# Patient Record
Sex: MALE | Race: WHITE | NOT HISPANIC OR LATINO | Employment: OTHER | URBAN - METROPOLITAN AREA
[De-identification: names, ages, dates, MRNs, and addresses within clinical notes are randomized per-mention and may not be internally consistent; named-entity substitution may affect disease eponyms.]

---

## 2017-01-10 DIAGNOSIS — G25.81 RESTLESS LEGS SYNDROME (RLS): Primary | ICD-10-CM

## 2017-01-11 RX ORDER — CARBIDOPA AND LEVODOPA 25; 100 MG/1; MG/1
TABLET ORAL
Qty: 90 TABLET | Refills: 1 | Status: SHIPPED | OUTPATIENT
Start: 2017-01-11 | End: 2017-08-26

## 2017-01-11 NOTE — TELEPHONE ENCOUNTER
CARBIDOPA/LEVODOPA 25-100MG TABS     Last Written Prescription Date: 11/10/16  Last Fill Quantity: 30, # refills: 1  Last Office Visit with [facility], [facility] or [facility] prescribing provider: 11/10/16   Next 5 appointments (look out 90 days)     Jan 17, 2017  1:45 PM   [facility] [address] [address] [phone]                   BP Readings from Last 3 Encounters:   11/10/16 112/64   07/09/15 116/72   01/15/15 126/80

## 2017-01-17 ENCOUNTER — OFFICE VISIT (OUTPATIENT)
Dept: FAMILY MEDICINE | Facility: CLINIC | Age: 52
End: 2017-01-17
Payer: COMMERCIAL

## 2017-01-17 VITALS
HEART RATE: 90 BPM | TEMPERATURE: 97.7 F | SYSTOLIC BLOOD PRESSURE: 128 MMHG | OXYGEN SATURATION: 96 % | DIASTOLIC BLOOD PRESSURE: 78 MMHG | HEIGHT: 63 IN | RESPIRATION RATE: 16 BRPM | WEIGHT: 138.4 LBS | BODY MASS INDEX: 24.52 KG/M2

## 2017-01-17 DIAGNOSIS — N40.1 BENIGN PROSTATIC HYPERPLASIA WITH LOWER URINARY TRACT SYMPTOMS, UNSPECIFIED MORPHOLOGY: Primary | ICD-10-CM

## 2017-01-17 PROCEDURE — 99214 OFFICE O/P EST MOD 30 MIN: CPT | Performed by: FAMILY MEDICINE

## 2017-01-17 RX ORDER — TAMSULOSIN HYDROCHLORIDE 0.4 MG/1
0.4 CAPSULE ORAL DAILY
Qty: 90 CAPSULE | Refills: 3 | Status: SHIPPED | OUTPATIENT
Start: 2017-01-17 | End: 2017-12-21

## 2017-01-17 RX ORDER — FINASTERIDE 5 MG/1
5 TABLET, FILM COATED ORAL DAILY
Qty: 90 TABLET | Refills: 1 | Status: SHIPPED | OUTPATIENT
Start: 2017-01-17 | End: 2017-12-21

## 2017-01-17 RX ORDER — TAMSULOSIN HYDROCHLORIDE 0.4 MG/1
0.4 CAPSULE ORAL DAILY
Qty: 30 CAPSULE | Refills: 11 | Status: SHIPPED | OUTPATIENT
Start: 2017-01-17 | End: 2017-01-17

## 2017-01-17 NOTE — PROGRESS NOTES
SUBJECTIVE:                                                    Giovanni Cedeno is a 51 year old male who presents to clinic today for the following health issues:  Health Maintenance Due   Topic Date Due     HEPATITIS C SCREENING  04/22/1983     Health Maintenance reviewed at today's visit patient asked to schedule/complete:   None, Health Maintenance up to date.    Genitourinary symptoms      Duration: years, getting worse    Description:  nocturia x 4    Intensity:  moderate    Accompanying signs and symptoms (fever/discharge/nausea/vomiting/back or abdominal pain):  None    History (frequent UTI's/kidney stones/prostate problems): None  Sexually active: no     Precipitating or alleviating factors: doesn't drink many fluids at night    Therapies tried and outcome: none   Outcome: na         .  Current Outpatient Prescriptions   Medication Sig Dispense Refill     tamsulosin (FLOMAX) 0.4 MG capsule Take 1 capsule (0.4 mg) by mouth daily 90 capsule 3     finasteride (PROSCAR) 5 MG tablet Take 1 tablet (5 mg) by mouth daily 90 tablet 1     carbidopa-levodopa (SINEMET)  MG per tablet TAKE 1 TABLET BY MOUTH AT BEDTIME 90 tablet 1     ketoconazole (NIZORAL) 2 % cream Apply topically 2 times daily 45 g 1     ibuprofen (ADVIL,MOTRIN) 600 MG tablet Take 1 tablet (600 mg) by mouth every 6 hours as needed for moderate pain 90 tablet 3     simvastatin (ZOCOR) 20 MG tablet TAKE 1 TABLET BY MOUTH EVERY DAY AT BEDTIME 30 tablet 11     gabapentin (NEURONTIN) 300 MG capsule TAKE 2 CAPSULES BY MOUTH EVERY NIGHT AT BEDTIME 60 capsule 11     mirtazapine (REMERON) 15 MG tablet TAKE 1 TABLET BY MOUTH EVERY NIGHT AT BEDTIME 30 tablet 4        No Known Allergies    Immunization History   Administered Date(s) Administered     Influenza (IIV3) 10/01/2013, 09/20/2014     Influenza Vaccine IM 3yrs+ 4 Valent IIV4 11/10/2016     Zoster vaccine, live 01/09/2015         reports that he does not drink alcohol.      reports that he does  "not use illicit drugs.    family history includes DIABETES in his father; HEART DISEASE in his father.    indicated that his mother is alive. He indicated that his father is alive.      has no past surgical history on file.     reports that he does not engage in sexual activity.  .  Pediatric History   Patient Guardian Status     Mother:  Rae Cedeno     Other Topics Concern     Parent/Sibling W/ Cabg, Mi Or Angioplasty Before 65f 55m? Yes     Social History Narrative         reports that he has never smoked. He has never used smokeless tobacco.    Medical, social, surgical, and family histories reviewed.    /78 mmHg  Pulse 90  Temp(Src) 97.7  F (36.5  C) (Tympanic)  Resp 16  Ht 5' 2.5\" (1.588 m)  Wt 138 lb 6.4 oz (62.778 kg)  BMI 24.89 kg/m2  SpO2 96%    Body mass index is 24.89 kg/(m^2).    Problem list, Medication list, Allergies, and Medical/Social/Surgical histories reviewed in [facility] and updated as appropriate.  Labs reviewed in [facility]  Patient Active Problem List   Diagnosis     Restless leg syndrome     Knee bursitis     Pure hypercholesterolemia     Major depression in partial remission (H)     Pre-diabetes     Epicondylitis, lateral humeral     Immunization reaction     Chronic pain syndrome     Chronic right shoulder pain     History reviewed. No pertinent past surgical history.    Social History   Substance Use Topics     Smoking status: Never Smoker      Smokeless tobacco: Never Used     Alcohol Use: No     Family History   Problem Relation Age of Onset     DIABETES Father      HEART DISEASE Father          No Known Allergies  Recent Labs   Lab Test  11/10/16   0945  07/09/15   1013  03/11/14   1047  05/08/13   0924   A1C   --   5.4  5.8  6.3*   LDL  167*   --   218*  150*   HDL  53   --   48  44   TRIG  173*   --   325*  184*   ALT  29   --   29  35   CR  0.93   --    --   1.00   GFRESTIMATED  85   --    --   79   GFRESTBLACK  >90   --    --   >90   POTASSIUM   --    --    --   5.0    " "    BP Readings from Last 6 Encounters:   01/17/17 128/78   11/10/16 112/64   07/09/15 116/72   01/15/15 126/80   01/09/15 122/74   11/07/14 118/74       Wt Readings from Last 3 Encounters:   01/17/17 138 lb 6.4 oz (62.778 kg)   11/10/16 132 lb (59.875 kg)   11/10/16 131 lb 3.2 oz (59.512 kg)         Positive symptoms or findings indicated by bold designation:     ROS: 10 point ROS neg other than the symptoms noted above in the HPI.except  has Restless leg syndrome; Knee bursitis; Pure hypercholesterolemia; Major depression in partial remission (H); Pre-diabetes; Epicondylitis, lateral humeral; Immunization reaction; Chronic pain syndrome; and Chronic right shoulder pain on his problem list.      CHRONIC PAIN SYNDROME     LDL OR \"BAD\" CHOLESTEROL  GOAL < 100     DEPRESSION IMPROVED    RIGHT LATERAL EPICONDYLIITIS    Constitutional: The patient denied fatigue, fever, insomnia, night sweats, recent illness and weight loss.  NORMAL     Eyes: The patient denied blindness, eye pain, eye tearing, photophobia, vision change and visual disturbance. NORMAL     Ears/Nose/Throat/Neck: The patient denied dizziness, facial pain, hearing loss, nasal discharge, oral pain, otalgia, postnasal drip, sinus congestion, sore throat, tinnitus and voice change.   NORMAL     Cardiovascular: The patient denied arrhythmia, chest pain/pressure, claudication, edema, exercise intolerance, fatigue, orthopnea, palpitations and syncope.  NORMAL     Respiratory: The patient denied asthma, chest congestion, cough, dyspnea on exertion, dyspnea/shortness of breath, hemoptysis, pedal edema, pleuritic pain, productive sputum, snoring and wheezing. NL    Gastrointestinal: The patient denied abdominal pain, anorexia, constipation, diarrhea, dysphagia, gastroesophageal reflux, hematochezia, hemorrhoids, melena, nausea and vomiting .WNL     Genitourinary/Nephrology: The patient denied breast complaint, dysuria, nocturia sexual dysfunction, t, urinary " "frequency, urinary incontinence, urinary urgency   DYSURIA   NOCUTURIA AND FREQUENCY     Musculoskeletal: The patient denied arthralgia(s), back pain, joint complaint, muscle weakness, myalgias, osteoporosis, sciatica, stiffness and swelling.  RIGHT SHOULDER PAIN     Dermatoligic:: The patient denied acne, dermatitis, ecchymosis, itching, mole change, rash, skin cancer, skin lesion and sores.      Neurologic: The patient denied dizziness, gait abnormality, headache, memory loss, mental status change, paresis, paresthesia, seizure, syncope, tremor and vision change.      Psychiatric: The patient denied anxiety, depression, disturbances of memory, drug abuse, insomnia, mood swings and relationship difficulties.  NORMAL     Endocrine: The patient denied , goiter, obesity, polyuria and thyroid disease.  NORMAL     Hematologic/Lymphatic: The patient denied abnormal bleeding and bruising, abnormal ecchymoses, anemia, lymph node enlargement/mass, petechiae and venous  Thrombosis.  NORMAL     Allergy/Immunology: The patient denied food allergy and  Allergic rhinitis or conjunctivitis.  NORMAL       PE:  /78 mmHg  Pulse 90  Temp(Src) 97.7  F (36.5  C) (Tympanic)  Resp 16  Ht 5' 2.5\" (1.588 m)  Wt 138 lb 6.4 oz (62.778 kg)  BMI 24.89 kg/m2  SpO2 96% Body mass index is 24.89 kg/(m^2).    Constitutional: general appearance, well nourished, well developed, in no acute distress, well developed, appears stated age, normal body habitus,  NORMAL     Eyes:; The patient has normal eyelids sclerae and conjunctivae : NORMAL      Ears/Nose/Throat: external ear, overall: normal appearance; external nose, overall: benign appearance, normal moujth gums and lips  The patient has: NORMAL     Neck: thyroid, overall: normal size, normal consistency, nontender,  NORMAL     Respiratory:  palpation of chest, overall: normal excursion, NORMAL   Clear to percussion and auscultation  NORMAL     Tachypnea  NORMAL  Color  NORMAL "     Cardiovascular:  Good color with no peripheral edema NORMAL   Regular sinus rhythm without murmur. Physiologic heart sounds Heart is unelarged  .   Chest/Breast: normal shape  NORMAL      Abdominal exam,  Liver and spleen are  unenlarged  NORMAL       Tenderness NORMAL   Scars  NORMAL     Urogenital; no renal, flank or bladder  tenderness;  NORMAL     Lymphatic: neck nodes,  NORMAL    Other notes NA     Musculoskeletal:  Brief ortho exam normal except:   IMPROVE EPICONDYLIITIS  AND SHOULDER RANGE OF MOTION WITHIN NORMAL LIMITS     Integument: inspection of skin, no rash, lesions; and, palpation, no induration, no tenderness.      Neurologic mental status, overall: alert and oriented; gait, no ataxia, no unsteadiness; coordination, no tremors; cranial nerves, overall: normal motor, overall: normal bulk, tone.      Psychiatric: orientation/consciousness, overall: oriented to person, place and time; behavior/psychomotor activity, no tics, normal psychomotor activity; mood and affect, overall: normal mood and affect; appearance, overall: well-groomed, good eye contact; speech, overall: normal quality, no aphasia and normal quality, quantity, intact.        ICD-10-CM    1. Benign prostatic hyperplasia with lower urinary tract symptoms, unspecified morphology N40.1 tamsulosin (FLOMAX) 0.4 MG capsule     finasteride (PROSCAR) 5 MG tablet     DISCONTINUED: tamsulosin (FLOMAX) 0.4 MG capsule        .    Side effects benefits and risks thoroughly discussed. .he may come in early if unimproved or getting worse          Importance of adhering to regimen discussed and if medications were dispensed, the importance of taking medications discussed and bringing in the medications after every visit for chronic problems         Please drink 2 glasses of water prior to meals and walk 15-30 minutes after meals    I spent  25 MINUTES SPENT  with patient discussing the following issues    The encounter diagnosis was Benign prostatic  hyperplasia with lower urinary tract symptoms, unspecified morphology. over half of which involved counseling and coordination of care.    Patient Instructions     (N40.1) Benign prostatic hyperplasia with lower urinary tract symptoms, unspecified morphology  (primary encounter diagnosis)    Comment:      Plan: tamsulosin (FLOMAX) 0.4 MG capsule, finasteride          (PROSCAR) 5 MG tablet, DISCONTINUED: tamsulosin          (FLOMAX) 0.4 MG capsule                Anika Reyes Jr., MD             Diet:  MEDITERRANEAN DIET     Exercise:  NORMAL   Exercises Range of motion, balance, isometric, and strengthening exercises 30 repetitions twice daily of involved joints      .ANIKA REYES MD 1/17/2017 7:27 PM  January 17, 2017

## 2017-01-17 NOTE — MR AVS SNAPSHOT
"              After Visit Summary   1/17/2017    Giovanni Cedeno    MRN: [id number]           Patient Information     Date Of Birth          1965        Visit Information        Provider Department      1/17/2017 1:45 PM Sony Velez MD [facility]        Today's Diagnoses     Benign prostatic hyperplasia with lower urinary tract symptoms, unspecified morphology    -  1       Care Instructions      (N40.1) Benign prostatic hyperplasia with lower urinary tract symptoms, unspecified morphology  (primary encounter diagnosis)    Comment:      Plan: tamsulosin (FLOMAX) 0.4 MG capsule, finasteride          (PROSCAR) 5 MG tablet, DISCONTINUED: tamsulosin          (FLOMAX) 0.4 MG capsule                Sony Velez Jr., MD             Follow-ups after your visit        Who to contact     If you have questions or need follow up information about today's clinic visit or your schedule please contact [facility] directly at [phone].  Normal or non-critical lab and imaging results will be communicated to you by MyChart, letter or phone within 4 business days after the clinic has received the results. If you do not hear from us within 7 days, please contact the clinic through [facility] [employer] [facility] or phone. If you have a critical or abnormal lab result, we will notify you by phone as soon as possible.  Submit refill requests through [employer] or call your pharmacy and they will forward the refill request to us. Please allow 3 business days for your refill to be completed.          Additional Information About Your Visit        [facility] [employer] Information     [employer] lets you send messages to your doctor, view your test results, renew your prescriptions, schedule appointments and more. To sign up, go to [email or web address] . Click on \"Log in\" on the left side of the screen, which will take you to the Welcome page. Then click on \"Sign up Now\" on the " "right side of the page.     You will be asked to enter the access code listed below, as well as some personal information. Please follow the directions to create your username and password.     Your access code is: [email or web address]  Expires: 2017  9:47 AM     Your access code will  in 90 days. If you need help or a new code, please call your [facility] clinic or [phone].        Care EveryWhere ID     This is your Care EveryWhere ID. This could be used by other organizations to access your [facility] medical records  [id number]        Your Vitals Were     Pulse Temperature Respirations Height BMI (Body Mass Index) Pulse Oximetry    90 97.7  F (36.5  C) (Tympanic) 16 5' 2.5\" (1.588 m) 24.89 kg/m2 96%       Blood Pressure from Last 3 Encounters:   17 128/78   11/10/16 112/64   07/09/15 116/72    Weight from Last 3 Encounters:   17 138 lb 6.4 oz (62.778 kg)   11/10/16 132 lb (59.875 kg)   11/10/16 131 lb 3.2 oz (59.512 kg)              Today, you had the following     No orders found for display         Today's Medication Changes          These changes are accurate as of: 17  2:57 PM.  If you have any questions, ask your nurse or doctor.               Start taking these medicines.        Dose/Directions    finasteride 5 MG tablet   Commonly known as:  PROSCAR   Used for:  Benign prostatic hyperplasia with lower urinary tract symptoms, unspecified morphology   Started by:  Sony Velez MD        Dose:  5 mg   Take 1 tablet (5 mg) by mouth daily   Quantity:  90 tablet   Refills:  1       tamsulosin 0.4 MG capsule   Commonly known as:  FLOMAX   Used for:  Benign prostatic hyperplasia with lower urinary tract symptoms, unspecified morphology   Started by:  Sony Velez MD        Dose:  0.4 mg   Take 1 capsule (0.4 mg) by mouth daily   Quantity:  90 capsule   Refills:  3            Where to get your medicines      These medications were sent to [id number] - " [place], [place] - [address] [address] [facility] [address] [facility] [address]  [address], [place] [address]    Hours:  24-hours Phone:  [phone]    - finasteride 5 MG tablet  - tamsulosin 0.4 MG capsule             Primary Care Provider Office Phone # Fax #    Sony Medina Velez [phone]       [facility] [address]  [facility] [address]        Thank you!     Thank you for choosing [facility]  for your care. Our goal is always to provide you with excellent care. Hearing back from our patients is one way we can continue to improve our services. Please take a few minutes to complete the written survey that you may receive in the mail after your visit with us. Thank you!             Your Updated Medication List - Protect others around you: Learn how to safely use, store and throw away your medicines at www.disposemymeds.org.          This list is accurate as of: 1/17/17  2:57 PM.  Always use your most recent med list.                   Brand Name Dispense Instructions for use    carbidopa-levodopa  MG per tablet    SINEMET    90 tablet    TAKE 1 TABLET BY MOUTH AT BEDTIME       finasteride 5 MG tablet    PROSCAR    90 tablet    Take 1 tablet (5 mg) by mouth daily       gabapentin 300 MG capsule    NEURONTIN    60 capsule    TAKE 2 CAPSULES BY MOUTH EVERY NIGHT AT BEDTIME       ibuprofen 600 MG tablet    ADVIL/MOTRIN    90 tablet    Take 1 tablet (600 mg) by mouth every 6 hours as needed for moderate pain       ketoconazole 2 % cream    NIZORAL    45 g    Apply topically 2 times daily       mirtazapine 15 MG tablet    REMERON    30 tablet    TAKE 1 TABLET BY MOUTH EVERY NIGHT AT BEDTIME       simvastatin 20 MG tablet    ZOCOR    30 tablet    TAKE 1 TABLET BY MOUTH EVERY DAY AT BEDTIME       tamsulosin 0.4 MG capsule    FLOMAX    90 capsule    Take 1 capsule (0.4 mg) by mouth daily

## 2017-01-17 NOTE — NURSING NOTE
"Chief Complaint   Patient presents with     Urinary Problem     nocturia       Initial /78 mmHg  Pulse 90  Temp(Src) 97.7  F (36.5  C) (Tympanic)  Resp 16  Ht 5' 2.5\" (1.588 m)  Wt 138 lb 6.4 oz (62.778 kg)  BMI 24.89 kg/m2  SpO2 96% Estimated body mass index is 24.89 kg/(m^2) as calculated from the following:    Height as of this encounter: 5' 2.5\" (1.588 m).    Weight as of this encounter: 138 lb 6.4 oz (62.778 kg).  BP completed using cuff size: regular  Marisol Phillips CMA      "

## 2017-02-06 ENCOUNTER — TELEPHONE (OUTPATIENT)
Dept: ORTHOPEDICS | Facility: CLINIC | Age: 52
End: 2017-02-06

## 2017-02-06 NOTE — TELEPHONE ENCOUNTER
Reason for Call:  Other call back    Detailed comments: Patient seen you in November of 2016 for right shoulder, wondering if its ok to go canoeing. Please advise.     Phone Number Patient can be reached at: Home number on file [phone] (home)    Best Time: any    Can we leave a detailed message on this number? YES    Call taken on 2/6/2017 at 11:12 AM by Martine Rodríguez

## 2017-02-06 NOTE — TELEPHONE ENCOUNTER
Called and left a vm for the patient letting him know he can do activities as tolerated, it is up to him. If it hurts he may want to refrain. I left our main office number.  Mildred Hernandez Certified Medical Assistant

## 2017-05-23 ENCOUNTER — RADIANT APPOINTMENT (OUTPATIENT)
Dept: GENERAL RADIOLOGY | Facility: CLINIC | Age: 52
End: 2017-05-23
Attending: FAMILY MEDICINE
Payer: COMMERCIAL

## 2017-05-23 ENCOUNTER — OFFICE VISIT (OUTPATIENT)
Dept: FAMILY MEDICINE | Facility: CLINIC | Age: 52
End: 2017-05-23
Payer: COMMERCIAL

## 2017-05-23 VITALS
DIASTOLIC BLOOD PRESSURE: 62 MMHG | TEMPERATURE: 97.2 F | OXYGEN SATURATION: 100 % | HEART RATE: 90 BPM | BODY MASS INDEX: 24.12 KG/M2 | SYSTOLIC BLOOD PRESSURE: 110 MMHG | RESPIRATION RATE: 16 BRPM | WEIGHT: 134 LBS

## 2017-05-23 DIAGNOSIS — R10.12 ABDOMINAL PAIN, LEFT UPPER QUADRANT: Primary | ICD-10-CM

## 2017-05-23 DIAGNOSIS — M54.9 UPPER BACK PAIN: ICD-10-CM

## 2017-05-23 DIAGNOSIS — M54.50 ACUTE MIDLINE LOW BACK PAIN WITHOUT SCIATICA: ICD-10-CM

## 2017-05-23 DIAGNOSIS — M79.674 PAIN OF TOE OF RIGHT FOOT: ICD-10-CM

## 2017-05-23 LAB
ALBUMIN SERPL-MCNC: 3.9 G/DL (ref 3.4–5)
ALBUMIN UR-MCNC: NEGATIVE MG/DL
ALP SERPL-CCNC: 72 U/L (ref 40–150)
ALT SERPL W P-5'-P-CCNC: 31 U/L (ref 0–70)
ANION GAP SERPL CALCULATED.3IONS-SCNC: 12 MMOL/L (ref 3–14)
APPEARANCE UR: CLEAR
AST SERPL W P-5'-P-CCNC: 17 U/L (ref 0–45)
BILIRUB SERPL-MCNC: 0.2 MG/DL (ref 0.2–1.3)
BILIRUB UR QL STRIP: NEGATIVE
BUN SERPL-MCNC: 14 MG/DL (ref 7–30)
CALCIUM SERPL-MCNC: 9.4 MG/DL (ref 8.5–10.1)
CHLORIDE SERPL-SCNC: 105 MMOL/L (ref 94–109)
CO2 SERPL-SCNC: 23 MMOL/L (ref 20–32)
COLOR UR AUTO: YELLOW
CREAT SERPL-MCNC: 0.78 MG/DL (ref 0.66–1.25)
ERYTHROCYTE [DISTWIDTH] IN BLOOD BY AUTOMATED COUNT: 12.4 % (ref 10–15)
ERYTHROCYTE [SEDIMENTATION RATE] IN BLOOD BY WESTERGREN METHOD: 6 MM/H (ref 0–20)
GFR SERPL CREATININE-BSD FRML MDRD: ABNORMAL ML/MIN/1.7M2
GLUCOSE SERPL-MCNC: 103 MG/DL (ref 70–99)
GLUCOSE UR STRIP-MCNC: NEGATIVE MG/DL
HCT VFR BLD AUTO: 50 % (ref 40–53)
HGB BLD-MCNC: 16.8 G/DL (ref 13.3–17.7)
HGB UR QL STRIP: NEGATIVE
KETONES UR STRIP-MCNC: NEGATIVE MG/DL
LEUKOCYTE ESTERASE UR QL STRIP: NEGATIVE
MCH RBC QN AUTO: 30.5 PG (ref 26.5–33)
MCHC RBC AUTO-ENTMCNC: 33.6 G/DL (ref 31.5–36.5)
MCV RBC AUTO: 91 FL (ref 78–100)
NITRATE UR QL: NEGATIVE
PH UR STRIP: 6 PH (ref 5–7)
PLATELET # BLD AUTO: 323 10E9/L (ref 150–450)
POTASSIUM SERPL-SCNC: 4.2 MMOL/L (ref 3.4–5.3)
PROT SERPL-MCNC: 7.6 G/DL (ref 6.8–8.8)
RBC # BLD AUTO: 5.51 10E12/L (ref 4.4–5.9)
SODIUM SERPL-SCNC: 140 MMOL/L (ref 133–144)
SP GR UR STRIP: 1.02 (ref 1–1.03)
URN SPEC COLLECT METH UR: NORMAL
UROBILINOGEN UR STRIP-ACNC: 0.2 EU/DL (ref 0.2–1)
WBC # BLD AUTO: 9.9 10E9/L (ref 4–11)

## 2017-05-23 PROCEDURE — 85027 COMPLETE CBC AUTOMATED: CPT | Performed by: FAMILY MEDICINE

## 2017-05-23 PROCEDURE — 72070 X-RAY EXAM THORAC SPINE 2VWS: CPT

## 2017-05-23 PROCEDURE — 85652 RBC SED RATE AUTOMATED: CPT | Performed by: FAMILY MEDICINE

## 2017-05-23 PROCEDURE — 81003 URINALYSIS AUTO W/O SCOPE: CPT | Performed by: FAMILY MEDICINE

## 2017-05-23 PROCEDURE — 99214 OFFICE O/P EST MOD 30 MIN: CPT | Performed by: FAMILY MEDICINE

## 2017-05-23 PROCEDURE — 36415 COLL VENOUS BLD VENIPUNCTURE: CPT | Performed by: FAMILY MEDICINE

## 2017-05-23 PROCEDURE — 80053 COMPREHEN METABOLIC PANEL: CPT | Performed by: FAMILY MEDICINE

## 2017-05-23 PROCEDURE — 72100 X-RAY EXAM L-S SPINE 2/3 VWS: CPT

## 2017-05-23 RX ORDER — HYDROCODONE BITARTRATE AND ACETAMINOPHEN 7.5; 325 MG/1; MG/1
1 TABLET ORAL EVERY 4 HOURS PRN
Qty: 60 TABLET | Refills: 0 | Status: SHIPPED | OUTPATIENT
Start: 2017-05-23 | End: 2017-10-16

## 2017-05-23 RX ORDER — CELECOXIB 200 MG/1
200 CAPSULE ORAL 2 TIMES DAILY PRN
Qty: 60 CAPSULE | Refills: 1 | Status: SHIPPED | OUTPATIENT
Start: 2017-05-23 | End: 2017-10-16

## 2017-05-23 RX ORDER — CYCLOBENZAPRINE HCL 5 MG
5 TABLET ORAL 3 TIMES DAILY PRN
Qty: 42 TABLET | Refills: 1 | Status: SHIPPED | OUTPATIENT
Start: 2017-05-23 | End: 2017-08-01

## 2017-05-23 NOTE — PROGRESS NOTES
NORMAL ERTHROCYTE SEDIMENTATION RATE IE INFLAMMATORY MARKER   NORMAL COMPLETE BLOOD PANEL WBCS RBCS AND PLATELETS   NORMAL URINE ANALYSIS   ANIKA REYES JR., MD

## 2017-05-23 NOTE — PROGRESS NOTES
SUBJECTIVE:                                                    Giovanni Cedeno is a 52 year old male who presents to clinic today for the following health issues:  Health Maintenance Due   Topic Date Due     HEPATITIS C SCREENING  04/22/1983     PHQ-9 Q6 MONTHS (NO INBASKET)  05/10/2017     Health Maintenance reviewed at today's visit patient asked to schedule/complete:   Depression:  Completed at today's visit.      Back Pain  UPPER MIDDLE BACK PAIN   AND LOWER MIDDLE BACK PAIN WITHOUT RADICULOPATHY X 3 DAYS   SEVERE AND GETTING WORSE  HISTORY OF RECURRENT EPISODES OF LOWER BACK PAIN   DOES HEAVY JOB WHICH REQUIRES LIFTING AND BENDING   ALREADY HAS SEEN CHIROPRACTOR with MIXED RESULTS      Duration: 2 days        Specific  dayscause: unknown    Description:   Location of pain: low back right and middle of back right  Character of pain: sharp, stabbing and constant  Pain radiation:none  New numbness or weakness in legs, not attributed to pain:  no     Intensity: At its worst 8/10    History:   Pain interferes with job: YES,   History of back problems: recurrent self limited episodes of low back pain in the past  Any previous MRI or X-rays: Yes--at unknown.  Date many years ago  Sees a specialist for back pain:  chiro yesterday, no change  Therapies tried without relief: chiropractor    Alleviating factors:   Improved by: none      Precipitating factors:  Worsened by: movement, has had a burning in stomach for the last few days    Functional and Psychosocial Screen (Demetrius STarT Back):              Accompanying Signs & Symptoms:  Risk of Fracture:  None  Risk of Cauda Equina:  None  Risk of Infection:  None  Risk of Cancer:  None  Risk of Ankylosing Spondylitis:  Onset at age <35, male, AND morning back stiffness. no                   Left upper quadrant   Epigastric pain x 4 days   Index toe right painful  X one week and getting worse   DIFFERENTIAL DIAGNOSIS DISCUSSION   SPLEEN RENAL BOWEL OR EPIGASTRIC PAIN  "    .ANIKA REYES MD .5/23/2017 8:43 AM .May 23, 2017    CURRENTLY ON TAMULOLSIN AND FINASTERIDE  FOR BENIGN PROSTATIC HYPERTROPHY SYMPTOMS   SINEMET FOR RESTLESS LEG SYNDROME   SIMVASTATIN FOR LDL OR \"BAD\" CHOLESTEROL  GOAL < 100   GABAPENTIN AND REMERON FOR PAIN AND SLEEP   .  Current Outpatient Prescriptions   Medication Sig Dispense Refill     HYDROcodone-acetaminophen (NORCO) 7.5-325 MG per tablet Take 1 tablet by mouth every 4 hours as needed for pain maximum  4  tablet(s) per day 60 tablet 0     celecoxib (CELEBREX) 200 MG capsule Take 1 capsule (200 mg) by mouth 2 times daily as needed for moderate pain 60 capsule 1     cyclobenzaprine (FLEXERIL) 5 MG tablet Take 1 tablet (5 mg) by mouth 3 times daily as needed for muscle spasms 42 tablet 1     tamsulosin (FLOMAX) 0.4 MG capsule Take 1 capsule (0.4 mg) by mouth daily 90 capsule 3     finasteride (PROSCAR) 5 MG tablet Take 1 tablet (5 mg) by mouth daily 90 tablet 1     carbidopa-levodopa (SINEMET)  MG per tablet TAKE 1 TABLET BY MOUTH AT BEDTIME 90 tablet 1     ketoconazole (NIZORAL) 2 % cream Apply topically 2 times daily 45 g 1     simvastatin (ZOCOR) 20 MG tablet TAKE 1 TABLET BY MOUTH EVERY DAY AT BEDTIME 30 tablet 11     gabapentin (NEURONTIN) 300 MG capsule TAKE 2 CAPSULES BY MOUTH EVERY NIGHT AT BEDTIME 60 capsule 11     mirtazapine (REMERON) 15 MG tablet TAKE 1 TABLET BY MOUTH EVERY NIGHT AT BEDTIME 30 tablet 4        No Known Allergies    Immunization History   Administered Date(s) Administered     Influenza (IIV3) 10/01/2013, 09/20/2014     Influenza Vaccine IM 3yrs+ 4 Valent IIV4 11/10/2016     Zoster vaccine, live 01/09/2015         reports that he does not drink alcohol.      reports that he does not use illicit drugs.    family history includes DIABETES in his father; HEART DISEASE in his father.    indicated that his mother is alive. He indicated that his father is alive.      has no past surgical history on file.     reports that he " does not engage in sexual activity.  .  Pediatric History   Patient Guardian Status     Mother:  Rae Cedeno     Other Topics Concern     Parent/Sibling W/ Cabg, Mi Or Angioplasty Before 65f 55m? Yes     Social History Narrative         reports that he has never smoked. He has never used smokeless tobacco.    Medical, social, surgical, and family histories reviewed.    Problem list, Medication list, Allergies, and Medical/Social/Surgical histories reviewed in [facility] and updated as appropriate.  Labs reviewed in [facility]  Patient Active Problem List   Diagnosis     Restless leg syndrome     Knee bursitis     Pure hypercholesterolemia     Major depression in partial remission (H)     Pre-diabetes     Epicondylitis, lateral humeral     Immunization reaction     Chronic pain syndrome     Chronic right shoulder pain     Abdominal pain, left upper quadrant     Pain of toe of right foot     Acute midline low back pain without sciatica     Upper back pain     History reviewed. No pertinent surgical history.    Social History   Substance Use Topics     Smoking status: Never Smoker     Smokeless tobacco: Never Used     Alcohol use No     Family History   Problem Relation Age of Onset     DIABETES Father      HEART DISEASE Father          No Known Allergies  Recent Labs   Lab Test  11/10/16   0945  07/09/15   1013  03/11/14   1047  05/08/13   0924   A1C   --   5.4  5.8  6.3*   LDL  167*   --   218*  150*   HDL  53   --   48  44   TRIG  173*   --   325*  184*   ALT  29   --   29  35   CR  0.93   --    --   1.00   GFRESTIMATED  85   --    --   79   GFRESTBLACK  >90   --    --   >90   POTASSIUM   --    --    --   5.0        BP Readings from Last 6 Encounters:   05/23/17 110/62   01/17/17 128/78   11/10/16 112/64   07/09/15 116/72   01/15/15 126/80   01/09/15 122/74       Wt Readings from Last 3 Encounters:   05/23/17 134 lb (60.8 kg)   01/17/17 138 lb 6.4 oz (62.8 kg)   11/10/16 132 lb (59.9 kg)         Positive symptoms or  findings indicated by bold designation:     ROS: 10 point ROS neg other than the symptoms noted above in the HPI.except  has Restless leg syndrome; Knee bursitis; Pure hypercholesterolemia; Major depression in partial remission (H); Pre-diabetes; Epicondylitis, lateral humeral; Immunization reaction; Chronic pain syndrome; Chronic right shoulder pain; Abdominal pain, left upper quadrant; Pain of toe of right foot; Acute midline low back pain without sciatica; and Upper back pain on his problem list.   Constitutional: The patient denied fatigue, fever, insomnia, night sweats, recent illness and weight loss.      Eyes: The patient denied blindness, eye pain, eye tearing, photophobia, vision change and visual disturbance.       Ears/Nose/Throat/Neck: The patient denied dizziness, facial pain, hearing loss, nasal discharge, oral pain, otalgia, postnasal drip, sinus congestion, sore throat, tinnitus and voice change.       Cardiovascular: The patient denied arrhythmia, chest pain/pressure, claudication, edema, exercise intolerance, fatigue, orthopnea, palpitations and syncope.      Respiratory: The patient denied asthma, chest congestion, cough, dyspnea on exertion, dyspnea/shortness of breath, hemoptysis, pedal edema, pleuritic pain, productive sputum, snoring and wheezing.     Gastrointestinal: The patient denied abdominal pain, anorexia, constipation, diarrhea, dysphagia, gastroesophageal reflux, hematochezia, hemorrhoids, melena, nausea and vomiting . LEFT UPPER QUADRANT ABDOMINAL PAIN   NOT CONSTIPATED     Genitourinary/Nephrology: The patient denied breast complaint, dysuria, nocturia sexual dysfunction, t, urinary frequency, urinary incontinence, urinary urgency        Musculoskeletal: The patient denied arthralgia(s), back pain, joint complaint, muscle weakness, myalgias, osteoporosis, sciatica, stiffness and swelling.  HISTORY OF CHRONIC LOWER BACK PAIN HISTORY OF MAGNETIC RESONANCE IMAGING OF BACK  XRAY SHOW  LACK OF CURVATURE THORACIC SPINE   NO OSTEOPOROTIC FRACTURE   LUMBAR DISC with SPONDYLOLYSIS     Dermatoligic:: The patient denied acne, dermatitis, ecchymosis, itching, mole change, rash, skin cancer, skin lesion and sores.      Neurologic: The patient denied dizziness, gait abnormality, headache, memory loss, mental status change, paresis, paresthesia, seizure, syncope, tremor and vision change.     Psychiatric: The patient denied anxiety, depression, disturbances of memory, drug abuse, insomnia, mood swings and relationship difficulties.      Endocrine: The patient denied , goiter, obesity, polyuria and thyroid disease.      Hematologic/Lymphatic: The patient denied abnormal bleeding and bruising, abnormal ecchymoses, anemia, lymph node enlargement/mass, petechiae and venous  Thrombosis.      Allergy/Immunology: The patient denied food allergy and  Allergic rhinitis or conjunctivitis.        PE:  /62  Pulse 90  Temp 97.2  F (36.2  C) (Tympanic)  Resp 16  Wt 134 lb (60.8 kg)  SpO2 100%  BMI 24.12 kg/m2 Body mass index is 24.12 kg/(m^2).    Constitutional: general appearance, well nourished, well developed, in no acute distress, well developed, appears stated age, normal body habitus,      Eyes:; The patient has normal eyelids sclerae and conjunctivae :      Ears/Nose/Throat: external ear, overall: normal appearance; external nose, overall: benign appearance, normal moujth gums and lips  The patient has:      Neck: thyroid, overall: normal size, normal consistency, nontender,      Respiratory:  palpation of chest, overall: normal excursion,    Clear to percussion and auscultation      Tachypnea   Color      Cardiovascular:  Good color with no peripheral edema  NORMAL   Regular sinus rhythm without murmur. Physiologic heart sounds Heart is unelarged  .   Chest/Breast: normal shape  NORMAL       Abdominal exam,  Liver and spleen are  unenlarged  NORMAL        Tenderness  LEFT UPPER QUADRANT ABDOMINAL PAIN     Scars  NORMAL      Urogenital; no renal, flank or bladder  tenderness;  NORMAL     Lymphatic: neck nodes,  NORMAL     Other nodes NORMAL     Musculoskeletal:  Brief ortho exam normal except:   DECREASE RANGE OF MOTION OF BACK NOTED   PAIN IN THORACIC AND MIDLINE LUMBAR AREA   LEFT UPPER QUADRANT ABDOMIANL PAIN    Integument: inspection of skin, no rash, lesions; and, palpation, no induration, no tenderness.      Neurologic mental status, overall: alert and oriented; gait, no ataxia, no unsteadiness; coordination, no tremors; cranial nerves, overall: normal motor, overall: normal bulk, tone.      Psychiatric: orientation/consciousness, overall: oriented to person, place and time; behavior/psychomotor activity, no tics, normal psychomotor activity; mood and affect, overall: normal mood and affect; appearance, overall: well-groomed, good eye contact; speech, overall: normal quality, no aphasia and normal quality, quantity, intact.  ANXIETY AND DEPRESSION AND INSOMNIA AND RESTLESS LEG SYNDROME     Diagnostic Test Results:  Results for orders placed or performed in visit on 05/23/17   UA reflex to Microscopic and Culture   Result Value Ref Range    Color Urine Yellow     Appearance Urine Clear     Glucose Urine Negative NEG mg/dL    Bilirubin Urine Negative NEG    Ketones Urine Negative NEG mg/dL    Specific Gravity Urine 1.020 1.003 - 1.035    Blood Urine Negative NEG    pH Urine 6.0 5.0 - 7.0 pH    Protein Albumin Urine Negative NEG mg/dL    Urobilinogen Urine 0.2 0.2 - 1.0 EU/dL    Nitrite Urine Negative NEG    Leukocyte Esterase Urine Negative NEG    Source Midstream Urine          ICD-10-CM    1. Abdominal pain, left upper quadrant R10.12 Comprehensive metabolic panel     CBC with platelets     Erythrocyte sedimentation rate auto     UA reflex to Microscopic and Culture     HYDROcodone-acetaminophen (NORCO) 7.5-325 MG per tablet   2. Pain of toe of right foot M79.674    3. Acute midline low back pain without  sciatica M54.5 XR Lumbar Spine 2/3 Views     celecoxib (CELEBREX) 200 MG capsule     cyclobenzaprine (FLEXERIL) 5 MG tablet   4. Upper back pain M54.9 XR Thoracic Spine 2 Views     celecoxib (CELEBREX) 200 MG capsule     cyclobenzaprine (FLEXERIL) 5 MG tablet        .    Side effects benefits and risks thoroughly discussed. .he may come in early if unimproved or getting worse          Importance of adhering to regimen discussed and if medications were dispensed, the importance of taking medications discussed and bringing in the medications after every visit for chronic problems         Please drink 2 glasses of water prior to meals and walk 15-30 minutes after meals    I spent  25 MINUTES SPENT  with patient discussing the following issues   The primary encounter diagnosis was Abdominal pain, left upper quadrant. Diagnoses of Pain of toe of right foot, Acute midline low back pain without sciatica, and Upper back pain were also pertinent to this visit. over half of which involved counseling and coordination of care.    Patient Instructions   Assessment: Lower back pain    Plan: do these exercises at least twice daily:  Standing or sitting exercise can be done every two hours    Joni' Flexion Versus Charly   Extension Exercises For   Low Back Pain   Examples of Joni' Flexion Exercises  1. Pelvic tilt.  Please press the small of your back against the floor.  Start with 5-10  and increase to 100 count over one month   2. Single Knee to chest. Lie on your back with legs in bent position. Alternate one leg and the other very slowly bringing the knee to chest.  Start with a count of 5-10   Over one month work up to 100  3. Double knee to chest.  Lie on your back with knees in bent position.  Bring both knees to the chest slowly hold for a count of 5-to 10. Over one month work to 100  4. Partial sit-up or crunch.  Lie on your back in bent leg position.  Please bring your body with arms crossed in front  To 30  degrees of flexion. Start with 5-10 over one month work up to 100 or more  5. Sit back.  Please sit on the side of the bed or a stair landing  And lie backwards until the abdominal muscles start to quiver.  Hold for a count of 5-10 and over a month work up to 100.  5. Hamstring stretch.  Please extend your legs while sitting on the floor as tolerated for 5-10 count.  Gradually increase to count of 30 over one month      Alternately find a stair landing or sturdy chair and place heel  In a comfortable level of extension  And stretch one hamstring at a time for 5-10 seconds.  Increase to count of 30 over one month                         Squat.  Stand with legs comfortably apart and lower the body slowly by flexing the knees  for count of 5-10 over one month increase to 30.  Useful for anterior disc protrusion, facette joint arthritis spond-10ylolysis, spondylolisthesis and spinal stenosis    Deena method or extension exercises  Useful disc bulging posteriorly  1. Prone pressups  Please lie on your abdomen.   Please do a push with the upper half of your body only.  This should not cause the pain to shoot down your buttock thigh leg or foot  Start with 10 and repeat x 3 one minute apart.  Repeat x 10 every 1-2 hours  Pain tends to increase in the center of back  And leaves buttock thighs legs and foot over time  You may shift your pelvis in opposite direction of buttock and leg pain to achieve even better results  2. Superman with arms extended  Or at the side Simultaneously lift your arms and legs off the floor. Start with 5-10 over one month increase to 100.  3. Cat stretch or cobra Start  As if in extended position of prone pressup but hold the stretch for 5 or 10 count. Over one moth to count of 30.  4.  Extensions can be done in standing position  As well if prone pressup is inconvenient.  Shift your pelvis in opposite direction of limb pain.  Put your hands  Flat on your buttocks and lean backwards without  loss of balance.  Count 10 x 3 times then 10 extensions hourly             ALL THE ABOVE PROBLEMS ARE STABLE AND MED CHANGES AS NOTED    Diet:  MEDITERRANEAN DIET     Exercise:  UPPER BACK   Exercises Range of motion, balance, isometric, and strengthening exercises 30 repetitions twice daily of involved joints      .ANIKA REYES MD 5/23/2017 8:43 AM  May 23, 2017

## 2017-05-23 NOTE — LETTER
[address]  [address]  [facility] [address] [phone]                                                                                                           Giovanni Cedeno  [address] [facility] [address]    May 23, 2017          Dear Giovanni Cedeno,      Off for at least one week   Upper and lower back pain   Avoid heavy lifting and bending x 4 weeks                 Sincerely,    Sony Velez Jr MD

## 2017-05-23 NOTE — PATIENT INSTRUCTIONS
Assessment: Lower back pain    Plan: do these exercises at least twice daily:  Standing or sitting exercise can be done every two hours    Joni' Flexion Versus Deena   Extension Exercises For   Low Back Pain   Examples of Joni' Flexion Exercises  1. Pelvic tilt.  Please press the small of your back against the floor.  Start with 5-10  and increase to 100 count over one month   2. Single Knee to chest. Lie on your back with legs in bent position. Alternate one leg and the other very slowly bringing the knee to chest.  Start with a count of 5-10   Over one month work up to 100  3. Double knee to chest.  Lie on your back with knees in bent position.  Bring both knees to the chest slowly hold for a count of 5-to 10. Over one month work to 100  4. Partial sit-up or crunch.  Lie on your back in bent leg position.  Please bring your body with arms crossed in front  To 30 degrees of flexion. Start with 5-10 over one month work up to 100 or more  5. Sit back.  Please sit on the side of the bed or a stair landing  And lie backwards until the abdominal muscles start to quiver.  Hold for a count of 5-10 and over a month work up to 100.  5. Hamstring stretch.  Please extend your legs while sitting on the floor as tolerated for 5-10 count.  Gradually increase to count of 30 over one month      Alternately find a stair landing or sturdy chair and place heel  In a comfortable level of extension  And stretch one hamstring at a time for 5-10 seconds.  Increase to count of 30 over one month                         Squat.  Stand with legs comfortably apart and lower the body slowly by flexing the knees  for count of 5-10 over one month increase to 30.  Useful for anterior disc protrusion, facette joint arthritis spond-10ylolysis, spondylolisthesis and spinal stenosis    Deena method or extension exercises  Useful disc bulging posteriorly  1. Prone pressups  Please lie on your abdomen.   Please do a push with the upper  half of your body only.  This should not cause the pain to shoot down your buttock thigh leg or foot  Start with 10 and repeat x 3 one minute apart.  Repeat x 10 every 1-2 hours  Pain tends to increase in the center of back  And leaves buttock thighs legs and foot over time  You may shift your pelvis in opposite direction of buttock and leg pain to achieve even better results  2. Superman with arms extended  Or at the side Simultaneously lift your arms and legs off the floor. Start with 5-10 over one month increase to 100.  3. Cat stretch or cobra Start  As if in extended position of prone pressup but hold the stretch for 5 or 10 count. Over one moth to count of 30.  4.  Extensions can be done in standing position  As well if prone pressup is inconvenient.  Shift your pelvis in opposite direction of limb pain.  Put your hands  Flat on your buttocks and lean backwards without loss of balance.  Count 10 x 3 times then 10 extensions hourly

## 2017-05-23 NOTE — LETTER
[place] [address] [facility]  [address]  [facility] [address]  [phone]                                                                                                           Giovanni Cedeno  [address] [facility] [address]    May 24, 2017      Dear Giovanni,    The results of your recent tests were reviewed and are enclosed.     AS WE DISCUSSED     Results for orders placed or performed in visit on 05/23/17   XR Lumbar Spine 2/3 Views    Narrative    XR LUMBAR SPINE 2-3 VIEWS 5/23/2017 8:43 AM    COMPARISON: None.    HISTORY: Low back pain.      Impression    IMPRESSION: Vertebral heights are preserved without evidence of  fracture. Trace retrolisthesis at L2-3. Mild/moderate disc space loss  at L1 to and L2-3.    TROY VERONICA     Thank you for choosing [facility].  We appreciate the opportunity to serve you and look forward to supporting your healthcare needs in the future.    If you have any questions or concerns, please call me or my staff at [phone].      Sincerely,    Sony Velez Jr MD

## 2017-05-23 NOTE — MR AVS SNAPSHOT
After Visit Summary   5/23/2017    Giovanni Cedeno    MRN: [id number]           Patient Information     Date Of Birth          1965        Visit Information        Provider Department      5/23/2017 7:45 AM Sony Velez MD [facility]        Today's Diagnoses     Abdominal pain, left upper quadrant    -  1    Pain of toe of right foot        Acute midline low back pain without sciatica        Upper back pain          Care Instructions    Assessment: Lower back pain    Plan: do these exercises at least twice daily:  Standing or sitting exercise can be done every two hours    Joni' Flexion Versus Charly   Extension Exercises For   Low Back Pain   Examples of Joni' Flexion Exercises  1. Pelvic tilt.  Please press the small of your back against the floor.  Start with 5-10  and increase to 100 count over one month   2. Single Knee to chest. Lie on your back with legs in bent position. Alternate one leg and the other very slowly bringing the knee to chest.  Start with a count of 5-10   Over one month work up to 100  3. Double knee to chest.  Lie on your back with knees in bent position.  Bring both knees to the chest slowly hold for a count of 5-to 10. Over one month work to 100  4. Partial sit-up or crunch.  Lie on your back in bent leg position.  Please bring your body with arms crossed in front  To 30 degrees of flexion. Start with 5-10 over one month work up to 100 or more  5. Sit back.  Please sit on the side of the bed or a stair landing  And lie backwards until the abdominal muscles start to quiver.  Hold for a count of 5-10 and over a month work up to 100.  5. Hamstring stretch.  Please extend your legs while sitting on the floor as tolerated for 5-10 count.  Gradually increase to count of 30 over one month      Alternately find a stair landing or sturdy chair and place heel  In a comfortable level of extension  And stretch one hamstring  at a time for 5-10 seconds.  Increase to count of 30 over one month                         Squat.  Stand with legs comfortably apart and lower the body slowly by flexing the knees  for count of 5-10 over one month increase to 30.  Useful for anterior disc protrusion, facette joint arthritis spond-10ylolysis, spondylolisthesis and spinal stenosis    Deena method or extension exercises  Useful disc bulging posteriorly  1. Prone pressups  Please lie on your abdomen.   Please do a push with the upper half of your body only.  This should not cause the pain to shoot down your buttock thigh leg or foot  Start with 10 and repeat x 3 one minute apart.  Repeat x 10 every 1-2 hours  Pain tends to increase in the center of back  And leaves buttock thighs legs and foot over time  You may shift your pelvis in opposite direction of buttock and leg pain to achieve even better results  2. Superman with arms extended  Or at the side Simultaneously lift your arms and legs off the floor. Start with 5-10 over one month increase to 100.  3. Cat stretch or cobra Start  As if in extended position of prone pressup but hold the stretch for 5 or 10 count. Over one moth to count of 30.  4.  Extensions can be done in standing position  As well if prone pressup is inconvenient.  Shift your pelvis in opposite direction of limb pain.  Put your hands  Flat on your buttocks and lean backwards without loss of balance.  Count 10 x 3 times then 10 extensions hourly             Follow-ups after your visit        Follow-up notes from your care team     Return in about 2 weeks (around 6/6/2017).      Who to contact     If you have questions or need follow up information about today's clinic visit or your schedule please contact [facility] directly at [phone].  Normal or non-critical lab and imaging results will be communicated to you by MyChart, letter or phone within 4 business days after the clinic has  "received the results. If you do not hear from us within 7 days, please contact the clinic through [employer] or phone. If you have a critical or abnormal lab result, we will notify you by phone as soon as possible.  Submit refill requests through [employer] or call your pharmacy and they will forward the refill request to us. Please allow 3 business days for your refill to be completed.          Additional Information About Your Visit        [employer] Information     [employer] lets you send messages to your doctor, view your test results, renew your prescriptions, schedule appointments and more. To sign up, go to [email or web address] . Click on \"Log in\" on the left side of the screen, which will take you to the Welcome page. Then click on \"Sign up Now\" on the right side of the page.     You will be asked to enter the access code listed below, as well as some personal information. Please follow the directions to create your username and password.     Your access code is: [email or web address]  Expires: 2017  8:52 AM     Your access code will  in 90 days. If you need help or a new code, please call your [place] clinic or [phone].        Care EveryWhere ID     This is your Care EveryWhere ID. This could be used by other organizations to access your [facility] medical records  [id number]        Your Vitals Were     Pulse Temperature Respirations Pulse Oximetry BMI (Body Mass Index)       90 97.2  F (36.2  C) (Tympanic) 16 100% 24.12 kg/m2        Blood Pressure from Last 3 Encounters:   17 110/62   17 128/78   11/10/16 112/64    Weight from Last 3 Encounters:   17 134 lb (60.8 kg)   17 138 lb 6.4 oz (62.8 kg)   11/10/16 132 lb (59.9 kg)              We Performed the Following     CBC with platelets     Comprehensive metabolic panel     Erythrocyte sedimentation rate auto     UA reflex to Microscopic and Culture          Today's Medication Changes          These changes are accurate as of: 17 "  9:02 AM.  If you have any questions, ask your nurse or doctor.               Start taking these medicines.        Dose/Directions    celecoxib 200 MG capsule   Commonly known as:  celeBREX   Used for:  Upper back pain, Acute midline low back pain without sciatica   Started by:  Sony Velez MD        Dose:  200 mg   Take 1 capsule (200 mg) by mouth 2 times daily as needed for moderate pain   Quantity:  60 capsule   Refills:  1       cyclobenzaprine 5 MG tablet   Commonly known as:  FLEXERIL   Used for:  Acute midline low back pain without sciatica, Upper back pain   Started by:  Sony Velez MD        Dose:  5 mg   Take 1 tablet (5 mg) by mouth 3 times daily as needed for muscle spasms   Quantity:  42 tablet   Refills:  1       HYDROcodone-acetaminophen 7.5-325 MG per tablet   Commonly known as:  NORCO   Used for:  Abdominal pain, left upper quadrant   Started by:  Sony Velez MD        Dose:  1 tablet   Take 1 tablet by mouth every 4 hours as needed for pain maximum  4  tablet(s) per day   Quantity:  60 tablet   Refills:  0            Where to get your medicines      These medications were sent to [facility] [address] [place] [id number] [facility]  [address] [place] [id number]    Hours:  24-hours Phone:  [phone]     celecoxib 200 MG capsule    cyclobenzaprine 5 MG tablet         Some of these will need a paper prescription and others can be bought over the counter.  Ask your nurse if you have questions.     Bring a paper prescription for each of these medications     HYDROcodone-acetaminophen 7.5-325 MG per tablet                Primary Care Provider Office Phone # Fax #    Sony Velez [phone]       [facility] ROSEMARY HOLMANXES [address]  [facility] [address]        Thank you!     Thank you for choosing [facility]  for your care.  Our goal is always to provide you with excellent care. Hearing back from our patients is one way we can continue to improve our services. Please take a few minutes to complete the written survey that you may receive in the mail after your visit with us. Thank you!             Your Updated Medication List - Protect others around you: Learn how to safely use, store and throw away your medicines at www.disposemymeds.org.          This list is accurate as of: 5/23/17  9:02 AM.  Always use your most recent med list.                   Brand Name Dispense Instructions for use    carbidopa-levodopa  MG per tablet    SINEMET    90 tablet    TAKE 1 TABLET BY MOUTH AT BEDTIME       celecoxib 200 MG capsule    celeBREX    60 capsule    Take 1 capsule (200 mg) by mouth 2 times daily as needed for moderate pain       cyclobenzaprine 5 MG tablet    FLEXERIL    42 tablet    Take 1 tablet (5 mg) by mouth 3 times daily as needed for muscle spasms       finasteride 5 MG tablet    PROSCAR    90 tablet    Take 1 tablet (5 mg) by mouth daily       gabapentin 300 MG capsule    NEURONTIN    60 capsule    TAKE 2 CAPSULES BY MOUTH EVERY NIGHT AT BEDTIME       HYDROcodone-acetaminophen 7.5-325 MG per tablet    NORCO    60 tablet    Take 1 tablet by mouth every 4 hours as needed for pain maximum  4  tablet(s) per day       ketoconazole 2 % cream    NIZORAL    45 g    Apply topically 2 times daily       mirtazapine 15 MG tablet    REMERON    30 tablet    TAKE 1 TABLET BY MOUTH EVERY NIGHT AT BEDTIME       simvastatin 20 MG tablet    ZOCOR    30 tablet    TAKE 1 TABLET BY MOUTH EVERY DAY AT BEDTIME       tamsulosin 0.4 MG capsule    FLOMAX    90 capsule    Take 1 capsule (0.4 mg) by mouth daily

## 2017-05-23 NOTE — PROGRESS NOTES
Please send normal lab letter when labs are complete  AS WE DISCUSSED   ANIKA REYES JR., MD     XR LUMBAR SPINE 2-3 VIEWS 5/23/2017 8:43 AM    COMPARISON: None.    HISTORY: Low back pain.           Impression            IMPRESSION: Vertebral heights are preserved without evidence of  fracture. Trace retrolisthesis at L2-3. Mild/moderate disc space loss  at L1 to and L2-3.    TROY VERONICA

## 2017-05-23 NOTE — PROGRESS NOTES
Please send normal lab letter when labs are complete  NORMAL COMPLETE METABOLIC PROFILE RENAL BLOOD SALTS LIVER GLUCOSE TEST   NORMAL ERTHROCYTE SEDIMENTATION RATE IE INFLAMMATORY MARKER   NORMAL COMPLETE BLOOD PANEL WBCS RBCS AND PLATELETS   NORMAL URINE ANALYSIS   ANIKA REYES JR., MD

## 2017-05-23 NOTE — LETTER
[address]  [address]  [facility] [address] [phone]                                                                                                           Giovanni Cedeno  [address] [facility] [address]    May 24, 2017      Dear Giovanni,    The results of your recent tests were reviewed and are enclosed.     NORMAL COMPLETE METABOLIC PROFILE RENAL BLOOD SALTS LIVER GLUCOSE TEST   NORMAL ERTHROCYTE SEDIMENTATION RATE IE INFLAMMATORY MARKER   NORMAL COMPLETE BLOOD PANEL WBCS RBCS AND PLATELETS   NORMAL URINE ANALYSIS     Results for orders placed or performed in visit on 05/23/17   Comprehensive metabolic panel   Result Value Ref Range    Sodium 140 133 - 144 mmol/L    Potassium 4.2 3.4 - 5.3 mmol/L    Chloride 105 94 - 109 mmol/L    Carbon Dioxide 23 20 - 32 mmol/L    Anion Gap 12 3 - 14 mmol/L    Glucose 103 (H) 70 - 99 mg/dL    Urea Nitrogen 14 7 - 30 mg/dL    Creatinine 0.78 0.66 - 1.25 mg/dL    GFR Estimate >90  Non  GFR Calc   >60 mL/min/1.7m2    GFR Estimate If Black >90   GFR Calc   >60 mL/min/1.7m2    Calcium 9.4 8.5 - 10.1 mg/dL    Bilirubin Total 0.2 0.2 - 1.3 mg/dL    Albumin 3.9 3.4 - 5.0 g/dL    Protein Total 7.6 6.8 - 8.8 g/dL    Alkaline Phosphatase 72 40 - 150 U/L    ALT 31 0 - 70 U/L    AST 17 0 - 45 U/L   CBC with platelets   Result Value Ref Range    WBC 9.9 4.0 - 11.0 10e9/L    RBC Count 5.51 4.4 - 5.9 10e12/L    Hemoglobin 16.8 13.3 - 17.7 g/dL    Hematocrit 50.0 40.0 - 53.0 %    MCV 91 78 - 100 fl    MCH 30.5 26.5 - 33.0 pg    MCHC 33.6 31.5 - 36.5 g/dL    RDW 12.4 10.0 - 15.0 %    Platelet Count 323 150 - 450 10e9/L   Erythrocyte sedimentation rate auto   Result Value Ref Range    Sed Rate 6 0 - 20 mm/h   UA reflex to Microscopic and Culture   Result Value Ref Range    Color Urine Yellow     Appearance Urine Clear     Glucose Urine Negative NEG mg/dL    Bilirubin Urine Negative NEG     Ketones Urine Negative NEG mg/dL    Specific Gravity Urine 1.020 1.003 - 1.035    Blood Urine Negative NEG    pH Urine 6.0 5.0 - 7.0 pH    Protein Albumin Urine Negative NEG mg/dL    Urobilinogen Urine 0.2 0.2 - 1.0 EU/dL    Nitrite Urine Negative NEG    Leukocyte Esterase Urine Negative NEG    Source Midstream Urine      Thank you for choosing [facility].  We appreciate the opportunity to serve you and look forward to supporting your healthcare needs in the future.    If you have any questions or concerns, please call me or my staff at [phone].      Sincerely,    Sony Velez Jr MD

## 2017-05-23 NOTE — NURSING NOTE
"Chief Complaint   Patient presents with     Back Pain       Initial /62  Pulse 90  Temp 97.2  F (36.2  C) (Tympanic)  Resp 16  Wt 134 lb (60.8 kg)  SpO2 100%  BMI 24.12 kg/m2 Estimated body mass index is 24.12 kg/(m^2) as calculated from the following:    Height as of 1/17/17: 5' 2.5\" (1.588 m).    Weight as of this encounter: 134 lb (60.8 kg).  Medication Reconciliation: complete   Marisol Phillips CMA    "

## 2017-08-01 DIAGNOSIS — M54.50 ACUTE MIDLINE LOW BACK PAIN WITHOUT SCIATICA: ICD-10-CM

## 2017-08-01 DIAGNOSIS — M54.9 UPPER BACK PAIN: ICD-10-CM

## 2017-08-01 NOTE — TELEPHONE ENCOUNTER
cyclobenzaprine (FLEXERIL) 5 MG tablet      Last Written Prescription Date:  5/23/17  Last Fill Quantity: 42,   # refills: 1  Last Office Visit with [facility] or [facility] prescribing provider: 5/23/17  Future Office visit:    Next 5 appointments (look out 90 days)     Aug 07, 2017  8:00 AM CDT   Return Visit with Jose G Blanchard MD   [facility]    [address] [address]  [place] [id number]   [phone]                   Routing refill request to provider for review/approval because:  Drug not on the [facility], [facility] or [facility] refill protocol or controlled substance

## 2017-08-03 RX ORDER — CYCLOBENZAPRINE HCL 5 MG
TABLET ORAL
Qty: 42 TABLET | Refills: 0 | Status: SHIPPED | OUTPATIENT
Start: 2017-08-03 | End: 2017-09-12

## 2017-08-03 NOTE — TELEPHONE ENCOUNTER
Controlled Substance Refill Request for cyclobenzaprine (flexeril) 5 mg Problem List Complete:  Yes     checked in past 6 months?  Yes 8/3/17 no concerns

## 2017-08-07 ENCOUNTER — OFFICE VISIT (OUTPATIENT)
Dept: ORTHOPEDICS | Facility: CLINIC | Age: 52
End: 2017-08-07
Payer: COMMERCIAL

## 2017-08-07 VITALS
BODY MASS INDEX: 23.55 KG/M2 | DIASTOLIC BLOOD PRESSURE: 83 MMHG | HEIGHT: 63 IN | SYSTOLIC BLOOD PRESSURE: 136 MMHG | WEIGHT: 132.9 LBS | OXYGEN SATURATION: 98 % | HEART RATE: 88 BPM

## 2017-08-07 DIAGNOSIS — M25.511 CHRONIC RIGHT SHOULDER PAIN: ICD-10-CM

## 2017-08-07 DIAGNOSIS — G89.29 CHRONIC PAIN OF RIGHT KNEE: ICD-10-CM

## 2017-08-07 DIAGNOSIS — M25.561 CHRONIC PAIN OF RIGHT KNEE: ICD-10-CM

## 2017-08-07 DIAGNOSIS — M25.811 SHOULDER IMPINGEMENT, RIGHT: Primary | ICD-10-CM

## 2017-08-07 DIAGNOSIS — G89.29 CHRONIC RIGHT SHOULDER PAIN: ICD-10-CM

## 2017-08-07 PROCEDURE — 20610 DRAIN/INJ JOINT/BURSA W/O US: CPT | Mod: RT | Performed by: ORTHOPAEDIC SURGERY

## 2017-08-07 PROCEDURE — 99213 OFFICE O/P EST LOW 20 MIN: CPT | Mod: 25 | Performed by: ORTHOPAEDIC SURGERY

## 2017-08-07 RX ORDER — TRIAMCINOLONE ACETONIDE 40 MG/ML
40 INJECTION, SUSPENSION INTRA-ARTICULAR; INTRAMUSCULAR ONCE
Qty: 1 ML | Refills: 0 | OUTPATIENT
Start: 2017-08-07 | End: 2017-08-07

## 2017-08-07 ASSESSMENT — PAIN SCALES - GENERAL: PAINLEVEL: NO PAIN (1)

## 2017-08-07 NOTE — PROGRESS NOTES
"CHIEF COMPLAINT:   Chief Complaint   Patient presents with     RECHECK     Right shoulder and right knee pain. Last injection: 11/10/16. Injection didn't last that long in the shoulder.  A couple months after the shoulder injection he did something and the pain came back. After he got the knee injection, that same day by 4 pm his knee was swollen and he couldn't walk on it. Once the swelling went down he didn't have any issues with it until last week, pain came back. He would like more injections.      HISTORY OF PRESENT ILLNESS    Giovanni \"JAKUB\" Pao is a 52 year old male seen  He returns to the clinic today for follow up of right knee pain. His right knee pain has returned. His pain is minimal, rated a 1/10. He has no problems with walking. Last injection was on 11/10/2016, it was a pes bursal injection. He reports by 4 PM that same day of the injection, his knee started to swell and was unable to bear weight. Once the swelling went down, he notes the injection worked great until last week.    He was also previously seen for right shoulder incomplete rotator cuff tear. No specific injury. Today, his pain is located over the posterolateral aspect of the shoulder. His pain today is minimal, rated a 1/10. His last injection was on 11/10/2016. He report this injection did not last very long. A couple months after the injection, patient thinks he may have done something to aggravate the pain, causing the injection to stop working. Today he is considering another injection.      Present symptoms: pain anterior medial right knee, pain right shoulder.  Pain severity: 1/10 knee and shoulder  Pain quality: dull and aching, sharp at times  Frequency of symptoms: occasionally  Night Pain: No  Pain while at rest: Yes    Other PMH:  has a past medical history of Arthritis.   Patient Active Problem List    Diagnosis Date Noted     Major depression in partial remission (H) 11/24/2013     Priority: High     Pre-diabetes " 11/24/2013     Priority: High     Abdominal pain, left upper quadrant 05/23/2017     Priority: Medium     Pain of toe of right foot 05/23/2017     Priority: Medium     Acute midline low back pain without sciatica 05/23/2017     Priority: Medium     Upper back pain 05/23/2017     Priority: Medium     Chronic right shoulder pain 11/10/2016     Priority: Medium     Chronic pain syndrome 07/09/2015     Priority: Medium     Patient is followed by ANIKA REYES for ongoing prescription of pain medication.  All refills should be approved by this provider, or covering partner.    Medication(s): Norco 7.5mg po three times daily 3.   Maximum quantity per month: 90  Clinic visit frequency required: Q 3 months     Controlled substance agreement on file: Yes       Date(s): 07/09/2015    Pain Clinic evaluation in the past: No       Location(s):  none     DIRE Total Score(s):    7/9/2015   Total Score 20       Last [facility] website verification:  none   [email or web address]         Immunization reaction 01/15/2015     Priority: Medium     Epicondylitis, lateral humeral 11/26/2013     Priority: Medium     Restless leg syndrome 05/08/2013     Priority: Medium     Knee bursitis 05/08/2013     Priority: Medium      right posterior medial hamstring insertion site       Pure hypercholesterolemia 05/08/2013     Priority: Medium     Medications:   Current Outpatient Prescriptions:      cyclobenzaprine (FLEXERIL) 5 MG tablet, TAKE 1 TABLET(5 MG) BY MOUTH THREE TIMES DAILY AS NEEDED FOR MUSCLE SPASMS, Disp: 42 tablet, Rfl: 0     HYDROcodone-acetaminophen (NORCO) 7.5-325 MG per tablet, Take 1 tablet by mouth every 4 hours as needed for pain maximum  4  tablet(s) per day, Disp: 60 tablet, Rfl: 0     celecoxib (CELEBREX) 200 MG capsule, Take 1 capsule (200 mg) by mouth 2 times daily as needed for moderate pain, Disp: 60 capsule, Rfl: 1     tamsulosin (FLOMAX) 0.4 MG capsule, Take 1 capsule (0.4 mg) by mouth daily, Disp: 90  "capsule, Rfl: 3     finasteride (PROSCAR) 5 MG tablet, Take 1 tablet (5 mg) by mouth daily, Disp: 90 tablet, Rfl: 1     carbidopa-levodopa (SINEMET)  MG per tablet, TAKE 1 TABLET BY MOUTH AT BEDTIME, Disp: 90 tablet, Rfl: 1     ketoconazole (NIZORAL) 2 % cream, Apply topically 2 times daily, Disp: 45 g, Rfl: 1     simvastatin (ZOCOR) 20 MG tablet, TAKE 1 TABLET BY MOUTH EVERY DAY AT BEDTIME, Disp: 30 tablet, Rfl: 11     gabapentin (NEURONTIN) 300 MG capsule, TAKE 2 CAPSULES BY MOUTH EVERY NIGHT AT BEDTIME, Disp: 60 capsule, Rfl: 11     mirtazapine (REMERON) 15 MG tablet, TAKE 1 TABLET BY MOUTH EVERY NIGHT AT BEDTIME, Disp: 30 tablet, Rfl: 4    Allergies: No Known Allergies    REVIEW OF SYSTEMS:  CONSTITUTIONAL:NEGATIVE for fever, chills, change in weight  INTEGUMENTARY/SKIN: NEGATIVE for worrisome rashes, moles or lesions  MUSCULOSKELETAL:See HPI above  NEURO: NEGATIVE for weakness, dizziness or paresthesias    This document serves as a record of the services and decisions personally performed and made by Jose G Blanchard MD. It was created on his behalf by Merced Moran, a trained medical scribe. The creation of this document is based the provider's statements to the medical scribe.    Alessioibharjeet Moran 8:26 AM 8/7/2017      PHYSICAL EXAM:  /83  Pulse 88  Ht 1.588 m (5' 2.5\")  Wt 60.3 kg (132 lb 14.4 oz)  SpO2 98%  BMI 23.92 kg/m2   GENERAL APPEARANCE: healthy, alert, no distress   SKIN: no suspicious lesions or rashes  NEURO: Normal strength and tone, mentation intact and speech normal  PSYCH:  mentation appears normal and affect normal  RESPIRATORY: No increased work of breathing.      RIGHT UPPER EXTREMITY:  Sensation intact to light touch in median, radial, ulnar and axillary nerve distributions  Palpable 2+ radial pulse, brisk capillary refill to all fingers, wwp  Intact epl fpl fdp edc wrist flexion/extension biceps triceps deltoid    RIGHT SHOULDER:  Shoulder Inspection: no swelling, bruising, " discoloration, or obvious deformity or asymmetry  Tender: mild AC joint, greater tuberosity and pectoralis over the anterior chest.   Non-tender: SC joint, proximal-mid clavicle, mid-distal clavicle, acromion, anterior capsule, proximal bicep tendon, supraspinatus , infraspinatus, upper trapezius muscle and rhomboids  Range of Motion:   Active: forward flexion 170 degrees, external rotation  70 degrees, internal rotation  T12   Passive: forward flexion 175 degrees,   Strength: forward flexion 5-/5, painful, limited by pain, External rotation 5-/5, painful, limited by pain  Liftoff: Able  Impingement: all grade 2 positive  Special tests: Belly Press: Negative  Empty Can: Negative  Belly press: Negative      LEFT UPPER EXTREMITY:  Sensation intact to light touch in median, radial, ulnar and axillary nerve distributions  Palpable 2+ radial pulse, brisk capillary refill to all fingers, wwp  Intact epl fpl fdp edc wrist flexion/extension biceps triceps deltoid    LEFT SHOULDER:  Shoulder Inspection: no swelling, bruising, discoloration, or obvious deformity or asymmetry  Tender: none  Non-tender: SC joint, proximal-mid clavicle, mid-distal clavicle, AC joint, acromion, anterior capsule, proximal bicep tendon, greater tuberosity, proximal humerus, supraspinatus , infraspinatus, upper trapezius muscle and rhomboids  Range of Motion:   Active:forward flexion 175 degrees, external rotation  70 degrees, internal rotation  T8   Passive: same  Strength: forward flexion 5-/5, External rotation 5-/5  Liftoff: Able  Impingement: negative.    BILATERAL LOWER EXTREMITIES:  GAIT: normal.  Toes warm and well perfused, brisk capillary refill. Palpable 2+ dp pulses.  Bilateral calf soft and nttp or squeeze.  Edema: none      RIGHT KNEE EXAM:    Skin: intact, no ecchymosis or erythema  Squat: 100 %, not limited by pain.     ROM: 1 degrees hyperextension to 135 degrees flexion  Effusion: none  Tender: none.  Non-tender med/lat joint line,  anterior or posterior knee, pes bursa  McMurrays: negative    MCL: stable, and non-painful at both 0 and 30 degrees knee flexion  Varus stress: stable, and non-painful at both 0 and 30 degrees knee flexion  Lachmans: neg, firm endpoint  Posterior Drawer stable  Patellofemoral joint:                Apprehension: negative              Crepitations: minimal   Grind: none    LEFT KNEE EXAM:    Skin: intact, no ecchymosis or erythema  Squat: 100 %, not limited by pain.     ROM: 1 degrees hyperextension to 135 degrees flexion  Effusion: none  Tender: none.   Non-tender med/lat joint line, anterior or posterior knee  McMurrays: negative    MCL: stable, and non-painful at both 0 and 30 degrees knee flexion  Varus stress: stable, and non-painful at both 0 and 30 degrees knee flexion  Lachmans: neg, firm endpoint  Posterior Drawer stable  Patellofemoral joint:                Apprehension: negative              Crepitations: minimal   Grind: none      X-rays : 3 views right knee 11/10/2016 reviewed, No obvious fracture or dislocation. No obvious bony abnormality or lesion. Preserved joint spaces.    X-rays : no new x-rays today.  3 views right shoulder 8/21/2014 reviewed. No obvious fracture or dislocation. No obvious bony abnormality or lesion. Possible cystic change greater tuberosity.    MRI of right shoulder from 12/14/2015 that showed:  IMPRESSION:    1. Supraspinatus degenerative tendinopathy with superimposed small  thickness tear anterolaterally.  2. Subscapularis degenerative tendinopathy without tear.  3. Fluid in subacromial    Impression: 52 year old male with Right shoulder pain consistent with impingement syndrome, rotator cuff tendinosis with small rotator cuff tear. Left knee pain consistent with mild osteoarthritis.    Plan:   * reviewed imaging studies with patient.     KNEE    * rest, sitting  * Activity modification - avoid impact activities or activities that aggravate symptoms. Avoid repetitive  activities.  * NSAIDS (non-steroidal anti-inflammatory medications; e.g. Aleve, advil, motrin, ibuprofen) - regular use for inflammation ( twice daily or three times daily), with food, as long as no contra-indications Please discuss with primary care doctor if needed  * ice, 15-20 minutes at a time several times a day or as needed.  * Strengthening of quadriceps muscles  * Physical Therapy for strengthening, stretching and range of motion exercises of legs  * Tylenol as needed for pain, consider Tylenol arthritis or similar  * Exercise: low impact such as stationary bike, elliptical, pool.  * Injections: cortisone; risks and perceived benefits discussed today. Patient decides not to proceed today, not that bad.    * return to clinic as needed.    SHOULDER  * discussed with patient finding of a small rotator cuff tear, its implications and potential treatment options  * discussed various options with patient, including nonop with Physical Therapy, injections, NSAIDS, activity modification versus rotator cuff repair. Risks and benefits of each discussed in detail.  * risks of nonop treatment include continued pain, weakness, progression of tear, development of rotator cuff tear arthropathy and arthrosis, decreased range of motion and stiffness.  * Risks of surgery include, but not limited to: bleeding, infection, pain, scar, damage to adjacent structures (e.g. Nerves, blood vessels, bone, cartilage), temporary or permanent nerve damage, recurrence of symptoms, failure to relieve pain or weakness, stiffness, post-traumatic arthritis, development of rotator cuff tear arthropathy and arthrosis, decreased range of motion and stiffness, need for further surgery, blood clots, pulmonary embolism, risks of anesthesia, death.    * despite these risks, patient would like to proceed with injection.  * return to clinic as needed.    PROCEDURE NOTE:  The risks, perceived benefits and potential complications (including but not  limited to: bleeding, infection, pain, scar, damage to adjacent structures, atrophy or necrosis of soft tissue, skin blanching, failure to relieve symptoms, worsening of symptoms, allergic reaction) of injection were discussed with the patient. Questions were addressed and answered.The patient elected to proceed. Written informed consent was obtained. The correct procedural site was identified and confirmed. A Right Shoulder subacromial injection was performed using 2mL Kenalog-40 40mg per mL and 7mL (4mL 1% lidocaine, 3mL 0.25% marcaine)  of local anesthetic after sterile prep, to the correct procedural site. Sterile bandaid applied. This was tolerated well by the patient. No apparent complications. Did also discuss that if diabetic, recommend close monitoring of blood sugars over the next week as cortisone injections can temporarily elevate blood sugars.      The information in this document, created by a scribe for me, accurately reflects the services I personally performed and the decisions made by me. I have reviewed and approved this document for accuracy.     Jose G Blanchard M.D., M.S.  Dept. of Orthopaedic Surgery  [facility]

## 2017-08-07 NOTE — PATIENT INSTRUCTIONS
Please remember to call and schedule a follow up appointment if one was recommended at your earliest convenience.  Orthopedics CLINIC HOURS TELEPHONE NUMBER   Dr. Lo Levy  Certified Medical Assistant   Monday & Wednesday   8am - 5pm  Thursday 1pm - 5pm  Friday 8am -11:30am Specialty schedulers:   [phone] to schedule your surgery.  Main Clinic:   [phone] to make an appointment with any provider.    Urgent Care locations:    [facility] Monday-Friday Closed  Saturday-Sunday 9am-5pm      Monday-Friday 12pm - 8pm  Saturday-Sunday 9am-5pm [phone]     If SURGERY has been recommended, please call our Specialty Schedulers at [phone] to schedule your procedure.    If you need a medication refill, please contact your pharmacy. Please allow 3 business days for your refill to be completed.    If an MRI or CT scan has been recommended, please call [facility] Schedulers at [phone] to schedule your appointment.  Use [employer] (secure e-mail communication and access to your chart) to send a message or to make an appointment. Please ask how you can sign up for [employer].  Your care team's suggested websites for health information:   [email or web address] : Up to date and easily searchable information on multiple topics.   [email or web address] : [place] dept of heat, public health issues in [place], N1N1

## 2017-08-07 NOTE — NURSING NOTE
"Chief Complaint   Patient presents with     RECHECK     Right shoulder and right knee pain. Last injection: 11/10/16. Injection didn't last that long in the shoulder.  A couple months after the shoulder injection he did something and the pain came back. After he got the knee injection, that same day by 4 pm his knee was swollen and he couldn't walk on it. Once the swelling went down he didn't have any issues with it until last week, pain came back. He would like more injections.        Initial /83  Pulse 88  Ht 1.588 m (5' 2.5\")  Wt 60.3 kg (132 lb 14.4 oz)  SpO2 98%  BMI 23.92 kg/m2 Estimated body mass index is 23.92 kg/(m^2) as calculated from the following:    Height as of this encounter: 1.588 m (5' 2.5\").    Weight as of this encounter: 60.3 kg (132 lb 14.4 oz).  Medication Reconciliation: ngoc Hernandez Certified Medical Assistant    "

## 2017-08-07 NOTE — PROGRESS NOTES
The patient's right shoulder was prepped with betadine solution after verification of allergies. Area approximately 10 cm x 10 cm prepped in a sterile fashion. After injection, betadine removed with soap and water and band-aids applied.    4cc Lidocaine 1% NDC 5945-4971-51, LOT -[id number],  [id number]  3cc Bupivacaine 0.25% NDC 6013-9573-29, LOT -dk,  2018  2cc Kenalog 40 NDC 6172-7097-89, LOT [id number],   injected into patient's right shoulder subacromial space without resistance using posterolateral approach by:   Ammon Daniel PA-C, CAQ (Ortho)  Supervising Physician: Jose G Blanchard M.D., M.S.  Dept. of Orthopaedic Surgery  [facility]

## 2017-08-07 NOTE — LETTER
"    8/7/2017         RE: Giovanni Cedeno  [address]  [place] [address]        Dear Colleague,    Thank you for referring your patient, Giovanni Cedeno, to the [facility] [place]. Please see a copy of my visit note below.    CHIEF COMPLAINT:   Chief Complaint   Patient presents with     RECHECK     Right shoulder and right knee pain. Last injection: 11/10/16. Injection didn't last that long in the shoulder.  A couple months after the shoulder injection he did something and the pain came back. After he got the knee injection, that same day by 4 pm his knee was swollen and he couldn't walk on it. Once the swelling went down he didn't have any issues with it until last week, pain came back. He would like more injections.      HISTORY OF PRESENT ILLNESS    Giovanni \"JAKUB\" Pao is a 52 year old male seen  He returns to the clinic today for follow up of right knee pain. His right knee pain has returned. His pain is minimal, rated a 1/10. He has no problems with walking. Last injection was on 11/10/2016, it was a pes bursal injection. He reports by 4 PM that same day of the injection, his knee started to swell and was unable to bear weight. Once the swelling went down, he notes the injection worked great until last week.    He was also previously seen for right shoulder incomplete rotator cuff tear. No specific injury. Today, his pain is located over the posterolateral aspect of the shoulder. His pain today is minimal, rated a 1/10. His last injection was on 11/10/2016. He report this injection did not last very long. A couple months after the injection, patient thinks he may have done something to aggravate the pain, causing the injection to stop working. Today he is considering another injection.      Present symptoms: pain anterior medial right knee, pain right shoulder.  Pain severity: 1/10 knee and shoulder  Pain quality: dull and aching, sharp at times  Frequency of " symptoms: occasionally  Night Pain: No  Pain while at rest: Yes    Other PMH:  has a past medical history of Arthritis.   Patient Active Problem List    Diagnosis Date Noted     Major depression in partial remission (H) 11/24/2013     Priority: High     Pre-diabetes 11/24/2013     Priority: High     Abdominal pain, left upper quadrant 05/23/2017     Priority: Medium     Pain of toe of right foot 05/23/2017     Priority: Medium     Acute midline low back pain without sciatica 05/23/2017     Priority: Medium     Upper back pain 05/23/2017     Priority: Medium     Chronic right shoulder pain 11/10/2016     Priority: Medium     Chronic pain syndrome 07/09/2015     Priority: Medium     Patient is followed by ANIKA REYES for ongoing prescription of pain medication.  All refills should be approved by this provider, or covering partner.    Medication(s): Norco 7.5mg po three times daily 3.   Maximum quantity per month: 90  Clinic visit frequency required: Q 3 months     Controlled substance agreement on file: Yes       Date(s): 07/09/2015    Pain Clinic evaluation in the past: No       Location(s):  none     DIRE Total Score(s):    7/9/2015   Total Score 20       Last [facility] website verification:  none   [email or web address]         Immunization reaction 01/15/2015     Priority: Medium     Epicondylitis, lateral humeral 11/26/2013     Priority: Medium     Restless leg syndrome 05/08/2013     Priority: Medium     Knee bursitis 05/08/2013     Priority: Medium      right posterior medial hamstring insertion site       Pure hypercholesterolemia 05/08/2013     Priority: Medium     Medications:   Current Outpatient Prescriptions:      cyclobenzaprine (FLEXERIL) 5 MG tablet, TAKE 1 TABLET(5 MG) BY MOUTH THREE TIMES DAILY AS NEEDED FOR MUSCLE SPASMS, Disp: 42 tablet, Rfl: 0     HYDROcodone-acetaminophen (NORCO) 7.5-325 MG per tablet, Take 1 tablet by mouth every 4 hours as needed for pain maximum  4  tablet(s)  "per day, Disp: 60 tablet, Rfl: 0     celecoxib (CELEBREX) 200 MG capsule, Take 1 capsule (200 mg) by mouth 2 times daily as needed for moderate pain, Disp: 60 capsule, Rfl: 1     tamsulosin (FLOMAX) 0.4 MG capsule, Take 1 capsule (0.4 mg) by mouth daily, Disp: 90 capsule, Rfl: 3     finasteride (PROSCAR) 5 MG tablet, Take 1 tablet (5 mg) by mouth daily, Disp: 90 tablet, Rfl: 1     carbidopa-levodopa (SINEMET)  MG per tablet, TAKE 1 TABLET BY MOUTH AT BEDTIME, Disp: 90 tablet, Rfl: 1     ketoconazole (NIZORAL) 2 % cream, Apply topically 2 times daily, Disp: 45 g, Rfl: 1     simvastatin (ZOCOR) 20 MG tablet, TAKE 1 TABLET BY MOUTH EVERY DAY AT BEDTIME, Disp: 30 tablet, Rfl: 11     gabapentin (NEURONTIN) 300 MG capsule, TAKE 2 CAPSULES BY MOUTH EVERY NIGHT AT BEDTIME, Disp: 60 capsule, Rfl: 11     mirtazapine (REMERON) 15 MG tablet, TAKE 1 TABLET BY MOUTH EVERY NIGHT AT BEDTIME, Disp: 30 tablet, Rfl: 4    Allergies: No Known Allergies    REVIEW OF SYSTEMS:  CONSTITUTIONAL:NEGATIVE for fever, chills, change in weight  INTEGUMENTARY/SKIN: NEGATIVE for worrisome rashes, moles or lesions  MUSCULOSKELETAL:See HPI above  NEURO: NEGATIVE for weakness, dizziness or paresthesias    This document serves as a record of the services and decisions personally performed and made by Jose G Blanchard MD. It was created on his behalf by Merced Moran, a trained medical scribe. The creation of this document is based the provider's statements to the medical scribe.    Scribe Merced Moran 8:26 AM 8/7/2017      PHYSICAL EXAM:  /83  Pulse 88  Ht 1.588 m (5' 2.5\")  Wt 60.3 kg (132 lb 14.4 oz)  SpO2 98%  BMI 23.92 kg/m2   GENERAL APPEARANCE: healthy, alert, no distress   SKIN: no suspicious lesions or rashes  NEURO: Normal strength and tone, mentation intact and speech normal  PSYCH:  mentation appears normal and affect normal  RESPIRATORY: No increased work of breathing.      RIGHT UPPER EXTREMITY:  Sensation intact to light touch " in median, radial, ulnar and axillary nerve distributions  Palpable 2+ radial pulse, brisk capillary refill to all fingers, wwp  Intact epl fpl fdp edc wrist flexion/extension biceps triceps deltoid    RIGHT SHOULDER:  Shoulder Inspection: no swelling, bruising, discoloration, or obvious deformity or asymmetry  Tender: mild AC joint, greater tuberosity and pectoralis over the anterior chest.   Non-tender: SC joint, proximal-mid clavicle, mid-distal clavicle, acromion, anterior capsule, proximal bicep tendon, supraspinatus , infraspinatus, upper trapezius muscle and rhomboids  Range of Motion:   Active: forward flexion 170 degrees, external rotation  70 degrees, internal rotation  T12   Passive: forward flexion 175 degrees,   Strength: forward flexion 5-/5, painful, limited by pain, External rotation 5-/5, painful, limited by pain  Liftoff: Able  Impingement: all grade 2 positive  Special tests: Belly Press: Negative  Empty Can: Negative  Belly press: Negative      LEFT UPPER EXTREMITY:  Sensation intact to light touch in median, radial, ulnar and axillary nerve distributions  Palpable 2+ radial pulse, brisk capillary refill to all fingers, wwp  Intact epl fpl fdp edc wrist flexion/extension biceps triceps deltoid    LEFT SHOULDER:  Shoulder Inspection: no swelling, bruising, discoloration, or obvious deformity or asymmetry  Tender: none  Non-tender: SC joint, proximal-mid clavicle, mid-distal clavicle, AC joint, acromion, anterior capsule, proximal bicep tendon, greater tuberosity, proximal humerus, supraspinatus , infraspinatus, upper trapezius muscle and rhomboids  Range of Motion:   Active:forward flexion 175 degrees, external rotation  70 degrees, internal rotation  T8   Passive: same  Strength: forward flexion 5-/5, External rotation 5-/5  Liftoff: Able  Impingement: negative.    BILATERAL LOWER EXTREMITIES:  GAIT: normal.  Toes warm and well perfused, brisk capillary refill. Palpable 2+ dp pulses.  Bilateral  calf soft and nttp or squeeze.  Edema: none      RIGHT KNEE EXAM:    Skin: intact, no ecchymosis or erythema  Squat: 100 %, not limited by pain.     ROM: 1 degrees hyperextension to 135 degrees flexion  Effusion: none  Tender: none.  Non-tender med/lat joint line, anterior or posterior knee, pes bursa  McMurrays: negative    MCL: stable, and non-painful at both 0 and 30 degrees knee flexion  Varus stress: stable, and non-painful at both 0 and 30 degrees knee flexion  Lachmans: neg, firm endpoint  Posterior Drawer stable  Patellofemoral joint:                Apprehension: negative              Crepitations: minimal   Grind: none    LEFT KNEE EXAM:    Skin: intact, no ecchymosis or erythema  Squat: 100 %, not limited by pain.     ROM: 1 degrees hyperextension to 135 degrees flexion  Effusion: none  Tender: none.   Non-tender med/lat joint line, anterior or posterior knee  McMurrays: negative    MCL: stable, and non-painful at both 0 and 30 degrees knee flexion  Varus stress: stable, and non-painful at both 0 and 30 degrees knee flexion  Lachmans: neg, firm endpoint  Posterior Drawer stable  Patellofemoral joint:                Apprehension: negative              Crepitations: minimal   Grind: none      X-rays : 3 views right knee 11/10/2016 reviewed, No obvious fracture or dislocation. No obvious bony abnormality or lesion. Preserved joint spaces.    X-rays : no new x-rays today.  3 views right shoulder 8/21/2014 reviewed. No obvious fracture or dislocation. No obvious bony abnormality or lesion. Possible cystic change greater tuberosity.    MRI of right shoulder from 12/14/2015 that showed:  IMPRESSION:    1. Supraspinatus degenerative tendinopathy with superimposed small  thickness tear anterolaterally.  2. Subscapularis degenerative tendinopathy without tear.  3. Fluid in subacromial    Impression: 52 year old male with Right shoulder pain consistent with impingement syndrome, rotator cuff tendinosis with small  rotator cuff tear. Left knee pain consistent with mild osteoarthritis.    Plan:   * reviewed imaging studies with patient.     KNEE    * rest, sitting  * Activity modification - avoid impact activities or activities that aggravate symptoms. Avoid repetitive activities.  * NSAIDS (non-steroidal anti-inflammatory medications; e.g. Aleve, advil, motrin, ibuprofen) - regular use for inflammation ( twice daily or three times daily), with food, as long as no contra-indications Please discuss with primary care doctor if needed  * ice, 15-20 minutes at a time several times a day or as needed.  * Strengthening of quadriceps muscles  * Physical Therapy for strengthening, stretching and range of motion exercises of legs  * Tylenol as needed for pain, consider Tylenol arthritis or similar  * Exercise: low impact such as stationary bike, elliptical, pool.  * Injections: cortisone; risks and perceived benefits discussed today. Patient decides not to proceed today, not that bad.    * return to clinic as needed.    SHOULDER  * discussed with patient finding of a small rotator cuff tear, its implications and potential treatment options  * discussed various options with patient, including nonop with Physical Therapy, injections, NSAIDS, activity modification versus rotator cuff repair. Risks and benefits of each discussed in detail.  * risks of nonop treatment include continued pain, weakness, progression of tear, development of rotator cuff tear arthropathy and arthrosis, decreased range of motion and stiffness.  * Risks of surgery include, but not limited to: bleeding, infection, pain, scar, damage to adjacent structures (e.g. Nerves, blood vessels, bone, cartilage), temporary or permanent nerve damage, recurrence of symptoms, failure to relieve pain or weakness, stiffness, post-traumatic arthritis, development of rotator cuff tear arthropathy and arthrosis, decreased range of motion and stiffness, need for further surgery, blood  clots, pulmonary embolism, risks of anesthesia, death.    * despite these risks, patient would like to proceed with injection.  * return to clinic as needed.    PROCEDURE NOTE:  The risks, perceived benefits and potential complications (including but not limited to: bleeding, infection, pain, scar, damage to adjacent structures, atrophy or necrosis of soft tissue, skin blanching, failure to relieve symptoms, worsening of symptoms, allergic reaction) of injection were discussed with the patient. Questions were addressed and answered.The patient elected to proceed. Written informed consent was obtained. The correct procedural site was identified and confirmed. A Right Shoulder subacromial injection was performed using 2mL Kenalog-40 40mg per mL and 7mL (4mL 1% lidocaine, 3mL 0.25% marcaine)  of local anesthetic after sterile prep, to the correct procedural site. Sterile bandaid applied. This was tolerated well by the patient. No apparent complications. Did also discuss that if diabetic, recommend close monitoring of blood sugars over the next week as cortisone injections can temporarily elevate blood sugars.      The information in this document, created by a scribe for me, accurately reflects the services I personally performed and the decisions made by me. I have reviewed and approved this document for accuracy.     Jose G Blanchard M.D., M.S.  Dept. of Orthopaedic Surgery  [facility]      The patient's right shoulder was prepped with betadine solution after verification of allergies. Area approximately 10 cm x 10 cm prepped in a sterile fashion. After injection, betadine removed with soap and water and band-aids applied.    4cc Lidocaine 1% NDC 2832-2940-69, LOT -dk,  2019  3cc Bupivacaine 0.25% NDC 4505-6294-37, LOT -dk,  [id number]  2cc Kenalog 40 NDC 2633-0127-73, LOT [id number],   injected into patient's right shoulder subacromial space without resistance using  posterolateral approach by:   Ammon Daniel PA-C, CAQ (Ortho)  Supervising Physician: Jose G Blanchard M.D., M.S.  Dept. of Orthopaedic Surgery  [facility]          Again, thank you for allowing me to participate in the care of your patient.        Sincerely,        Jose G Blanchard MD

## 2017-08-07 NOTE — MR AVS SNAPSHOT
After Visit Summary   8/7/2017    Giovanni Cedeno    MRN: [id number]           Patient Information     Date Of Birth          1965        Visit Information        Provider Department      8/7/2017 8:00 AM Jose G Blanchard MD [facility]        Care Instructions    Please remember to call and schedule a follow up appointment if one was recommended at your earliest convenience.  Orthopedics CLINIC HOURS TELEPHONE NUMBER   Dr. Lo Levy  Certified Medical Assistant   Monday & Wednesday   8am - 5pm  Thursday 1pm - 5pm  Friday 8am -11:30am Specialty schedulers:   [phone] to schedule your surgery.  Main Clinic:   [phone] to make an appointment with any provider.    Urgent Care locations:    [facility] Monday-Friday Closed  Saturday-Sunday 9am-5pm      Monday-Friday 12pm - 8pm  Saturday-Sunday 9am-5pm [phone]     If SURGERY has been recommended, please call our Specialty Schedulers at [phone] to schedule your procedure.    If you need a medication refill, please contact your pharmacy. Please allow 3 business days for your refill to be completed.    If an MRI or CT scan has been recommended, please call [facility] Schedulers at [phone] to schedule your appointment.  Use [employer] (secure e-mail communication and access to your chart) to send a message or to make an appointment. Please ask how you can sign up for AwoX.  Your care team's suggested websites for health information:   [email or web address] : Up to date and easily searchable information on multiple topics.   [email or web address] : [place] dept of heat, public health issues in [place], N1N1              Follow-ups after your visit        Who to contact     If you have questions or need follow up information about today's clinic visit or your schedule please contact [facility] directly at  "[phone].  Normal or non-critical lab and imaging results will be communicated to you by MyChart, letter or phone within 4 business days after the clinic has received the results. If you do not hear from us within 7 days, please contact the clinic through Sha-Shahart or phone. If you have a critical or abnormal lab result, we will notify you by phone as soon as possible.  Submit refill requests through Dexmo or call your pharmacy and they will forward the refill request to us. Please allow 3 business days for your refill to be completed.          Additional Information About Your Visit        Sha-Shahart Information     Dexmo lets you send messages to your doctor, view your test results, renew your prescriptions, schedule appointments and more. To sign up, go to [email or web address] . Click on \"Log in\" on the left side of the screen, which will take you to the Welcome page. Then click on \"Sign up Now\" on the right side of the page.     You will be asked to enter the access code listed below, as well as some personal information. Please follow the directions to create your username and password.     Your access code is: [email or web address]  Expires: 2017  8:52 AM     Your access code will  in 90 days. If you need help or a new code, please call your [facility] clinic or [phone].        Care EveryWhere ID     This is your Care EveryWhere ID. This could be used by other organizations to access your [facility] medical records  [id number]        Your Vitals Were     Pulse Height Pulse Oximetry BMI (Body Mass Index)          88 1.588 m (5' 2.5\") 98% 23.92 kg/m2         Blood Pressure from Last 3 Encounters:   17 136/83   17 110/62   17 128/78    Weight from Last 3 Encounters:   17 60.3 kg (132 lb 14.4 oz)   17 60.8 kg (134 lb)   17 62.8 kg (138 lb 6.4 oz)              Today, you had the following     No orders found for display       Primary Care Provider Office Phone # Fax # "    Sony Velez [phone]       [facility] [address]  [facility] [address]        Equal Access to Services     FRANKIE SCHMITT : Hadii aad ku hadchatao Soomaali, waaxda luqadaha, qaybta kaalmada adeegyada, sagrario calixtoroverto dempsey. So [facility] [phone].    ATENCIÓN: Si habla español, tiene a adams disposición servicios gratuitos de asistencia lingüística. Llame al [phone].    We comply with applicable federal civil rights laws and [place] laws. We do not discriminate on the basis of race, color, national origin, age, disability sex, sexual orientation or gender identity.            Thank you!     Thank you for choosing [facility]  for your care. Our goal is always to provide you with excellent care. Hearing back from our patients is one way we can continue to improve our services. Please take a few minutes to complete the written survey that you may receive in the mail after your visit with us. Thank you!             Your Updated Medication List - Protect others around you: Learn how to safely use, store and throw away your medicines at www.disposemymeds.org.          This list is accurate as of: 8/7/17  8:20 AM.  Always use your most recent med list.                   Brand Name Dispense Instructions for use Diagnosis    carbidopa-levodopa  MG per tablet    SINEMET    90 tablet    TAKE 1 TABLET BY MOUTH AT BEDTIME    Restless legs syndrome (RLS)       celecoxib 200 MG capsule    celeBREX    60 capsule    Take 1 capsule (200 mg) by mouth 2 times daily as needed for moderate pain    Upper back pain, Acute midline low back pain without sciatica       cyclobenzaprine 5 MG tablet    FLEXERIL    42 tablet    TAKE 1 TABLET(5 MG) BY MOUTH THREE TIMES DAILY AS NEEDED FOR MUSCLE SPASMS    Acute midline low back pain without sciatica, Upper back pain       finasteride 5 MG tablet    PROSCAR    90 tablet    Take 1 tablet  (5 mg) by mouth daily    Benign prostatic hyperplasia with lower urinary tract symptoms, unspecified morphology       gabapentin 300 MG capsule    NEURONTIN    60 capsule    TAKE 2 CAPSULES BY MOUTH EVERY NIGHT AT BEDTIME    Restless leg syndrome       HYDROcodone-acetaminophen 7.5-325 MG per tablet    NORCO    60 tablet    Take 1 tablet by mouth every 4 hours as needed for pain maximum  4  tablet(s) per day    Abdominal pain, left upper quadrant       ketoconazole 2 % cream    NIZORAL    45 g    Apply topically 2 times daily    Tinea versicolor       mirtazapine 15 MG tablet    REMERON    30 tablet    TAKE 1 TABLET BY MOUTH EVERY NIGHT AT BEDTIME    Major depressive disorder, single episode, in partial or unspecified remission       simvastatin 20 MG tablet    ZOCOR    30 tablet    TAKE 1 TABLET BY MOUTH EVERY DAY AT BEDTIME    Hyperlipidemia LDL goal <100, Pure hypercholesterolemia       tamsulosin 0.4 MG capsule    FLOMAX    90 capsule    Take 1 capsule (0.4 mg) by mouth daily    Benign prostatic hyperplasia with lower urinary tract symptoms, unspecified morphology

## 2017-08-20 ENCOUNTER — OFFICE VISIT (OUTPATIENT)
Dept: URGENT CARE | Facility: URGENT CARE | Age: 52
End: 2017-08-20
Payer: COMMERCIAL

## 2017-08-20 VITALS
DIASTOLIC BLOOD PRESSURE: 82 MMHG | OXYGEN SATURATION: 97 % | HEART RATE: 82 BPM | TEMPERATURE: 98.2 F | BODY MASS INDEX: 23.04 KG/M2 | WEIGHT: 128 LBS | RESPIRATION RATE: 14 BRPM | SYSTOLIC BLOOD PRESSURE: 122 MMHG

## 2017-08-20 DIAGNOSIS — L08.9 INFECTION OF THUMB: Primary | ICD-10-CM

## 2017-08-20 PROCEDURE — 99213 OFFICE O/P EST LOW 20 MIN: CPT | Performed by: FAMILY MEDICINE

## 2017-08-20 NOTE — PROGRESS NOTES
SUBJECTIVE:  Chief Complaint   Patient presents with     Thumb Discomfort     left thumb x2 day     Giovanni Cedeno is a 52 year old male who presents with a chief complaint of left thumb pad pain  Symptoms began 2 day(s) ago, are mild and moderate and sudden onset and still present  Context:  Injury:No. Patient unsure if has a sliver, did try poking thumb with needle.  More swelling and discomfort.      Past Medical History:   Diagnosis Date     Arthritis      Current Outpatient Prescriptions   Medication Sig Dispense Refill     cyclobenzaprine (FLEXERIL) 5 MG tablet TAKE 1 TABLET(5 MG) BY MOUTH THREE TIMES DAILY AS NEEDED FOR MUSCLE SPASMS 42 tablet 0     HYDROcodone-acetaminophen (NORCO) 7.5-325 MG per tablet Take 1 tablet by mouth every 4 hours as needed for pain maximum  4  tablet(s) per day 60 tablet 0     celecoxib (CELEBREX) 200 MG capsule Take 1 capsule (200 mg) by mouth 2 times daily as needed for moderate pain 60 capsule 1     tamsulosin (FLOMAX) 0.4 MG capsule Take 1 capsule (0.4 mg) by mouth daily 90 capsule 3     finasteride (PROSCAR) 5 MG tablet Take 1 tablet (5 mg) by mouth daily 90 tablet 1     carbidopa-levodopa (SINEMET)  MG per tablet TAKE 1 TABLET BY MOUTH AT BEDTIME 90 tablet 1     ketoconazole (NIZORAL) 2 % cream Apply topically 2 times daily 45 g 1     simvastatin (ZOCOR) 20 MG tablet TAKE 1 TABLET BY MOUTH EVERY DAY AT BEDTIME 30 tablet 11     gabapentin (NEURONTIN) 300 MG capsule TAKE 2 CAPSULES BY MOUTH EVERY NIGHT AT BEDTIME 60 capsule 11     mirtazapine (REMERON) 15 MG tablet TAKE 1 TABLET BY MOUTH EVERY NIGHT AT BEDTIME 30 tablet 4     Social History   Substance Use Topics     Smoking status: Never Smoker     Smokeless tobacco: Never Used     Alcohol use No       ROS:  CONSTITUTIONAL:NEGATIVE for fever, chills, change in weight  INTEGUMENTARY/SKIN: POSITIVE for rash   ENT/MOUTH: NEGATIVE for ear, mouth and throat problems  RESP:NEGATIVE for significant cough or SOB  CV:  NEGATIVE for chest pain, palpitations or peripheral edema  GI: NEGATIVE for nausea, abdominal pain, heartburn, or change in bowel habits  MUSCULOSKELETAL: POSITIVE  for thumb pain    EXAM:   /82  Pulse 82  Temp 98.2  F (36.8  C)  Resp 14  Wt 128 lb (58.1 kg)  SpO2 97%  BMI 23.04 kg/m2  M/S Exam:left thumb - pad with mild swelling and erythema, healed self-inflicted scraping/wound.  No drainage, no vesicles,tenderness to palpation  GENERAL APPEARANCE: healthy, alert and no distress  EXTREMITIES: peripheral pulses normal  NEURO: Normal strength and tone, sensory exam grossly normal, mentation intact and speech normal    X-RAY was not done.    ASSESSMENT/PLAN:  (L08.9) Infection of thumb  (primary encounter diagnosis)  Comment: left  Plan: amoxicillin-clavulanate (AUGMENTIN) 875-125 MG         per tablet            Reviewed that Xray for foreign body would be helpful if due to metallic object, if has small sliver that would anticipate that would work its way out.  Mild erythema and tenderness is most likely due to infection now, RX Augmentin given.    Return to clinic if no resolution of symptoms.    Carson Haynes MD  August 20, 2017 4:10 PM

## 2017-08-20 NOTE — MR AVS SNAPSHOT
After Visit Summary   8/20/2017    Giovanni Cedeno    MRN: [id number]           Patient Information     Date Of Birth          1965        Visit Information        Provider Department      8/20/2017 2:50 PM Carson Haynes MD [facility]        Today's Diagnoses     Infection of thumb    -  1      Care Instructions    Take full course of antibiotic for infection on thumb.  Warm compress to area.      Cellulitis  Cellulitis is an infection of the deep layers of skin. A break in the skin, such as a cut or scratch, can let bacteria under the skin. If the bacteria get to deep layers of the skin, it can be serious. If not treated, cellulitis can get into the bloodstream and lymph nodes. The infection can then spread throughout the body. This causes serious illness.  Cellulitis causes the affected skin to become red, swollen, warm, and sore. The reddened areas have a visible border. An open sore may leak fluid (pus). You may have a fever, chills, and pain.  Cellulitis is treated with antibiotics taken for 7 to 10 days. An open sore may be cleaned and covered with cool wet gauze. Symptoms should get better 1 to 2 days after treatment is started. Make sure to take all the antibiotics for the full number of days until they are gone. Keep taking the medicine even if your symptoms go away.  Home care  Follow these tips:    Limit the use of the part of your body with cellulitis.     If the infection is on your leg, keep your leg raised while sitting. This will help to reduce swelling.    Take all of the antibiotic medicine exactly as directed until it is gone. Do not miss any doses, especially during the first 7 days. Don t stop taking the medicine when your symptoms get better.    Keep the affected area clean and dry.    Wash your hands with soap and warm water before and after touching your skin. Anyone else who touches your skin should also wash his or her hands. Don't share towels.  Follow-up  "care  Follow up with your healthcare provider, or as advised. If your infection does not go away on the first antibiotic, your healthcare provider will prescribe a different one.  When to seek medical advice  Call your healthcare provider right away if any of these occur:    Red areas that spread    Swelling or pain that gets worse    Fluid leaking from the skin (pus)    Fever higher of 100.4  F (38.0  C) or higher after 2 days on antibiotics  Date Last Reviewed: 9/1/2016 2000-2017 [employer]. [address] [place] [address]. All rights reserved. This information is not intended as a substitute for professional medical care. Always follow your healthcare professional's instructions.                Follow-ups after your visit        Who to contact     If you have questions or need follow up information about today's clinic visit or your schedule please contact [facility] directly at [phone].  Normal or non-critical lab and imaging results will be communicated to you by MyChart, letter or phone within 4 business days after the clinic has received the results. If you do not hear from us within 7 days, please contact the clinic through Kaiam[employer] or phone. If you have a critical or abnormal lab result, we will notify you by phone as soon as possible.  Submit refill requests through Logentries or call your pharmacy and they will forward the refill request to us. Please allow 3 business days for your refill to be completed.          Additional Information About Your Visit        Kaiamhart Information     Logentries lets you send messages to your doctor, view your test results, renew your prescriptions, schedule appointments and more. To sign up, go to [email or web address] . Click on \"Log in\" on the left side of the screen, which will take you to the Welcome page. Then click on \"Sign up Now\" on the right side of the page.     You will be asked to enter the access code listed below, " as well as some personal information. Please follow the directions to create your username and password.     Your access code is: [email or web address]  Expires: 2017  8:52 AM     Your access code will  in 90 days. If you need help or a new code, please call your [facility] clinic or [phone].        Care EveryWhere ID     This is your Care EveryWhere ID. This could be used by other organizations to access your [facility] medical records  [id number]        Your Vitals Were     Pulse Temperature Respirations Pulse Oximetry BMI (Body Mass Index)       82 98.2  F (36.8  C) 14 97% 23.04 kg/m2        Blood Pressure from Last 3 Encounters:   17 122/82   17 136/83   17 110/62    Weight from Last 3 Encounters:   17 128 lb (58.1 kg)   17 132 lb 14.4 oz (60.3 kg)   17 134 lb (60.8 kg)              Today, you had the following     No orders found for display         Today's Medication Changes          These changes are accurate as of: 17  4:05 PM.  If you have any questions, ask your nurse or doctor.               Start taking these medicines.        Dose/Directions    amoxicillin-clavulanate 875-125 MG per tablet   Commonly known as:  AUGMENTIN   Used for:  Infection of thumb   Started by:  Carson Haynes MD        Dose:  1 tablet   Take 1 tablet by mouth 2 times daily   Quantity:  20 tablet   Refills:  0            Where to get your medicines      [facility] [place], [place] - [address] [facility]  [address] [facility] [address]    Hours:  24-hours Phone:  [phone]     amoxicillin-clavulanate 875-125 MG per tablet                Primary Care Provider Office Phone # Fax #    Sony Velez [phone] [address] [facility] [address]        Equal Access to Services     [facility]: juan Obregon, gabino  sagrario kabachristy lund ah. So [facility] [phone].    ATENCIÓN: Si jimenez howard, tiene a adams disposición servicios gratuitos de asistencia lingüística. [place]ak al [phone].    We comply with applicable federal civil rights laws and [place] laws. We do not discriminate on the basis of race, color, national origin, age, disability sex, sexual orientation or gender identity.            Thank you!     Thank you for choosing [facility]  for your care. Our goal is always to provide you with excellent care. Hearing back from our patients is one way we can continue to improve our services. Please take a few minutes to complete the written survey that you may receive in the mail after your visit with us. Thank you!             Your Updated Medication List - Protect others around you: Learn how to safely use, store and throw away your medicines at www.disposemymeds.org.          This list is accurate as of: 8/20/17  4:05 PM.  Always use your most recent med list.                   Brand Name Dispense Instructions for use Diagnosis    amoxicillin-clavulanate 875-125 MG per tablet    AUGMENTIN    20 tablet    Take 1 tablet by mouth 2 times daily    Infection of thumb       carbidopa-levodopa  MG per tablet    SINEMET    90 tablet    TAKE 1 TABLET BY MOUTH AT BEDTIME    Restless legs syndrome (RLS)       celecoxib 200 MG capsule    celeBREX    60 capsule    Take 1 capsule (200 mg) by mouth 2 times daily as needed for moderate pain    Upper back pain, Acute midline low back pain without sciatica       cyclobenzaprine 5 MG tablet    FLEXERIL    42 tablet    TAKE 1 TABLET(5 MG) BY MOUTH THREE TIMES DAILY AS NEEDED FOR MUSCLE SPASMS    Acute midline low back pain without sciatica, Upper back pain       finasteride 5 MG tablet    PROSCAR    90 tablet    Take 1 tablet (5 mg) by mouth daily    Benign prostatic hyperplasia with lower urinary tract symptoms, unspecified morphology        gabapentin 300 MG capsule    NEURONTIN    60 capsule    TAKE 2 CAPSULES BY MOUTH EVERY NIGHT AT BEDTIME    Restless leg syndrome       HYDROcodone-acetaminophen 7.5-325 MG per tablet    NORCO    60 tablet    Take 1 tablet by mouth every 4 hours as needed for pain maximum  4  tablet(s) per day    Abdominal pain, left upper quadrant       ketoconazole 2 % cream    NIZORAL    45 g    Apply topically 2 times daily    Tinea versicolor       mirtazapine 15 MG tablet    REMERON    30 tablet    TAKE 1 TABLET BY MOUTH EVERY NIGHT AT BEDTIME    Major depressive disorder, single episode, in partial or unspecified remission       simvastatin 20 MG tablet    ZOCOR    30 tablet    TAKE 1 TABLET BY MOUTH EVERY DAY AT BEDTIME    Hyperlipidemia LDL goal <100, Pure hypercholesterolemia       tamsulosin 0.4 MG capsule    FLOMAX    90 capsule    Take 1 capsule (0.4 mg) by mouth daily    Benign prostatic hyperplasia with lower urinary tract symptoms, unspecified morphology

## 2017-08-20 NOTE — NURSING NOTE
"Chief Complaint   Patient presents with     Thumb Discomfort       Initial /82  Pulse 82  Temp 98.2  F (36.8  C)  Resp 14  Wt 128 lb (58.1 kg)  SpO2 97%  BMI 23.04 kg/m2 Estimated body mass index is 23.04 kg/(m^2) as calculated from the following:    Height as of 8/7/17: 5' 2.5\" (1.588 m).    Weight as of this encounter: 128 lb (58.1 kg).  Medication Reconciliation: complete     Rohan Yi. MA      "

## 2017-08-20 NOTE — PATIENT INSTRUCTIONS
Take full course of antibiotic for infection on thumb.  Warm compress to area.      Cellulitis  Cellulitis is an infection of the deep layers of skin. A break in the skin, such as a cut or scratch, can let bacteria under the skin. If the bacteria get to deep layers of the skin, it can be serious. If not treated, cellulitis can get into the bloodstream and lymph nodes. The infection can then spread throughout the body. This causes serious illness.  Cellulitis causes the affected skin to become red, swollen, warm, and sore. The reddened areas have a visible border. An open sore may leak fluid (pus). You may have a fever, chills, and pain.  Cellulitis is treated with antibiotics taken for 7 to 10 days. An open sore may be cleaned and covered with cool wet gauze. Symptoms should get better 1 to 2 days after treatment is started. Make sure to take all the antibiotics for the full number of days until they are gone. Keep taking the medicine even if your symptoms go away.  Home care  Follow these tips:    Limit the use of the part of your body with cellulitis.     If the infection is on your leg, keep your leg raised while sitting. This will help to reduce swelling.    Take all of the antibiotic medicine exactly as directed until it is gone. Do not miss any doses, especially during the first 7 days. Don t stop taking the medicine when your symptoms get better.    Keep the affected area clean and dry.    Wash your hands with soap and warm water before and after touching your skin. Anyone else who touches your skin should also wash his or her hands. Don't share towels.  Follow-up care  Follow up with your healthcare provider, or as advised. If your infection does not go away on the first antibiotic, your healthcare provider will prescribe a different one.  When to seek medical advice  Call your healthcare provider right away if any of these occur:    Red areas that spread    Swelling or pain that gets worse    Fluid leaking  from the skin (pus)    Fever higher of 100.4  F (38.0  C) or higher after 2 days on antibiotics  Date Last Reviewed: 9/1/2016 2000-2017 [employer]. [address] [place] [address], [place] [address]. All rights reserved. This information is not intended as a substitute for professional medical care. Always follow your healthcare professional's instructions.

## 2017-08-26 DIAGNOSIS — G25.81 RESTLESS LEGS SYNDROME (RLS): ICD-10-CM

## 2017-08-28 NOTE — TELEPHONE ENCOUNTER
carbidopa-levodopa (SINEMET)  MG per tablet    Last Written Prescription Date:  1/11/17  Last Fill Quantity: 90,   # refills: 1  Last Office Visit with [facility], [facility] or [facility] prescribing provider: 5/23/17  Future Office visit:       Routing refill request to provider for review/approval because:  Drug not on the [facility], [facility] or [facility] refill protocol or controlled substance

## 2017-08-29 RX ORDER — CARBIDOPA AND LEVODOPA 25; 100 MG/1; MG/1
TABLET ORAL
Qty: 90 TABLET | Refills: 0 | Status: SHIPPED | OUTPATIENT
Start: 2017-08-29 | End: 2017-12-18

## 2017-09-12 DIAGNOSIS — M54.9 UPPER BACK PAIN: ICD-10-CM

## 2017-09-12 DIAGNOSIS — M54.50 ACUTE MIDLINE LOW BACK PAIN WITHOUT SCIATICA: ICD-10-CM

## 2017-09-12 NOTE — TELEPHONE ENCOUNTER
cyclobenzaprine (FLEXERIL) 5 MG tablet      Last Written Prescription Date:  8/3/17  Last Fill Quantity: 42  # refills: 0  Last Office Visit with [facility] or [facility] prescribing provider: 8/23/17  Future Office visit:       Routing refill request to provider for review/approval because:  Drug not on the [facility], [facility] or [facility] refill protocol or controlled substance

## 2017-09-16 RX ORDER — CYCLOBENZAPRINE HCL 5 MG
TABLET ORAL
Qty: 42 TABLET | Refills: 0 | Status: SHIPPED | OUTPATIENT
Start: 2017-09-16 | End: 2017-10-24

## 2017-10-03 ENCOUNTER — TELEPHONE (OUTPATIENT)
Dept: ORTHOPEDICS | Facility: CLINIC | Age: 52
End: 2017-10-03

## 2017-10-03 NOTE — TELEPHONE ENCOUNTER
Reason for Call:  Other surgery orders     Detailed comments: pt was seen 8-7-17. Pt has decided he would like to go forward with surgery. Need orders.    Phone Number Patient can be reached at: Home number on file [phone] (home)    Best Time: any     Can we leave a detailed message on this number? YES    Call taken on 10/3/2017 at 2:02 PM by Milagro Palacio

## 2017-10-04 NOTE — TELEPHONE ENCOUNTER
Put surgery orders in and asked specialty to call and help schedule.   Mildred Hernandez Certified Medical Assistant

## 2017-10-16 ENCOUNTER — OFFICE VISIT (OUTPATIENT)
Dept: FAMILY MEDICINE | Facility: CLINIC | Age: 52
End: 2017-10-16
Payer: COMMERCIAL

## 2017-10-16 VITALS
BODY MASS INDEX: 23.04 KG/M2 | TEMPERATURE: 97.3 F | OXYGEN SATURATION: 95 % | SYSTOLIC BLOOD PRESSURE: 118 MMHG | DIASTOLIC BLOOD PRESSURE: 76 MMHG | WEIGHT: 128 LBS | RESPIRATION RATE: 16 BRPM | HEART RATE: 104 BPM

## 2017-10-16 DIAGNOSIS — M54.9 UPPER BACK PAIN: ICD-10-CM

## 2017-10-16 DIAGNOSIS — M75.41 IMPINGEMENT SYNDROME, SHOULDER, RIGHT: Primary | ICD-10-CM

## 2017-10-16 DIAGNOSIS — M54.50 ACUTE MIDLINE LOW BACK PAIN WITHOUT SCIATICA: ICD-10-CM

## 2017-10-16 PROCEDURE — 99213 OFFICE O/P EST LOW 20 MIN: CPT | Performed by: FAMILY MEDICINE

## 2017-10-16 RX ORDER — CELECOXIB 200 MG/1
200 CAPSULE ORAL 2 TIMES DAILY PRN
Qty: 60 CAPSULE | Refills: 1 | Status: SHIPPED | OUTPATIENT
Start: 2017-10-16 | End: 2017-12-21

## 2017-10-16 RX ORDER — HYDROCODONE BITARTRATE AND ACETAMINOPHEN 7.5; 325 MG/1; MG/1
1 TABLET ORAL EVERY 4 HOURS PRN
Qty: 60 TABLET | Refills: 0 | Status: SHIPPED | OUTPATIENT
Start: 2017-10-16 | End: 2017-11-14

## 2017-10-16 ASSESSMENT — PATIENT HEALTH QUESTIONNAIRE - PHQ9
SUM OF ALL RESPONSES TO PHQ QUESTIONS 1-9: 4
SUM OF ALL RESPONSES TO PHQ QUESTIONS 1-9: 4

## 2017-10-16 NOTE — LETTER
My Depression Action Plan  Name: Giovanni Cedeno   Date of Birth 1965  Date: 10/16/2017    My doctor: Sony Velez   My clinic: [address]  [facility] [address] [phone]          GREEN    ZONE   Good Control    What it looks like:     Things are going generally well. You have normal up s and down s. You may even feel depressed from time to time, but bad moods usually last less than a day.   What you need to do:  1. Continue to care for yourself (see self care plan)  2. Check your depression survival kit and update it as needed  3. Follow your physician s recommendations including any medication.  4. Do not stop taking medication unless you consult with your physician first.           YELLOW         ZONE Getting Worse    What it looks like:     Depression is starting to interfere with your life.     It may be hard to get out of bed; you may be starting to isolate yourself from others.    Symptoms of depression are starting to last most all day and this has happened for several days.     You may have suicidal thoughts but they are not constant.   What you need to do:     1. Call your care team, your response to treatment will improve if you keep your care team informed of your progress. Yellow periods are signs an adjustment may need to be made.     2. Continue your self-care, even if you have to fake it!    3. Talk to someone in your support network    4. Open up your depression survival kit           RED    ZONE Medical Alert - Get Help    What it looks like:     Depression is seriously interfering with your life.     You may experience these or other symptoms: You can t get out of bed most days, can t work or engage in other necessary activities, you have trouble taking care of basic hygiene, or basic responsibilities, thoughts of suicide or death that will not go away, self-injurious behavior.     What you  need to do:  1. Call your care team and request a same-day appointment. If they are not available (weekends or after hours) call your local crisis line, emergency room or 911.      Electronically signed by: Marisol Phillips, October 16, 2017    Depression Self Care Plan / Survival Kit    Self-Care for Depression  Here s the deal. Your body and mind are really not as separate as most people think.  What you do and think affects how you feel and how you feel influences what you do and think. This means if you do things that people who feel good do, it will help you feel better.  Sometimes this is all it takes.  There is also a place for medication and therapy depending on how severe your depression is, so be sure to consult with your medical provider and/ or Behavioral Health Consultant if your symptoms are worsening or not improving.     In order to better manage my stress, I will:    Exercise  Get some form of exercise, every day. This will help reduce pain and release endorphins, the  feel good  chemicals in your brain. This is almost as good as taking antidepressants!  This is not the same as joining a gym and then never going! (they count on that by the way ) It can be as simple as just going for a walk or doing some gardening, anything that will get you moving.      Hygiene   Maintain good hygiene (Get out of bed in the morning, Make your bed, Brush your teeth, Take a shower, and Get dressed like you were going to work, even if you are unemployed).  If your clothes don't fit try to get ones that do.    Diet  I will strive to eat foods that are good for me, drink plenty of water, and avoid excessive sugar, caffeine, alcohol, and other mood-altering substances.  Some foods that are helpful in depression are: complex carbohydrates, B vitamins, flaxseed, fish or fish oil, fresh fruits and vegetables.    Psychotherapy  I agree to participate in Individual Therapy (if recommended).    Medication  If prescribed  medications, I agree to take them.  Missing doses can result in serious side effects.  I understand that drinking alcohol, or other illicit drug use, may cause potential side effects.  I will not stop my medication abruptly without first discussing it with my provider.    Staying Connected With Others  I will stay in touch with my friends, family members, and my primary care provider/team.    Use your imagination  Be creative.  We all have a creative side; it doesn t matter if it s oil painting, sand castles, or mud pies! This will also kick up the endorphins.    Witness Beauty  (AKA stop and smell the roses) Take a look outside, even in mid-winter. Notice colors, textures. Watch the squirrels and birds.     Service to others  Be of service to others.  There is always someone else in need.  By helping others we can  get out of ourselves  and remember the really important things.  This also provides opportunities for practicing all the other parts of the program.    Humor  Laugh and be silly!  Adjust your TV habits for less news and crime-drama and more comedy.    Control your stress  Try breathing deep, massage therapy, biofeedback, and meditation. Find time to relax each day.     My support system    Clinic Contact:  Phone number:    Contact 1:  Phone number:    Contact 2:  Phone number:    [facility]/:  Phone number:    Therapist:  Phone number:    [facility] crisis center:    Phone number:    Other community support:  Phone number:

## 2017-10-16 NOTE — NURSING NOTE
"Chief Complaint   Patient presents with     Musculoskeletal Problem     right shoulder       Initial /76  Pulse 104  Temp 97.3  F (36.3  C) (Tympanic)  Resp 16  Wt 128 lb (58.1 kg)  SpO2 95%  BMI 23.04 kg/m2 Estimated body mass index is 23.04 kg/(m^2) as calculated from the following:    Height as of 8/7/17: 5' 2.5\" (1.588 m).    Weight as of this encounter: 128 lb (58.1 kg).  Medication Reconciliation: complete   Marisol Phillips CMA    "

## 2017-10-16 NOTE — PROGRESS NOTES
"  SUBJECTIVE:   Giovanni Cedeno is a 52 year old male who presents to clinic today for the following health issues:      Musculoskeletal problem/pain      Duration: ongoing    Description  Location: right side    Intensity:  moderate    Accompanying signs and symptoms: pain when lefting arm    History  Previous similar problem: YES  Previous evaluation:  x-ray and orthopedic evaluation    Precipitating or alleviating factors:  Trauma or overuse: YES  Aggravating factors include: working    Therapies tried and outcome: having surgery end of Nov    .ANIKA REYES MD .10/16/2017 6:01 PM .October 16, 2017    Giovanni Cedeno is a 52 year old male who is who presents with RIGHT SHOULDER PAIN   PRECIPITATED AT WORK BY PROLONGED SIDE TO SIDE HEAVY GRINDING OF FLOOR   PRE EXISTING RIGHT SHOULDER PAIN   RIGHT  SIDED RIGHT LATERAL EPICONDYLITIS IN REMISSION   Onset :  SEVERAL MONTHS  Severity:  SEVERE     Home treatments  REST    Additional Symptoms:  RESTRICTED MOTION RIGHT SHOULDER    Course  ONGOING SYMPTOMS       RESTLESS LEG SYNDROME IMPROVED     HISTORY OF KNEE BURSITIS IMPROVED     LDL OR \"BAD\" CHOLESTEROL  GOAL < 100 ONGOING     DEPRESSION IN PARTIAL REMISSION     PRE DIABETES MONITORING NEEDED     CHRONIC PAIN SYNDROME     CHRONIC RIGHT SHOULDER PAIN     CHRONIC INTERMITTENT  LOWER BACK PAIN     UPPER BACK PAIN ONGOING   h    .  Current Outpatient Prescriptions   Medication Sig Dispense Refill     celecoxib (CELEBREX) 200 MG capsule Take 1 capsule (200 mg) by mouth 2 times daily as needed for moderate pain 60 capsule 1     HYDROcodone-acetaminophen (NORCO) 7.5-325 MG per tablet Take 1 tablet by mouth every 4 hours as needed for pain maximum  4  tablet(s) per day 60 tablet 0     cyclobenzaprine (FLEXERIL) 5 MG tablet TAKE 1 TABLET(5 MG) BY MOUTH THREE TIMES DAILY AS NEEDED FOR MUSCLE SPASMS 42 tablet 0     carbidopa-levodopa (SINEMET)  MG per tablet TAKE 1 TABLET BY MOUTH AT BEDTIME 90 tablet 0     " tamsulosin (FLOMAX) 0.4 MG capsule Take 1 capsule (0.4 mg) by mouth daily 90 capsule 3     finasteride (PROSCAR) 5 MG tablet Take 1 tablet (5 mg) by mouth daily 90 tablet 1     ketoconazole (NIZORAL) 2 % cream Apply topically 2 times daily 45 g 1     simvastatin (ZOCOR) 20 MG tablet TAKE 1 TABLET BY MOUTH EVERY DAY AT BEDTIME 30 tablet 11     gabapentin (NEURONTIN) 300 MG capsule TAKE 2 CAPSULES BY MOUTH EVERY NIGHT AT BEDTIME 60 capsule 11     mirtazapine (REMERON) 15 MG tablet TAKE 1 TABLET BY MOUTH EVERY NIGHT AT BEDTIME 30 tablet 4     [DISCONTINUED] celecoxib (CELEBREX) 200 MG capsule Take 1 capsule (200 mg) by mouth 2 times daily as needed for moderate pain 60 capsule 1        No Known Allergies    Immunization History   Administered Date(s) Administered     Influenza (IIV3) 10/01/2013, 09/20/2014     Influenza Vaccine IM 3yrs+ 4 Valent IIV4 11/10/2016     TDAP Vaccine (Adacel) 05/21/2012     Zoster vaccine, live 01/09/2015         reports that he does not drink alcohol.      reports that he does not use illicit drugs.    family history includes DIABETES in his father; HEART DISEASE in his father.    indicated that his mother is alive. He indicated that his father is alive.      has no past surgical history on file.     reports that he does not engage in sexual activity.  .  Pediatric History   Patient Guardian Status     Mother:  Rae Cedeno     Other Topics Concern     Parent/Sibling W/ Cabg, Mi Or Angioplasty Before 65f 55m? Yes     Social History Narrative         reports that he has never smoked. He has never used smokeless tobacco.    Medical, social, surgical, and family histories reviewed.    Labs reviewed in EPIC  Patient Active Problem List   Diagnosis     Restless leg syndrome     Knee bursitis     Pure hypercholesterolemia     Major depression in partial remission (H)     Pre-diabetes     Epicondylitis, lateral humeral     Immunization reaction     Chronic pain syndrome     Chronic right  shoulder pain     Abdominal pain, left upper quadrant     Pain of toe of right foot     Acute midline low back pain without sciatica     Upper back pain     History reviewed. No pertinent surgical history.    Social History   Substance Use Topics     Smoking status: Never Smoker     Smokeless tobacco: Never Used     Alcohol use No     Family History   Problem Relation Age of Onset     DIABETES Father      HEART DISEASE Father          No Known Allergies  Recent Labs   Lab Test  05/23/17   0813  11/10/16   0945  07/09/15   1013  03/11/14   1047  05/08/13   0924   A1C   --    --   5.4  5.8  6.3*   LDL   --   167*   --   218*  150*   HDL   --   53   --   48  44   TRIG   --   173*   --   325*  184*   ALT  31  29   --   29  35   CR  0.78  0.93   --    --   1.00   GFRESTIMATED  >90  Non  GFR Calc    85   --    --   79   GFRESTBLACK  >90   GFR Calc    >90   --    --   >90   POTASSIUM  4.2   --    --    --   5.0        BP Readings from Last 6 Encounters:   10/16/17 118/76   08/20/17 122/82   08/07/17 136/83   05/23/17 110/62   01/17/17 128/78   11/10/16 112/64       Wt Readings from Last 3 Encounters:   10/16/17 128 lb (58.1 kg)   08/20/17 128 lb (58.1 kg)   08/07/17 132 lb 14.4 oz (60.3 kg)         Positive symptoms or findings indicated by bold designation:     ROS: 10 point ROS neg other than the symptoms noted above in the HPI.except  has Restless leg syndrome; Knee bursitis; Pure hypercholesterolemia; Major depression in partial remission (H); Pre-diabetes; Epicondylitis, lateral humeral; Immunization reaction; Chronic pain syndrome; Chronic right shoulder pain; Abdominal pain, left upper quadrant; Pain of toe of right foot; Acute midline low back pain without sciatica; and Upper back pain on his problem list.   Constitutional: The patient denied fatigue, fever, insomnia, night sweats, recent illness and weight loss.  NORMAL WEIGHT      Eyes: The patient denied blindness, eye pain, eye  tearing, photophobia, vision change and visual disturbance. NORMAL VISION       Ears/Nose/Throat/Neck: The patient denied dizziness, facial pain, hearing loss, nasal discharge, oral pain, otalgia, postnasal drip, sinus congestion, sore throat, tinnitus and voice change.   NORMAL HEARING AND BALANCE     Cardiovascular: The patient denied arrhythmia, chest pain/pressure, claudication, edema, exercise intolerance, fatigue, orthopnea, palpitations and syncope.      Respiratory: The patient denied asthma, chest congestion, cough, dyspnea on exertion, dyspnea/shortness of breath, hemoptysis, pedal edema, pleuritic pain, productive sputum, snoring and wheezing.     Gastrointestinal: The patient denied abdominal pain, anorexia, constipation, diarrhea, dysphagia, gastroesophageal reflux, hematochezia, hemorrhoids, melena, nausea and vomiting .     Genitourinary/Nephrology: The patient denied breast complaint, dysuria, nocturia sexual dysfunction, t, urinary frequency, urinary incontinence, urinary urgency        Musculoskeletal:  SEE HISTORY OF PRESENT ILLNESS     Dermatoligic:: The patient denied acne, dermatitis, ecchymosis, itching, mole change, rash, skin cancer, skin lesion and sores.  NORMAL     Neurologic: The patient denied dizziness, gait abnormality, headache, memory loss, mental status change, paresis, paresthesia, seizure, syncope, tremor and vision change.     Psychiatric: The patient denied anxiety, depression, disturbances of memory, drug abuse, insomnia, mood swings and relationship difficulties.      Endocrine: The patient denied , goiter, obesity, polyuria and thyroid disease.      Hematologic/Lymphatic: The patient denied abnormal bleeding and bruising, abnormal ecchymoses, anemia, lymph node enlargement/mass, petechiae and venous  Thrombosis.      Allergy/Immunology: The patient denied food allergy and  Allergic rhinitis or conjunctivitis.        PE:  /76  Pulse 104  Temp 97.3  F (36.3  C)  (Tympanic)  Resp 16  Wt 128 lb (58.1 kg)  SpO2 95%  BMI 23.04 kg/m2 Body mass index is 23.04 kg/(m^2).    Constitutional: general appearance, well nourished, well developed, in no acute distress, well developed, appears stated age, normal body habitus,      Eyes:; The patient has normal eyelids sclerae and conjunctivae :      Ears/Nose/Throat: external ear, overall: normal appearance; external nose, overall: benign appearance, normal moujth gums and lips  The patient has:  NORMAL EXTERNAL EARS AND MOUTH     Neck: thyroid, overall: normal size, normal consistency, nontender,      Respiratory:  palpation of chest, overall: normal excursion,    Clear to percussion and auscultation      Tachypnea   Color      Cardiovascular:  Good color with no peripheral edema    Regular sinus rhythm without murmur. Physiologic heart sounds Heart is unelarged  .   Chest/Breast: normal shape       Abdominal exam,  Liver and spleen are  unenlarged        Tenderness    Scars  NOT APPLICABLE     Urogenital; no renal, flank or bladder  tenderness;      Lymphatic: neck nodes,     Other nodes NOT APPLICABLE     Musculoskeletal:  Brief ortho exam normal except:   PAIN with RIGHT LATERAL SHOULDER MOVEMENT AND INTERNAL  AND EXTERNAL ROTATION   NO SUBLUXATION   UPPER BACK POSTERIOR MUSCLES PAIN   LOWER BACK PAIN DECREASE RANGE OF MOTION MINIMUM AT PRESENT      Integument: inspection of skin, no rash, lesions; and, palpation, no induration, no tenderness.      Neurologic mental status, overall: alert and oriented; gait, no ataxia, no unsteadiness; coordination, no tremors; cranial nerves, overall: normal motor, overall: normal bulk, tone.      Psychiatric: orientation/consciousness, overall: oriented to person, place and time; behavior/psychomotor activity, no tics, normal psychomotor activity; mood and affect, overall: normal mood and affect; appearance, overall: well-groomed, good eye contact; speech, overall: normal quality, no aphasia and  normal quality, quantity, intact.      Diagnostic Test Results:  Results for orders placed or performed in visit on 05/23/17   XR Lumbar Spine 2/3 Views    Narrative    XR LUMBAR SPINE 2-3 VIEWS 5/23/2017 8:43 AM    COMPARISON: None.    HISTORY: Low back pain.      Impression    IMPRESSION: Vertebral heights are preserved without evidence of  fracture. Trace retrolisthesis at L2-3. Mild/moderate disc space loss  at L1 to and L2-3.    TROY VERONICA         ICD-10-CM    1. Impingement syndrome, shoulder, right M75.41    2. Upper back pain M54.9 celecoxib (CELEBREX) 200 MG capsule   3. Acute midline low back pain without sciatica M54.5 celecoxib (CELEBREX) 200 MG capsule        .    Side effects benefits and risks thoroughly discussed. .he may come in early if unimproved or getting worse          Importance of adhering to regimen discussed and if medications were dispensed, the importance of taking medications discussed and bringing in the medications after every visit for chronic problems         Please drink 2 glasses of water prior to meals and walk 15-30 minutes after meals    I spent  25 MINUTES SPENT  with patient discussing the following issues     The primary encounter diagnosis was Impingement syndrome, shoulder, right. Diagnoses of Upper back pain and Acute midline low back pain without sciatica were also pertinent to this visit. over half of which involved counseling and coordination of care.    Patient Instructions     CODMAN'S EXERCISES  ,THAT IS PENDULUM RANGE OF MOTION 30 EACH TWICE DAILY    THUMB UP EXERCISES INTO PAIN 30 EACH TWICE DAILY    INTERNAL  AND EXTERNAL ROTATION  with AND WITHOUT RESISTANCE OR WEIGHT 30 EACH TWICE DAILY    SWORD SHEATH EXERCISE 30 EACH TWICE DAILY    WALL STRETCH EXERCISE 30 SECONDS EACH TWICE DAILY    POSTERIOR SHOULDER STRETCH AND HOLD 3 10 SECOND STRETCHES TWICE DAILY    ISOMETRIC EXERCISE 60 DEGREES ABDUCTION, ADDUCTION, 90 DEGREE THUMB UP POSITION    AVOID PAINFUL  ARC    ICE TWICE DAILY X 5 MINUTES PRIOR TO EXERCISE OR AS NEEDED FOR PAIN CONTROL    Consider injection  If doesn't work     (M54.9) Upper back pain    Comment:      Plan: celecoxib (CELEBREX) 200 MG capsule                   (M54.5) Acute midline low back pain without sciatica    Comment:      Plan: celecoxib (CELEBREX) 200 MG capsule twice daily with food     norco 7.5mg po a hour of sleep only           Take with food     Avoid that activity     Anika Reyes Jr., MD                    ALL THE ABOVE PROBLEMS ARE STABLE AND MED CHANGES AS NOTED    Diet: MEDITERRANEAN DIET     Exercise:  RIGHT SHOULDER   Exercises Range of motion, balance, isometric, and strengthening exercises 30 repetitions twice daily of involved joints      .ANIKA REYES MD 10/16/2017 6:01 PM  October 16, 2017

## 2017-10-16 NOTE — PATIENT INSTRUCTIONS
CODMAN'S EXERCISES  ,THAT IS PENDULUM RANGE OF MOTION 30 EACH TWICE DAILY    THUMB UP EXERCISES INTO PAIN 30 EACH TWICE DAILY    INTERNAL  AND EXTERNAL ROTATION  with AND WITHOUT RESISTANCE OR WEIGHT 30 EACH TWICE DAILY    SWORD SHEATH EXERCISE 30 EACH TWICE DAILY    WALL STRETCH EXERCISE 30 SECONDS EACH TWICE DAILY    POSTERIOR SHOULDER STRETCH AND HOLD 3 10 SECOND STRETCHES TWICE DAILY    ISOMETRIC EXERCISE 60 DEGREES ABDUCTION, ADDUCTION, 90 DEGREE THUMB UP POSITION    AVOID PAINFUL ARC    ICE TWICE DAILY X 5 MINUTES PRIOR TO EXERCISE OR AS NEEDED FOR PAIN CONTROL    Consider injection  If doesn't work     (M54.9) Upper back pain    Comment:      Plan: celecoxib (CELEBREX) 200 MG capsule                   (M54.5) Acute midline low back pain without sciatica    Comment:      Plan: celecoxib (CELEBREX) 200 MG capsule twice daily with food     norco 7.5mg po a hour of sleep only           Take with food     Avoid that activity     Sony Velez Jr., MD

## 2017-10-16 NOTE — MR AVS SNAPSHOT
After Visit Summary   10/16/2017    Giovanni Cedeno    MRN: [id number]           Patient Information     Date Of Birth          1965        Visit Information        Provider Department      10/16/2017 11:45 AM Sony Velez MD [facility]        Today's Diagnoses     Impingement syndrome, shoulder, right    -  1    Upper back pain        Acute midline low back pain without sciatica          Care Instructions      CODMAN'S EXERCISES  ,THAT IS PENDULUM RANGE OF MOTION 30 EACH TWICE DAILY    THUMB UP EXERCISES INTO PAIN 30 EACH TWICE DAILY    INTERNAL  AND EXTERNAL ROTATION  with AND WITHOUT RESISTANCE OR WEIGHT 30 EACH TWICE DAILY    SWORD SHEATH EXERCISE 30 EACH TWICE DAILY    WALL STRETCH EXERCISE 30 SECONDS EACH TWICE DAILY    POSTERIOR SHOULDER STRETCH AND HOLD 3 10 SECOND STRETCHES TWICE DAILY    ISOMETRIC EXERCISE 60 DEGREES ABDUCTION, ADDUCTION, 90 DEGREE THUMB UP POSITION    AVOID PAINFUL ARC    ICE TWICE DAILY X 5 MINUTES PRIOR TO EXERCISE OR AS NEEDED FOR PAIN CONTROL    Consider injection  If doesn't work     (M54.9) Upper back pain    Comment:      Plan: celecoxib (CELEBREX) 200 MG capsule                   (M54.5) Acute midline low back pain without sciatica    Comment:      Plan: celecoxib (CELEBREX) 200 MG capsule twice daily with food     norco 7.5mg po a hour of sleep only           Take with food     Avoid that activity     Sony Velez Jr., MD                      Follow-ups after your visit        Follow-up notes from your care team     Return in about 4 weeks (around 11/13/2017).      Your next 10 appointments already scheduled     Oct 23, 2017  9:15 AM CDT   Return Visit with Jose G Blanchard MD   [facility] [place] ([facility] [place])    [address]  [place] [address]   [phone]            Nov 14, 2017  8:00 AM CST   Pre-Op physical with Sony Haney  "MD Agustin   [facility]    [address]  [place] [address] [phone]            Dec 11, 2017 11:15 AM CST   Return Visit with Jose G Blanchard MD   [facility] [place] ([facility] [place])    [address]  [place] [address] [phone]              Who to contact     If you have questions or need follow up information about today's clinic visit or your schedule please contact [facility] directly at [phone].  Normal or non-critical lab and imaging results will be communicated to you by Realeyeshart, letter or phone within 4 business days after the clinic has received the results. If you do not hear from us within 7 days, please contact the clinic through Realeyeshart or phone. If you have a critical or abnormal lab result, we will notify you by phone as soon as possible.  Submit refill requests through Bosideng or call your pharmacy and they will forward the refill request to us. Please allow 3 business days for your refill to be completed.          Additional Information About Your Visit        Realeyeshart Information     Bosideng lets you send messages to your doctor, view your test results, renew your prescriptions, schedule appointments and more. To sign up, go to [email or web address] . Click on \"Log in\" on the left side of the screen, which will take you to the Welcome page. Then click on \"Sign up Now\" on the right side of the page.     You will be asked to enter the access code listed below, as well as some personal information. Please follow the directions to create your username and password.     Your access code is: [email or web address]  Expires: 2018 12:16 PM     Your access code will  in 90 days. If you need help or a new code, please call your [facility] or [phone].        Care EveryWhere ID "     This is your Care EveryWhere ID. This could be used by other organizations to access your [facility] medical records  [id number]        Your Vitals Were     Pulse Temperature Respirations Pulse Oximetry BMI (Body Mass Index)       104 97.3  F (36.3  C) (Tympanic) 16 95% 23.04 kg/m2        Blood Pressure from Last 3 Encounters:   10/16/17 118/76   08/20/17 122/82   08/07/17 136/83    Weight from Last 3 Encounters:   10/16/17 128 lb (58.1 kg)   08/20/17 128 lb (58.1 kg)   08/07/17 132 lb 14.4 oz (60.3 kg)              We Performed the Following     DEPRESSION ACTION PLAN (DAP)          Where to get your medicines      These medications were sent to [facility] [address]  [address] [facility] [address]    Hours:  24-hours Phone:  [phone]     celecoxib 200 MG capsule         Some of these will need a paper prescription and others can be bought over the counter.  Ask your nurse if you have questions.     Bring a paper prescription for each of these medications     HYDROcodone-acetaminophen 7.5-325 MG per tablet          Primary Care Provider Office Phone # Fax #    Sony Medina Velez [phone] [address] [address]        Equal Access to Services     FRANKIE SCHMITT AH: Hadii aad ku hadasho Soomaali, waaxda luqadaha, qaybta kaalmada adeegyada, waxay jag dempsey. So [facility] [phone].    ATENCIÓN: Si habla español, tiene a adams disposición servicios gratuitos de asistencia lingüística. Llame al [phone].    We comply with applicable federal civil rights laws and [place] laws. We do not discriminate on the basis of race, color, national origin, age, disability, sex, sexual orientation, or gender identity.            Thank you!     Thank you for choosing [facility]  for your care. Our goal is always to provide you  with excellent care. Hearing back from our patients is one way we can continue to improve our services. Please take a few minutes to complete the written survey that you may receive in the mail after your visit with us. Thank you!             Your Updated Medication List - Protect others around you: Learn how to safely use, store and throw away your medicines at www.disposemymeds.org.          This list is accurate as of: 10/16/17 12:16 PM.  Always use your most recent med list.                   Brand Name Dispense Instructions for use Diagnosis    carbidopa-levodopa  MG per tablet    SINEMET    90 tablet    TAKE 1 TABLET BY MOUTH AT BEDTIME    Restless legs syndrome (RLS)       celecoxib 200 MG capsule    celeBREX    60 capsule    Take 1 capsule (200 mg) by mouth 2 times daily as needed for moderate pain    Upper back pain, Acute midline low back pain without sciatica       cyclobenzaprine 5 MG tablet    FLEXERIL    42 tablet    TAKE 1 TABLET(5 MG) BY MOUTH THREE TIMES DAILY AS NEEDED FOR MUSCLE SPASMS    Acute midline low back pain without sciatica, Upper back pain       finasteride 5 MG tablet    PROSCAR    90 tablet    Take 1 tablet (5 mg) by mouth daily    Benign prostatic hyperplasia with lower urinary tract symptoms, unspecified morphology       gabapentin 300 MG capsule    NEURONTIN    60 capsule    TAKE 2 CAPSULES BY MOUTH EVERY NIGHT AT BEDTIME    Restless leg syndrome       HYDROcodone-acetaminophen 7.5-325 MG per tablet    NORCO    60 tablet    Take 1 tablet by mouth every 4 hours as needed for pain maximum  4  tablet(s) per day        ketoconazole 2 % cream    NIZORAL    45 g    Apply topically 2 times daily    Tinea versicolor       mirtazapine 15 MG tablet    REMERON    30 tablet    TAKE 1 TABLET BY MOUTH EVERY NIGHT AT BEDTIME    Major depressive disorder, single episode, in partial or unspecified remission       simvastatin 20 MG tablet    ZOCOR    30 tablet    TAKE 1 TABLET BY MOUTH EVERY  DAY AT BEDTIME    Hyperlipidemia LDL goal <100, Pure hypercholesterolemia       tamsulosin 0.4 MG capsule    FLOMAX    90 capsule    Take 1 capsule (0.4 mg) by mouth daily    Benign prostatic hyperplasia with lower urinary tract symptoms, unspecified morphology

## 2017-10-23 ENCOUNTER — OFFICE VISIT (OUTPATIENT)
Dept: ORTHOPEDICS | Facility: CLINIC | Age: 52
End: 2017-10-23
Payer: COMMERCIAL

## 2017-10-23 VITALS — HEIGHT: 63 IN | RESPIRATION RATE: 16 BRPM

## 2017-10-23 DIAGNOSIS — G89.29 CHRONIC RIGHT SHOULDER PAIN: Primary | ICD-10-CM

## 2017-10-23 DIAGNOSIS — M25.511 CHRONIC RIGHT SHOULDER PAIN: Primary | ICD-10-CM

## 2017-10-23 DIAGNOSIS — M75.41 IMPINGEMENT SYNDROME, SHOULDER, RIGHT: ICD-10-CM

## 2017-10-23 DIAGNOSIS — M75.111 INCOMPLETE TEAR OF RIGHT ROTATOR CUFF: ICD-10-CM

## 2017-10-23 PROCEDURE — 99212 OFFICE O/P EST SF 10 MIN: CPT | Performed by: ORTHOPAEDIC SURGERY

## 2017-10-23 ASSESSMENT — PAIN SCALES - GENERAL: PAINLEVEL: MILD PAIN (3)

## 2017-10-23 NOTE — PROGRESS NOTES
"CHIEF COMPLAINT:   Chief Complaint   Patient presents with     RECHECK     Right shoulder pain. Last cortisone injection on 8/7/17. This worked well. On 9/24/17 he was finishing a garage floor and the  got stuck and has had pain in the shoulder with overhead movements. He has shoulder arthroscopy set up for 11/28/17.     HISTORY OF PRESENT ILLNESS    Giovanni \"JAKUB\" Pao is a 52 year old male seen for follow up of right shoulder pain. Last injection was on 8/7/2017 which worked great until his most recent re-injury. He reports that on 9/24/2017, he was finishing a garage floor and the  got stuck, hurting his shoulder. Since that time, has had pain with overhead shoulder movements. His pain is mild today, rated a 3/10. Pain is located diffusely in the shoulder. He has shoulder arthroscopy set up for 11/28/2017. He is wondering if he can get an injection to get by before the surgery.       Present symptoms: pain right shoulder.  Pain severity: 3/10  Pain quality: dull and aching, sharp at times  Frequency of symptoms: occasionally  Night Pain: Yes  Pain while at rest: Yes    Other PMH:  has a past medical history of Arthritis.   Patient Active Problem List    Diagnosis Date Noted     Major depression in partial remission (H) 11/24/2013     Priority: High     Pre-diabetes 11/24/2013     Priority: High     Abdominal pain, left upper quadrant 05/23/2017     Priority: Medium     Pain of toe of right foot 05/23/2017     Priority: Medium     Acute midline low back pain without sciatica 05/23/2017     Priority: Medium     Upper back pain 05/23/2017     Priority: Medium     Chronic right shoulder pain 11/10/2016     Priority: Medium     Chronic pain syndrome 07/09/2015     Priority: Medium     Patient is followed by ANIKA REYES for ongoing prescription of pain medication.  All refills should be approved by this provider, or covering partner.    Medication(s): Norco 7.5mg po three times daily 3. "   Maximum quantity per month: 90  Clinic visit frequency required: Q 3 months     Controlled substance agreement on file: Yes       Date(s): 07/09/2015    Pain Clinic evaluation in the past: No       Location(s):  none     DIRE Total Score(s):    7/9/2015   Total Score 20       Last [facility] website verification:  none   [email or web address]         Immunization reaction 01/15/2015     Priority: Medium     Epicondylitis, lateral humeral 11/26/2013     Priority: Medium     Restless leg syndrome 05/08/2013     Priority: Medium     Knee bursitis 05/08/2013     Priority: Medium      right posterior medial hamstring insertion site       Pure hypercholesterolemia 05/08/2013     Priority: Medium     Medications:   Current Outpatient Prescriptions:      celecoxib (CELEBREX) 200 MG capsule, Take 1 capsule (200 mg) by mouth 2 times daily as needed for moderate pain, Disp: 60 capsule, Rfl: 1     HYDROcodone-acetaminophen (NORCO) 7.5-325 MG per tablet, Take 1 tablet by mouth every 4 hours as needed for pain maximum  4  tablet(s) per day, Disp: 60 tablet, Rfl: 0     cyclobenzaprine (FLEXERIL) 5 MG tablet, TAKE 1 TABLET(5 MG) BY MOUTH THREE TIMES DAILY AS NEEDED FOR MUSCLE SPASMS, Disp: 42 tablet, Rfl: 0     carbidopa-levodopa (SINEMET)  MG per tablet, TAKE 1 TABLET BY MOUTH AT BEDTIME, Disp: 90 tablet, Rfl: 0     tamsulosin (FLOMAX) 0.4 MG capsule, Take 1 capsule (0.4 mg) by mouth daily, Disp: 90 capsule, Rfl: 3     finasteride (PROSCAR) 5 MG tablet, Take 1 tablet (5 mg) by mouth daily, Disp: 90 tablet, Rfl: 1     ketoconazole (NIZORAL) 2 % cream, Apply topically 2 times daily, Disp: 45 g, Rfl: 1     simvastatin (ZOCOR) 20 MG tablet, TAKE 1 TABLET BY MOUTH EVERY DAY AT BEDTIME, Disp: 30 tablet, Rfl: 11     gabapentin (NEURONTIN) 300 MG capsule, TAKE 2 CAPSULES BY MOUTH EVERY NIGHT AT BEDTIME, Disp: 60 capsule, Rfl: 11     mirtazapine (REMERON) 15 MG tablet, TAKE 1 TABLET BY MOUTH EVERY NIGHT AT BEDTIME, Disp: 30  "tablet, Rfl: 4    Allergies: No Known Allergies    REVIEW OF SYSTEMS:  CONSTITUTIONAL:NEGATIVE for fever, chills, change in weight  INTEGUMENTARY/SKIN: NEGATIVE for worrisome rashes, moles or lesions  MUSCULOSKELETAL:See HPI above  NEURO: NEGATIVE for weakness, dizziness or paresthesias    This document serves as a record of the services and decisions personally performed and made by Jose G Blanchard MD. It was created on his behalf by Merced Moran, a trained medical scribe. The creation of this document is based the provider's statements to the medical scribe.    Scribe Merced Moran 9:38 AM 10/23/2017     PHYSICAL EXAM:  Resp 16  Ht 5' 2.5\" (1.588 m)   GENERAL APPEARANCE: healthy, alert, no distress ; accompanied by a friend.   SKIN: no suspicious lesions or rashes  NEURO: Normal strength and tone, mentation intact and speech normal  PSYCH:  mentation appears normal and affect normal  RESPIRATORY: No increased work of breathing.      RIGHT UPPER EXTREMITY:  Sensation intact to light touch in median, radial, ulnar and axillary nerve distributions  Palpable 2+ radial pulse, brisk capillary refill to all fingers, wwp  Intact epl fpl fdp edc wrist flexion/extension biceps triceps deltoid    RIGHT SHOULDER:  Shoulder Inspection: no swelling, bruising, discoloration, or obvious deformity or asymmetry  Tender: mild AC joint, greater tuberosity and pectoralis over the anterior chest.   Non-tender: SC joint, proximal-mid clavicle, mid-distal clavicle, acromion, anterior capsule, proximal bicep tendon, supraspinatus , infraspinatus, upper trapezius muscle and rhomboids  Range of Motion:   Active: forward flexion 170 degrees, external rotation  70 degrees, internal rotation  T12   Passive: forward flexion 175 degrees,   Strength: forward flexion 4/5, painful, limited by pain, External rotation 4/5, painful, limited by pain  Liftoff: Able  Impingement: all grade 2 positive  Special tests: Belly Press: Negative  Empty Can: " Negative  Belly press: Negative      LEFT UPPER EXTREMITY:  Sensation intact to light touch in median, radial, ulnar and axillary nerve distributions  Palpable 2+ radial pulse, brisk capillary refill to all fingers, wwp  Intact epl fpl fdp edc wrist flexion/extension biceps triceps deltoid    LEFT SHOULDER:  Shoulder Inspection: no swelling, bruising, discoloration, or obvious deformity or asymmetry  Tender: none  Non-tender: SC joint, proximal-mid clavicle, mid-distal clavicle, AC joint, acromion, anterior capsule, proximal bicep tendon, greater tuberosity, proximal humerus, supraspinatus , infraspinatus, upper trapezius muscle and rhomboids  Range of Motion:   Active:forward flexion 175 degrees, external rotation  70 degrees, internal rotation  T8   Passive: same  Strength: forward flexion 5-/5, External rotation 5-/5  Liftoff: Able  Impingement: negative.      X-rays : no new x-rays today. 3 views right shoulder 8/21/2014 reviewed. No obvious fracture or dislocation. No obvious bony abnormality or lesion. Possible cystic change greater tuberosity.    MRI of right shoulder from 12/14/2015 that showed:  IMPRESSION:    1. Supraspinatus degenerative tendinopathy with superimposed small  thickness tear anterolaterally.  2. Subscapularis degenerative tendinopathy without tear.  3. Fluid in subacromial    Impression: 52 year old male with Right shoulder pain consistent with impingement syndrome, rotator cuff tendinosis with small rotator cuff tear. Left knee pain consistent with mild osteoarthritis.    Plan:   * discussed with patient finding of rotator cuff tear, its implications and potential treatment options. Appears to be more weakness today than at prior visits, concern for progression of tear.  * discussed various options with patient, including nonop with Physical Therapy, injections, NSAIDS, activity modification versus rotator cuff repair. Risks and benefits of each discussed in detail.  * risks of nonop  treatment include continued pain, weakness, progression of tear, development of rotator cuff tear arthropathy and arthrosis, decreased range of motion and stiffness.  * Risks of surgery include, but not limited to: bleeding, infection, pain, scar, damage to adjacent structures (e.g. Nerves, blood vessels, bone, cartilage), temporary or permanent nerve damage, recurrence of symptoms, failure to relieve pain or weakness, stiffness, post-traumatic arthritis, development of rotator cuff tear arthropathy and arthrosis, decreased range of motion and stiffness, need for further surgery, blood clots, pulmonary embolism, risks of anesthesia, death.    * despite these risks, patient would like to proceed with surgery. Surgery is scheduled for 11/28/2017.    * will plan: right shoulder arthroscopic rotator cuff repair, subacromial decompression, distal clavicle excision; with possible proximal biceps tenodesis versus tenotomy; possible mini-open repair or DCE if necessary.    * will perform on outpatient basis, overnight observation if necessary  * patient will need H+P prior to surgery from primary care physician   * will plan followup 2 week postoperative, start Physical Therapy at that time, per protocol, which will be provided to the patient.  * all questions addressed and answered to satisfaction  * continue working on shoulder range of motion to prevent stiffness.      The information in this document, created by a scribe for me, accurately reflects the services I personally performed and the decisions made by me. I have reviewed and approved this document for accuracy.      Jose G Blanchard M.D., M.S.  Dept. of Orthopaedic Surgery  [facility]

## 2017-10-23 NOTE — NURSING NOTE
"Chief Complaint   Patient presents with     RECHECK     Right shoulder pain. Last cortisone injection on 8/7/17. This worked well. On 9/24/17 he was finishing a garage floor and the  got stuck and has had pain in the shoulder with overhead movements. He has shoulder arthroscopy set up for 11/28/17.       Initial Resp 16  Ht 1.588 m (5' 2.5\") Estimated body mass index is 23.04 kg/(m^2) as calculated from the following:    Height as of 8/7/17: 1.588 m (5' 2.5\").    Weight as of 10/16/17: 58.1 kg (128 lb).  Medication Reconciliation: complete   Ammon Daniel PA-C, CAQ (Ortho)  Supervising Physician: Jose G Blanchard M.D., M.S.  Dept. of Orthopaedic Surgery  [facility]      "

## 2017-10-23 NOTE — LETTER
"    10/23/2017         RE: Giovanni Cedeno  [address]  [place] [address]        Dear Colleague,    Thank you for referring your patient, Giovanni Cedeno, to the [facility] [place]. Please see a copy of my visit note below.    CHIEF COMPLAINT:   Chief Complaint   Patient presents with     RECHECK     Right shoulder pain. Last cortisone injection on 8/7/17. This worked well. On 9/24/17 he was finishing a garage floor and the  got stuck and has had pain in the shoulder with overhead movements. He has shoulder arthroscopy set up for 11/28/17.     HISTORY OF PRESENT ILLNESS    Giovanni \"JAKUB\" Pao is a 52 year old male seen for follow up of right shoulder pain. Last injection was on 8/7/2017 which worked great until his most recent re-injury. He reports that on 9/24/2017, he was finishing a garage floor and the  got stuck, hurting his shoulder. Since that time, has had pain with overhead shoulder movements. His pain is mild today, rated a 3/10. Pain is located diffusely in the shoulder. He has shoulder arthroscopy set up for 11/28/2017. He is wondering if he can get an injection to get by before the surgery.       Present symptoms: pain right shoulder.  Pain severity: 3/10  Pain quality: dull and aching, sharp at times  Frequency of symptoms: occasionally  Night Pain: Yes  Pain while at rest: Yes    Other PMH:  has a past medical history of Arthritis.   Patient Active Problem List    Diagnosis Date Noted     Major depression in partial remission (H) 11/24/2013     Priority: High     Pre-diabetes 11/24/2013     Priority: High     Abdominal pain, left upper quadrant 05/23/2017     Priority: Medium     Pain of toe of right foot 05/23/2017     Priority: Medium     Acute midline low back pain without sciatica 05/23/2017     Priority: Medium     Upper back pain 05/23/2017     Priority: Medium     Chronic right shoulder pain 11/10/2016     Priority: Medium     Chronic " pain syndrome 07/09/2015     Priority: Medium     Patient is followed by ANIKA REYES for ongoing prescription of pain medication.  All refills should be approved by this provider, or covering partner.    Medication(s): Norco 7.5mg po three times daily 3.   Maximum quantity per month: 90  Clinic visit frequency required: Q 3 months     Controlled substance agreement on file: Yes       Date(s): 07/09/2015    Pain Clinic evaluation in the past: No       Location(s):  none     DIRE Total Score(s):    7/9/2015   Total Score 20       [facility] website verification:  none   [email or web address]         Immunization reaction 01/15/2015     Priority: Medium     Epicondylitis, lateral humeral 11/26/2013     Priority: Medium     Restless leg syndrome 05/08/2013     Priority: Medium     Knee bursitis 05/08/2013     Priority: Medium      right posterior medial hamstring insertion site       Pure hypercholesterolemia 05/08/2013     Priority: Medium     Medications:   Current Outpatient Prescriptions:      celecoxib (CELEBREX) 200 MG capsule, Take 1 capsule (200 mg) by mouth 2 times daily as needed for moderate pain, Disp: 60 capsule, Rfl: 1     HYDROcodone-acetaminophen (NORCO) 7.5-325 MG per tablet, Take 1 tablet by mouth every 4 hours as needed for pain maximum  4  tablet(s) per day, Disp: 60 tablet, Rfl: 0     cyclobenzaprine (FLEXERIL) 5 MG tablet, TAKE 1 TABLET(5 MG) BY MOUTH THREE TIMES DAILY AS NEEDED FOR MUSCLE SPASMS, Disp: 42 tablet, Rfl: 0     carbidopa-levodopa (SINEMET)  MG per tablet, TAKE 1 TABLET BY MOUTH AT BEDTIME, Disp: 90 tablet, Rfl: 0     tamsulosin (FLOMAX) 0.4 MG capsule, Take 1 capsule (0.4 mg) by mouth daily, Disp: 90 capsule, Rfl: 3     finasteride (PROSCAR) 5 MG tablet, Take 1 tablet (5 mg) by mouth daily, Disp: 90 tablet, Rfl: 1     ketoconazole (NIZORAL) 2 % cream, Apply topically 2 times daily, Disp: 45 g, Rfl: 1     simvastatin (ZOCOR) 20 MG tablet, TAKE 1 TABLET BY MOUTH  "EVERY DAY AT BEDTIME, Disp: 30 tablet, Rfl: 11     gabapentin (NEURONTIN) 300 MG capsule, TAKE 2 CAPSULES BY MOUTH EVERY NIGHT AT BEDTIME, Disp: 60 capsule, Rfl: 11     mirtazapine (REMERON) 15 MG tablet, TAKE 1 TABLET BY MOUTH EVERY NIGHT AT BEDTIME, Disp: 30 tablet, Rfl: 4    Allergies: No Known Allergies    REVIEW OF SYSTEMS:  CONSTITUTIONAL:NEGATIVE for fever, chills, change in weight  INTEGUMENTARY/SKIN: NEGATIVE for worrisome rashes, moles or lesions  MUSCULOSKELETAL:See HPI above  NEURO: NEGATIVE for weakness, dizziness or paresthesias    This document serves as a record of the services and decisions personally performed and made by Jose G Blanchard MD. It was created on his behalf by Merced Moran, a trained medical scribe. The creation of this document is based the provider's statements to the medical scribe.    Alessioibharjeet Moran 9:38 AM 10/23/2017     PHYSICAL EXAM:  Resp 16  Ht 5' 2.5\" (1.588 m)   GENERAL APPEARANCE: healthy, alert, no distress ; accompanied by a friend.   SKIN: no suspicious lesions or rashes  NEURO: Normal strength and tone, mentation intact and speech normal  PSYCH:  mentation appears normal and affect normal  RESPIRATORY: No increased work of breathing.      RIGHT UPPER EXTREMITY:  Sensation intact to light touch in median, radial, ulnar and axillary nerve distributions  Palpable 2+ radial pulse, brisk capillary refill to all fingers, wwp  Intact epl fpl fdp edc wrist flexion/extension biceps triceps deltoid    RIGHT SHOULDER:  Shoulder Inspection: no swelling, bruising, discoloration, or obvious deformity or asymmetry  Tender: mild AC joint, greater tuberosity and pectoralis over the anterior chest.   Non-tender: SC joint, proximal-mid clavicle, mid-distal clavicle, acromion, anterior capsule, proximal bicep tendon, supraspinatus , infraspinatus, upper trapezius muscle and rhomboids  Range of Motion:   Active: forward flexion 170 degrees, external rotation  70 degrees, internal rotation  " T12   Passive: forward flexion 175 degrees,   Strength: forward flexion 4/5, painful, limited by pain, External rotation 4/5, painful, limited by pain  Liftoff: Able  Impingement: all grade 2 positive  Special tests: Belly Press: Negative  Empty Can: Negative  Belly press: Negative      LEFT UPPER EXTREMITY:  Sensation intact to light touch in median, radial, ulnar and axillary nerve distributions  Palpable 2+ radial pulse, brisk capillary refill to all fingers, wwp  Intact epl fpl fdp edc wrist flexion/extension biceps triceps deltoid    LEFT SHOULDER:  Shoulder Inspection: no swelling, bruising, discoloration, or obvious deformity or asymmetry  Tender: none  Non-tender: SC joint, proximal-mid clavicle, mid-distal clavicle, AC joint, acromion, anterior capsule, proximal bicep tendon, greater tuberosity, proximal humerus, supraspinatus , infraspinatus, upper trapezius muscle and rhomboids  Range of Motion:   Active:forward flexion 175 degrees, external rotation  70 degrees, internal rotation  T8   Passive: same  Strength: forward flexion 5-/5, External rotation 5-/5  Liftoff: Able  Impingement: negative.      X-rays : no new x-rays today. 3 views right shoulder 8/21/2014 reviewed. No obvious fracture or dislocation. No obvious bony abnormality or lesion. Possible cystic change greater tuberosity.    MRI of right shoulder from 12/14/2015 that showed:  IMPRESSION:    1. Supraspinatus degenerative tendinopathy with superimposed small  thickness tear anterolaterally.  2. Subscapularis degenerative tendinopathy without tear.  3. Fluid in subacromial    Impression: 52 year old male with Right shoulder pain consistent with impingement syndrome, rotator cuff tendinosis with small rotator cuff tear. Left knee pain consistent with mild osteoarthritis.    Plan:   * discussed with patient finding of rotator cuff tear, its implications and potential treatment options. Appears to be more weakness today than at prior visits,  concern for progression of tear.  * discussed various options with patient, including nonop with Physical Therapy, injections, NSAIDS, activity modification versus rotator cuff repair. Risks and benefits of each discussed in detail.  * risks of nonop treatment include continued pain, weakness, progression of tear, development of rotator cuff tear arthropathy and arthrosis, decreased range of motion and stiffness.  * Risks of surgery include, but not limited to: bleeding, infection, pain, scar, damage to adjacent structures (e.g. Nerves, blood vessels, bone, cartilage), temporary or permanent nerve damage, recurrence of symptoms, failure to relieve pain or weakness, stiffness, post-traumatic arthritis, development of rotator cuff tear arthropathy and arthrosis, decreased range of motion and stiffness, need for further surgery, blood clots, pulmonary embolism, risks of anesthesia, death.    * despite these risks, patient would like to proceed with surgery. Surgery is scheduled for 11/28/2017.    * will plan: right shoulder arthroscopic rotator cuff repair, subacromial decompression, distal clavicle excision; with possible proximal biceps tenodesis versus tenotomy; possible mini-open repair or DCE if necessary.    * will perform on outpatient basis, overnight observation if necessary  * patient will need H+P prior to surgery from primary care physician   * will plan followup 2 week postoperative, start Physical Therapy at that time, per protocol, which will be provided to the patient.  * all questions addressed and answered to satisfaction  * continue working on shoulder range of motion to prevent stiffness.      The information in this document, created by a scribe for me, accurately reflects the services I personally performed and the decisions made by me. I have reviewed and approved this document for accuracy.      Jose G Blanchard M.D., M.S.  Dept. of Orthopaedic Surgery  [facility]      Again, thank  you for allowing me to participate in the care of your patient.        Sincerely,        Jose G Blanchard MD

## 2017-10-23 NOTE — PATIENT INSTRUCTIONS
Please remember to call and schedule a follow up appointment if one was recommended at your earliest convenience.  Orthopedics CLINIC HOURS TELEPHONE NUMBER   Dr. Lo Levy  Certified Medical Assistant   Monday & Wednesday   8am - 5pm  Thursday 1pm - 5pm  Friday 8am -11:30am Specialty schedulers:   [phone] to schedule your surgery.  Main Clinic:   [phone] to make an appointment with any provider.    Urgent Care locations:    [facility] Monday-Friday Closed  Saturday-Sunday 9am-5pm      Monday-Friday 12pm - 8pm  Saturday-Sunday 9am-5pm [phone]     If SURGERY has been recommended, please call our Specialty Schedulers at [phone] to schedule your procedure.    If you need a medication refill, please contact your pharmacy. Please allow 3 business days for your refill to be completed.    If an MRI or CT scan has been recommended, please call [facility] Schedulers at [phone] to schedule your appointment.  Use [employer] (secure e-mail communication and access to your chart) to send a message or to make an appointment. Please ask how you can sign up for [employer].  Your care team's suggested websites for health information:   [email or web address] : Up to date and easily searchable information on multiple topics.   [email or web address] : [place] dept of heat, public health issues in [place], N1N1

## 2017-10-23 NOTE — MR AVS SNAPSHOT
After Visit Summary   10/23/2017    Giovanni Cedeno    MRN: [id number]           Patient Information     Date Of Birth          1965        Visit Information        Provider Department      10/23/2017 9:15 AM Jose G Blanchard MD [facility]        Care Instructions    Please remember to call and schedule a follow up appointment if one was recommended at your earliest convenience.  Orthopedics CLINIC HOURS TELEPHONE NUMBER   Dr. Lo Levy  Certified Medical Assistant   Monday & Wednesday   8am - 5pm  Thursday 1pm - 5pm  Friday 8am -11:30am Specialty schedulers:   [phone] to schedule your surgery.  Main Clinic:   [phone] to make an appointment with any provider.    Urgent Care locations:    [facility] Monday-Friday Closed  Saturday-Sunday 9am-5pm      Monday-Friday 12pm - 8pm  Saturday-Sunday 9am-5pm [phone]     If SURGERY has been recommended, please call our Specialty Schedulers at [phone] to schedule your procedure.    If you need a medication refill, please contact your pharmacy. Please allow 3 business days for your refill to be completed.    If an MRI or CT scan has been recommended, please call [place] [facility] Schedulers at [phone] to schedule your appointment.  Use [employer] (secure e-mail communication and access to your chart) to send a message or to make an appointment. Please ask how you can sign up for [employer].  Your care team's suggested websites for health information:   [email or web address] : Up to date and easily searchable information on multiple topics.   [email or web address] : [place] dept of heat, public health issues in [place], N1N1              Follow-ups after your visit        Your next 10 appointments already scheduled     Nov 14, 2017  8:00 AM CST   Pre-Op physical with Sony Velez MD   [facility]  "([facility])    [address]  [facility] [place] [address]   [phone]            Dec 11, 2017 11:15 AM CST   Return Visit with Jose G Blanchard MD   [facility] [place] ([facility])    [address]  [place] [id number] [phone]              Who to contact     If you have questions or need follow up information about today's clinic visit or your schedule please contact [facility] directly at [phone].  Normal or non-critical lab and imaging results will be communicated to you by Paper.lihart, letter or phone within 4 business days after the clinic has received the results. If you do not hear from us within 7 days, please contact the clinic through Paper.lihart or phone. If you have a critical or abnormal lab result, we will notify you by phone as soon as possible.  Submit refill requests through [employer] or call your pharmacy and they will forward the refill request to us. Please allow 3 business days for your refill to be completed.          Additional Information About Your Visit        MyChart Information     [employer] lets you send messages to your doctor, view your test results, renew your prescriptions, schedule appointments and more. To sign up, go to [email or web address] . Click on \"Log in\" on the left side of the screen, which will take you to the Welcome page. Then click on \"Sign up Now\" on the right side of the page.     You will be asked to enter the access code listed below, as well as some personal information. Please follow the directions to create your username and password.     Your access code is: [email or web address]  Expires: 2018 12:16 PM     Your access code will  in 90 days. If you need help or a new code, please call your [facility] clinic or [phone].        Care EveryWhere ID     This is your Care EveryWhere ID. This could be used by " "other organizations to access your [facility] medical records  [id number]        Your Vitals Were     Respirations Height                16 1.588 m (5' 2.5\")           Blood Pressure from Last 3 Encounters:   10/16/17 118/76   08/20/17 122/82   08/07/17 136/83    Weight from Last 3 Encounters:   10/16/17 58.1 kg (128 lb)   08/20/17 58.1 kg (128 lb)   08/07/17 60.3 kg (132 lb 14.4 oz)              Today, you had the following     No orders found for display       Primary Care Provider Office Phone # Fax #    Sony Medina Velez [phone] [address] [facility] [address]        Equal Access to Services     FRANKIE SCHMITT : Hadii raphael anguiano hadasho Soomaali, waaxda luqadaha, qaybta kaalmada adeegyada, waxanna lund . So [facility] [phone].    ATENCIÓN: Si habla español, tiene a adams disposición servicios gratuitos de asistencia lingüística. Llame al [phone].    We comply with applicable federal civil rights laws and [place] laws. We do not discriminate on the basis of race, color, national origin, age, disability, sex, sexual orientation, or gender identity.            Thank you!     Thank you for choosing [facility]  for your care. Our goal is always to provide you with excellent care. Hearing back from our patients is one way we can continue to improve our services. Please take a few minutes to complete the written survey that you may receive in the mail after your visit with us. Thank you!             Your Updated Medication List - Protect others around you: Learn how to safely use, store and throw away your medicines at www.disposemymeds.org.          This list is accurate as of: 10/23/17  9:30 AM.  Always use your most recent med list.                   Brand Name Dispense Instructions for use Diagnosis    carbidopa-levodopa  MG per tablet    SINEMET    90 tablet    TAKE 1 TABLET BY MOUTH AT BEDTIME    Restless legs " syndrome (RLS)       celecoxib 200 MG capsule    celeBREX    60 capsule    Take 1 capsule (200 mg) by mouth 2 times daily as needed for moderate pain    Upper back pain, Acute midline low back pain without sciatica       cyclobenzaprine 5 MG tablet    FLEXERIL    42 tablet    TAKE 1 TABLET(5 MG) BY MOUTH THREE TIMES DAILY AS NEEDED FOR MUSCLE SPASMS    Acute midline low back pain without sciatica, Upper back pain       finasteride 5 MG tablet    PROSCAR    90 tablet    Take 1 tablet (5 mg) by mouth daily    Benign prostatic hyperplasia with lower urinary tract symptoms, unspecified morphology       gabapentin 300 MG capsule    NEURONTIN    60 capsule    TAKE 2 CAPSULES BY MOUTH EVERY NIGHT AT BEDTIME    Restless leg syndrome       HYDROcodone-acetaminophen 7.5-325 MG per tablet    NORCO    60 tablet    Take 1 tablet by mouth every 4 hours as needed for pain maximum  4  tablet(s) per day        ketoconazole 2 % cream    NIZORAL    45 g    Apply topically 2 times daily    Tinea versicolor       mirtazapine 15 MG tablet    REMERON    30 tablet    TAKE 1 TABLET BY MOUTH EVERY NIGHT AT BEDTIME    Major depressive disorder, single episode, in partial or unspecified remission       simvastatin 20 MG tablet    ZOCOR    30 tablet    TAKE 1 TABLET BY MOUTH EVERY DAY AT BEDTIME    Hyperlipidemia LDL goal <100, Pure hypercholesterolemia       tamsulosin 0.4 MG capsule    FLOMAX    90 capsule    Take 1 capsule (0.4 mg) by mouth daily    Benign prostatic hyperplasia with lower urinary tract symptoms, unspecified morphology

## 2017-10-24 DIAGNOSIS — M54.9 UPPER BACK PAIN: ICD-10-CM

## 2017-10-24 DIAGNOSIS — M54.50 ACUTE MIDLINE LOW BACK PAIN WITHOUT SCIATICA: ICD-10-CM

## 2017-10-24 RX ORDER — CYCLOBENZAPRINE HCL 5 MG
TABLET ORAL
Qty: 42 TABLET | Refills: 0 | Status: SHIPPED | OUTPATIENT
Start: 2017-10-24 | End: 2017-11-22

## 2017-10-24 NOTE — TELEPHONE ENCOUNTER
LAST OFFICE VISIT: 10/16/17.    cyclobenzaprine (FLEXERIL) 5 MG tablet      Last Written Prescription Date:  9/16/17  Last Fill Quantity: 42,   # refills: 0  Future Office visit:    Next 5 appointments (look out 90 days)     Nov 14, 2017  8:00 AM CST   [facility] physical [facility]   [facility]    [address] [place] [address]   [phone]            Dec 11, 2017 11:15 AM CST   Return Visit with Jose G Blanchard MD   [facility] [place] ([facility]    [address]  [place] [address]   [phone]                   Routing refill request to provider for review/approval because:  Drug not on the [facility], [facility] or  Health refill protocol or controlled substance

## 2017-11-14 ENCOUNTER — OFFICE VISIT (OUTPATIENT)
Dept: FAMILY MEDICINE | Facility: CLINIC | Age: 52
End: 2017-11-14
Payer: COMMERCIAL

## 2017-11-14 ENCOUNTER — RADIANT APPOINTMENT (OUTPATIENT)
Dept: GENERAL RADIOLOGY | Facility: CLINIC | Age: 52
End: 2017-11-14
Attending: FAMILY MEDICINE
Payer: COMMERCIAL

## 2017-11-14 VITALS
DIASTOLIC BLOOD PRESSURE: 72 MMHG | HEART RATE: 74 BPM | SYSTOLIC BLOOD PRESSURE: 116 MMHG | TEMPERATURE: 97.6 F | WEIGHT: 130 LBS | RESPIRATION RATE: 16 BRPM | BODY MASS INDEX: 23.4 KG/M2 | OXYGEN SATURATION: 99 %

## 2017-11-14 DIAGNOSIS — G89.4 CHRONIC PAIN SYNDROME: ICD-10-CM

## 2017-11-14 DIAGNOSIS — M75.101 ROTATOR CUFF SYNDROME, RIGHT: ICD-10-CM

## 2017-11-14 DIAGNOSIS — Z01.818 PREOP GENERAL PHYSICAL EXAM: Primary | ICD-10-CM

## 2017-11-14 DIAGNOSIS — Z01.818 PREOP GENERAL PHYSICAL EXAM: ICD-10-CM

## 2017-11-14 DIAGNOSIS — R73.03 PRE-DIABETES: Chronic | ICD-10-CM

## 2017-11-14 DIAGNOSIS — E78.00 PURE HYPERCHOLESTEROLEMIA: Chronic | ICD-10-CM

## 2017-11-14 LAB
ANION GAP SERPL CALCULATED.3IONS-SCNC: 8 MMOL/L (ref 3–14)
BUN SERPL-MCNC: 20 MG/DL (ref 7–30)
CALCIUM SERPL-MCNC: 9.4 MG/DL (ref 8.5–10.1)
CHLORIDE SERPL-SCNC: 105 MMOL/L (ref 94–109)
CO2 SERPL-SCNC: 25 MMOL/L (ref 20–32)
CREAT SERPL-MCNC: 0.84 MG/DL (ref 0.66–1.25)
ERYTHROCYTE [DISTWIDTH] IN BLOOD BY AUTOMATED COUNT: 12.5 % (ref 10–15)
GFR SERPL CREATININE-BSD FRML MDRD: >90 ML/MIN/1.7M2
GLUCOSE SERPL-MCNC: 92 MG/DL (ref 70–99)
HCT VFR BLD AUTO: 44.3 % (ref 40–53)
HGB BLD-MCNC: 14.7 G/DL (ref 13.3–17.7)
MCH RBC QN AUTO: 31.2 PG (ref 26.5–33)
MCHC RBC AUTO-ENTMCNC: 33.2 G/DL (ref 31.5–36.5)
MCV RBC AUTO: 94 FL (ref 78–100)
PLATELET # BLD AUTO: 291 10E9/L (ref 150–450)
POTASSIUM SERPL-SCNC: 4.2 MMOL/L (ref 3.4–5.3)
RBC # BLD AUTO: 4.71 10E12/L (ref 4.4–5.9)
SODIUM SERPL-SCNC: 138 MMOL/L (ref 133–144)
WBC # BLD AUTO: 6.4 10E9/L (ref 4–11)

## 2017-11-14 PROCEDURE — 99214 OFFICE O/P EST MOD 30 MIN: CPT | Mod: 25 | Performed by: FAMILY MEDICINE

## 2017-11-14 PROCEDURE — 93005 ELECTROCARDIOGRAM TRACING: CPT | Performed by: FAMILY MEDICINE

## 2017-11-14 PROCEDURE — 85027 COMPLETE CBC AUTOMATED: CPT | Performed by: FAMILY MEDICINE

## 2017-11-14 PROCEDURE — 71020 XR CHEST 2 VW: CPT

## 2017-11-14 PROCEDURE — 80048 BASIC METABOLIC PNL TOTAL CA: CPT | Performed by: FAMILY MEDICINE

## 2017-11-14 PROCEDURE — 36415 COLL VENOUS BLD VENIPUNCTURE: CPT | Performed by: FAMILY MEDICINE

## 2017-11-14 RX ORDER — HYDROCODONE BITARTRATE AND ACETAMINOPHEN 7.5; 325 MG/1; MG/1
1 TABLET ORAL EVERY 4 HOURS PRN
Qty: 60 TABLET | Refills: 0 | Status: SHIPPED | OUTPATIENT
Start: 2017-11-14 | End: 2017-12-21

## 2017-11-14 ASSESSMENT — ANXIETY QUESTIONNAIRES
4. TROUBLE RELAXING: NOT AT ALL
3. WORRYING TOO MUCH ABOUT DIFFERENT THINGS: NOT AT ALL
2. NOT BEING ABLE TO STOP OR CONTROL WORRYING: NOT AT ALL
7. FEELING AFRAID AS IF SOMETHING AWFUL MIGHT HAPPEN: NOT AT ALL
7. FEELING AFRAID AS IF SOMETHING AWFUL MIGHT HAPPEN: NOT AT ALL
GAD7 TOTAL SCORE: 0
GAD7 TOTAL SCORE: 0
5. BEING SO RESTLESS THAT IT IS HARD TO SIT STILL: NOT AT ALL
6. BECOMING EASILY ANNOYED OR IRRITABLE: NOT AT ALL
1. FEELING NERVOUS, ANXIOUS, OR ON EDGE: NOT AT ALL
GAD7 TOTAL SCORE: 0

## 2017-11-14 NOTE — PATIENT INSTRUCTIONS
Before Your Surgery      Call your surgeon if there is any change in your health. This includes signs of a cold or flu (such as a sore throat, runny nose, cough, rash or fever).    Do not smoke, drink alcohol or take over the counter medicine (unless your surgeon or primary care doctor tells you to) for the 24 hours before and after surgery.    If you take prescribed drugs: Follow your doctor s orders about which medicines to take and which to stop until after surgery.    Eating and drinking prior to surgery: follow the instructions from your surgeon    Take a shower or bath the night before surgery. Use the soap your surgeon gave you to gently clean your skin. If you do not have soap from your surgeon, use your regular soap. Do not shave or scrub the surgery site.  Wear clean pajamas and have clean sheets on your bed. (Z01.818) Preop general physical exam  (primary encounter diagnosis)    Comment:      Plan: EKG 12-lead complete w/read - Clinics, XR Chest          2 Views, Basic metabolic panel, CBC with           platelets                   (M75.101) Rotator cuff syndrome, right    Comment:      Plan: HYDROcodone-acetaminophen (NORCO) 7.5-325 MG           per tablet                   (E78.00) Pure hypercholesterolemia    Comment:      Plan:          (R73.03) Pre-diabetes    Comment:      Plan:          (G89.4) Chronic pain syndrome    Comment:      Plan: HYDROcodone-acetaminophen (NORCO) 7.5-325 MG           per tablet             ONE PO FOUR TIMES DAILY     CHRONIC PAIN SYNDROME

## 2017-11-14 NOTE — LETTER
November 15, 2017      Giovanni Cedeno  [address] [address]        Dear ,    We are writing to inform you of your test results.    Normal COMPLETE BLOOD PANEL WBCS RBCS AND PLTS   NORMAL GLUCOSE, RENAL AND BLOOD SALTS     Resulted Orders   Basic metabolic panel   Result Value Ref Range    Sodium 138 133 - 144 mmol/L    Potassium 4.2 3.4 - 5.3 mmol/L    Chloride 105 94 - 109 mmol/L    Carbon Dioxide 25 20 - 32 mmol/L    Anion Gap 8 3 - 14 mmol/L    Glucose 92 70 - 99 mg/dL      Comment:      Non Fasting    Urea Nitrogen 20 7 - 30 mg/dL    Creatinine 0.84 0.66 - 1.25 mg/dL    GFR Estimate >90 >60 mL/min/1.7m2      Comment:      Non  GFR Calc    GFR Estimate If Black >90 >60 mL/min/1.7m2      Comment:       GFR Calc    Calcium 9.4 8.5 - 10.1 mg/dL   CBC with platelets   Result Value Ref Range    WBC 6.4 4.0 - 11.0 10e9/L    RBC Count 4.71 4.4 - 5.9 10e12/L    Hemoglobin 14.7 13.3 - 17.7 g/dL    Hematocrit 44.3 40.0 - 53.0 %    MCV 94 78 - 100 fl    MCH 31.2 26.5 - 33.0 pg    MCHC 33.2 31.5 - 36.5 g/dL    RDW 12.5 10.0 - 15.0 %    Platelet Count 291 150 - 450 10e9/L       If you have any questions or concerns, please call the clinic at the number listed above.       Sincerely,        ANIKA REYES MD

## 2017-11-14 NOTE — PROGRESS NOTES
Please send normal lab letter when labs are complete  Normal COMPLETE BLOOD PANEL WBCS RBCS AND PLTS   NORMAL GLUCOSE, RENAL AND BLOOD SALTS    ANIKA REYES JR., MD

## 2017-11-14 NOTE — PROGRESS NOTES
[address]  [address]  [facility] [address]  [phone]  Dept: [phone]    PRE-OP EVALUATION:  Today's date: 2017    Giovanni Cedeno (: 1965) presents for pre-operative evaluation assessment as requested by Dr. Blanchard.  He requires evaluation and anesthesia risk assessment prior to undergoing surgery/procedure for treatment of right shoulder .  Proposed procedure:  Right rotator cuff injury uncertain as to extent of surgery planned      Date of Surgery/ Procedure: 2017  Time of Surgery/ Procedure: 5:30  Hospital/Surgical [facility]: [facility]  Primary Physician: Sony Velez  Type of Anesthesia Anticipated: to be determined    Patient has a Health Care Directive or Living Will:  NO    Preop Questions 2017   1.  Do you have a history of heart attack, stroke, stent, bypass or surgery on an artery in the head, neck, heart or legs? No   2.  Do you ever have any pain or discomfort in your chest? No   3.  Do you have a history of  Heart Failure? No   4.   Are you troubled by shortness of breath when:  walking on a level surface, or up a slight hill, or at night? No   5.  Do you currently have a cold, bronchitis or other respiratory infection? No   6.  Do you have a cough, shortness of breath, or wheezing? No   7.  Do you sometimes get pains in the calves of your legs when you walk? No   8. Do you or anyone in your family have previous history of blood clots? No   9.  Do you or does anyone in your family have a serious bleeding problem such as prolonged bleeding following surgeries or cuts? No   10. Have you ever had problems with anemia or been told to take iron pills? No   11. Have you had any abnormal blood loss such as black, tarry or bloody stools? No   12. Have you ever had a blood transfusion? No   13. Have you or any of your relatives ever had problems with anesthesia? No   14. Do you have sleep  "apnea, excessive snoring or daytime drowsiness? No   15. Do you have any prosthetic heart valves? No   16. Do you have prosthetic joints? No           HPI:                                                      Brief HPI related to upcoming procedure:  INTERMITTENT  PAIN X 2 YEARS     OVERUSE AT WORK     MAGNETIC RESONANCE IMAGING SHOWED PARTIAL TEAR     PAIN WITH WORK HAVING TO PACE HIMSELF    NO DISLOCATION     PAIN WITH WORKING  AND AT NIGHT WHEN  TRYING TO SLEEP     ENDOSCOPIC PROCEDURE PLANNED         RESTLESS LEG SYNDROME WELL CONTROLLED SINEMET TREATMENT     LDL OR \"BAD\" CHOLESTEROL  GOAL < 100 MEDICATIONS WITHOUT COMPLICATION     PRE DIABETES VERY MILD     RIGHT ELBOW PAIN MUST BE CAREFUL WHILE WORKING     CHRONIC PAIN SYNDROME     LEFT UPPER QUADRANT ABDOMINAL PAIN RESOLVED     TOE PAIN RESOLVED     CHRONIC LOWER BACK PAIN INTERMITTENT STRETCHES AND CAREFUL LIFTING     KNEE BURSITIS HAS RESOLVED  WEARS KNEE PADS     UPPER BACK PAIN RESOLVED  CHIROPRACTOR AND ACCUPUNCTURE     CHRONIC PAIN SYNDROME NORCO 7.5MG PO FOUR TIMES DAILY  CONTROLLEDS SUBSTANCE AGREEMENT IN PAST     WILL GET PAIN FROM ORTHOPEDIST X 6 WEEKS AND FOLLOW UP  WITH ME FOR PAIN MEDICATIONS         MEDICAL HISTORY:                                                    Patient Active Problem List    Diagnosis Date Noted     Major depression in partial remission (H) 11/24/2013     Priority: High     Pre-diabetes 11/24/2013     Priority: High     Pure hypercholesterolemia 05/08/2013     Priority: High     Abdominal pain, left upper quadrant 05/23/2017     Priority: Medium     Pain of toe of right foot 05/23/2017     Priority: Medium     Acute midline low back pain without sciatica 05/23/2017     Priority: Medium     Upper back pain 05/23/2017     Priority: Medium     Chronic right shoulder pain 11/10/2016     Priority: Medium     Chronic pain syndrome 07/09/2015     Priority: Medium     Patient is followed by ANIKA REYES for ongoing " prescription of pain medication.  All refills should be approved by this provider, or covering partner.    Medication(s): Norco 7.5mg po three times daily 3.   Maximum quantity per month: 90  Clinic visit frequency required: Q 3 months     Controlled substance agreement on file: Yes       Date(s): 07/09/2015    Pain Clinic evaluation in the past: No       Location(s):  none     DIRE Total Score(s):    7/9/2015   Total Score 20       Last [facility] website verification:  none   [email or web address]         Immunization reaction 01/15/2015     Priority: Medium     Epicondylitis, lateral humeral 11/26/2013     Priority: Medium     Restless leg syndrome 05/08/2013     Priority: Medium     Knee bursitis 05/08/2013     Priority: Medium      right posterior medial hamstring insertion site        Past Medical History:   Diagnosis Date     Arthritis      History reviewed. No pertinent surgical history.  Current Outpatient Prescriptions   Medication Sig Dispense Refill     HYDROcodone-acetaminophen (NORCO) 7.5-325 MG per tablet Take 1 tablet by mouth every 4 hours as needed for pain maximum  4  tablet(s) per day 60 tablet 0     cyclobenzaprine (FLEXERIL) 5 MG tablet TAKE 1 TABLET(5 MG) BY MOUTH THREE TIMES DAILY AS NEEDED FOR MUSCLE SPASMS 42 tablet 0     celecoxib (CELEBREX) 200 MG capsule Take 1 capsule (200 mg) by mouth 2 times daily as needed for moderate pain 60 capsule 1     carbidopa-levodopa (SINEMET)  MG per tablet TAKE 1 TABLET BY MOUTH AT BEDTIME 90 tablet 0     tamsulosin (FLOMAX) 0.4 MG capsule Take 1 capsule (0.4 mg) by mouth daily 90 capsule 3     finasteride (PROSCAR) 5 MG tablet Take 1 tablet (5 mg) by mouth daily 90 tablet 1     ketoconazole (NIZORAL) 2 % cream Apply topically 2 times daily 45 g 1     simvastatin (ZOCOR) 20 MG tablet TAKE 1 TABLET BY MOUTH EVERY DAY AT BEDTIME 30 tablet 11     gabapentin (NEURONTIN) 300 MG capsule TAKE 2 CAPSULES BY MOUTH EVERY NIGHT AT BEDTIME 60 capsule 11      mirtazapine (REMERON) 15 MG tablet TAKE 1 TABLET BY MOUTH EVERY NIGHT AT BEDTIME 30 tablet 4     OTC products: None, except as noted above    No Known Allergies   Latex Allergy: NO    Social History   Substance Use Topics     Smoking status: Never Smoker     Smokeless tobacco: Never Used     Alcohol use No     History   Drug Use No       REVIEW OF SYSTEMS:                                                    C: NEGATIVE for fever, chills, change in weight  I: NEGATIVE for worrisome rashes, moles or lesions  E: NEGATIVE for vision changes or irritation  EYES: GLASSES NO GLAUCOMA OR CATARACTS   E/M: NEGATIVE for ear, mouth and throat problems HEARING OK   R: NEGATIVE for significant cough or SOB  B: NEGATIVE for masses, tenderness or discharge  CV: NEGATIVE for chest pain, palpitations or peripheral edema  GI: NEGATIVE for nausea, abdominal pain, heartburn, or change in bowel habits  : NEGATIVE for frequency, dysuria, or hematuria  MUSCULOSKELETAL: RIGHT SHOULDER PAIN   HISTORY OF RIGHT ELBOW EPICONDYLITIS   UPPER AND LOWER BACK PAIN   ACCUPUNCTURE AND CHIROPRACTOR   CHRONIC PAIN SYNDROME ON NORCO 7.5MG PO FOUR TIMES DAILY UNTIL SURGERY   N: NEGATIVE for weakness, dizziness or paresthesias  ENDOCRINE:  PRE DIABETES   H: NEGATIVE for bleeding problems  P: NEGATIVE for changes in mood or affect    EXAM:                                                    /72  Pulse 74  Temp 97.6  F (36.4  C) (Tympanic)  Resp 16  Wt 130 lb (59 kg)  SpO2 99%  BMI 23.4 kg/m2    GENERAL APPEARANCE: healthy, alert and no distress     EYES: EOMI,  PERRL     HENT: ear canals and TM's normal and nose and mouth without ulcers or lesions     NECK: no adenopathy, no asymmetry, masses, or scars and thyroid normal to palpation     RESP: lungs clear to auscultation - no rales, rhonchi or wheezes     CV: regular rates and rhythm, normal S1 S2, no S3 or S4 and no murmur, click or rub     ABDOMEN:  soft, nontender, no HSM or masses and bowel  sounds normal     MS:  RIGHT SHOULDER PAIN INTERNAL  AND EXTERNAL ROTATION AND POSITIVE PAIN TEST      SKIN: no suspicious lesions or rashes     NEURO: Normal strength and tone, sensory exam grossly normal, mentation intact and speech normal     PSYCH: mentation appears normal. and affect normal/bright     LYMPHATICS: No axillary, cervical, or supraclavicular nodes    DIAGNOSTICS:                                                      Labs Drawn and in Process:   Unresulted Labs Ordered in the Past 30 Days of this Admission     Date and Time Order Name Status Description    11/14/2017 0847 CBC WITH PLATELETS In process     11/14/2017 0847 BASIC METABOLIC PANEL In process           Recent Labs   Lab Test  05/23/17   0813  11/10/16   0945  07/09/15   1013  04/21/14   1027  03/11/14   1047  05/08/13   0924   HGB  16.8   --    --   14.9   --    --    PLT  323   --    --   296   --    --    NA  140   --    --    --    --   140   POTASSIUM  4.2   --    --    --    --   5.0   CR  0.78  0.93   --    --    --   1.00   A1C   --    --   5.4   --   5.8  6.3*        IMPRESSION:                                                    Reason for surgery/procedure:  RIGHT ROTATOR CUFF TEAR AND REPAIR NEEDED   Diagnosis/reason for consult:   RIGHT ROTATOR CUFF TEAR AND REPAIR NEEDED     The proposed surgical procedure is considered LOW risk.    REVISED CARDIAC RISK INDEX  The patient has the following serious cardiovascular risks for perioperative complications such as (MI, PE, VFib and 3  AV Block):  No serious cardiac risks  INTERPRETATION: 1 risks: Class II (low risk - 0.9% complication rate)    The patient has the following additional risks for perioperative complications:  The 10-year ASCVD risk score (Alejandro AKASH Jr, et al., 2013) is: 5.5%    Values used to calculate the score:      Age: 52 years      Sex: Male      Is Non- : No      Diabetic: No      Tobacco smoker: No      Systolic Blood Pressure: 116 mmHg       Is BP treated: No      HDL Cholesterol: 53 mg/dL      Total Cholesterol: 255 mg/dL       RIGHT ROTATOR CUFF TEAR AND REPAIR NEEDED       ICD-10-CM    1. Preop general physical exam Z01.818 EKG 12-lead complete w/read - Clinics     XR Chest 2 Views     Basic metabolic panel     CBC with platelets   2. Rotator cuff syndrome, right M75.101 HYDROcodone-acetaminophen (NORCO) 7.5-325 MG per tablet   3. Pure hypercholesterolemia E78.00    4. Pre-diabetes R73.03    5. Chronic pain syndrome G89.4 HYDROcodone-acetaminophen (NORCO) 7.5-325 MG per tablet       RECOMMENDATIONS:                                                      --Consult hospital rounder / IM to assist post-op medical management    --Patient is to take all scheduled medications on the day of surgery EXCEPT for modifications listed below.    APPROVAL GIVEN to proceed with proposed procedure, without further diagnostic evaluation       Signed Electronically by: ANIKA REYES MD    Copy of this evaluation report is provided to requesting physician.    [facility] Preop Guidelines

## 2017-11-14 NOTE — MR AVS SNAPSHOT
After Visit Summary   11/14/2017    Giovanni Cedeno    MRN: [id number]           Patient Information     Date Of Birth          1965        Visit Information        Provider Department      11/14/2017 8:00 AM Sony Velez MD [facility]        Today's Diagnoses     Preop general physical exam    -  1    Rotator cuff syndrome, right        Pure hypercholesterolemia        Pre-diabetes        Chronic pain syndrome          Care Instructions      Before Your Surgery      Call your surgeon if there is any change in your health. This includes signs of a cold or flu (such as a sore throat, runny nose, cough, rash or fever).    Do not smoke, drink alcohol or take over the counter medicine (unless your surgeon or primary care doctor tells you to) for the 24 hours before and after surgery.    If you take prescribed drugs: Follow your doctor s orders about which medicines to take and which to stop until after surgery.    Eating and drinking prior to surgery: follow the instructions from your surgeon    Take a shower or bath the night before surgery. Use the soap your surgeon gave you to gently clean your skin. If you do not have soap from your surgeon, use your regular soap. Do not shave or scrub the surgery site.  Wear clean pajamas and have clean sheets on your bed. (Z01.818) Preop general physical exam  (primary encounter diagnosis)    Comment:      Plan: EKG 12-lead complete w/read - Clinics, XR Chest          2 Views, Basic metabolic panel, CBC with           platelets                   (M75.101) Rotator cuff syndrome, right    Comment:      Plan: HYDROcodone-acetaminophen (NORCO) 7.5-325 MG           per tablet                   (E78.00) Pure hypercholesterolemia    Comment:      Plan:          (R73.03) Pre-diabetes    Comment:      Plan:          (G89.4) Chronic pain syndrome    Comment:      Plan: HYDROcodone-acetaminophen (NORCO) 7.5-325 MG            per tablet             ONE PO FOUR TIMES DAILY     CHRONIC PAIN SYNDROME                    Follow-ups after your visit        Your next 10 appointments already scheduled     Nov 14, 2017  8:50 AM CST   [facility] VIE[facility]    [address]  [place] [address]   [phone]           Please bring a list of your current medicines to your exam. (Include vitamins, minerals and over-thecounter medicines.) Leave your valuables at home.  Tell your doctor if there is a chance you may be pregnant.  You do not need to do anything special for this exam.            Dec 11, 2017 11:15 AM CST   Return Visit with Jose G Blanchard MD   [facility] [place] ([facility] [place])    [address]  [place] [address] [phone]              Future tests that were ordered for you today     Open Future Orders        Priority Expected Expires Ordered    XR Chest 2 Views Routine 11/14/2017 11/14/2018 11/14/2017            Who to contact     If you have questions or need follow up information about today's clinic visit or your schedule please contact [facility] directly at [phone].  Normal or non-critical lab and imaging results will be communicated to you by Search to Phonehart, letter or phone within 4 business days after the clinic has received the results. If you do not hear from us within 7 days, please contact the clinic through Search to Phonehart or phone. If you have a critical or abnormal lab result, we will notify you by phone as soon as possible.  Submit refill requests through MarginLeft or call your pharmacy and they will forward the refill request to us. Please allow 3 business days for your refill to be completed.          Additional Information About Your Visit        MarginLeft Information     MarginLeft lets you send messages to  "your doctor, view your test results, renew your prescriptions, schedule appointments and more. To sign up, go to [email or web address] . Click on \"Log in\" on the left side of the screen, which will take you to the Welcome page. Then click on \"Sign up Now\" on the right side of the page.     You will be asked to enter the access code listed below, as well as some personal information. Please follow the directions to create your username and password.     Your access code is: [email or web address]  Expires: 2018 11:16 AM     Your access code will  in 90 days. If you need help or a new code, please call your [place] clinic or [phone].        Care EveryWhere ID     This is your Care EveryWhere ID. This could be used by other organizations to access your [place] medical records  [id number]        Your Vitals Were     Pulse Temperature Respirations Pulse Oximetry BMI (Body Mass Index)       74 97.6  F (36.4  C) (Tympanic) 16 99% 23.4 kg/m2        Blood Pressure from Last 3 Encounters:   17 116/72   10/16/17 118/76   17 122/82    Weight from Last 3 Encounters:   17 130 lb (59 kg)   10/16/17 128 lb (58.1 kg)   17 128 lb (58.1 kg)              We Performed the Following     Basic metabolic panel     CBC with platelets     EKG 12-lead complete w/read - Clinics          Where to get your medicines      Some of these will need a paper prescription and others can be bought over the counter.  Ask your nurse if you have questions.     Bring a paper prescription for each of these medications     HYDROcodone-acetaminophen 7.5-325 MG per tablet          Primary Care Provider Office Phone # Fax #    Sony Velez [phone] [address]  [facility] [address]        Equal Access to Services     [facility] : Kimi Dia, juan dutta, sagrario melendez . [facility] [phone].    ATENCIÓN: Si " jimenez howard, tiene a adams disposición servicios gratuitos de asistencia lingüística. Palak diaz [phone].    We comply with applicable federal civil rights laws and [place] laws. We do not discriminate on the basis of race, color, national origin, age, disability, sex, sexual orientation, or gender identity.            Thank you!     Thank you for choosing [facility]  for your care. Our goal is always to provide you with excellent care. Hearing back from our patients is one way we can continue to improve our services. Please take a few minutes to complete the written survey that you may receive in the mail after your visit with us. Thank you!             Your Updated Medication List - Protect others around you: Learn how to safely use, store and throw away your medicines at www.disposemymeds.org.          This list is accurate as of: 11/14/17  8:49 AM.  Always use your most recent med list.                   Brand Name Dispense Instructions for use Diagnosis    carbidopa-levodopa  MG per tablet    SINEMET    90 tablet    TAKE 1 TABLET BY MOUTH AT BEDTIME    Restless legs syndrome (RLS)       celecoxib 200 MG capsule    celeBREX    60 capsule    Take 1 capsule (200 mg) by mouth 2 times daily as needed for moderate pain    Upper back pain, Acute midline low back pain without sciatica       cyclobenzaprine 5 MG tablet    FLEXERIL    42 tablet    TAKE 1 TABLET(5 MG) BY MOUTH THREE TIMES DAILY AS NEEDED FOR MUSCLE SPASMS    Acute midline low back pain without sciatica, Upper back pain       finasteride 5 MG tablet    PROSCAR    90 tablet    Take 1 tablet (5 mg) by mouth daily    Benign prostatic hyperplasia with lower urinary tract symptoms, unspecified morphology       gabapentin 300 MG capsule    NEURONTIN    60 capsule    TAKE 2 CAPSULES BY MOUTH EVERY NIGHT AT BEDTIME    Restless leg syndrome       HYDROcodone-acetaminophen 7.5-325 MG per tablet    NORCO    60 tablet     Take 1 tablet by mouth every 4 hours as needed for pain maximum  4  tablet(s) per day    Rotator cuff syndrome, right, Chronic pain syndrome       ketoconazole 2 % cream    NIZORAL    45 g    Apply topically 2 times daily    Tinea versicolor       mirtazapine 15 MG tablet    REMERON    30 tablet    TAKE 1 TABLET BY MOUTH EVERY NIGHT AT BEDTIME    Major depressive disorder, single episode, in partial or unspecified remission       simvastatin 20 MG tablet    ZOCOR    30 tablet    TAKE 1 TABLET BY MOUTH EVERY DAY AT BEDTIME    Hyperlipidemia LDL goal <100, Pure hypercholesterolemia       tamsulosin 0.4 MG capsule    FLOMAX    90 capsule    Take 1 capsule (0.4 mg) by mouth daily    Benign prostatic hyperplasia with lower urinary tract symptoms, unspecified morphology

## 2017-11-14 NOTE — NURSING NOTE
"Chief Complaint   Patient presents with     Pre-Op Exam       Initial /72  Pulse 74  Temp 97.6  F (36.4  C) (Tympanic)  Resp 16  Wt 130 lb (59 kg)  SpO2 99%  BMI 23.4 kg/m2 Estimated body mass index is 23.4 kg/(m^2) as calculated from the following:    Height as of 10/23/17: 5' 2.5\" (1.588 m).    Weight as of this encounter: 130 lb (59 kg).  Medication Reconciliation: complete   Marisol Phillips CMA    "

## 2017-11-14 NOTE — LETTER
November 14, 2017      Giovanni Cedeno  [address] [address]        Dear ,    We are writing to inform you of your test results.    NORMAL CHEST XRAY     Resulted Orders   XR Chest 2 Views    Narrative    CHEST TWO VIEWS November 14, 2017 9:03 AM     HISTORY: Preoperative evaluation with general physical exam.       Impression    IMPRESSION: Since April 21, 2014, heart size is normal. No pleural  effusion, pneumothorax, or abnormal area of consolidation.    PHLIL MORENO MD       If you have any questions or concerns, please call the clinic at the number listed above.       Sincerely,        Sony Velez MD

## 2017-11-15 ASSESSMENT — ANXIETY QUESTIONNAIRES: GAD7 TOTAL SCORE: 0

## 2017-11-16 DIAGNOSIS — G89.4 CHRONIC PAIN SYNDROME: ICD-10-CM

## 2017-11-16 DIAGNOSIS — G25.81 RESTLESS LEG SYNDROME: ICD-10-CM

## 2017-11-17 RX ORDER — GABAPENTIN 300 MG/1
CAPSULE ORAL
Qty: 60 CAPSULE | Refills: 0 | Status: SHIPPED | OUTPATIENT
Start: 2017-11-17 | End: 2017-12-21

## 2017-11-17 NOTE — TELEPHONE ENCOUNTER
Requested Prescriptions   Pending Prescriptions Disp Refills     gabapentin (NEURONTIN) 300 MG capsule [Pharmacy Med Name: GABAPENTIN 300MG CAPSULES] 60 capsule 0     Sig: TAKE 2 CAPSULES BY MOUTH EVERY NIGHT AT BEDTIME    There is no refill protocol information for this order        Last [facility] website verification:  11/17/17 no concerns    [email or web address]    Gabapentin (Neurontin) 300 mg   Last Written Prescription Date:  10/10/17  Last Fill Quantity: 60,   # refills: 0  Future Office visit:    Next 5 appointments (look out 90 days)     Dec 11, 2017 11:15 AM CST   Return Visit with Jose G Blanchard MD   [facility] [place] ([facility] [place])    [address]  [place] [address] [phone]                   Routing refill request to provider for review/approval because:  Drug not on the [facility], [facility] or  Health refill protocol or controlled substance

## 2017-11-22 DIAGNOSIS — M54.9 UPPER BACK PAIN: ICD-10-CM

## 2017-11-22 DIAGNOSIS — M54.50 ACUTE MIDLINE LOW BACK PAIN WITHOUT SCIATICA: ICD-10-CM

## 2017-11-24 RX ORDER — CYCLOBENZAPRINE HCL 5 MG
TABLET ORAL
Qty: 42 TABLET | Refills: 0 | Status: SHIPPED | OUTPATIENT
Start: 2017-11-24 | End: 2017-12-21

## 2017-11-24 NOTE — TELEPHONE ENCOUNTER
Controlled Substance Refill Request for cyclobenzaprine 5 mg  Patient is followed by ANIKA REYES for ongoing prescription of pain medication.  All refills should be approved by this provider, or covering partner.    Medication(s): Norco 7.5mg po three times daily 3.   Maximum quantity per month: 90  Clinic visit frequency required: Q 3 months     Controlled substance agreement on file: Yes       Date(s): 07/09/2015    Pain Clinic evaluation in the past: No       Location(s):  none     DIRE Total Score(s):    7/9/2015   Total Score 20       Last [facility] website verification:  11/17/17 no concerns    [email or web address]    Problem List Complete:  Yes     checked in past 6 months?  Yes 11.17.2017        Last OV 11/14/2017  Last refill 10/24/2017  #42  With 0 refills

## 2017-11-28 ENCOUNTER — TRANSFERRED RECORDS (OUTPATIENT)
Dept: HEALTH INFORMATION MANAGEMENT | Facility: CLINIC | Age: 52
End: 2017-11-28

## 2017-11-28 DIAGNOSIS — Z98.890 S/P ARTHROSCOPY OF SHOULDER: Primary | ICD-10-CM

## 2017-11-29 ENCOUNTER — TELEPHONE (OUTPATIENT)
Dept: ORTHOPEDICS | Facility: CLINIC | Age: 52
End: 2017-11-29

## 2017-11-29 NOTE — TELEPHONE ENCOUNTER
Called and spoke to patient letting him know that Dr. Blanchard was not willing to give additional pain medication. He has already doubled his pain medication. I told him to rest, ice, and he could use ibuprofen in between, per Dr. Blanchard. He understood and thanked me for calling.  Mildred Hernandez Certified Medical Assistant

## 2017-11-29 NOTE — TELEPHONE ENCOUNTER
Reason for Call:  Other prescription    Detailed comments:  Patient calling. He had surgery yesterday on right shoulder. His pain now is at a 9 and 10. Please call to advise if he can get something stronger.     Phone Number Patient can be reached at his home number. Above.     Best Time:  Any     Can we leave a detailed message on this number? YES    Call taken on 11/29/2017 at 10:24 AM by Geraldine Nascimento

## 2017-12-01 ENCOUNTER — NURSE TRIAGE (OUTPATIENT)
Dept: NURSING | Facility: CLINIC | Age: 52
End: 2017-12-01

## 2017-12-02 NOTE — TELEPHONE ENCOUNTER
Friend of patient calling with patient because after taking a shower tonight the two of them are unable to figure out how to reapply the ice machine and dressing. The caller is requesting that a nurse at the [facility] reapply the device for them. I advised that I am not able to set this up and if they have no other choice they will need to go to the ER and be seen there for assistance with their issues.  Mercedez Lees RN  [facility]

## 2017-12-05 ENCOUNTER — TELEPHONE (OUTPATIENT)
Dept: ORTHOPEDICS | Facility: CLINIC | Age: 52
End: 2017-12-05

## 2017-12-06 NOTE — TELEPHONE ENCOUNTER
I left a VM for Maverick letting him know that he can get started with Physical Therapy ASAP he does not need to wait for his f/u with us. I left our callback number if he has any further questions.     Ammon Daniel PA-C, CAIL[facility] (Ortho)  Supervising Physician: Jose G Blanchard M.D., M.S.  Dept. of Orthopaedic Surgery  [facility]

## 2017-12-11 ENCOUNTER — OFFICE VISIT (OUTPATIENT)
Dept: ORTHOPEDICS | Facility: CLINIC | Age: 52
End: 2017-12-11
Payer: COMMERCIAL

## 2017-12-11 VITALS — RESPIRATION RATE: 16 BRPM | WEIGHT: 128.3 LBS | HEIGHT: 63 IN | BODY MASS INDEX: 22.73 KG/M2

## 2017-12-11 DIAGNOSIS — Z98.890 S/P ROTATOR CUFF REPAIR: Primary | ICD-10-CM

## 2017-12-11 PROCEDURE — 99024 POSTOP FOLLOW-UP VISIT: CPT | Performed by: ORTHOPAEDIC SURGERY

## 2017-12-11 RX ORDER — AMOXICILLIN 250 MG
1-2 CAPSULE ORAL
COMMUNITY
Start: 2017-11-28 | End: 2017-12-21

## 2017-12-11 ASSESSMENT — PAIN SCALES - GENERAL: PAINLEVEL: MODERATE PAIN (4)

## 2017-12-11 NOTE — PATIENT INSTRUCTIONS
Please remember to call and schedule a follow up appointment if one was recommended at your earliest convenience.  Orthopedics CLINIC HOURS TELEPHONE NUMBER   Dr. Lo Levy  Certified Medical Assistant   Monday & Wednesday   8am - 5pm  Thursday 1pm - 5pm  Friday 8am -11:30am Specialty schedulers:   [phone] to schedule your surgery.  Main Clinic:   [phone] to make an appointment with any provider.    Urgent Care locations:    [facility] Monday-Friday Closed  Saturday-Sunday 9am-5pm      Monday-Friday 12pm - 8pm  Saturday-Sunday 9am-5pm [phone]     If SURGERY has been recommended, please call our Specialty Schedulers at [phone] to schedule your procedure.    If you need a medication refill, please contact your pharmacy. Please allow 3 business days for your refill to be completed.    If an MRI or CT scan has been recommended, please call [facility] Schedulers at [phone] to schedule your appointment.  Use PassionTag (secure e-mail communication and access to your chart) to send a message or to make an appointment. Please ask how you can sign up for PassionTag.  Your care team's suggested websites for health information:   [email or web address] : Up to date and easily searchable information on multiple topics.   [email or web address] : [place] dept of heat, public health issues in [place], N1N1

## 2017-12-11 NOTE — MR AVS SNAPSHOT
After Visit Summary   12/11/2017    Giovanni Cedeno    MRN: [id number]           Patient Information     Date Of Birth          1965        Visit Information        Provider Department      12/11/2017 11:15 AM Jose G Blanchard MD [facility] [place]        Today's Diagnoses     S/P rotator cuff repair    -  1      Care Instructions    Please remember to call and schedule a follow up appointment if one was recommended at your earliest convenience.  Orthopedics CLINIC HOURS TELEPHONE NUMBER   Dr. Lo Levy  Certified Medical Assistant   Monday & Wednesday   8am - 5pm  Thursday 1pm - 5pm  Friday 8am -11:30am Specialty schedulers:   [phone] to schedule your surgery.  Main Clinic:   [phone] to make an appointment with any provider.    Urgent Care locations:    [facility] Monday-Friday Closed  Saturday-Sunday 9am-5pm      Monday-Friday 12pm - 8pm  Saturday-Sunday 9am-5pm [phone]     If SURGERY has been recommended, please call our Specialty Schedulers at [phone] to schedule your procedure.    If you need a medication refill, please contact your pharmacy. Please allow 3 business days for your refill to be completed.    If an MRI or CT scan has been recommended, please call [place] [facility] Schedulers at [phone] to schedule your appointment.  Use [employer] (secure e-mail communication and access to your chart) to send a message or to make an appointment. Please ask how you can sign up for [employer].  Your care team's suggested websites for health information:   [email or web address] : Up to date and easily searchable information on multiple topics.   [email or web address] : [place] dept of heatl, public health issues in [place], N1N1              Follow-ups after your visit        Follow-up notes from your care team     Return in about 4 weeks (around 1/8/2018) for Postop Visit, clinical recheck.       Your next 10 appointments already scheduled     Dec 12, 2017  2:30 PM CST   (Arrive by 2:15 PM)   SHERIF Extremity with Juan Antonio Pastor PT   [facility] [place] [facility] ([facility] [place])    [address]  [address]  [place] [address]   [phone]            Dec 15, 2017  1:30 PM CST   SHERIF Extremity with Luc Rose PTA   [facility] ([facility] [place])    [address]  [address]  [place] [address]   [phone]            Dec 18, 2017  1:50 PM CST   SHERIF Extremity with Luc Rose PTA   [facility] ([facility] [place])    [address]  [address]  [place] [address]   [phone]            Dec 21, 2017  1:50 PM CST   SHERIF Extremity with Luc Rose PTA   [facility] [place] [facility] ([facility] [place])    [address]  [address]  [place] [address]   [phone]            Dec 26, 2017  8:00 AM CST   SHERIF Extremity with Juan Antonio Pastor PT   [facility] [place] [facility] ([facility] [place])    [address]  [address]  [place] [address]   [phone]            Dec 29, 2017  1:30 PM CST   SHERIF Extremity with Luc Rose PTA   [facility] [place] [facility] ([facility] [place])    [address]  [address]  [place] [address]   [phone]            Jan 02, 2018  1:50 PM CST   SHERIF Extremity with Juan Antonio Pastor PT   [facility] [place] [facility] ([facility] [place])    [address]  [address]  [place] [address]   [phone]            Jan 05, 2018  1:30 PM CST   SHERIF Extremity with Juan Antonio Pastor PT   [facility] [place] [facility] ([facility] [place])    [address]  [address]  [place] [address]   [phone]            Jan 08, 2018  1:00 PM CST   Return Visit with Jose G Blanchard MD   [facility] [place] ([facility] [place])     "[address]  [place] [address] [phone]              Who to contact     If you have questions or need follow up information about today's clinic visit or your schedule please contact [facility] directly at [phone].  Normal or non-critical lab and imaging results will be communicated to you by MyChart, letter or phone within 4 business days after the clinic has received the results. If you do not hear from us within 7 days, please contact the clinic through MyChart or phone. If you have a critical or abnormal lab result, we will notify you by phone as soon as possible.  Submit refill requests through [employer] or call your pharmacy and they will forward the refill request to us. Please allow 3 business days for your refill to be completed.          Additional Information About Your Visit        MyChart Information     Pathful lets you send messages to your doctor, view your test results, renew your prescriptions, schedule appointments and more. To sign up, go to [email or web address] . Click on \"Log in\" on the left side of the screen, which will take you to the Welcome page. Then click on \"Sign up Now\" on the right side of the page.     You will be asked to enter the access code listed below, as well as some personal information. Please follow the directions to create your username and password.     Your access code is: [email or web address]  Expires: 2018 11:16 AM     Your access code will  in 90 days. If you need help or a new code, please call your [place] clinic or [phone].        Care EveryWhere ID     This is your Care EveryWhere ID. This could be used by other organizations to access your [place] medical records  [id number]        Your Vitals Were     Respirations Height BMI (Body Mass Index)             16 5' 2.5\" (1.588 m) 23.09 kg/m2          Blood Pressure from Last 3 Encounters:   17 116/72   10/16/17 118/76   17 " 122/82    Weight from Last 3 Encounters:   12/11/17 128 lb 4.8 oz (58.2 kg)   11/14/17 130 lb (59 kg)   10/16/17 128 lb (58.1 kg)              Today, you had the following     No orders found for display       Primary Care Provider Office Phone # Fax #    Sony Meidna Velez [phone] [address] [facility] [address]        Equal Access to Services     [facility] : Hadii aad ku hadasho Soomaali, waaxda luqadaha, qaybta kaalmada adeegyada, waxay idiin hayaan adeeg kharash lacarolyn . So [facility] [phone].    ATENCIÓN: Si habla español, tiene a adams disposición servicios gratuitos de asistencia lingüística. [facility] [phone].    We comply with applicable federal civil rights laws and [place] laws. We do not discriminate on the basis of race, color, national origin, age, disability, sex, sexual orientation, or gender identity.            Thank you!     Thank you for choosing [facility]  for your care. Our goal is always to provide you with excellent care. Hearing back from our patients is one way we can continue to improve our services. Please take a few minutes to complete the written survey that you may receive in the mail after your visit with us. Thank you!             Your Updated Medication List - Protect others around you: Learn how to safely use, store and throw away your medicines at www.disposemymeds.org.          This list is accurate as of: 12/11/17 12:57 PM.  Always use your most recent med list.                   Brand Name Dispense Instructions for use Diagnosis    carbidopa-levodopa  MG per tablet    SINEMET    90 tablet    TAKE 1 TABLET BY MOUTH AT BEDTIME    Restless legs syndrome (RLS)       celecoxib 200 MG capsule    celeBREX    60 capsule    Take 1 capsule (200 mg) by mouth 2 times daily as needed for moderate pain    Upper back pain, Acute midline low back pain without sciatica       cyclobenzaprine 5 MG tablet     FLEXERIL    42 tablet    TAKE 1 TABLET(5 MG) BY MOUTH THREE TIMES DAILY AS NEEDED FOR MUSCLE SPASMS    Acute midline low back pain without sciatica, Upper back pain       finasteride 5 MG tablet    PROSCAR    90 tablet    Take 1 tablet (5 mg) by mouth daily    Benign prostatic hyperplasia with lower urinary tract symptoms, unspecified morphology       gabapentin 300 MG capsule    NEURONTIN    60 capsule    TAKE 2 CAPSULES BY MOUTH EVERY NIGHT AT BEDTIME    Restless leg syndrome       HYDROcodone-acetaminophen 7.5-325 MG per tablet    NORCO    60 tablet    Take 1 tablet by mouth every 4 hours as needed for pain maximum  4  tablet(s) per day    Rotator cuff syndrome, right, Chronic pain syndrome       ketoconazole 2 % cream    NIZORAL    45 g    Apply topically 2 times daily    Tinea versicolor       mirtazapine 15 MG tablet    REMERON    30 tablet    TAKE 1 TABLET BY MOUTH EVERY NIGHT AT BEDTIME    Major depressive disorder, single episode, in partial or unspecified remission       senna-docusate 8.6-50 MG per tablet    SENOKOT-S;PERICOLACE     Take 1-2 tablets by mouth        simvastatin 20 MG tablet    ZOCOR    30 tablet    TAKE 1 TABLET BY MOUTH EVERY DAY AT BEDTIME    Hyperlipidemia LDL goal <100, Pure hypercholesterolemia       tamsulosin 0.4 MG capsule    FLOMAX    90 capsule    Take 1 capsule (0.4 mg) by mouth daily    Benign prostatic hyperplasia with lower urinary tract symptoms, unspecified morphology

## 2017-12-11 NOTE — PROGRESS NOTES
"chief complaint:   Chief Complaint   Patient presents with     Surgical Followup     Right shoulder scope - RCR (M), DCR, SAD, LD. DOS 11/28/17, 2 week PO. Patient states his shoulder is doing fabulous. Pain is tolerable. He has remained in the sling. He will have his first session of PT tomorrow with Juan Antonio.        SURGERY: right shoulder arthroscopy.  1. Right shoulder arthroscopic rotator cuff repair, medium repair, double row technique  2. Right shoulder arthroscopic distal clavical resection  3. Right shoulder arthroscopic labral debridement  4. Right shoulder arthroscopic subacromial decompression with partial acromioplasty.    DATE OF SURGERY: 11/28/2017.    HISTORY OF PRESENT ILLNESS:  Giovanni Cedeno is a 52 year old male seen for postoperative evaluation of a right shoulder arthroscopy and rotator cuff repair, medium repair, distal clavical resection, labral debridement, subacromial decompression with partial acromioplasty for right shoulder rotator cuff tear, impingement, acromialclavicular arthritis, type 1 SLAP tear. Surgery occurred 2 weeks ago. Returns today stating he is doing great. His shoulder is \"fabulous\". Pain is tolerable, moderate, rated a 4/10. He has remained in the sling since surgery. Will start physical therapy tomorrow. No problems with the surgical wounds. Denies fevers chills or night sweats. No associated numbness or tingling. Has been taking it easy and non-weightbearing since surgery as recommended.       Past medical history:   Past Medical History:   Diagnosis Date     Arthritis      Patient Active Problem List    Diagnosis Date Noted     Major depression in partial remission (H) 11/24/2013     Priority: High     Pre-diabetes 11/24/2013     Priority: High     Abdominal pain, left upper quadrant 05/23/2017     Priority: Medium     Pain of toe of right foot 05/23/2017     Priority: Medium     Acute midline low back pain without sciatica 05/23/2017     Priority: Medium     Upper " back pain 05/23/2017     Priority: Medium     Chronic right shoulder pain 11/10/2016     Priority: Medium     Chronic pain syndrome 07/09/2015     Priority: Medium     Patient is followed by ANIKA REYES for ongoing prescription of pain medication.  All refills should be approved by this provider, or covering partner.    Medication(s): Norco 7.5mg po three times daily 3.   Maximum quantity per month: 90  Clinic visit frequency required: Q 3 months     Controlled substance agreement on file: Yes       Date(s): 07/09/2015    Pain Clinic evaluation in the past: No       Location(s):  none     DIRE Total Score(s):    7/9/2015   Total Score 20       Last [facility] website verification:  11/17/17 no concerns    [email or web address]         Immunization reaction 01/15/2015     Priority: Medium     Epicondylitis, lateral humeral 11/26/2013     Priority: Medium     Restless leg syndrome 05/08/2013     Priority: Medium     Knee bursitis 05/08/2013     Priority: Medium      right posterior medial hamstring insertion site       Pure hypercholesterolemia 05/08/2013     Priority: Medium       Past surgical history: History reviewed. No pertinent surgical history.  Medications:   Current Outpatient Prescriptions:      senna-docusate (SENOKOT-S;PERICOLACE) 8.6-50 MG per tablet, Take 1-2 tablets by mouth, Disp: , Rfl:      cyclobenzaprine (FLEXERIL) 5 MG tablet, TAKE 1 TABLET(5 MG) BY MOUTH THREE TIMES DAILY AS NEEDED FOR MUSCLE SPASMS, Disp: 42 tablet, Rfl: 0     gabapentin (NEURONTIN) 300 MG capsule, TAKE 2 CAPSULES BY MOUTH EVERY NIGHT AT BEDTIME, Disp: 60 capsule, Rfl: 0     HYDROcodone-acetaminophen (NORCO) 7.5-325 MG per tablet, Take 1 tablet by mouth every 4 hours as needed for pain maximum  4  tablet(s) per day, Disp: 60 tablet, Rfl: 0     celecoxib (CELEBREX) 200 MG capsule, Take 1 capsule (200 mg) by mouth 2 times daily as needed for moderate pain, Disp: 60 capsule, Rfl: 1     carbidopa-levodopa (SINEMET)  " MG per tablet, TAKE 1 TABLET BY MOUTH AT BEDTIME, Disp: 90 tablet, Rfl: 0     tamsulosin (FLOMAX) 0.4 MG capsule, Take 1 capsule (0.4 mg) by mouth daily, Disp: 90 capsule, Rfl: 3     finasteride (PROSCAR) 5 MG tablet, Take 1 tablet (5 mg) by mouth daily, Disp: 90 tablet, Rfl: 1     ketoconazole (NIZORAL) 2 % cream, Apply topically 2 times daily, Disp: 45 g, Rfl: 1     simvastatin (ZOCOR) 20 MG tablet, TAKE 1 TABLET BY MOUTH EVERY DAY AT BEDTIME, Disp: 30 tablet, Rfl: 11     mirtazapine (REMERON) 15 MG tablet, TAKE 1 TABLET BY MOUTH EVERY NIGHT AT BEDTIME, Disp: 30 tablet, Rfl: 4    Allergies: No Known Allergies    REVIEW OF SYSTEMS:   CONSTITUTIONAL:NEGATIVE for fever, chills, night sweats  INTEGUMENTARY/SKIN: NEGATIVE for worrisome wound problems or redness.  MUSCULOSKELETAL:See HPI above  NEURO: NEGATIVE for weakness, dizziness or paresthesias    PHYSICAL EXAM:  Resp 16  Ht 1.588 m (5' 2.5\")  Wt 58.2 kg (128 lb 4.8 oz)  BMI 23.09 kg/m2   GENERAL APPEARANCE: healthy, alert, no distress; accompanied by friend   SKIN: no suspicious lesions or rashes  NEURO: Normal strength and tone, mentation intact and speech normal  PSYCH:  mentation appears normal and affect normal, not anxious  RESPIRATORY: No increased work of breathing.    right SHOULDER:  Shoulder Inspection: minimal swelling, resolving ecchymosis  Incisions: sutures intact, healing well.  Tender: minimal over incision  Range of Motion: not assessed.  Strength: not assessed.      X-RAY: none indicated.      Impression: Giovanni Cedeno is a 52 year old male 2 weeks status post right shoulder arthroscopy and rotator cuff repair, medium repair, distal clavical resection, labral debridement, subacromial decompression with partial acromioplasty, doing well.     Plan: routine postoperative shoulder arthroscopy medium protocol  * WB status: no lifting, pushing, pulling or reaching.  * Activity: non-weightbearing of right upper extremity.  * " Immobilization: continue sling for another 4 weeks. Can discontinue sling at that time.  * Rest.  * Ice twice daily to three times daily.  * Physical Therapy for passive range of motion, per medium repair protocol.  * Tylenol as needed for pain  * return to clinic 4 weeks, sooner as needed.    The information in this document, created by a scribe for me, accurately reflects the services I personally performed and the decisions made by me. I have reviewed and approved this document for accuracy.        Jose G Blanchard M.D., M.S.  Dept. of Orthopaedic Surgery  [facility]

## 2017-12-11 NOTE — NURSING NOTE
"Chief Complaint   Patient presents with     Surgical Followup     Right shoulder scope - RCR (M), DCR, SAD, LD. DOS 11/28/17, 2 week PO. Patient states his shoulder is doing fabulous. Pain is tolerable. He has remained in the sling. He will have his first session of PT tomorrow with Juan Antonio.        Initial Resp 16  Ht 1.588 m (5' 2.5\")  Wt 58.2 kg (128 lb 4.8 oz)  BMI 23.09 kg/m2 Estimated body mass index is 23.09 kg/(m^2) as calculated from the following:    Height as of this encounter: 1.588 m (5' 2.5\").    Weight as of this encounter: 58.2 kg (128 lb 4.8 oz).  Medication Reconciliation: ngoc Hernandez Certified Medical Assistant    "

## 2017-12-12 ENCOUNTER — THERAPY VISIT (OUTPATIENT)
Dept: PHYSICAL THERAPY | Facility: CLINIC | Age: 52
End: 2017-12-12
Payer: COMMERCIAL

## 2017-12-12 DIAGNOSIS — M25.511 ACUTE PAIN OF RIGHT SHOULDER: Primary | ICD-10-CM

## 2017-12-12 DIAGNOSIS — Z98.890 S/P RIGHT ROTATOR CUFF REPAIR: ICD-10-CM

## 2017-12-12 PROCEDURE — 97161 PT EVAL LOW COMPLEX 20 MIN: CPT | Mod: GP | Performed by: PHYSICAL THERAPIST

## 2017-12-12 PROCEDURE — 97110 THERAPEUTIC EXERCISES: CPT | Mod: GP | Performed by: PHYSICAL THERAPIST

## 2017-12-12 NOTE — PROGRESS NOTES
[facility] Initial Evaluation    Subjective:    Patient is a 52 year old male presenting with rehab right shoulder hpi. The history is provided by the patient. No  was used.   Giovanni Cedeno is a 52 year old male with a right shoulder condition.  Condition occurred with:  Repetition/overuse.  Condition occurred: for unknown reasons.  This is a new condition  Patient is s/p right rotator cuff repair, distal clavical resection, labral debridement and subacromial decompression with partial acromioplasty on 11/28/17. He reports history of right shoulder pain for the past 2 years due to overuse and wear and tear..    Patient reports pain:  Posterior and anterior.    Pain is described as aching and is constant and reported as 4/10.  Associated symptoms:  Loss of motion/stiffness and loss of strength. Pain is worse during the day.  Symptoms are exacerbated by certain positions and relieved by rest.  Since onset symptoms are gradually improving.        General health as reported by patient is good.  Pertinent medical history includes:  None.  Medical allergies: no.  Other surgeries include:  Orthopedic surgery (left shoulder, right elbow).  Current medications:  Pain medication, sleep medication, anti-inflammatory and muscle relaxants.  Current occupation is [employer]  .  Patient is currently not working due to present treatment problem.  Primary job tasks include:  Lifting and other (pushing/pulling).    Barriers include:  None as reported by the patient.    Red flags:  None as reported by the patient.                        Objective:    Standing Alignment:      Shoulder/UE:  Rounded shoulders                                       Shoulder Evaluation:  ROM:  AROM:    Flexion:  Left:  165        Abduction:  Left: 170       Internal Rotation:  Left:  55      External Rotation:  Left:  80                    PROM:    Flexion:  Right: 100      Abduction:  Right:  75    Internal  Rotation:  Right:  40  External Rotation:  Right:  -5                    Strength:  : Right shoulder strength testing deferred.  Flexion: Left:5/5    Pain: -        Abduction:  Left: 5/5   Pain:-        Internal Rotation:  Left:5/5      Pain:-      External Rotation:   Left:5-/5      Pain:-                 Palpation:  normal (No outward signs of infection around surgical site)                                         General     ROS    Assessment/Plan:      Patient is a 52 year old male with right side shoulder complaints.    Patient has the following significant findings with corresponding treatment plan.                Diagnosis 1:  S/p right rotator cuff repair    Pain -  self management, education, directional preference exercise and home program  Decreased ROM/flexibility - manual therapy, therapeutic exercise and home program  Decreased strength - therapeutic exercise, therapeutic activities and home program  Impaired muscle performance - neuro re-education and home program  Decreased function - therapeutic activities and home program  Impaired posture - neuro re-education and home program    Therapy Evaluation Codes:   1) History comprised of:   Personal factors that impact the plan of care:      None.    Comorbidity factors that impact the plan of care are:      None.     Medications impacting care: Pain.  2) Examination of Body Systems comprised of:   Body structures and functions that impact the plan of care:      Shoulder.   Activity limitations that impact the plan of care are:      Bathing, Driving and Dressing.  3) Clinical presentation characteristics are:   Stable/Uncomplicated.  4) Decision-Making    Low complexity using standardized patient assessment instrument and/or measureable assessment of functional outcome.  Cumulative Therapy Evaluation is: Low complexity.    Previous and current functional limitations:  (See Goal Flow Sheet for this information)    Short term and Long term goals: (See Goal  Flow Sheet for this information)     Communication ability:  Patient appears to be able to clearly communicate and understand verbal and written communication and follow directions correctly.  Treatment Explanation - The following has been discussed with the patient:   RX ordered/plan of care  Anticipated outcomes  Possible risks and side effects  This patient would benefit from PT intervention to resume normal activities.   Rehab potential is good.    Frequency:  2 X week, once daily  Duration:  for 6 weeks tapering to 1 X a week over 6 weeks  Discharge Plan:  Achieve all LTG.  Independent in home treatment program.  Reach maximal therapeutic benefit.    Please refer to the daily flowsheet for treatment today, total treatment time and time spent performing 1:1 timed codes.

## 2017-12-12 NOTE — MR AVS SNAPSHOT
After Visit Summary   12/12/2017    Giovanni Cedeno    MRN: [id number]           Patient Information     Date Of Birth          1965        Visit Information        Provider Department      12/12/2017 2:30 PM Juan Antonio Pastor PT [facility] [place] [facility]        Today's Diagnoses     Acute pain of right shoulder    -  1    S/P right rotator cuff repair           Follow-ups after your visit        Your next 10 appointments already scheduled     Dec 15, 2017  1:30 PM CST   SHERIF Extremity with Lcu Rose PTA   [facility] [place] [facility] ([facility] [place])    [address]  [address]  [place] [address]   [phone]            Dec 18, 2017  1:50 PM CST   SHERIF Extremity with Luc Rose PTA   [facility] [place] [facility] ([facility] [place])    [address]  [address]  [place] [address]   [phone]            Dec 21, 2017  1:50 PM CST   SHERIF Extremity with Luc Rose PTA   [facility] [place] [facility] ([facility] [place])    [address]  [address]  [place] [address]   [phone]            Dec 26, 2017  8:00 AM CST   SHERIF Extremity with Juan Antonio Pastor PT   [facility] [place] [facility] ([facility] [place])    [address]  [address]  [place] [address]   [phone]            Dec 29, 2017  1:30 PM CST   SHERIF Extremity with Luc Rose PTA   [facility] [place] [facility] ([facility] [place])    [address]  [address]  [place] [address]   [phone]            Jan 02, 2018  1:50 PM CST   SHERIF Extremity with Juan Antonio Pastor PT   [facility] [place] [facility] ([facility] [place])    [address]  [address]  [place] [address]   [phone]            Jan 05, 2018  1:30 PM CST   SHERIF Extremity with Juan Antonio Pastor PT   [facility] [place] [facility] ([facility] [place])    [address] [address] [facility] [address] [address]  [place] [address]   [phone]            2018  1:00 PM CST   Return Visit with Jose G Blanchard MD   [facility] [place] ([facility] [place])    [address]  [place] [address]   [phone]              Who to contact     If you have questions or need follow up information about today's clinic visit or your schedule please contact [facility] [place] [facility] directly at [phone].  Normal or non-critical lab and imaging results will be communicated to you by Stykyhart, letter or phone within 4 business days after the clinic has received the results. If you do not hear from us within 7 days, please contact the clinic through Stykyhart or phone. If you have a critical or abnormal lab result, we will notify you by phone as soon as possible.  Submit refill requests through [employer] or call your pharmacy and they will forward the refill request to us. Please allow 3 business days for your refill to be completed.          Additional Information About Your Visit        MyChart Information     [employer] lets you send messages to your doctor, view your test results, renew your prescriptions, schedule appointments and more. To sign up, go to [email or web address] . Click on \"Log in\" on the left side of the screen, which will take you to the Welcome page. Then click on \"Sign up Now\" on the right side of the page.     You will be asked to enter the access code listed below, as well as some personal information. Please follow the directions to create your username and password.     Your access code is: [email or web address]  Expires: 2018 11:16 AM     Your access code will  in 90 days. If you need help or a new code, please call your [facility] clinic or [phone].        Care EveryWhere ID     This is your Care EveryWhere ID. This could be used by other organizations to access your [facility] medical records  [id number]         Blood " Pressure from Last 3 Encounters:   11/14/17 116/72   10/16/17 118/76   08/20/17 122/82    Weight from Last 3 Encounters:   12/11/17 58.2 kg (128 lb 4.8 oz)   11/14/17 59 kg (130 lb)   10/16/17 58.1 kg (128 lb)              We Performed the Following     HC PT EVAL, LOW COMPLEXITY     SHERIF INITIAL EVAL REPORT     THERAPEUTIC EXERCISES        Primary Care Provider Office Phone # Fax #    Sony Medina Velez [phone] [address] [facility] [address]        Equal Access to Services     [facility]: Hadii aad ku hadasho Sokizzy, waaxda luqadaha, qaybta kaalmada adeegyada, sagrario lund . So [facility] [phone].    ATENCIÓN: Si habla español, tiene a adams disposición servicios gratuitos de asistencia lingüística. Llame al [phone].    We comply with applicable federal civil rights laws and [place] laws. We do not discriminate on the basis of race, color, national origin, age, disability, sex, sexual orientation, or gender identity.            Thank you!     Thank you for choosing [facility]   for your care. Our goal is always to provide you with excellent care. Hearing back from our patients is one way we can continue to improve our services. Please take a few minutes to complete the written survey that you may receive in the mail after your visit with us. Thank you!             Your Updated Medication List - Protect others around you: Learn how to safely use, store and throw away your medicines at www.disposemymeds.org.          This list is accurate as of: 12/12/17  3:15 PM.  Always use your most recent med list.                   Brand Name Dispense Instructions for use Diagnosis    carbidopa-levodopa  MG per tablet    SINEMET    90 tablet    TAKE 1 TABLET BY MOUTH AT BEDTIME    Restless legs syndrome (RLS)       celecoxib 200 MG capsule    celeBREX    60 capsule    Take 1 capsule (200 mg) by mouth 2 times daily as  needed for moderate pain    Upper back pain, Acute midline low back pain without sciatica       cyclobenzaprine 5 MG tablet    FLEXERIL    42 tablet    TAKE 1 TABLET(5 MG) BY MOUTH THREE TIMES DAILY AS NEEDED FOR MUSCLE SPASMS    Acute midline low back pain without sciatica, Upper back pain       finasteride 5 MG tablet    PROSCAR    90 tablet    Take 1 tablet (5 mg) by mouth daily    Benign prostatic hyperplasia with lower urinary tract symptoms, unspecified morphology       gabapentin 300 MG capsule    NEURONTIN    60 capsule    TAKE 2 CAPSULES BY MOUTH EVERY NIGHT AT BEDTIME    Restless leg syndrome       HYDROcodone-acetaminophen 7.5-325 MG per tablet    NORCO    60 tablet    Take 1 tablet by mouth every 4 hours as needed for pain maximum  4  tablet(s) per day    Rotator cuff syndrome, right, Chronic pain syndrome       ketoconazole 2 % cream    NIZORAL    45 g    Apply topically 2 times daily    Tinea versicolor       mirtazapine 15 MG tablet    REMERON    30 tablet    TAKE 1 TABLET BY MOUTH EVERY NIGHT AT BEDTIME    Major depressive disorder, single episode, in partial or unspecified remission       senna-docusate 8.6-50 MG per tablet    SENOKOT-S;PERICOLACE     Take 1-2 tablets by mouth        simvastatin 20 MG tablet    ZOCOR    30 tablet    TAKE 1 TABLET BY MOUTH EVERY DAY AT BEDTIME    Hyperlipidemia LDL goal <100, Pure hypercholesterolemia       tamsulosin 0.4 MG capsule    FLOMAX    90 capsule    Take 1 capsule (0.4 mg) by mouth daily    Benign prostatic hyperplasia with lower urinary tract symptoms, unspecified morphology

## 2017-12-15 ENCOUNTER — THERAPY VISIT (OUTPATIENT)
Dept: PHYSICAL THERAPY | Facility: CLINIC | Age: 52
End: 2017-12-15
Payer: COMMERCIAL

## 2017-12-15 DIAGNOSIS — M25.511 ACUTE PAIN OF RIGHT SHOULDER: ICD-10-CM

## 2017-12-15 DIAGNOSIS — Z98.890 S/P RIGHT ROTATOR CUFF REPAIR: ICD-10-CM

## 2017-12-15 PROCEDURE — 97110 THERAPEUTIC EXERCISES: CPT | Mod: GP | Performed by: PHYSICAL THERAPY ASSISTANT

## 2017-12-18 ENCOUNTER — THERAPY VISIT (OUTPATIENT)
Dept: PHYSICAL THERAPY | Facility: CLINIC | Age: 52
End: 2017-12-18
Payer: COMMERCIAL

## 2017-12-18 DIAGNOSIS — G25.81 RESTLESS LEGS SYNDROME (RLS): ICD-10-CM

## 2017-12-18 DIAGNOSIS — M25.511 ACUTE PAIN OF RIGHT SHOULDER: ICD-10-CM

## 2017-12-18 DIAGNOSIS — Z98.890 S/P RIGHT ROTATOR CUFF REPAIR: ICD-10-CM

## 2017-12-18 PROCEDURE — 97110 THERAPEUTIC EXERCISES: CPT | Mod: GP | Performed by: PHYSICAL THERAPY ASSISTANT

## 2017-12-20 DIAGNOSIS — E78.00 PURE HYPERCHOLESTEROLEMIA: ICD-10-CM

## 2017-12-20 DIAGNOSIS — E78.5 HYPERLIPIDEMIA LDL GOAL <100: Chronic | ICD-10-CM

## 2017-12-20 RX ORDER — SIMVASTATIN 20 MG
TABLET ORAL
Qty: 30 TABLET | Refills: 0 | Status: CANCELLED | OUTPATIENT
Start: 2017-12-20

## 2017-12-20 RX ORDER — CARBIDOPA AND LEVODOPA 25; 100 MG/1; MG/1
TABLET ORAL
Qty: 90 TABLET | Refills: 0 | Status: SHIPPED | OUTPATIENT
Start: 2017-12-20 | End: 2017-12-21

## 2017-12-20 NOTE — TELEPHONE ENCOUNTER
Last OV 11/14/2017.  Prescription approved per [facility] Refill Protocol.    Requested Prescriptions   Pending Prescriptions Disp Refills     carbidopa-levodopa (SINEMET)  MG per tablet [Pharmacy Med Name: CARBIDOPA/LEVODOPA 25-100MG TABS] 90 tablet 0     Sig: TAKE 1 TABLET BY MOUTH AT BEDTIME    Antiparkinson's Agents Protocol Failed    12/18/2017  3:37 PM       Failed - Blood pressure under 140/90    BP Readings from Last 3 Encounters:   11/14/17 116/72   10/16/17 118/76   08/20/17 122/82                Passed - CBC on record in past 12 months    Recent Labs   Lab Test  11/14/17   0846   WBC  6.4   RBC  4.71   HGB  14.7   HCT  44.3   PLT  291            Passed - ALT on record in past 12 months        Recent Labs   Lab Test  05/23/17   0813   ALT  31            Passed - Serum Creatinine on file in past 12 months    Recent Labs   Lab Test  11/14/17   0846   CR  0.84            Passed - Recent or future visit with authorizing provider's specialty    Patient had office visit in the last year or has a visit in the next 30 days with authorizing provider.  See chart review.              Passed - Patient is age 18 or older

## 2017-12-21 ENCOUNTER — OFFICE VISIT (OUTPATIENT)
Dept: FAMILY MEDICINE | Facility: CLINIC | Age: 52
End: 2017-12-21
Payer: COMMERCIAL

## 2017-12-21 ENCOUNTER — THERAPY VISIT (OUTPATIENT)
Dept: PHYSICAL THERAPY | Facility: CLINIC | Age: 52
End: 2017-12-21
Payer: COMMERCIAL

## 2017-12-21 VITALS
BODY MASS INDEX: 23.85 KG/M2 | SYSTOLIC BLOOD PRESSURE: 122 MMHG | WEIGHT: 132.5 LBS | HEART RATE: 103 BPM | TEMPERATURE: 97.3 F | DIASTOLIC BLOOD PRESSURE: 78 MMHG | OXYGEN SATURATION: 97 % | RESPIRATION RATE: 16 BRPM

## 2017-12-21 DIAGNOSIS — Z98.890 S/P RIGHT ROTATOR CUFF REPAIR: ICD-10-CM

## 2017-12-21 DIAGNOSIS — E78.5 HYPERLIPIDEMIA LDL GOAL <100: Chronic | ICD-10-CM

## 2017-12-21 DIAGNOSIS — R35.0 BENIGN PROSTATIC HYPERPLASIA WITH URINARY FREQUENCY: ICD-10-CM

## 2017-12-21 DIAGNOSIS — N40.1 BENIGN PROSTATIC HYPERPLASIA WITH URINARY FREQUENCY: ICD-10-CM

## 2017-12-21 DIAGNOSIS — M54.9 UPPER BACK PAIN: ICD-10-CM

## 2017-12-21 DIAGNOSIS — G89.4 CHRONIC PAIN SYNDROME: ICD-10-CM

## 2017-12-21 DIAGNOSIS — M54.50 ACUTE MIDLINE LOW BACK PAIN WITHOUT SCIATICA: ICD-10-CM

## 2017-12-21 DIAGNOSIS — F51.01 PRIMARY INSOMNIA: ICD-10-CM

## 2017-12-21 DIAGNOSIS — E78.00 PURE HYPERCHOLESTEROLEMIA: ICD-10-CM

## 2017-12-21 DIAGNOSIS — G25.81 RESTLESS LEG SYNDROME: ICD-10-CM

## 2017-12-21 DIAGNOSIS — M25.511 ACUTE PAIN OF RIGHT SHOULDER: ICD-10-CM

## 2017-12-21 DIAGNOSIS — B36.0 TINEA VERSICOLOR: ICD-10-CM

## 2017-12-21 DIAGNOSIS — M75.101 ROTATOR CUFF SYNDROME, RIGHT: ICD-10-CM

## 2017-12-21 DIAGNOSIS — Z23 NEED FOR PROPHYLACTIC VACCINATION AND INOCULATION AGAINST INFLUENZA: Primary | ICD-10-CM

## 2017-12-21 DIAGNOSIS — G25.81 RESTLESS LEGS SYNDROME (RLS): ICD-10-CM

## 2017-12-21 DIAGNOSIS — K59.03 DRUG-INDUCED CONSTIPATION: ICD-10-CM

## 2017-12-21 PROCEDURE — 97110 THERAPEUTIC EXERCISES: CPT | Mod: GP | Performed by: PHYSICAL THERAPY ASSISTANT

## 2017-12-21 PROCEDURE — 90686 IIV4 VACC NO PRSV 0.5 ML IM: CPT | Performed by: FAMILY MEDICINE

## 2017-12-21 PROCEDURE — 99214 OFFICE O/P EST MOD 30 MIN: CPT | Mod: 25 | Performed by: FAMILY MEDICINE

## 2017-12-21 PROCEDURE — 90471 IMMUNIZATION ADMIN: CPT | Performed by: FAMILY MEDICINE

## 2017-12-21 RX ORDER — AMOXICILLIN 250 MG
1-2 CAPSULE ORAL 2 TIMES DAILY PRN
Qty: 100 TABLET | Refills: 11 | Status: SHIPPED | OUTPATIENT
Start: 2017-12-21 | End: 2019-04-18

## 2017-12-21 RX ORDER — HYDROCODONE BITARTRATE AND ACETAMINOPHEN 7.5; 325 MG/1; MG/1
1 TABLET ORAL EVERY 4 HOURS PRN
Qty: 60 TABLET | Refills: 0 | Status: SHIPPED | OUTPATIENT
Start: 2018-12-21 | End: 2018-03-02

## 2017-12-21 RX ORDER — MIRTAZAPINE 15 MG/1
15 TABLET, FILM COATED ORAL AT BEDTIME
Qty: 30 TABLET | Refills: 4 | Status: SHIPPED | OUTPATIENT
Start: 2017-12-21 | End: 2018-06-08

## 2017-12-21 RX ORDER — CARBIDOPA AND LEVODOPA 25; 100 MG/1; MG/1
1 TABLET ORAL AT BEDTIME
Qty: 90 TABLET | Refills: 5 | Status: SHIPPED | OUTPATIENT
Start: 2017-12-21 | End: 2019-04-18

## 2017-12-21 RX ORDER — KETOCONAZOLE 20 MG/G
CREAM TOPICAL 2 TIMES DAILY
Qty: 45 G | Refills: 1 | Status: SHIPPED | OUTPATIENT
Start: 2017-12-21 | End: 2017-12-21

## 2017-12-21 RX ORDER — SIMVASTATIN 20 MG
TABLET ORAL
Qty: 30 TABLET | Refills: 11 | Status: SHIPPED | OUTPATIENT
Start: 2017-12-21 | End: 2019-04-18

## 2017-12-21 RX ORDER — FINASTERIDE 5 MG/1
5 TABLET, FILM COATED ORAL DAILY
Qty: 90 TABLET | Refills: 1 | Status: SHIPPED | OUTPATIENT
Start: 2017-12-21 | End: 2018-07-15

## 2017-12-21 RX ORDER — CYCLOBENZAPRINE HCL 5 MG
TABLET ORAL
Qty: 42 TABLET | Refills: 0 | Status: SHIPPED | OUTPATIENT
Start: 2017-12-21 | End: 2018-09-10

## 2017-12-21 RX ORDER — TAMSULOSIN HYDROCHLORIDE 0.4 MG/1
0.4 CAPSULE ORAL DAILY
Qty: 90 CAPSULE | Refills: 3 | Status: SHIPPED | OUTPATIENT
Start: 2017-12-21 | End: 2018-09-10

## 2017-12-21 RX ORDER — CELECOXIB 200 MG/1
200 CAPSULE ORAL 2 TIMES DAILY PRN
Qty: 60 CAPSULE | Refills: 1 | Status: SHIPPED | OUTPATIENT
Start: 2017-12-21 | End: 2018-05-10

## 2017-12-21 RX ORDER — GABAPENTIN 300 MG/1
CAPSULE ORAL
Qty: 60 CAPSULE | Refills: 0 | Status: SHIPPED | OUTPATIENT
Start: 2017-12-21 | End: 2018-01-31

## 2017-12-21 RX ORDER — HYDROCODONE BITARTRATE AND ACETAMINOPHEN 7.5; 325 MG/1; MG/1
1 TABLET ORAL EVERY 4 HOURS PRN
Qty: 60 TABLET | Refills: 0 | Status: SHIPPED | OUTPATIENT
Start: 2017-12-21 | End: 2017-12-21

## 2017-12-21 NOTE — MR AVS SNAPSHOT
After Visit Summary   12/21/2017    Giovanni Cedeno    MRN: [id number]           Patient Information     Date Of Birth          1965        Visit Information        Provider Department      12/21/2017 4:00 PM Sony Velez MD [facility] [place]        Today's Diagnoses     Need for prophylactic vaccination and inoculation against influenza    -  1    Restless legs syndrome (RLS)        Hyperlipidemia LDL goal <100        Pure hypercholesterolemia        Tinea versicolor        Upper back pain        Acute midline low back pain without sciatica        Restless leg syndrome        Primary insomnia        Benign prostatic hyperplasia with urinary frequency        Drug-induced constipation        Rotator cuff syndrome, right        Chronic pain syndrome          Care Instructions      (Z23) Need for prophylactic vaccination and inoculation against influenza  (primary encounter diagnosis)    Comment:      Plan: FLU VAC, SPLIT VIRUS IM > 3 YO (QUADRIVALENT)           [38547], Vaccine Administration, Initial           [89283]                   (G25.81) Restless legs syndrome (RLS)    Comment:      Plan: carbidopa-levodopa (SINEMET)  MG per           tablet                   (E78.5) Hyperlipidemia LDL goal <100    Comment:      Plan: simvastatin (ZOCOR) 20 MG tablet                   (E78.00) Pure hypercholesterolemia    Comment:      Plan: simvastatin (ZOCOR) 20 MG tablet                        (B36.0) Tinea versicolor    Comment:      Plan: ketoconazole (NIZORAL) 2 % cream                   (M54.9) Upper back pain    Comment:      Plan: celecoxib (CELEBREX) 200 MG capsule,           cyclobenzaprine (FLEXERIL) 5 MG tablet                   (M54.5) Acute midline low back pain without sciatica    Comment:      Plan: celecoxib (CELEBREX) 200 MG capsule,           cyclobenzaprine (FLEXERIL) 5 MG tablet                   (G25.81) Restless leg syndrome    Comment:       Plan: gabapentin (NEURONTIN) 300 MG capsule                   (F51.01) Primary insomnia    Comment:      Plan: mirtazapine (REMERON) 15 MG tablet                   (N40.1,  R35.0) Benign prostatic hyperplasia with urinary frequency    Comment:      Plan: finasteride (PROSCAR) 5 MG tablet, tamsulosin           (FLOMAX) 0.4 MG capsule                   (K59.03) Drug-induced constipation    Comment:      Plan: senna-docusate (SENOKOT-S;PERICOLACE) 8.6-50 MG          per tablet                   (M75.101) Rotator cuff syndrome, right    Comment:      Plan: HYDROcodone-acetaminophen (NORCO) 7.5-325 MG           per tablet, DISCONTINUED:           HYDROcodone-acetaminophen (NORCO) 7.5-325 MG           per tablet                   (G89.4) Chronic pain syndrome    Comment:      Plan: HYDROcodone-acetaminophen (NORCO) 7.5-325 MG           per tablet, DISCONTINUED:           HYDROcodone-acetaminophen (NORCO) 7.5-325 MG           per tablet                              Follow-ups after your visit        Your next 10 appointments already scheduled     Dec 26, 2017  8:00 AM CST   SHERIF Extremity with Juan Antonio Pastor PT   [facility] [place] [facility] ([facility] [place])    [address]  [place] [address]   [phone]            Dec 29, 2017  1:30 PM CST   SHERIF Extremity with Luc Rose [facility]   [facility] [place] [facility] ([facility] [place])    [address] [place] [address] [address]  [place] [address]   [phone]            Jan 02, 2018  1:50 PM CST   SHERIF Extremity with Juan Antonio Pastor PT   [facility] [place] [facility] ([facility] [place])    [address] [place] [address]  [address]  [place] [address]   [phone]            Jan 05, 2018  1:30 PM CST   SHERIF Extremity with Juan Antonio Pastor PT   [facility] [place] [facility] ([facility] [place])    [address]  [address]  [place] [address]   [phone]            Jan 08, 2018  1:00 PM CST  "  Return Visit with Jose G Blanchard MD   [facility] [place] ([facility] [place])    [address]  [place] [address] [phone]              Who to contact     If you have questions or need follow up information about today's clinic visit or your schedule please contact [facility] directly at [phone].  Normal or non-critical lab and imaging results will be communicated to you by ZillionTVhart, letter or phone within 4 business days after the clinic has received the results. If you do not hear from us within 7 days, please contact the clinic through [employer] or phone. If you have a critical or abnormal lab result, we will notify you by phone as soon as possible.  Submit refill requests through [employer] or call your pharmacy and they will forward the refill request to us. Please allow 3 business days for your refill to be completed.          Additional Information About Your Visit        [employer] Information     [employer] lets you send messages to your doctor, view your test results, renew your prescriptions, schedule appointments and more. To sign up, go to [email or web address] . Click on \"Log in\" on the left side of the screen, which will take you to the Welcome page. Then click on \"Sign up Now\" on the right side of the page.     You will be asked to enter the access code listed below, as well as some personal information. Please follow the directions to create your username and password.     Your access code is: [email or web address]  Expires: 2018 11:16 AM     Your access code will  in 90 days. If you need help or a new code, please call your [place] clinic or [phone].        Care EveryWhere ID     This is your Care EveryWhere ID. This could be used by other organizations to access your [place] medical records  [id number]        Your Vitals Were     Pulse Temperature Respirations Pulse Oximetry BMI (Body " Mass Index)       103 97.3  F (36.3  C) (Tympanic) 16 97% 23.85 kg/m2        Blood Pressure from Last 3 Encounters:   12/21/17 122/78   11/14/17 116/72   10/16/17 118/76    Weight from Last 3 Encounters:   12/21/17 132 lb 8 oz (60.1 kg)   12/11/17 128 lb 4.8 oz (58.2 kg)   11/14/17 130 lb (59 kg)              We Performed the Following     FLU VAC, SPLIT VIRUS IM > 3 YO (QUADRIVALENT) [24949]     Vaccine Administration, Initial [03932]          Today's Medication Changes          These changes are accurate as of: 12/21/17  4:37 PM.  If you have any questions, ask your nurse or doctor.               Start taking these medicines.        Dose/Directions    HYDROcodone-acetaminophen 7.5-325 MG per tablet   Commonly known as:  NORCO   Used for:  Rotator cuff syndrome, right, Chronic pain syndrome   Started by:  Sony Velez MD        Dose:  1 tablet   Start taking on:  12/21/2018   Take 1 tablet by mouth every 4 hours as needed for pain maximum  4  tablet(s) per day   Quantity:  60 tablet   Refills:  0         These medicines have changed or have updated prescriptions.        Dose/Directions    carbidopa-levodopa  MG per tablet   Commonly known as:  SINEMET   This may have changed:  See the new instructions.   Used for:  Restless legs syndrome (RLS)   Changed by:  Sony Velez MD        Dose:  1 tablet   Take 1 tablet by mouth At Bedtime   Quantity:  90 tablet   Refills:  5       cyclobenzaprine 5 MG tablet   Commonly known as:  FLEXERIL   This may have changed:  See the new instructions.   Used for:  Acute midline low back pain without sciatica, Upper back pain   Changed by:  Sony Velez MD        TAKE 1 TABLET(5 MG) BY MOUTH THREE TIMES DAILY AS NEEDED FOR MUSCLE SPASMS   Quantity:  42 tablet   Refills:  0       gabapentin 300 MG capsule   Commonly known as:  NEURONTIN   This may have changed:  See the new instructions.   Used for:  Restless leg syndrome   Changed by:   Sony Velez MD        TAKE 2 CAPSULES BY MOUTH EVERY NIGHT AT BEDTIME   Quantity:  60 capsule   Refills:  0       mirtazapine 15 MG tablet   Commonly known as:  REMERON   This may have changed:  See the new instructions.   Used for:  Primary insomnia   Changed by:  Sony Velez MD        Dose:  15 mg   Take 1 tablet (15 mg) by mouth At Bedtime   Quantity:  30 tablet   Refills:  4       senna-docusate 8.6-50 MG per tablet   Commonly known as:  SENOKOT-S;PERICOLACE   This may have changed:    - when to take this  - reasons to take this   Used for:  Drug-induced constipation   Changed by:  Sony Velez MD        Dose:  1-2 tablet   Take 1-2 tablets by mouth 2 times daily as needed for constipation   Quantity:  100 tablet   Refills:  11            Where to get your medicines      [facility] [address] [facility]  [id number] [place] [address]    Hours:  24-hours Phone:  [phone]     carbidopa-levodopa  MG per tablet    celecoxib 200 MG capsule    cyclobenzaprine 5 MG tablet    finasteride 5 MG tablet    gabapentin 300 MG capsule    ketoconazole 2 % cream    mirtazapine 15 MG tablet    senna-docusate 8.6-50 MG per tablet    simvastatin 20 MG tablet    tamsulosin 0.4 MG capsule         Some of these will need a paper prescription and others can be bought over the counter.  Ask your nurse if you have questions.     Bring a paper prescription for each of these medications     HYDROcodone-acetaminophen 7.5-325 MG per tablet                Primary Care Provider Office Phone # Fax #    Sony Velez [phone] [address] [facility] [address]        Equal Access to Services     FRANKIE SCHMITT : Kimi Dia, waaxda luqadaha, qaybta kaalmada adeegyada, sagrario dempsey. So [facility]  [phone].    ATENCIÓN: Si jimenez howard, tiene a adams disposición servicios gratuitos de asistencia lingüística. Palak diaz [phone].    We comply with applicable federal civil rights laws and [place] laws. We do not discriminate on the basis of race, color, national origin, age, disability, sex, sexual orientation, or gender identity.            Thank you!     Thank you for choosing [facility]  for your care. Our goal is always to provide you with excellent care. Hearing back from our patients is one way we can continue to improve our services. Please take a few minutes to complete the written survey that you may receive in the mail after your visit with us. Thank you!             Your Updated Medication List - Protect others around you: Learn how to safely use, store and throw away your medicines at www.disposemymeds.org.          This list is accurate as of: 12/21/17  4:37 PM.  Always use your most recent med list.                   Brand Name Dispense Instructions for use Diagnosis    carbidopa-levodopa  MG per tablet    SINEMET    90 tablet    Take 1 tablet by mouth At Bedtime    Restless legs syndrome (RLS)       celecoxib 200 MG capsule    celeBREX    60 capsule    Take 1 capsule (200 mg) by mouth 2 times daily as needed for moderate pain    Upper back pain, Acute midline low back pain without sciatica       cyclobenzaprine 5 MG tablet    FLEXERIL    42 tablet    TAKE 1 TABLET(5 MG) BY MOUTH THREE TIMES DAILY AS NEEDED FOR MUSCLE SPASMS    Acute midline low back pain without sciatica, Upper back pain       finasteride 5 MG tablet    PROSCAR    90 tablet    Take 1 tablet (5 mg) by mouth daily    Benign prostatic hyperplasia with urinary frequency       gabapentin 300 MG capsule    NEURONTIN    60 capsule    TAKE 2 CAPSULES BY MOUTH EVERY NIGHT AT BEDTIME    Restless leg syndrome       HYDROcodone-acetaminophen 7.5-325 MG per tablet   Start taking on:  12/21/2018     NORCO    60 tablet    Take 1 tablet by mouth every 4 hours as needed for pain maximum  4  tablet(s) per day    Rotator cuff syndrome, right, Chronic pain syndrome       ketoconazole 2 % cream    NIZORAL    45 g    Apply topically 2 times daily    Tinea versicolor       mirtazapine 15 MG tablet    REMERON    30 tablet    Take 1 tablet (15 mg) by mouth At Bedtime    Primary insomnia       senna-docusate 8.6-50 MG per tablet    SENOKOT-S;PERICOLACE    100 tablet    Take 1-2 tablets by mouth 2 times daily as needed for constipation    Drug-induced constipation       simvastatin 20 MG tablet    ZOCOR    30 tablet    TAKE 1 TABLET BY MOUTH EVERY DAY AT BEDTIME    Hyperlipidemia LDL goal <100, Pure hypercholesterolemia       tamsulosin 0.4 MG capsule    FLOMAX    90 capsule    Take 1 capsule (0.4 mg) by mouth daily    Benign prostatic hyperplasia with urinary frequency

## 2017-12-21 NOTE — NURSING NOTE
"Chief Complaint   Patient presents with     Recheck Medication     surgery follow up       Initial /78  Pulse 103  Temp 97.3  F (36.3  C) (Tympanic)  Resp 16  Wt 132 lb 8 oz (60.1 kg)  SpO2 97%  BMI 23.85 kg/m2 Estimated body mass index is 23.85 kg/(m^2) as calculated from the following:    Height as of 12/11/17: 5' 2.5\" (1.588 m).    Weight as of this encounter: 132 lb 8 oz (60.1 kg).  Medication Reconciliation: complete   Marisol Phillips CMA    "

## 2017-12-21 NOTE — PROGRESS NOTES
"  SUBJECTIVE:   Giovanni Cedeno is a 52 year old male who presents to clinic today for the following health issues:      Follow up from surgery      Duration: 3 weeks    Description (location/character/radiation): right shoulder    Intensity:  6/10    Accompanying signs and symptoms: na    History (similar episodes/previous evaluation): None    Precipitating or alleviating factors: surgery    Therapies tried and outcome: PT, want refill of pain meds       RESTLESS LEG SYNDROME GOING WELL     REFILL NEEDED     KNEE BURSITIS     CHOLESTEROL NEEDS REFILL SIMVASTATIN     PRE DIABETES     RIGHT LATERAL  ELBOW SURGERY COMING ALONG    CHRONIC PAIN SYNDROME     HISTORY OF UPPER AND LOWER BACK PAIN     RIGHT ROTATOR CUFF TEAR     Musculoskeletal problem/pain      Duration: ongoing    Description  Location: right side    Intensity:  moderate    Accompanying signs and symptoms: pain when lefting arm    History  Previous similar problem: YES  Previous evaluation:  x-ray and orthopedic evaluation    Precipitating or alleviating factors:  Trauma or overuse: YES  Aggravating factors include: working    Therapies tried and outcome: having surgery end of Nov    .ANIKA REYES MD .10/16/2017 6:01 PM .October 16, 2017    Giovanni Cedeno is a 52 year old male who is who presents with RIGHT SHOULDER PAIN   PRECIPITATED AT WORK BY PROLONGED SIDE TO SIDE HEAVY GRINDING OF FLOOR   PRE EXISTING RIGHT SHOULDER PAIN   RIGHT  SIDED RIGHT LATERAL EPICONDYLITIS IN REMISSION   Onset :  SEVERAL MONTHS  Severity:  SEVERE     Home treatments  REST    Additional Symptoms:  RESTRICTED MOTION RIGHT SHOULDER    Course  ONGOING SYMPTOMS       RESTLESS LEG SYNDROME IMPROVED     HISTORY OF KNEE BURSITIS IMPROVED     LDL OR \"BAD\" CHOLESTEROL  GOAL < 100 ONGOING     DEPRESSION IN PARTIAL REMISSION     PRE DIABETES MONITORING NEEDED     CHRONIC PAIN SYNDROME     CHRONIC RIGHT SHOULDER PAIN     CHRONIC INTERMITTENT  LOWER BACK PAIN   UPPER BACK PAIN " ONGOING       Problem list and histories reviewed & adjusted, as indicated.  Additional history: as documented      Patient Active Problem List   Diagnosis     Restless leg syndrome     Knee bursitis     Pure hypercholesterolemia     Major depression in partial remission (H)     Pre-diabetes     Epicondylitis, lateral humeral     Immunization reaction     Chronic pain syndrome     Chronic right shoulder pain     Abdominal pain, left upper quadrant     Pain of toe of right foot     Acute midline low back pain without sciatica     Upper back pain     Acute pain of right shoulder     S/P right rotator cuff repair     History reviewed. No pertinent surgical history.    Social History   Substance Use Topics     Smoking status: Never Smoker     Smokeless tobacco: Never Used     Alcohol use No     Family History   Problem Relation Age of Onset     DIABETES Father      HEART DISEASE Father          Current Outpatient Prescriptions   Medication Sig Dispense Refill     carbidopa-levodopa (SINEMET)  MG per tablet Take 1 tablet by mouth At Bedtime 90 tablet 5     mirtazapine (REMERON) 15 MG tablet Take 1 tablet (15 mg) by mouth At Bedtime 30 tablet 4     simvastatin (ZOCOR) 20 MG tablet TAKE 1 TABLET BY MOUTH EVERY DAY AT BEDTIME 30 tablet 11     finasteride (PROSCAR) 5 MG tablet Take 1 tablet (5 mg) by mouth daily 90 tablet 1     tamsulosin (FLOMAX) 0.4 MG capsule Take 1 capsule (0.4 mg) by mouth daily 90 capsule 3     celecoxib (CELEBREX) 200 MG capsule Take 1 capsule (200 mg) by mouth 2 times daily as needed for moderate pain 60 capsule 1     gabapentin (NEURONTIN) 300 MG capsule TAKE 2 CAPSULES BY MOUTH EVERY NIGHT AT BEDTIME 60 capsule 0     cyclobenzaprine (FLEXERIL) 5 MG tablet TAKE 1 TABLET(5 MG) BY MOUTH THREE TIMES DAILY AS NEEDED FOR MUSCLE SPASMS 42 tablet 0     senna-docusate (SENOKOT-S;PERICOLACE) 8.6-50 MG per tablet Take 1-2 tablets by mouth 2 times daily as needed for constipation 100 tablet 11     [START  ON 12/21/2018] HYDROcodone-acetaminophen (NORCO) 7.5-325 MG per tablet Take 1 tablet by mouth every 4 hours as needed for pain maximum  4  tablet(s) per day 60 tablet 0     [DISCONTINUED] carbidopa-levodopa (SINEMET)  MG per tablet TAKE 1 TABLET BY MOUTH AT BEDTIME 90 tablet 0     [DISCONTINUED] gabapentin (NEURONTIN) 300 MG capsule TAKE 2 CAPSULES BY MOUTH EVERY NIGHT AT BEDTIME 60 capsule 0     [DISCONTINUED] celecoxib (CELEBREX) 200 MG capsule Take 1 capsule (200 mg) by mouth 2 times daily as needed for moderate pain 60 capsule 1     [DISCONTINUED] simvastatin (ZOCOR) 20 MG tablet TAKE 1 TABLET BY MOUTH EVERY DAY AT BEDTIME 30 tablet 11     [DISCONTINUED] mirtazapine (REMERON) 15 MG tablet TAKE 1 TABLET BY MOUTH EVERY NIGHT AT BEDTIME 30 tablet 4     No Known Allergies  Recent Labs   Lab Test  11/14/17   0846  05/23/17   0813  11/10/16   0945  07/09/15   1013  03/11/14   1047  05/08/13   0924   A1C   --    --    --   5.4  5.8  6.3*   LDL   --    --   167*   --   218*  150*   HDL   --    --   53   --   48  44   TRIG   --    --   173*   --   325*  184*   ALT   --   31  29   --   29  35   CR  0.84  0.78  0.93   --    --   1.00   GFRESTIMATED  >90  >90  Non African American GFR Calc    85   --    --   79   GFRESTBLACK  >90  >90  African American GFR Calc    >90   --    --   >90   POTASSIUM  4.2  4.2   --    --    --   5.0      BP Readings from Last 3 Encounters:   12/21/17 122/78   11/14/17 116/72   10/16/17 118/76    Wt Readings from Last 3 Encounters:   12/21/17 132 lb 8 oz (60.1 kg)   12/11/17 128 lb 4.8 oz (58.2 kg)   11/14/17 130 lb (59 kg)                  Labs reviewed in EPIC          Reviewed and updated as needed this visit by clinical staff     Reviewed and updated as needed this visit by Provider         ROS: has Restless leg syndrome; Knee bursitis; Pure hypercholesterolemia; Major depression in partial remission (H); Pre-diabetes; Epicondylitis, lateral humeral; Immunization reaction; Chronic pain  syndrome; Chronic right shoulder pain; Abdominal pain, left upper quadrant; Pain of toe of right foot; Acute midline low back pain without sciatica; Upper back pain; Acute pain of right shoulder; and S/P right rotator cuff repair on his problem list.    Constitutional, HEENT, cardiovascular, pulmonary, gi and gu systems are negative, except as otherwise noted.      OBJECTIVE:   /78  Pulse 103  Temp 97.3  F (36.3  C) (Tympanic)  Resp 16  Wt 132 lb 8 oz (60.1 kg)  SpO2 97%  BMI 23.85 kg/m2  Body mass index is 23.85 kg/(m^2).  GENERAL: healthy, alert and no distress  NECK: no adenopathy, no asymmetry, masses, or scars and thyroid normal to palpation  RESP: lungs clear to auscultation - no rales, rhonchi or wheezes  CV: regular rate and rhythm, normal S1 S2, no S3 or S4, no murmur, click or rub, no peripheral edema and peripheral pulses strong  ABDOMEN: soft, nontender, no hepatosplenomegaly, no masses and bowel sounds normal  MS:   IMPROVED RIGHT SHOULDER PAIN AND RANGE OF MOTION   SCARS PRESENT     Diagnostic Test Results:  Results for orders placed or performed in visit on 11/14/17   XR Chest 2 Views    Narrative    CHEST TWO VIEWS November 14, 2017 9:03 AM     HISTORY: Preoperative evaluation with general physical exam.       Impression    IMPRESSION: Since April 21, 2014, heart size is normal. No pleural  effusion, pneumothorax, or abnormal area of consolidation.    PHILL MORENO MD       ASSESSMENT/PLAN:           ICD-10-CM    1. Need for prophylactic vaccination and inoculation against influenza Z23 FLU VAC, SPLIT VIRUS IM > 3 YO (QUADRIVALENT) [00362]     Vaccine Administration, Initial [30249]   2. Restless legs syndrome (RLS) G25.81 carbidopa-levodopa (SINEMET)  MG per tablet   3. Hyperlipidemia LDL goal <100 E78.5 simvastatin (ZOCOR) 20 MG tablet   4. Pure hypercholesterolemia E78.00 simvastatin (ZOCOR) 20 MG tablet   5. Tinea versicolor B36.0 DISCONTINUED: ketoconazole (NIZORAL) 2 % cream    6. Upper back pain M54.9 celecoxib (CELEBREX) 200 MG capsule     cyclobenzaprine (FLEXERIL) 5 MG tablet   7. Acute midline low back pain without sciatica M54.5 celecoxib (CELEBREX) 200 MG capsule     cyclobenzaprine (FLEXERIL) 5 MG tablet   8. Restless leg syndrome G25.81 gabapentin (NEURONTIN) 300 MG capsule   9. Primary insomnia F51.01 mirtazapine (REMERON) 15 MG tablet   10. Benign prostatic hyperplasia with urinary frequency N40.1 finasteride (PROSCAR) 5 MG tablet    R35.0 tamsulosin (FLOMAX) 0.4 MG capsule   11. Drug-induced constipation K59.03 senna-docusate (SENOKOT-S;PERICOLACE) 8.6-50 MG per tablet   12. Rotator cuff syndrome, right M75.101 HYDROcodone-acetaminophen (NORCO) 7.5-325 MG per tablet     DISCONTINUED: HYDROcodone-acetaminophen (NORCO) 7.5-325 MG per tablet   13. Chronic pain syndrome G89.4 HYDROcodone-acetaminophen (NORCO) 7.5-325 MG per tablet     DISCONTINUED: HYDROcodone-acetaminophen (NORCO) 7.5-325 MG per tablet       Patient Instructions     (Z23) Need for prophylactic vaccination and inoculation against influenza  (primary encounter diagnosis)    Comment:      Plan: FLU VAC, SPLIT VIRUS IM > 3 YO (QUADRIVALENT)           [39279], Vaccine Administration, Initial           [71997]                   (G25.81) Restless legs syndrome (RLS)    Comment:      Plan: carbidopa-levodopa (SINEMET)  MG per           tablet                   (E78.5) Hyperlipidemia LDL goal <100    Comment:      Plan: simvastatin (ZOCOR) 20 MG tablet                   (E78.00) Pure hypercholesterolemia    Comment:      Plan: simvastatin (ZOCOR) 20 MG tablet                        (B36.0) Tinea versicolor    Comment:      Plan: ketoconazole (NIZORAL) 2 % cream                   (M54.9) Upper back pain    Comment:      Plan: celecoxib (CELEBREX) 200 MG capsule,           cyclobenzaprine (FLEXERIL) 5 MG tablet                   (M54.5) Acute midline low back pain without sciatica    Comment:      Plan: celecoxib  (CELEBREX) 200 MG capsule,           cyclobenzaprine (FLEXERIL) 5 MG tablet                   (G25.81) Restless leg syndrome    Comment:      Plan: gabapentin (NEURONTIN) 300 MG capsule                   (F51.01) Primary insomnia    Comment:      Plan: mirtazapine (REMERON) 15 MG tablet                   (N40.1,  R35.0) Benign prostatic hyperplasia with urinary frequency    Comment:      Plan: finasteride (PROSCAR) 5 MG tablet, tamsulosin           (FLOMAX) 0.4 MG capsule                   (K59.03) Drug-induced constipation    Comment:      Plan: senna-docusate (SENOKOT-S;PERICOLACE) 8.6-50 MG          per tablet                   (M75.101) Rotator cuff syndrome, right    Comment:      Plan: HYDROcodone-acetaminophen (NORCO) 7.5-325 MG           per tablet, DISCONTINUED:           HYDROcodone-acetaminophen (NORCO) 7.5-325 MG           per tablet                   (G89.4) Chronic pain syndrome    Comment:      Plan: HYDROcodone-acetaminophen (NORCO) 7.5-325 MG           per tablet, DISCONTINUED:           HYDROcodone-acetaminophen (NORCO) 7.5-325 MG           per tablet                          ANIKA REYES MD  [facility]  Injectable Influenza Immunization Documentation    1.  Is the person to be vaccinated sick today?   No    2. Does the person to be vaccinated have an allergy to a component   of the vaccine?   No  Egg Allergy Algorithm Link    3. Has the person to be vaccinated ever had a serious reaction   to influenza vaccine in the past?   No    4. Has the person to be vaccinated ever had Guillain-Barré syndrome?   No    Form completed by LME

## 2017-12-21 NOTE — PATIENT INSTRUCTIONS
(Z23) Need for prophylactic vaccination and inoculation against influenza  (primary encounter diagnosis)    Comment:      Plan: FLU VAC, SPLIT VIRUS IM > 3 YO (QUADRIVALENT)           [47020], Vaccine Administration, Initial           [41323]                   (G25.81) Restless legs syndrome (RLS)    Comment:      Plan: carbidopa-levodopa (SINEMET)  MG per           tablet                   (E78.5) Hyperlipidemia LDL goal <100    Comment:      Plan: simvastatin (ZOCOR) 20 MG tablet                   (E78.00) Pure hypercholesterolemia    Comment:      Plan: simvastatin (ZOCOR) 20 MG tablet                        (B36.0) Tinea versicolor    Comment:      Plan: ketoconazole (NIZORAL) 2 % cream                   (M54.9) Upper back pain    Comment:      Plan: celecoxib (CELEBREX) 200 MG capsule,           cyclobenzaprine (FLEXERIL) 5 MG tablet                   (M54.5) Acute midline low back pain without sciatica    Comment:      Plan: celecoxib (CELEBREX) 200 MG capsule,           cyclobenzaprine (FLEXERIL) 5 MG tablet                   (G25.81) Restless leg syndrome    Comment:      Plan: gabapentin (NEURONTIN) 300 MG capsule                   (F51.01) Primary insomnia    Comment:      Plan: mirtazapine (REMERON) 15 MG tablet                   (N40.1,  R35.0) Benign prostatic hyperplasia with urinary frequency    Comment:      Plan: finasteride (PROSCAR) 5 MG tablet, tamsulosin           (FLOMAX) 0.4 MG capsule                   (K59.03) Drug-induced constipation    Comment:      Plan: senna-docusate (SENOKOT-S;PERICOLACE) 8.6-50 MG          per tablet                   (M75.101) Rotator cuff syndrome, right    Comment:      Plan: HYDROcodone-acetaminophen (NORCO) 7.5-325 MG           per tablet, DISCONTINUED:           HYDROcodone-acetaminophen (NORCO) 7.5-325 MG           per tablet                   (G89.4) Chronic pain syndrome    Comment:      Plan: HYDROcodone-acetaminophen (NORCO) 7.5-325 MG            per tablet, DISCONTINUED:           HYDROcodone-acetaminophen (NORCO) 7.5-325 MG           per tablet

## 2017-12-26 ENCOUNTER — THERAPY VISIT (OUTPATIENT)
Dept: PHYSICAL THERAPY | Facility: CLINIC | Age: 52
End: 2017-12-26
Payer: COMMERCIAL

## 2017-12-26 DIAGNOSIS — Z98.890 S/P RIGHT ROTATOR CUFF REPAIR: ICD-10-CM

## 2017-12-26 DIAGNOSIS — M25.511 ACUTE PAIN OF RIGHT SHOULDER: ICD-10-CM

## 2017-12-26 PROCEDURE — 97110 THERAPEUTIC EXERCISES: CPT | Mod: GP | Performed by: PHYSICAL THERAPIST

## 2018-01-02 ENCOUNTER — THERAPY VISIT (OUTPATIENT)
Dept: PHYSICAL THERAPY | Facility: CLINIC | Age: 53
End: 2018-01-02
Payer: COMMERCIAL

## 2018-01-02 DIAGNOSIS — M25.511 ACUTE PAIN OF RIGHT SHOULDER: ICD-10-CM

## 2018-01-02 DIAGNOSIS — Z98.890 S/P RIGHT ROTATOR CUFF REPAIR: ICD-10-CM

## 2018-01-02 PROCEDURE — 97110 THERAPEUTIC EXERCISES: CPT | Mod: GP | Performed by: PHYSICAL THERAPIST

## 2018-01-02 NOTE — MR AVS SNAPSHOT
After Visit Summary   1/2/2018    Giovanni Cedeno    MRN: [id number]           Patient Information     Date Of Birth          1965        Visit Information        Provider Department      1/2/2018 1:50 PM Juan Antonio Pastor PT [facility] [place] [facility]        Today's Diagnoses     Acute pain of right shoulder        S/P right rotator cuff repair           Follow-ups after your visit        Your next 10 appointments already scheduled     Jan 08, 2018  1:00 PM CST   Return Visit with Jose G Blanchard MD   [facility] [place] ([facility] [place])    [address]  [address]  [place] [address]   [phone]            Jan 09, 2018  8:40 AM CST   SHERIF Extremity with Juan Antonio Pastor PT   [facility] [place] [facility] ([facility] [place])    [address]  [address]  [place] [address]   [phone]            Jan 16, 2018 10:00 AM CST   SHERIF Extremity with Juan Antonio Pastor PT   [facility] [place] [facility] ([facility] [place])    [address]  [address]  [place] [address]   [phone]            Jan 18, 2018 12:30 PM CST   SHERIF Extremity with Luc Rose PTA   [facility] [place] [facility] ([facility] [place])    [address]  [address]  [place] [address]   [phone]            Jan 23, 2018 10:00 AM CST   SHERIF Extremity with Juan Antonio Pastor PT   [facility] [place] [facility] ([facility] [place])    [address]  [address]  [place] [address]   [phone]            Jan 25, 2018 12:30 PM CST   SHERIF Extremity with Luc Rose PTA   [facility] [place] [facility] ([facility] [place])    [address]  [address]  [place] [address]   [phone]            Jan 30, 2018 10:00 AM CST   SHERIF Extremity with Juan Antonio Pastor PT   [facility] [place] [facility] ([facility] [place])    [address]  [address]  [place] [address]   [phone]            Feb 01, 2018 12:30 PM CST   SHERIF Extremity with Luc Rose PTA   [facility] [place] [facility] ([facility] [place])    [address]  [address]  [place] [id number]   [phone]            Feb 06, 2018 10:00 AM CST   SHERIF Extremity with Juan Antonio Pastor PT   [facility] [place] [facility] ([facility] [place])    [address]  [address]  [place] [id number]   [phone]            Feb 08, 2018 12:30 PM CST   SHERIF Extremity with Luc Rose PTA   [facility] [place] [facility] ([facility] [place])    [address]  [address]  [place] [id number]   [phone]              Who to contact     If you have questions or need follow up information about today's clinic visit or your schedule please contact [facility] [place] [facility] directly at [phone].  Normal or non-critical lab and imaging results will be communicated to you by [employer], letter or phone within 4 business days after the clinic has received the results. If you do not hear from us within 7 days, please contact the clinic through MEDEMt or phone. If you have a critical or abnormal lab result, we will notify you by phone as soon as possible.  Submit refill requests through [employer] or call your pharmacy and they will forward the refill request to us. Please allow 3 business days for your refill to be completed.          Additional Information About Your Visit        News [employer] Information     [employer] lets you send messages to your doctor, view your test results, renew your prescriptions, schedule appointments and more. To sign up, go to [email or web address] . Click on \"Log in\" on the left side of the screen, which will take you to the Welcome page. Then click on \"Sign up Now\" on the right side of the page.     You will be asked to enter the access code listed below, as well as some personal information. Please " follow the directions to create your username and password.     Your access code is: [email or web address]  Expires: 2018 11:16 AM     Your access code will  in 90 days. If you need help or a new code, please call your [facility] clinic or [phone].        Care EveryWhere ID     This is your Care EveryWhere ID. This could be used by other organizations to access your [facility] medical records  [id number]         Blood Pressure from Last 3 Encounters:   17 122/78   17 116/72   10/16/17 118/76    Weight from Last 3 Encounters:   17 60.1 kg (132 lb 8 oz)   17 58.2 kg (128 lb 4.8 oz)   17 59 kg (130 lb)              We Performed the Following     [facility] PROGRESS NOTES REPORT     THERAPEUTIC EXERCISES        Primary Care Provider Office Phone # Fax #    Sony Medina Veelz [phone] [address] [facility] [address]        Equal Access to Services     [facility]: Hadii aad ku hadasho Soomaali, waaxda luqadaha, qaybta kaalmada adeegyada, waxanna lund . So [facility] [phone].    ATENCIÓN: Si habla español, tiene a adams disposición servicios gratuitos de asistencia lingüística. [facility] [phone].    We comply with applicable federal civil rights laws and [place] laws. We do not discriminate on the basis of race, color, national origin, age, disability, sex, sexual orientation, or gender identity.            Thank you!     Thank you for choosing [facility]   for your care. Our goal is always to provide you with excellent care. Hearing back from our patients is one way we can continue to improve our services. Please take a few minutes to complete the written survey that you may receive in the mail after your visit with us. Thank you!             Your Updated Medication List - Protect others around you: Learn how to safely use, store and throw away your medicines at www.disposemymeds.org.           This list is accurate as of: 1/2/18  2:26 PM.  Always use your most recent med list.                   Brand Name Dispense Instructions for use Diagnosis    carbidopa-levodopa  MG per tablet    SINEMET    90 tablet    Take 1 tablet by mouth At Bedtime    Restless legs syndrome (RLS)       celecoxib 200 MG capsule    celeBREX    60 capsule    Take 1 capsule (200 mg) by mouth 2 times daily as needed for moderate pain    Upper back pain, Acute midline low back pain without sciatica       cyclobenzaprine 5 MG tablet    FLEXERIL    42 tablet    TAKE 1 TABLET(5 MG) BY MOUTH THREE TIMES DAILY AS NEEDED FOR MUSCLE SPASMS    Acute midline low back pain without sciatica, Upper back pain       finasteride 5 MG tablet    PROSCAR    90 tablet    Take 1 tablet (5 mg) by mouth daily    Benign prostatic hyperplasia with urinary frequency       gabapentin 300 MG capsule    NEURONTIN    60 capsule    TAKE 2 CAPSULES BY MOUTH EVERY NIGHT AT BEDTIME    Restless leg syndrome       HYDROcodone-acetaminophen 7.5-325 MG per tablet   Start taking on:  12/21/2018    NORCO    60 tablet    Take 1 tablet by mouth every 4 hours as needed for pain maximum  4  tablet(s) per day    Rotator cuff syndrome, right, Chronic pain syndrome       mirtazapine 15 MG tablet    REMERON    30 tablet    Take 1 tablet (15 mg) by mouth At Bedtime    Primary insomnia       senna-docusate 8.6-50 MG per tablet    SENOKOT-S;PERICOLACE    100 tablet    Take 1-2 tablets by mouth 2 times daily as needed for constipation    Drug-induced constipation       simvastatin 20 MG tablet    ZOCOR    30 tablet    TAKE 1 TABLET BY MOUTH EVERY DAY AT BEDTIME    Hyperlipidemia LDL goal <100, Pure hypercholesterolemia       tamsulosin 0.4 MG capsule    FLOMAX    90 capsule    Take 1 capsule (0.4 mg) by mouth daily    Benign prostatic hyperplasia with urinary frequency

## 2018-01-02 NOTE — PROGRESS NOTES
Subjective:  HPI                    Objective:  System    Physical Exam    General     ROS    Assessment/Plan:    PROGRESS  REPORT    Progress reporting period is from 12/12/17 to 1/2/18.       SUBJECTIVE  Subjective changes noted by patient: Patient reports his shoulder has been doing well, but has noticed some increased pain in his elbow the past few days. He reports that he has continued to wear the sling up to this point    Current Pain level: 3/10.      Initial Pain level: 4/10.   Changes in function:  Yes (See Goal flowsheet attached for changes in current functional level)  Adverse reaction to treatment or activity: None    OBJECTIVE  Changes noted in objective findings:  Right shoulder PROM scaption 130, ER 30 without any resistance to motion at this time. Mild stiffness with elbow extension to neutral position     ASSESSMENT/PLAN  Updated problem list and treatment plan: Diagnosis 1:  S/p right rotator cuff repair      Pain -  self management, education, directional preference exercise and home program  Decreased ROM/flexibility - manual therapy, therapeutic exercise and home program  Decreased strength - therapeutic exercise, therapeutic activities and home program  Impaired muscle performance - neuro re-education and home program  Decreased function - therapeutic activities and home program  STG/LTGs have been met or progress has been made towards goals:  Yes (See Goal flow sheet completed today.)  Assessment of Progress: The patient's condition is improving.  Patient is meeting short term goals and is progressing towards long term goals.  Self Management Plans:  Patient has been instructed in a home treatment program.  Patient  has been instructed in self management of symptoms.  I have re-evaluated this patient and find that the nature, scope, duration and intensity of the therapy is appropriate for the medical condition of the patient.  Giovanni continues to require the following intervention to meet  STG and LTG's:  PT    Recommendations:  This patient would benefit from continued therapy.     Frequency:  2 X week, once daily  Duration:  for 4 weeks tapering to 1 X a week over 4 weeks          Please refer to the daily flowsheet for treatment today, total treatment time and time spent performing 1:1 timed codes.

## 2018-01-08 ENCOUNTER — OFFICE VISIT (OUTPATIENT)
Dept: ORTHOPEDICS | Facility: CLINIC | Age: 53
End: 2018-01-08
Payer: COMMERCIAL

## 2018-01-08 VITALS — WEIGHT: 143 LBS | HEIGHT: 63 IN | RESPIRATION RATE: 16 BRPM | BODY MASS INDEX: 25.34 KG/M2

## 2018-01-08 DIAGNOSIS — Z98.890 S/P ROTATOR CUFF REPAIR: Primary | ICD-10-CM

## 2018-01-08 DIAGNOSIS — M62.838 MUSCLE SPASM OF RIGHT SHOULDER: ICD-10-CM

## 2018-01-08 PROCEDURE — 99024 POSTOP FOLLOW-UP VISIT: CPT | Performed by: ORTHOPAEDIC SURGERY

## 2018-01-08 RX ORDER — METHOCARBAMOL 500 MG/1
1000 TABLET, FILM COATED ORAL 3 TIMES DAILY PRN
Qty: 60 TABLET | Refills: 1 | Status: SHIPPED | OUTPATIENT
Start: 2018-01-08 | End: 2018-03-02

## 2018-01-08 ASSESSMENT — PAIN SCALES - GENERAL: PAINLEVEL: MODERATE PAIN (5)

## 2018-01-08 NOTE — MR AVS SNAPSHOT
After Visit Summary   1/8/2018    Giovanni Cedeno    MRN: [id number]           Patient Information     Date Of Birth          1965        Visit Information        Provider Department      1/8/2018 1:00 PM Jose G Blanchard MD [facility] [place]        Today's Diagnoses     S/P rotator cuff repair    -  1    Muscle spasm of right shoulder          Care Instructions    Please remember to call and schedule a follow up appointment if one was recommended at your earliest convenience.  Orthopedics CLINIC HOURS TELEPHONE NUMBER   Dr. Lo Levy  Certified Medical Assistant   Monday & Wednesday   8am - 5pm  Thursday 1pm - 5pm  Friday 8am -11:30am Specialty schedulers:   [phone] to schedule your surgery.  Main Clinic:   [phone] to make an appointment with any provider.    Urgent Care locations:    [facility] Monday-Friday Closed  Saturday-Sunday 9am-5pm      Monday-Friday 12pm - 8pm  Saturday-Sunday 9am-5pm [phone]     If SURGERY has been recommended, please call our Specialty Schedulers at [phone] to schedule your procedure.    If you need a medication refill, please contact your pharmacy. Please allow 3 business days for your refill to be completed.    If an MRI or CT scan has been recommended, please call [place] [facility] Schedulers at [phone] to schedule your appointment.  Use [employer] (secure e-mail communication and access to your chart) to send a message or to make an appointment. Please ask how you can sign up for nap- [employer].  Your care team's suggested websites for health information:   [email or web address] : Up to date and easily searchable information on multiple topics.   [email or web address] : [place] dept of heatl, public health issues in [place], N1N1              Follow-ups after your visit        Additional Services     [facility] PT, HAND, AND CHIROPRACTIC REFERRAL       **This order will  print in the [facility] Scheduling Office**    Physical Therapy, Hand Therapy and Chiropractic Care are available through:    *[facility]  *[facility]  *[facility]    Call one number to schedule at any of the above locations: [phone].    Your provider has referred you to: Physical Therapy at [facility] or [facility]    Indication/Reason for Referral: Right shoulder rotator cuff repair (M); distal clavicle resection, subacromial decompression, labral debridement   Onset of Illness: 11/28/17  Therapy Orders: Per Protocol or Clinical Pathway  Special Programs: None  Special Request: None    Demetrius Romo      Additional Comments for the Therapist or Chiropractor: medium protocol    Please be aware that coverage of these services is subject to the terms and limitations of your health insurance plan.  Call member services at your health plan with any benefit or coverage questions.      Please bring the following to your appointment:    *Your personal calendar for scheduling future appointments  *Comfortable clothing                  Follow-up notes from your care team     Return in about 6 weeks (around 2/19/2018) for Postop Visit, clinical recheck.      Your next 10 appointments already scheduled     Jan 09, 2018  8:40 AM CST   SHERIF Extremity with Juan Antonio Pastor PT   [facility] [place] [facility] ([facility] [place])    [address]  [address]  [place] [address]   [phone]            Jan 16, 2018 10:00 AM CST   SHERIF Extremity with Juan Antonio Pastor PT   [facility] [place] [facility] ([facility] [place])    [address]  [address]  [place] [address]   [phone]            Jan 18, 2018 12:30 PM CST   SHERIF Extremity with Luc Rose PTA   [facility] [place] [facility] ([facility] [place])    [address]  [address]  [place] [address]   [phone]            Jan 23, 2018 10:00 AM CST   SHERIF Extremity  with Juan Antonio Pastor PT   [facility] [place] [facility] ([facility] [place])    [address]  [address]  [place] [address]   [phone]            Jan 30, 2018 10:00 AM CST   SHERIF Extremity with Juan Antonio Pastor PT   [facility] [place] [facility] ([facility] [place])    [address]  [address]  [place] [address]   [phone]            Feb 01, 2018 12:30 PM CST   SHERIF Extremity with Luc Rose PTA   [facility] [place] [facility] ([facility] [place])    [address]  [address]  [place] [address]   [phone]            Feb 06, 2018 10:00 AM CST   SHERIF Extremity with Juan Antonio Pastor PT   [facility] [place] [facility] ([facility] [place])    [address]  [address]  [place] [address]   [phone]            Feb 08, 2018 12:30 PM CST   SHERIF Extremity with Luc Rose PTA   [facility] [place] [facility] ([facility] [place])    [address]  [address]  [place] [address]   [phone]            Feb 19, 2018 10:00 AM CST   Return Visit with Jose G Blanchard MD   [facility] [place] ([facility] [place])    [id number] [place] [address]  [address]  [place] [address]   [phone]              Who to contact     If you have questions or need follow up information about today's clinic visit or your schedule please contact [facility] [place] directly at [phone].  Normal or non-critical lab and imaging results will be communicated to you by MyChart, letter or phone within 4 business days after the clinic has received the results. If you do not hear from us within 7 days, please contact the clinic through MyChart or phone. If you have a critical or abnormal lab result, we will notify you by phone as soon as possible.  Submit refill requests through [employer] or call your pharmacy and they will forward the refill request to us. Please  "allow 3 business days for your refill to be completed.          Additional Information About Your Visit        MyChart Information     [employer] lets you send messages to your doctor, view your test results, renew your prescriptions, schedule appointments and more. To sign up, go to [email or web address] . Click on \"Log in\" on the left side of the screen, which will take you to the Welcome page. Then click on \"Sign up Now\" on the right side of the page.     You will be asked to enter the access code listed below, as well as some personal information. Please follow the directions to create your username and password.     Your access code is: [email or web address]  Expires: 2018 11:16 AM     Your access code will  in 90 days. If you need help or a new code, please call your [facility] clinic or [phone].        Care EveryWhere ID     This is your Care EveryWhere ID. This could be used by other organizations to access your [facility] medical records  [id number]        Your Vitals Were     Respirations Height BMI (Body Mass Index)             16 5' 2.5\" (1.588 m) 25.74 kg/m2          Blood Pressure from Last 3 Encounters:   17 122/78   17 116/72   10/16/17 118/76    Weight from Last 3 Encounters:   18 143 lb (64.9 kg)   17 132 lb 8 oz (60.1 kg)   17 128 lb 4.8 oz (58.2 kg)              We Performed the Following     [facility] PT, HAND, AND CHIROPRACTIC REFERRAL          Today's Medication Changes          These changes are accurate as of: 18  3:13 PM.  If you have any questions, ask your nurse or doctor.               Start taking these medicines.        Dose/Directions    methocarbamol 500 MG tablet   Commonly known as:  ROBAXIN   Used for:  S/P rotator cuff repair, Muscle spasm of right shoulder   Started by:  Jose G Blanchard MD        Dose:  1000 mg   Take 2 tablets (1,000 mg) by mouth 3 times daily as needed for muscle spasms   Quantity:  60 tablet   Refills:  1            Where to " get your medicines      These medications were sent to [id number] - [place], [place] - [address] [facility] [address]  [address], [place] [address]    Hours:  24-hours Phone:  [phone]     methocarbamol 500 MG tablet                Primary Care Provider Office Phone # Fax #    Sony Medina Velez [phone] [address] [facility] [address]        Equal Access to Services     [facility]: Hadii aad ku hadasho Soomaali, waaxda luqadaha, qaybta kaalmada adeegyada, waxay idiin hayaan adeeg kharagiorgi lacarolyn . So [facility] [phone].    ATENCIÓN: Si habla español, tiene a adams disposición servicios gratuitos de asistencia lingüística. [facility] [phone].    We comply with applicable federal civil rights laws and [place] laws. We do not discriminate on the basis of race, color, national origin, age, disability, sex, sexual orientation, or gender identity.            Thank you!     Thank you for choosing [facility]  for your care. Our goal is always to provide you with excellent care. Hearing back from our patients is one way we can continue to improve our services. Please take a few minutes to complete the written survey that you may receive in the mail after your visit with us. Thank you!             Your Updated Medication List - Protect others around you: Learn how to safely use, store and throw away your medicines at www.disposemymeds.org.          This list is accurate as of: 1/8/18  3:13 PM.  Always use your most recent med list.                   Brand Name Dispense Instructions for use Diagnosis    carbidopa-levodopa  MG per tablet    SINEMET    90 tablet    Take 1 tablet by mouth At Bedtime    Restless legs syndrome (RLS)       celecoxib 200 MG capsule    celeBREX    60 capsule    Take 1 capsule (200 mg) by mouth 2 times daily as needed for moderate pain    Upper back pain,  Acute midline low back pain without sciatica       cyclobenzaprine 5 MG tablet    FLEXERIL    42 tablet    TAKE 1 TABLET(5 MG) BY MOUTH THREE TIMES DAILY AS NEEDED FOR MUSCLE SPASMS    Acute midline low back pain without sciatica, Upper back pain       finasteride 5 MG tablet    PROSCAR    90 tablet    Take 1 tablet (5 mg) by mouth daily    Benign prostatic hyperplasia with urinary frequency       gabapentin 300 MG capsule    NEURONTIN    60 capsule    TAKE 2 CAPSULES BY MOUTH EVERY NIGHT AT BEDTIME    Restless leg syndrome       HYDROcodone-acetaminophen 7.5-325 MG per tablet   Start taking on:  12/21/2018    NORCO    60 tablet    Take 1 tablet by mouth every 4 hours as needed for pain maximum  4  tablet(s) per day    Rotator cuff syndrome, right, Chronic pain syndrome       methocarbamol 500 MG tablet    ROBAXIN    60 tablet    Take 2 tablets (1,000 mg) by mouth 3 times daily as needed for muscle spasms    S/P rotator cuff repair, Muscle spasm of right shoulder       mirtazapine 15 MG tablet    REMERON    30 tablet    Take 1 tablet (15 mg) by mouth At Bedtime    Primary insomnia       senna-docusate 8.6-50 MG per tablet    SENOKOT-S;PERICOLACE    100 tablet    Take 1-2 tablets by mouth 2 times daily as needed for constipation    Drug-induced constipation       simvastatin 20 MG tablet    ZOCOR    30 tablet    TAKE 1 TABLET BY MOUTH EVERY DAY AT BEDTIME    Hyperlipidemia LDL goal <100, Pure hypercholesterolemia       tamsulosin 0.4 MG capsule    FLOMAX    90 capsule    Take 1 capsule (0.4 mg) by mouth daily    Benign prostatic hyperplasia with urinary frequency

## 2018-01-08 NOTE — LETTER
1/8/2018         RE: Giovanni Cedeno  [address]  [place] [address]        Dear Colleague,    Thank you for referring your patient, Giovanni Cedeno, to the [facility]. Please see a copy of my visit note below.    chief complaint:   Chief Complaint   Patient presents with     Surgical Followup     Righ shoulder scope, medium cuff repair. DOS: 11/28/17. 6 wks p/o. Doing well, going to PT 1x/week. Has some posterior muscle pain.       SURGERY: right shoulder arthroscopy.  1. Right shoulder arthroscopic rotator cuff repair, medium repair, double row technique  2. Right shoulder arthroscopic distal clavical resection  3. Right shoulder arthroscopic labral debridement  4. Right shoulder arthroscopic subacromial decompression with partial acromioplasty.    DATE OF SURGERY: 11/28/2017.    HISTORY OF PRESENT ILLNESS:  Giovanni Cedeno is a 52 year old male seen for postoperative evaluation of a right shoulder arthroscopy and rotator cuff repair, medium repair, distal clavical resection, labral debridement, subacromial decompression with partial acromioplasty for right shoulder rotator cuff tear, impingement, acromialclavicular arthritis, type 1 SLAP tear. Surgery occurred 6 weeks ago. He returns today doing well. His pain is moderate, rated a 5/10. Stopped wearing the sling last week. He has been going to physical therapy 1x weekly. Patient reports occasional posterior muscle pain. Presents his friend, Vinh.    Occasionally will take flexeril for low back pain.    Findings:   Type 1 SLAP tear and anterior labral fraying. Longitudinal striation of the subscapularis without full-thickness tear. Proximal biceps intact with minimal tendinosis with some flattening without any subluxation. Mild glenohumeral chondromalacia. Full-thickness tear of the anterior half of the supraspinatus with retraction to medial to the articular margin. Thickened subacromial bursa. Anterior  inferior acromial spur and acromialclavicular arthritis.    Past medical history:   Past Medical History:   Diagnosis Date     Arthritis      Patient Active Problem List    Diagnosis Date Noted     Major depression in partial remission (H) 11/24/2013     Priority: High     Pre-diabetes 11/24/2013     Priority: High     Acute pain of right shoulder 12/12/2017     Priority: Medium     S/P right rotator cuff repair 12/12/2017     Priority: Medium     Abdominal pain, left upper quadrant 05/23/2017     Priority: Medium     Pain of toe of right foot 05/23/2017     Priority: Medium     Acute midline low back pain without sciatica 05/23/2017     Priority: Medium     Upper back pain 05/23/2017     Priority: Medium     Chronic right shoulder pain 11/10/2016     Priority: Medium     Chronic pain syndrome 07/09/2015     Priority: Medium     Patient is followed by ANIKA REYES for ongoing prescription of pain medication.  All refills should be approved by this provider, or covering partner.    Medication(s): Norco 7.5mg po three times daily 3.   Maximum quantity per month: 90  Clinic visit frequency required: Q 3 months     Controlled substance agreement on file: Yes       Date(s): 07/09/2015    Pain Clinic evaluation in the past: No       Location(s):  none     DIRE Total Score(s):    7/9/2015   Total Score 20       Last [facility] website verification:  11/17/17 no concerns    [email or web address]         Immunization reaction 01/15/2015     Priority: Medium     Epicondylitis, lateral humeral 11/26/2013     Priority: Medium     Restless leg syndrome 05/08/2013     Priority: Medium     Knee bursitis 05/08/2013     Priority: Medium      right posterior medial hamstring insertion site       Pure hypercholesterolemia 05/08/2013     Priority: Medium       Past surgical history: History reviewed. No pertinent surgical history.  Medications:   Current Outpatient Prescriptions:      carbidopa-levodopa (SINEMET)  MG  "per tablet, Take 1 tablet by mouth At Bedtime, Disp: 90 tablet, Rfl: 5     mirtazapine (REMERON) 15 MG tablet, Take 1 tablet (15 mg) by mouth At Bedtime, Disp: 30 tablet, Rfl: 4     simvastatin (ZOCOR) 20 MG tablet, TAKE 1 TABLET BY MOUTH EVERY DAY AT BEDTIME, Disp: 30 tablet, Rfl: 11     finasteride (PROSCAR) 5 MG tablet, Take 1 tablet (5 mg) by mouth daily, Disp: 90 tablet, Rfl: 1     tamsulosin (FLOMAX) 0.4 MG capsule, Take 1 capsule (0.4 mg) by mouth daily, Disp: 90 capsule, Rfl: 3     celecoxib (CELEBREX) 200 MG capsule, Take 1 capsule (200 mg) by mouth 2 times daily as needed for moderate pain, Disp: 60 capsule, Rfl: 1     gabapentin (NEURONTIN) 300 MG capsule, TAKE 2 CAPSULES BY MOUTH EVERY NIGHT AT BEDTIME, Disp: 60 capsule, Rfl: 0     cyclobenzaprine (FLEXERIL) 5 MG tablet, TAKE 1 TABLET(5 MG) BY MOUTH THREE TIMES DAILY AS NEEDED FOR MUSCLE SPASMS, Disp: 42 tablet, Rfl: 0     senna-docusate (SENOKOT-S;PERICOLACE) 8.6-50 MG per tablet, Take 1-2 tablets by mouth 2 times daily as needed for constipation, Disp: 100 tablet, Rfl: 11     [START ON 12/21/2018] HYDROcodone-acetaminophen (NORCO) 7.5-325 MG per tablet, Take 1 tablet by mouth every 4 hours as needed for pain maximum  4  tablet(s) per day, Disp: 60 tablet, Rfl: 0    Allergies: No Known Allergies    REVIEW OF SYSTEMS:   CONSTITUTIONAL:NEGATIVE for fever, chills, night sweats  INTEGUMENTARY/SKIN: NEGATIVE for worrisome wound problems or redness.  MUSCULOSKELETAL:See HPI above  NEURO: NEGATIVE for weakness, dizziness or paresthesias    This document serves as a record of the services and decisions personally performed and made by Jose G Blanchard MD. It was created on his behalf by Merced Moran, a trained medical scribe. The creation of this document is based the provider's statements to the medical scribe.    Scribe Merced Moran 1:12 PM 1/8/2018     PHYSICAL EXAM:  Resp 16  Ht 1.588 m (5' 2.5\")  Wt 64.9 kg (143 lb)  BMI 25.74 kg/m2   GENERAL APPEARANCE: " healthy, alert, no distress; accompanied by friend   SKIN: no suspicious lesions or rashes  NEURO: Normal strength and tone, mentation intact and speech normal  PSYCH:  mentation appears normal and affect normal, not anxious  RESPIRATORY: No increased work of breathing.    right SHOULDER:  Shoulder Inspection: no swelling. No abnormal skin discoloration.  Incisions: healed well.  Tender: no tenderness over incisions, there is tender to palpation over the rhomboids and paraspinals tenderness  Range of Motion:    Passive: ff 150, external rotation 50 degrees.  Strength: not assessed.    X-RAY: none indicated.      Impression: Giovanni Cedeno is a 52 year old male 6 weeks status post right shoulder arthroscopy and rotator cuff repair, medium repair, distal clavical resection, labral debridement, subacromial decompression with partial acromioplasty, doing well.     Plan: routine postoperative shoulder arthroscopy medium protocol  * WB status: no lifting, pushing, pulling or reaching.  * Activity: non-weightbearing of right upper extremity.  * Immobilization: discontinue sling.  * OK sitting in pool or hot tub.  * Rest.  * Ice twice daily to three times daily.  * Refill: refilled methocarbamol today.  * Physical Therapy for active-assisted range of motion, per medium repair protocol.  * Tylenol as needed for pain  * return to clinic 6 weeks, sooner as needed.    The information in this document, created by a scribe for me, accurately reflects the services I personally performed and the decisions made by me. I have reviewed and approved this document for accuracy.      Jose G Blanchard M.D., M.S.  Dept. of Orthopaedic Surgery  [facility]      Again, thank you for allowing me to participate in the care of your patient.        Sincerely,        Jose G Blanchard MD

## 2018-01-08 NOTE — NURSING NOTE
"Chief Complaint   Patient presents with     Surgical Followup     Righ shoulder scope, medium cuff repair. DOS: 11/28/17. 6 wks p/o. Doing well, going to PT 1x/week. Has some posterior muscle pain.       Initial Resp 16  Ht 5' 2.5\" (1.588 m)  Wt 143 lb (64.9 kg)  BMI 25.74 kg/m2 Estimated body mass index is 25.74 kg/(m^2) as calculated from the following:    Height as of this encounter: 5' 2.5\" (1.588 m).    Weight as of this encounter: 143 lb (64.9 kg).  Medication Reconciliation: complete   Ammon Daniel PA-C, CAQ (Ortho)  Supervising Physician: Jose G Blanchard M.D., M.S.  Dept. of Orthopaedic Surgery  [facility]      "

## 2018-01-08 NOTE — PROGRESS NOTES
chief complaint:   Chief Complaint   Patient presents with     Surgical Followup     [facility] shoulder scope, medium cuff repair. DOS: 11/28/17. 6 wks p/o. Doing well, going to PT 1x/week. Has some posterior muscle pain.       SURGERY: right shoulder arthroscopy.  1. Right shoulder arthroscopic rotator cuff repair, medium repair, double row technique  2. Right shoulder arthroscopic distal clavical resection  3. Right shoulder arthroscopic labral debridement  4. Right shoulder arthroscopic subacromial decompression with partial acromioplasty.    DATE OF SURGERY: 11/28/2017.    HISTORY OF PRESENT ILLNESS:  Giovanni Cedeno is a 52 year old male seen for postoperative evaluation of a right shoulder arthroscopy and rotator cuff repair, medium repair, distal clavical resection, labral debridement, subacromial decompression with partial acromioplasty for right shoulder rotator cuff tear, impingement, acromialclavicular arthritis, type 1 SLAP tear. Surgery occurred 6 weeks ago. He returns today doing well. His pain is moderate, rated a 5/10. Stopped wearing the sling last week. He has been going to physical therapy 1x weekly. Patient reports occasional posterior muscle pain. Presents his friend, Vinh.    Occasionally will take flexeril for low back pain.    Findings:   Type 1 SLAP tear and anterior labral fraying. Longitudinal striation of the subscapularis without full-thickness tear. Proximal biceps intact with minimal tendinosis with some flattening without any subluxation. Mild glenohumeral chondromalacia. Full-thickness tear of the anterior half of the supraspinatus with retraction to medial to the articular margin. Thickened subacromial bursa. Anterior inferior acromial spur and acromialclavicular arthritis.    Past medical history:   Past Medical History:   Diagnosis Date     Arthritis      Patient Active Problem List    Diagnosis Date Noted     Major depression in partial remission (H) 11/24/2013     Priority: High      Pre-diabetes 11/24/2013     Priority: High     Acute pain of right shoulder 12/12/2017     Priority: Medium     S/P right rotator cuff repair 12/12/2017     Priority: Medium     Abdominal pain, left upper quadrant 05/23/2017     Priority: Medium     Pain of toe of right foot 05/23/2017     Priority: Medium     Acute midline low back pain without sciatica 05/23/2017     Priority: Medium     Upper back pain 05/23/2017     Priority: Medium     Chronic right shoulder pain 11/10/2016     Priority: Medium     Chronic pain syndrome 07/09/2015     Priority: Medium     Patient is followed by ANIKA REYES for ongoing prescription of pain medication.  All refills should be approved by this provider, or covering partner.    Medication(s): Norco 7.5mg po three times daily 3.   Maximum quantity per month: 90  Clinic visit frequency required: Q 3 months     Controlled substance agreement on file: Yes       Date(s): 07/09/2015    Pain Clinic evaluation in the past: No       Location(s):  none     DIRE Total Score(s):    7/9/2015   Total Score 20       Last [facility] website verification:  11/17/17 no concerns    [email or web address]         Immunization reaction 01/15/2015     Priority: Medium     Epicondylitis, lateral humeral 11/26/2013     Priority: Medium     Restless leg syndrome 05/08/2013     Priority: Medium     Knee bursitis 05/08/2013     Priority: Medium      right posterior medial hamstring insertion site       Pure hypercholesterolemia 05/08/2013     Priority: Medium       Past surgical history: History reviewed. No pertinent surgical history.  Medications:   Current Outpatient Prescriptions:      carbidopa-levodopa (SINEMET)  MG per tablet, Take 1 tablet by mouth At Bedtime, Disp: 90 tablet, Rfl: 5     mirtazapine (REMERON) 15 MG tablet, Take 1 tablet (15 mg) by mouth At Bedtime, Disp: 30 tablet, Rfl: 4     simvastatin (ZOCOR) 20 MG tablet, TAKE 1 TABLET BY MOUTH EVERY DAY AT BEDTIME, Disp: 30  "tablet, Rfl: 11     finasteride (PROSCAR) 5 MG tablet, Take 1 tablet (5 mg) by mouth daily, Disp: 90 tablet, Rfl: 1     tamsulosin (FLOMAX) 0.4 MG capsule, Take 1 capsule (0.4 mg) by mouth daily, Disp: 90 capsule, Rfl: 3     celecoxib (CELEBREX) 200 MG capsule, Take 1 capsule (200 mg) by mouth 2 times daily as needed for moderate pain, Disp: 60 capsule, Rfl: 1     gabapentin (NEURONTIN) 300 MG capsule, TAKE 2 CAPSULES BY MOUTH EVERY NIGHT AT BEDTIME, Disp: 60 capsule, Rfl: 0     cyclobenzaprine (FLEXERIL) 5 MG tablet, TAKE 1 TABLET(5 MG) BY MOUTH THREE TIMES DAILY AS NEEDED FOR MUSCLE SPASMS, Disp: 42 tablet, Rfl: 0     senna-docusate (SENOKOT-S;PERICOLACE) 8.6-50 MG per tablet, Take 1-2 tablets by mouth 2 times daily as needed for constipation, Disp: 100 tablet, Rfl: 11     [START ON 12/21/2018] HYDROcodone-acetaminophen (NORCO) 7.5-325 MG per tablet, Take 1 tablet by mouth every 4 hours as needed for pain maximum  4  tablet(s) per day, Disp: 60 tablet, Rfl: 0    Allergies: No Known Allergies    REVIEW OF SYSTEMS:   CONSTITUTIONAL:NEGATIVE for fever, chills, night sweats  INTEGUMENTARY/SKIN: NEGATIVE for worrisome wound problems or redness.  MUSCULOSKELETAL:See HPI above  NEURO: NEGATIVE for weakness, dizziness or paresthesias    This document serves as a record of the services and decisions personally performed and made by Jose G Blanchard MD. It was created on his behalf by Merced Moran, a trained medical scribe. The creation of this document is based the provider's statements to the medical scribe.    Philip Moran 1:12 PM 1/8/2018     PHYSICAL EXAM:  Resp 16  Ht 1.588 m (5' 2.5\")  Wt 64.9 kg (143 lb)  BMI 25.74 kg/m2   GENERAL APPEARANCE: healthy, alert, no distress; accompanied by friend   SKIN: no suspicious lesions or rashes  NEURO: Normal strength and tone, mentation intact and speech normal  PSYCH:  mentation appears normal and affect normal, not anxious  RESPIRATORY: No increased work of " breathing.    right SHOULDER:  Shoulder Inspection: no swelling. No abnormal skin discoloration.  Incisions: healed well.  Tender: no tenderness over incisions, there is tender to palpation over the rhomboids and paraspinals tenderness  Range of Motion:    Passive: ff 150, external rotation 50 degrees.  Strength: not assessed.    X-RAY: none indicated.      Impression: Giovanni Cedeno is a 52 year old male 6 weeks status post right shoulder arthroscopy and rotator cuff repair, medium repair, distal clavical resection, labral debridement, subacromial decompression with partial acromioplasty, doing well.     Plan: routine postoperative shoulder arthroscopy medium protocol  * WB status: no lifting, pushing, pulling or reaching.  * Activity: non-weightbearing of right upper extremity.  * Immobilization: discontinue sling.  * OK sitting in pool or hot tub.  * Rest.  * Ice twice daily to three times daily.  * Refill: refilled methocarbamol today.  * Physical Therapy for active-assisted range of motion, per medium repair protocol.  * Tylenol as needed for pain  * return to clinic 6 weeks, sooner as needed.    The information in this document, created by a scribe for me, accurately reflects the services I personally performed and the decisions made by me. I have reviewed and approved this document for accuracy.      Jose G Blanchard M.D., M.S.  Dept. of Orthopaedic Surgery  [facility]

## 2018-01-08 NOTE — PATIENT INSTRUCTIONS
Please remember to call and schedule a follow up appointment if one was recommended at your earliest convenience.  Orthopedics CLINIC HOURS TELEPHONE NUMBER   Dr. Lo Levy  Certified Medical Assistant   Monday & Wednesday   8am - 5pm  Thursday 1pm - 5pm  Friday 8am -11:30am Specialty schedulers:   [phone] to schedule your surgery.  Main Clinic:   [phone] to make an appointment with any provider.    Urgent Care locations:    [facility] Monday-Friday Closed  Saturday-Sunday 9am-5pm      Monday-Friday 12pm - 8pm  Saturday-Sunday 9am-5pm [phone]     If SURGERY has been recommended, please call our Specialty Schedulers at [phone] to schedule your procedure.    If you need a medication refill, please contact your pharmacy. Please allow 3 business days for your refill to be completed.    If an MRI or CT scan has been recommended, please call [facility] Schedulers at [phone] to schedule your appointment.  Use [employer] (secure e-mail communication and access to your chart) to send a message or to make an appointment. Please ask how you can sign up for [employer].  Your care team's suggested websites for health information:   [email or web address] : Up to date and easily searchable information on multiple topics.   [email or web address] : [place] dept of heat, public health issues in [place], N1N1

## 2018-01-09 ENCOUNTER — THERAPY VISIT (OUTPATIENT)
Dept: PHYSICAL THERAPY | Facility: CLINIC | Age: 53
End: 2018-01-09
Payer: COMMERCIAL

## 2018-01-09 DIAGNOSIS — M25.511 ACUTE PAIN OF RIGHT SHOULDER: ICD-10-CM

## 2018-01-09 DIAGNOSIS — Z98.890 S/P RIGHT ROTATOR CUFF REPAIR: ICD-10-CM

## 2018-01-09 PROCEDURE — 97110 THERAPEUTIC EXERCISES: CPT | Mod: GP | Performed by: PHYSICAL THERAPIST

## 2018-01-16 ENCOUNTER — THERAPY VISIT (OUTPATIENT)
Dept: PHYSICAL THERAPY | Facility: CLINIC | Age: 53
End: 2018-01-16
Payer: COMMERCIAL

## 2018-01-16 DIAGNOSIS — M25.511 ACUTE PAIN OF RIGHT SHOULDER: ICD-10-CM

## 2018-01-16 DIAGNOSIS — Z98.890 S/P RIGHT ROTATOR CUFF REPAIR: ICD-10-CM

## 2018-01-16 PROCEDURE — 97110 THERAPEUTIC EXERCISES: CPT | Mod: GP | Performed by: PHYSICAL THERAPIST

## 2018-01-16 PROCEDURE — 97112 NEUROMUSCULAR REEDUCATION: CPT | Mod: GP | Performed by: PHYSICAL THERAPIST

## 2018-01-17 NOTE — TELEPHONE ENCOUNTER
Reason for Call:  Other appointment    Detailed comments: Patient would like to know if you would like him to start physical therapy before or after his appointment to see you on 12-11-17. Call to advise. Thank you.    Phone Number Patient can be reached at: Other phone number: [phone]      Best Time: any    Can we leave a detailed message on this number? YES    Call taken on 12/5/2017 at 1:53 PM by Alyssia Baird       Satisfactory

## 2018-01-23 ENCOUNTER — THERAPY VISIT (OUTPATIENT)
Dept: PHYSICAL THERAPY | Facility: CLINIC | Age: 53
End: 2018-01-23
Payer: COMMERCIAL

## 2018-01-23 DIAGNOSIS — M25.511 ACUTE PAIN OF RIGHT SHOULDER: ICD-10-CM

## 2018-01-23 DIAGNOSIS — Z98.890 S/P RIGHT ROTATOR CUFF REPAIR: ICD-10-CM

## 2018-01-23 PROCEDURE — 97112 NEUROMUSCULAR REEDUCATION: CPT | Mod: GP | Performed by: PHYSICAL THERAPIST

## 2018-01-23 PROCEDURE — 97110 THERAPEUTIC EXERCISES: CPT | Mod: GP | Performed by: PHYSICAL THERAPIST

## 2018-01-30 ENCOUNTER — THERAPY VISIT (OUTPATIENT)
Dept: PHYSICAL THERAPY | Facility: CLINIC | Age: 53
End: 2018-01-30
Payer: COMMERCIAL

## 2018-01-30 DIAGNOSIS — Z98.890 S/P RIGHT ROTATOR CUFF REPAIR: ICD-10-CM

## 2018-01-30 DIAGNOSIS — M25.511 ACUTE PAIN OF RIGHT SHOULDER: ICD-10-CM

## 2018-01-30 PROCEDURE — 97110 THERAPEUTIC EXERCISES: CPT | Mod: GP | Performed by: PHYSICAL THERAPIST

## 2018-01-31 DIAGNOSIS — G25.81 RESTLESS LEG SYNDROME: ICD-10-CM

## 2018-02-01 RX ORDER — GABAPENTIN 300 MG/1
CAPSULE ORAL
Qty: 60 CAPSULE | Refills: 0 | Status: SHIPPED | OUTPATIENT
Start: 2018-02-01 | End: 2018-03-15

## 2018-02-01 NOTE — TELEPHONE ENCOUNTER
gabapentin      Last Written Prescription Date:  12/21/17  Last Fill Quantity: 60,   # refills: 0  Last Office Visit: 12/21/17  Future Office visit:    Next 5 appointments (look out 90 days)     Feb 19, 2018 10:00 AM CST   Return Visit with Jose G Blanchard MD   [facility] [place] ([facility] [place])    [address] [address]  [place] [address]   [phone]                   Routing refill request to provider for review/approval because:  Drug not on the [facility], [facility] or  Health refill protocol or controlled substance

## 2018-02-08 ENCOUNTER — THERAPY VISIT (OUTPATIENT)
Dept: PHYSICAL THERAPY | Facility: CLINIC | Age: 53
End: 2018-02-08
Payer: COMMERCIAL

## 2018-02-08 DIAGNOSIS — Z98.890 S/P RIGHT ROTATOR CUFF REPAIR: ICD-10-CM

## 2018-02-08 DIAGNOSIS — M25.511 ACUTE PAIN OF RIGHT SHOULDER: ICD-10-CM

## 2018-02-08 PROCEDURE — 97110 THERAPEUTIC EXERCISES: CPT | Mod: GP | Performed by: PHYSICAL THERAPY ASSISTANT

## 2018-02-19 ENCOUNTER — OFFICE VISIT (OUTPATIENT)
Dept: ORTHOPEDICS | Facility: CLINIC | Age: 53
End: 2018-02-19
Payer: COMMERCIAL

## 2018-02-19 VITALS — WEIGHT: 142 LBS | BODY MASS INDEX: 25.16 KG/M2 | HEIGHT: 63 IN | RESPIRATION RATE: 14 BRPM

## 2018-02-19 DIAGNOSIS — Z98.890 S/P ROTATOR CUFF REPAIR: Primary | ICD-10-CM

## 2018-02-19 DIAGNOSIS — M25.511 CHRONIC RIGHT SHOULDER PAIN: ICD-10-CM

## 2018-02-19 DIAGNOSIS — G89.29 CHRONIC RIGHT SHOULDER PAIN: ICD-10-CM

## 2018-02-19 PROCEDURE — 99024 POSTOP FOLLOW-UP VISIT: CPT | Performed by: ORTHOPAEDIC SURGERY

## 2018-02-19 ASSESSMENT — PAIN SCALES - GENERAL: PAINLEVEL: MODERATE PAIN (4)

## 2018-02-19 NOTE — MR AVS SNAPSHOT
After Visit Summary   2/19/2018    Giovanni Cedeno    MRN: [id number]           Patient Information     Date Of Birth          1965        Visit Information        Provider Department      2/19/2018 10:00 AM Jose G Blanchard MD [facility] [place]        Today's Diagnoses     S/P rotator cuff repair    -  1    Chronic right shoulder pain          Care Instructions    Please remember to call and schedule a follow up appointment if one was recommended at your earliest convenience.  Orthopedics CLINIC HOURS TELEPHONE NUMBER   Dr. Lo Levy  Certified Medical Assistant   Monday & Wednesday   8am - 5pm  Thursday 1pm - 5pm  Friday 8am -11:30am Specialty schedulers:   [phone] to make an appointment with any Specialty Provider.   Main Clinic:   [phone] to make an appointment with your primary provider   Urgent Care locations:    [facility] Monday-Friday Closed  Saturday-Sunday 9am-5pm      Monday-Friday 12pm - 8pm  Saturday-Sunday 9am-5pm [phone]     If SURGERY has been recommended, please call our Specialty Schedulers at [phone] to schedule your procedure.    If you need a medication refill, please contact your pharmacy. Please allow 3 business days for your refill to be completed.    If an MRI or CT scan has been recommended, please call [facility] Schedulers at [phone] to schedule your appointment.  Use [employer] (secure e-mail communication and access to your chart) to send a message or to make an appointment. Please ask how you can sign up for [employer].  Your care team's suggested websites for health information:   [email or web address] : Up to date and easily searchable information on multiple topics.   [email or web address] : [place] dept of heat, public health issues in [place], N1N1              Follow-ups after your visit        Additional Services     [facility] PT, HAND, AND  CHIROPRACTIC REFERRAL       === This order will print in the [facility] Scheduling  Office ===    Physical therapy, hand therapy and chiropractic care are available through:    [facility]  [facility]    Call one easy number to schedule at any of the above locations:  [phone].    Your provider has referred you to Physical Therapy at [facility] or [facility]    Indication/Reason for Referral: s/p right shoulder rotator cuff repair, medium   Onset of Illness:  11/28/2017  Therapy Orders:  Evaluate and Treat  Special Programs:  None  Special Request:  None    Additional Comments for the therapist or chiropractor:  Medium repair protocol. Aggressive range of motion. Progressive resistance.    Please be aware that coverage of these services is subject to the terms and limitations of your health insurance plan.  Call member services at your health plan with any benefit or coverage questions.      Please bring the following to your appointment:    >>   Your personal calendar for scheduling future appointments  >>   Comfortable clothing                  Follow-up notes from your care team     Return in about 2 months (around 4/19/2018).      Your next 10 appointments already scheduled     Feb 26, 2018 10:00 AM CST   [facility] Extremity with Luc Rose PTA   [facility] [place] [facility] ([facility] [place])    [address]  [address]  [place] [address]   [phone]            Mar 12, 2018 10:00 AM CDT   SHERIF Extremity with Luc Rose PTA   [facility] [place] [facility] ([facility] [place])    [address]  [address]  [place] [address]   [phone]            Mar 19, 2018 10:00 AM CDT   SHERIF Extremity with Luc Rose PTA   [facility] [place] [facility] ([facility] [place])    [address]  [address]  [place] [address]   [phone]            Mar 26, 2018 10:00 AM CDT   [facility]  Extremity with Luc Rose [facility] [place] [facility] ([facility] [place])    [address]  [address]  [place] [address]   [phone]            Apr 02, 2018 10:00 AM CDT   [facility] Extremity with Luc Rose [facility] [place] [facility] ([facility] [place])    [address]  [address]  [place] [address]   [phone]            Apr 09, 2018 10:00 AM CDT   [facility] Extremity with Luc Rose [facility] [place] [facility] ([facility] [place])    [address]  [address]  [place] [address]   [phone]            Apr 16, 2018 10:00 AM CDT   [facility]ER[facility] Extremity with Luc Rose [facility] [place] [facility] ([facility] [place])    [address]  [address]  [place] [address]   [phone]            Apr 23, 2018 10:00 AM CDT   SHER[facility] Extremity with Luc Rose [facility] [place] [facility] ([facility] [place])    [address]  [address]  [place] [address]   [phone]            Apr 30, 2018 10:00 AM CDT   SHERIF Extremity with Luc Rose [facility] [place] [facility] ([facility] [place])    [address]  [address]  [place] [address]   [phone]              Who to contact     If you have questions or need follow up information about today's clinic visit or your schedule please contact [facility] [place] directly at [phone].  Normal or non-critical lab and imaging results will be communicated to you by MyChart, letter or phone within 4 business days after the clinic has received the results. If you do not hear from us within 7 days, please contact the clinic through MyChart or phone. If you have a critical or abnormal lab result, we will notify you by phone as soon as possible.  Submit refill requests through [employer] or call your pharmacy and they will forward the refill request to  "us. Please allow 3 business days for your refill to be completed.          Additional Information About Your Visit        MyChart Information     [employer] lets you send messages to your doctor, view your test results, renew your prescriptions, schedule appointments and more. To sign up, go to [email or web address] . Click on \"Log in\" on the left side of the screen, which will take you to the Welcome page. Then click on \"Sign up Now\" on the right side of the page.     You will be asked to enter the access code listed below, as well as some personal information. Please follow the directions to create your username and password.     Your access code is: [email or web address]  Expires: 2018 10:38 AM     Your access code will  in 90 days. If you need help or a new code, please call your [facility] clinic or [phone].        Care EveryWhere ID     This is your Care EveryWhere ID. This could be used by other organizations to access your [facility] medical records  [id number]        Your Vitals Were     Respirations Height BMI (Body Mass Index)             14 5' 2.5\" (1.588 m) 25.56 kg/m2          Blood Pressure from Last 3 Encounters:   17 122/78   17 116/72   10/16/17 118/76    Weight from Last 3 Encounters:   18 142 lb (64.4 kg)   18 143 lb (64.9 kg)   17 132 lb 8 oz (60.1 kg)              We Performed the Following     [facility] PT, HAND, AND CHIROPRACTIC REFERRAL        Primary Care Provider Office Phone # Fax #    Sony Medina Velez [phone] [address] [facility] [address]        Equal Access to Services     BRENDA SCHMITT : Kimi Dia, juan dutta, qarad kaalmaisaías paez, sagrario dempsey. [facility] [phone].    ATENCIÓN: Si habla español, tiene a adams disposición servicios gratuitos de asistencia lingüística. Llanselmo al [phone].    We comply with applicable federal civil rights laws and [place] " laws. We do not discriminate on the basis of race, color, national origin, age, disability, sex, sexual orientation, or gender identity.            Thank you!     Thank you for choosing [facility]  for your care. Our goal is always to provide you with excellent care. Hearing back from our patients is one way we can continue to improve our services. Please take a few minutes to complete the written survey that you may receive in the mail after your visit with us. Thank you!             Your Updated Medication List - Protect others around you: Learn how to safely use, store and throw away your medicines at www.disposemymeds.org.          This list is accurate as of 2/19/18  3:34 PM.  Always use your most recent med list.                   Brand Name Dispense Instructions for use Diagnosis    carbidopa-levodopa  MG per tablet    SINEMET    90 tablet    Take 1 tablet by mouth At Bedtime    Restless legs syndrome (RLS)       celecoxib 200 MG capsule    celeBREX    60 capsule    Take 1 capsule (200 mg) by mouth 2 times daily as needed for moderate pain    Upper back pain, Acute midline low back pain without sciatica       cyclobenzaprine 5 MG tablet    FLEXERIL    42 tablet    TAKE 1 TABLET(5 MG) BY MOUTH THREE TIMES DAILY AS NEEDED FOR MUSCLE SPASMS    Acute midline low back pain without sciatica, Upper back pain       finasteride 5 MG tablet    PROSCAR    90 tablet    Take 1 tablet (5 mg) by mouth daily    Benign prostatic hyperplasia with urinary frequency       gabapentin 300 MG capsule    NEURONTIN    60 capsule    TAKE 2 CAPSULES BY MOUTH EVERY NIGHT AT BEDTIME    Restless leg syndrome       HYDROcodone-acetaminophen 7.5-325 MG per tablet   Start taking on:  12/21/2018    NORCO    60 tablet    Take 1 tablet by mouth every 4 hours as needed for pain maximum  4  tablet(s) per day    Rotator cuff syndrome, right, Chronic pain syndrome       methocarbamol 500 MG tablet    ROBAXIN     60 tablet    Take 2 tablets (1,000 mg) by mouth 3 times daily as needed for muscle spasms    S/P rotator cuff repair, Muscle spasm of right shoulder       mirtazapine 15 MG tablet    REMERON    30 tablet    Take 1 tablet (15 mg) by mouth At Bedtime    Primary insomnia       senna-docusate 8.6-50 MG per tablet    SENOKOT-S;PERICOLACE    100 tablet    Take 1-2 tablets by mouth 2 times daily as needed for constipation    Drug-induced constipation       simvastatin 20 MG tablet    ZOCOR    30 tablet    TAKE 1 TABLET BY MOUTH EVERY DAY AT BEDTIME    Hyperlipidemia LDL goal <100, Pure hypercholesterolemia       tamsulosin 0.4 MG capsule    FLOMAX    90 capsule    Take 1 capsule (0.4 mg) by mouth daily    Benign prostatic hyperplasia with urinary frequency

## 2018-02-19 NOTE — PATIENT INSTRUCTIONS
Please remember to call and schedule a follow up appointment if one was recommended at your earliest convenience.  Orthopedics CLINIC HOURS TELEPHONE NUMBER   Dr. Lo Levy  Certified Medical Assistant   Monday & Wednesday   8am - 5pm  Thursday 1pm - 5pm  Friday 8am -11:30am Specialty schedulers:   [phone] to make an appointment with any Specialty Provider.   Main Clinic:   [phone] to make an appointment with your primary provider   Urgent Care locations:    [facility] Monday-Friday Closed  Saturday-Sunday 9am-5pm      Monday-Friday 12pm - 8pm  Saturday-Sunday 9am-5pm [phone]     If SURGERY has been recommended, please call our Specialty Schedulers at [phone] to schedule your procedure.    If you need a medication refill, please contact your pharmacy. Please allow 3 business days for your refill to be completed.    If an MRI or CT scan has been recommended, please call [facility] Schedulers at [phone] to schedule your appointment.  Use [employer] (secure e-mail communication and access to your chart) to send a message or to make an appointment. Please ask how you can sign up for [employer].  Your care team's suggested websites for health information:   Www.[email or web address] : Up to date and easily searchable information on multiple topics.   [email or web address] : [place] dept of heat, public health issues in [place], N1N1

## 2018-02-19 NOTE — NURSING NOTE
"Chief Complaint   Patient presents with     Surgical Followup     [facility] shoulder scope, medium cuff repair. DOS: 11/28/17. 14 wks p/o Patient reports his shoulder has been doing well but has noticed sometimes increased pain in his elbow the past few days.        Initial Resp 14  Ht 1.588 m (5' 2.5\")  Wt 64.4 kg (142 lb)  BMI 25.56 kg/m2 Estimated body mass index is 25.56 kg/(m^2) as calculated from the following:    Height as of this encounter: 1.588 m (5' 2.5\").    Weight as of this encounter: 64.4 kg (142 lb).  Medication Reconciliation: complete   Praveen Mayer MA      "

## 2018-02-19 NOTE — LETTER
2/19/2018         RE: Giovanni Cedeno  [id number]  [place] [address]        Dear Colleague,    Thank you for referring your patient, Giovanni Cedeno, to the [facility]. Please see a copy of my visit note below.    chief complaint:   Chief Complaint   Patient presents with     Surgical Followup     [facility] shoulder scope, medium cuff repair. DOS: 11/28/17. 14 wks p/o Patient reports his shoulder has been doing well but has noticed sometimes increased pain in his elbow the past few days.        SURGERY: right shoulder arthroscopy.  1. Right shoulder arthroscopic rotator cuff repair, medium repair, double row technique  2. Right shoulder arthroscopic distal clavical resection  3. Right shoulder arthroscopic labral debridement  4. Right shoulder arthroscopic subacromial decompression with partial acromioplasty.    DATE OF SURGERY: 11/28/2017.    HISTORY OF PRESENT ILLNESS:  Giovanni Cedeno is a 52 year old male seen for postoperative evaluation of a right shoulder arthroscopy and rotator cuff repair, medium repair, distal clavical resection, labral debridement, subacromial decompression with partial acromioplasty. Surgery occurred 6 weeks ago. Patient returns today stating he is doing ok. He continues to have mild-moderate pain, rated a 4/10. His range of motion has been improving, still some problems with internal rotation. Also has noticed some sharp pain in the elbow these last few days. He notes a pulling sensation in the elbow.    Of note: Occasionally will take flexeril for low back pain.    OR Findings:   Type 1 SLAP tear and anterior labral fraying. Longitudinal striation of the subscapularis without full-thickness tear. Proximal biceps intact with minimal tendinosis with some flattening without any subluxation. Mild glenohumeral chondromalacia. Full-thickness tear of the anterior half of the supraspinatus with retraction to medial to the articular margin.  Thickened subacromial bursa. Anterior inferior acromial spur and acromialclavicular arthritis.    Past medical history:   Past Medical History:   Diagnosis Date     Arthritis      Patient Active Problem List    Diagnosis Date Noted     Major depression in partial remission (H) 11/24/2013     Priority: High     Pre-diabetes 11/24/2013     Priority: High     Acute pain of right shoulder 12/12/2017     Priority: Medium     S/P right rotator cuff repair 12/12/2017     Priority: Medium     Abdominal pain, left upper quadrant 05/23/2017     Priority: Medium     Pain of toe of right foot 05/23/2017     Priority: Medium     Acute midline low back pain without sciatica 05/23/2017     Priority: Medium     Upper back pain 05/23/2017     Priority: Medium     Chronic right shoulder pain 11/10/2016     Priority: Medium     Chronic pain syndrome 07/09/2015     Priority: Medium     Patient is followed by ANIKA REYES for ongoing prescription of pain medication.  All refills should be approved by this provider, or covering partner.    Medication(s): Norco 7.5mg po three times daily 3.   Maximum quantity per month: 90  Clinic visit frequency required: Q 3 months     Controlled substance agreement on file: Yes       Date(s): 07/09/2015    Pain Clinic evaluation in the past: No       Location(s):  none     DIRE Total Score(s):    7/9/2015   Total Score 20       Last [facility] website verification:  11/17/17 no concerns    [email or web address]         Immunization reaction 01/15/2015     Priority: Medium     Epicondylitis, lateral humeral 11/26/2013     Priority: Medium     Restless leg syndrome 05/08/2013     Priority: Medium     Knee bursitis 05/08/2013     Priority: Medium      right posterior medial hamstring insertion site       Pure hypercholesterolemia 05/08/2013     Priority: Medium       Past surgical history: History reviewed. No pertinent surgical history.  Medications:   Current Outpatient Prescriptions:       gabapentin (NEURONTIN) 300 MG capsule, TAKE 2 CAPSULES BY MOUTH EVERY NIGHT AT BEDTIME, Disp: 60 capsule, Rfl: 0     methocarbamol (ROBAXIN) 500 MG tablet, Take 2 tablets (1,000 mg) by mouth 3 times daily as needed for muscle spasms, Disp: 60 tablet, Rfl: 1     carbidopa-levodopa (SINEMET)  MG per tablet, Take 1 tablet by mouth At Bedtime, Disp: 90 tablet, Rfl: 5     mirtazapine (REMERON) 15 MG tablet, Take 1 tablet (15 mg) by mouth At Bedtime, Disp: 30 tablet, Rfl: 4     simvastatin (ZOCOR) 20 MG tablet, TAKE 1 TABLET BY MOUTH EVERY DAY AT BEDTIME, Disp: 30 tablet, Rfl: 11     finasteride (PROSCAR) 5 MG tablet, Take 1 tablet (5 mg) by mouth daily, Disp: 90 tablet, Rfl: 1     tamsulosin (FLOMAX) 0.4 MG capsule, Take 1 capsule (0.4 mg) by mouth daily, Disp: 90 capsule, Rfl: 3     celecoxib (CELEBREX) 200 MG capsule, Take 1 capsule (200 mg) by mouth 2 times daily as needed for moderate pain, Disp: 60 capsule, Rfl: 1     cyclobenzaprine (FLEXERIL) 5 MG tablet, TAKE 1 TABLET(5 MG) BY MOUTH THREE TIMES DAILY AS NEEDED FOR MUSCLE SPASMS, Disp: 42 tablet, Rfl: 0     senna-docusate (SENOKOT-S;PERICOLACE) 8.6-50 MG per tablet, Take 1-2 tablets by mouth 2 times daily as needed for constipation, Disp: 100 tablet, Rfl: 11     [START ON 12/21/2018] HYDROcodone-acetaminophen (NORCO) 7.5-325 MG per tablet, Take 1 tablet by mouth every 4 hours as needed for pain maximum  4  tablet(s) per day, Disp: 60 tablet, Rfl: 0    Allergies: No Known Allergies    REVIEW OF SYSTEMS:   CONSTITUTIONAL:NEGATIVE for fever, chills, night sweats  INTEGUMENTARY/SKIN: NEGATIVE for worrisome wound problems or redness.  MUSCULOSKELETAL:See HPI above  NEURO: NEGATIVE for weakness, dizziness or paresthesias    This document serves as a record of the services and decisions personally performed and made by Jose G Blanchard MD. It was created on his behalf by Merced Moran, a trained medical scribe. The creation of this document is based the provider's  "statements to the medical scribe.    Scribe Merced Moran 10:18 AM 2/19/2018      PHYSICAL EXAM:  Resp 14  Ht 1.588 m (5' 2.5\")  Wt 64.4 kg (142 lb)  BMI 25.56 kg/m2   GENERAL APPEARANCE: healthy, alert, no distress  SKIN: no suspicious lesions or rashes  NEURO: Normal strength and tone, mentation intact and speech normal  PSYCH:  mentation appears normal and affect normal, not anxious  RESPIRATORY: No increased work of breathing.    right SHOULDER:  Shoulder Inspection: no swelling. No abnormal skin discoloration.  Incisions: healed well.  Tender: no tenderness over incisions, there is tender to palpation over the rhomboids and paraspinals tenderness  Range of Motion:    Passive: forward flexion 160, external rotation 60 degrees, internal rotation L2  Strength: forward flexion 4+/5    X-RAY: none indicated.      Impression: Giovanni Cedeno is a 52 year old male 14 weeks status post right shoulder arthroscopy and rotator cuff repair, medium repair, distal clavical resection, labral debridement, subacromial decompression with partial acromioplasty, doing well.     Plan: routine postoperative shoulder arthroscopy medium protocol  * WB status: light weight bearing, per comfort, 1-2 pounds. Nothing away from body or overhead.  * Activity: light-weightbearing of right upper extremity.  * Immobilization: none, work on aggressive range of motion.  * Rest.  * Ice twice daily to three times daily.  * Physical Therapy updated per medium repair protocol.  * Tylenol as needed for pain  * return to clinic 8 weeks, sooner as needed.    The information in this document, created by a scribe for me, accurately reflects the services I personally performed and the decisions made by me. I have reviewed and approved this document for accuracy.      Jose G Blanchard M.D., M.S.  Dept. of Orthopaedic Surgery  [facility]      Again, thank you for allowing me to participate in the care of your patient.  "       Sincerely,        Jose G Blanchard MD

## 2018-02-19 NOTE — PROGRESS NOTES
chief complaint:   Chief Complaint   Patient presents with     Surgical Followup     [facility] shoulder scope, medium cuff repair. DOS: 11/28/17. 14 wks p/o Patient reports his shoulder has been doing well but has noticed sometimes increased pain in his elbow the past few days.        SURGERY: right shoulder arthroscopy.  1. Right shoulder arthroscopic rotator cuff repair, medium repair, double row technique  2. Right shoulder arthroscopic distal clavical resection  3. Right shoulder arthroscopic labral debridement  4. Right shoulder arthroscopic subacromial decompression with partial acromioplasty.    DATE OF SURGERY: 11/28/2017.    HISTORY OF PRESENT ILLNESS:  Giovanni Cedeno is a 52 year old male seen for postoperative evaluation of a right shoulder arthroscopy and rotator cuff repair, medium repair, distal clavical resection, labral debridement, subacromial decompression with partial acromioplasty. Surgery occurred 6 weeks ago. Patient returns today stating he is doing ok. He continues to have mild-moderate pain, rated a 4/10. His range of motion has been improving, still some problems with internal rotation. Also has noticed some sharp pain in the elbow these last few days. He notes a pulling sensation in the elbow.    Of note: Occasionally will take flexeril for low back pain.    OR Findings:   Type 1 SLAP tear and anterior labral fraying. Longitudinal striation of the subscapularis without full-thickness tear. Proximal biceps intact with minimal tendinosis with some flattening without any subluxation. Mild glenohumeral chondromalacia. Full-thickness tear of the anterior half of the supraspinatus with retraction to medial to the articular margin. Thickened subacromial bursa. Anterior inferior acromial spur and acromialclavicular arthritis.    Past medical history:   Past Medical History:   Diagnosis Date     Arthritis      Patient Active Problem List    Diagnosis Date Noted     Major depression in partial  remission (H) 11/24/2013     Priority: High     Pre-diabetes 11/24/2013     Priority: High     Acute pain of right shoulder 12/12/2017     Priority: Medium     S/P right rotator cuff repair 12/12/2017     Priority: Medium     Abdominal pain, left upper quadrant 05/23/2017     Priority: Medium     Pain of toe of right foot 05/23/2017     Priority: Medium     Acute midline low back pain without sciatica 05/23/2017     Priority: Medium     Upper back pain 05/23/2017     Priority: Medium     Chronic right shoulder pain 11/10/2016     Priority: Medium     Chronic pain syndrome 07/09/2015     Priority: Medium     Patient is followed by ANIKA REYES for ongoing prescription of pain medication.  All refills should be approved by this provider, or covering partner.    Medication(s): Norco 7.5mg po three times daily 3.   Maximum quantity per month: 90  Clinic visit frequency required: Q 3 months     Controlled substance agreement on file: Yes       Date(s): 07/09/2015    Pain Clinic evaluation in the past: No       Location(s):  none     DIRE Total Score(s):    7/9/2015   Total Score 20       Last [facility] website verification:  11/17/17 no concerns    [email or web address]         Immunization reaction 01/15/2015     Priority: Medium     Epicondylitis, lateral humeral 11/26/2013     Priority: Medium     Restless leg syndrome 05/08/2013     Priority: Medium     Knee bursitis 05/08/2013     Priority: Medium      right posterior medial hamstring insertion site       Pure hypercholesterolemia 05/08/2013     Priority: Medium       Past surgical history: History reviewed. No pertinent surgical history.  Medications:   Current Outpatient Prescriptions:      gabapentin (NEURONTIN) 300 MG capsule, TAKE 2 CAPSULES BY MOUTH EVERY NIGHT AT BEDTIME, Disp: 60 capsule, Rfl: 0     methocarbamol (ROBAXIN) 500 MG tablet, Take 2 tablets (1,000 mg) by mouth 3 times daily as needed for muscle spasms, Disp: 60 tablet, Rfl: 1      "carbidopa-levodopa (SINEMET)  MG per tablet, Take 1 tablet by mouth At Bedtime, Disp: 90 tablet, Rfl: 5     mirtazapine (REMERON) 15 MG tablet, Take 1 tablet (15 mg) by mouth At Bedtime, Disp: 30 tablet, Rfl: 4     simvastatin (ZOCOR) 20 MG tablet, TAKE 1 TABLET BY MOUTH EVERY DAY AT BEDTIME, Disp: 30 tablet, Rfl: 11     finasteride (PROSCAR) 5 MG tablet, Take 1 tablet (5 mg) by mouth daily, Disp: 90 tablet, Rfl: 1     tamsulosin (FLOMAX) 0.4 MG capsule, Take 1 capsule (0.4 mg) by mouth daily, Disp: 90 capsule, Rfl: 3     celecoxib (CELEBREX) 200 MG capsule, Take 1 capsule (200 mg) by mouth 2 times daily as needed for moderate pain, Disp: 60 capsule, Rfl: 1     cyclobenzaprine (FLEXERIL) 5 MG tablet, TAKE 1 TABLET(5 MG) BY MOUTH THREE TIMES DAILY AS NEEDED FOR MUSCLE SPASMS, Disp: 42 tablet, Rfl: 0     senna-docusate (SENOKOT-S;PERICOLACE) 8.6-50 MG per tablet, Take 1-2 tablets by mouth 2 times daily as needed for constipation, Disp: 100 tablet, Rfl: 11     [START ON 12/21/2018] HYDROcodone-acetaminophen (NORCO) 7.5-325 MG per tablet, Take 1 tablet by mouth every 4 hours as needed for pain maximum  4  tablet(s) per day, Disp: 60 tablet, Rfl: 0    Allergies: No Known Allergies    REVIEW OF SYSTEMS:   CONSTITUTIONAL:NEGATIVE for fever, chills, night sweats  INTEGUMENTARY/SKIN: NEGATIVE for worrisome wound problems or redness.  MUSCULOSKELETAL:See HPI above  NEURO: NEGATIVE for weakness, dizziness or paresthesias    This document serves as a record of the services and decisions personally performed and made by Jose G Blanchard MD. It was created on his behalf by Merced Moran, a trained medical scribe. The creation of this document is based the provider's statements to the medical scribe.    Philip Moran 10:18 AM 2/19/2018      PHYSICAL EXAM:  Resp 14  Ht 1.588 m (5' 2.5\")  Wt 64.4 kg (142 lb)  BMI 25.56 kg/m2   GENERAL APPEARANCE: healthy, alert, no distress  SKIN: no suspicious lesions or rashes  NEURO: " Normal strength and tone, mentation intact and speech normal  PSYCH:  mentation appears normal and affect normal, not anxious  RESPIRATORY: No increased work of breathing.    right SHOULDER:  Shoulder Inspection: no swelling. No abnormal skin discoloration.  Incisions: healed well.  Tender: no tenderness over incisions, there is tender to palpation over the rhomboids and paraspinals tenderness  Range of Motion:    Passive: forward flexion 160, external rotation 60 degrees, internal rotation L2  Strength: forward flexion 4+/5    X-RAY: none indicated.      Impression: Giovanni Cedeno is a 52 year old male 14 weeks status post right shoulder arthroscopy and rotator cuff repair, medium repair, distal clavical resection, labral debridement, subacromial decompression with partial acromioplasty, doing well.     Plan: routine postoperative shoulder arthroscopy medium protocol  * WB status: light weight bearing, per comfort, 1-2 pounds. Nothing away from body or overhead.  * Activity: light-weightbearing of right upper extremity.  * Immobilization: none, work on aggressive range of motion.  * Rest.  * Ice twice daily to three times daily.  * Physical Therapy updated per medium repair protocol.  * Tylenol as needed for pain  * return to clinic 8 weeks, sooner as needed.    The information in this document, created by a scribe for me, accurately reflects the services I personally performed and the decisions made by me. I have reviewed and approved this document for accuracy.      Jose G Blanchard M.D., M.S.  Dept. of Orthopaedic Surgery  [facility]

## 2018-02-21 DIAGNOSIS — M54.9 UPPER BACK PAIN: ICD-10-CM

## 2018-02-21 DIAGNOSIS — N40.1 BENIGN PROSTATIC HYPERPLASIA WITH LOWER URINARY TRACT SYMPTOMS: ICD-10-CM

## 2018-02-21 DIAGNOSIS — M54.50 ACUTE MIDLINE LOW BACK PAIN WITHOUT SCIATICA: ICD-10-CM

## 2018-02-22 RX ORDER — TAMSULOSIN HYDROCHLORIDE 0.4 MG/1
CAPSULE ORAL
Qty: 90 CAPSULE | Refills: 2 | Status: SHIPPED | OUTPATIENT
Start: 2018-02-22 | End: 2019-04-18

## 2018-02-22 NOTE — TELEPHONE ENCOUNTER
Prescription approved per [facility] Refill Protocol for 12 months of refills since last appointment, which was 12/21/17

## 2018-02-22 NOTE — TELEPHONE ENCOUNTER
"Requested Prescriptions   Pending Prescriptions Disp Refills     tamsulosin (FLOMAX) 0.4 MG capsule [Pharmacy Med Name: TAMSULOSIN 0.4MG CAPSULES]  Last Written Prescription Date:  12/21/17  Last Fill Quantity: 90,  # refills: 3   Last office visit: 12/21/2017 with prescribing provider:  Agustin   Future Office Visit:     30 capsule 0     Sig: TAKE 1 CAPSULE(0.4 MG) BY MOUTH DAILY    Alpha Blockers Passed    2/21/2018 11:18 AM       Passed - Blood pressure under 140/90 in past 12 months    BP Readings from Last 3 Encounters:   12/21/17 122/78   11/14/17 116/72   10/16/17 118/76                Passed - Recent or future visit with authorizing provider's specialty    Patient had office visit in the last year or has a visit in the next 30 days with authorizing provider.  See \"Patient Info\" tab in inbasket, or \"Choose Columns\" in Meds & Orders section of the refill encounter.            Passed - Patient does not have Tadalafil, Vardenafil, or Sildenafil on their medication list       Passed - Patient is 18 years of age or older        cyclobenzaprine (FLEXERIL) 5 MG tablet [Pharmacy Med Name: CYCLOBENZAPRINE 5MG TABLETS] 42 tablet 0     Sig: TAKE 1 TABLET(5 MG) BY MOUTH THREE TIMES DAILY AS NEEDED FOR MUSCLE SPASMS    There is no refill protocol information for this order          "

## 2018-02-22 NOTE — TELEPHONE ENCOUNTER
cyclobenzaprine (FLEXERIL) 5 MG tablet  Last Written Prescription Date: 12/21/17  Last Fill Quantity: 42,  # refills: 0   Last office visit: 12/21/2017    Future Office Visit:      Controlled Substance Refill Request for cyclobenzaprine (FLEXERIL) 5 MG tablet  Problem List Complete:  Yes  Patient is followed by ANIKA REYES for ongoing prescription of pain medication.  All refills should be approved by this provider, or covering partner.    Medication(s): Norco 7.5mg po three times daily 3.   Maximum quantity per month: 90  Clinic visit frequency required: Q 3 months     Controlled substance agreement on file: Yes       Date(s): 07/09/2015    Pain Clinic evaluation in the past: No       Location(s):  none     DIRE Total Score(s):    7/9/2015   Total Score 20       Last [facility] website verification:  11/17/17 no concerns    [email or web address]     checked in past 6 months?  Yes 11/17/17

## 2018-02-23 RX ORDER — CYCLOBENZAPRINE HCL 5 MG
TABLET ORAL
Qty: 42 TABLET | Refills: 0 | OUTPATIENT
Start: 2018-02-23

## 2018-02-23 NOTE — TELEPHONE ENCOUNTER
Refill denied. Patient prescribed Robaxin in Jan 2018 with 1 refill; not appropriate to use Flexeril in addition to Robaxin. Patient should continue to use Robaxin prn.

## 2018-02-26 ENCOUNTER — THERAPY VISIT (OUTPATIENT)
Dept: PHYSICAL THERAPY | Facility: CLINIC | Age: 53
End: 2018-02-26
Payer: COMMERCIAL

## 2018-02-26 DIAGNOSIS — M25.511 ACUTE PAIN OF RIGHT SHOULDER: ICD-10-CM

## 2018-02-26 DIAGNOSIS — Z98.890 S/P RIGHT ROTATOR CUFF REPAIR: ICD-10-CM

## 2018-02-26 PROCEDURE — 97110 THERAPEUTIC EXERCISES: CPT | Mod: GP | Performed by: PHYSICAL THERAPY ASSISTANT

## 2018-02-26 PROCEDURE — 97140 MANUAL THERAPY 1/> REGIONS: CPT | Mod: GP | Performed by: PHYSICAL THERAPY ASSISTANT

## 2018-03-02 ENCOUNTER — TELEPHONE (OUTPATIENT)
Dept: FAMILY MEDICINE | Facility: CLINIC | Age: 53
End: 2018-03-02

## 2018-03-02 DIAGNOSIS — M75.101 ROTATOR CUFF SYNDROME, RIGHT: ICD-10-CM

## 2018-03-02 DIAGNOSIS — M62.838 MUSCLE SPASM OF RIGHT SHOULDER: ICD-10-CM

## 2018-03-02 DIAGNOSIS — Z98.890 S/P ROTATOR CUFF REPAIR: ICD-10-CM

## 2018-03-02 DIAGNOSIS — G89.4 CHRONIC PAIN SYNDROME: ICD-10-CM

## 2018-03-02 RX ORDER — METHOCARBAMOL 500 MG/1
1000 TABLET, FILM COATED ORAL 3 TIMES DAILY PRN
Qty: 60 TABLET | Refills: 1 | Status: SHIPPED | OUTPATIENT
Start: 2018-03-02 | End: 2018-07-15

## 2018-03-02 RX ORDER — HYDROCODONE BITARTRATE AND ACETAMINOPHEN 7.5; 325 MG/1; MG/1
1 TABLET ORAL EVERY 4 HOURS PRN
Qty: 60 TABLET | Refills: 0 | Status: SHIPPED | OUTPATIENT
Start: 2018-12-21 | End: 2018-03-14

## 2018-03-02 NOTE — TELEPHONE ENCOUNTER
Gonzalo from [facility] called requesting clarification on start day for Norco. Start day on script given today is 12/21/2018. Please advice on correct start day.

## 2018-03-14 ENCOUNTER — TELEPHONE (OUTPATIENT)
Dept: FAMILY MEDICINE | Facility: CLINIC | Age: 53
End: 2018-03-14

## 2018-03-14 DIAGNOSIS — M75.101 ROTATOR CUFF SYNDROME, RIGHT: ICD-10-CM

## 2018-03-14 DIAGNOSIS — G89.4 CHRONIC PAIN SYNDROME: ICD-10-CM

## 2018-03-14 NOTE — TELEPHONE ENCOUNTER
Patient dropped off script for Noco. He needs script reprinted and signed with correct start date. Start date on current script is 12/21/2018. Ok to wait until PCP is tomorrow. Rx pended. Please review and approve if agree with order. Rx left on providers desk for review.

## 2018-03-15 DIAGNOSIS — G25.81 RESTLESS LEG SYNDROME: ICD-10-CM

## 2018-03-15 RX ORDER — HYDROCODONE BITARTRATE AND ACETAMINOPHEN 7.5; 325 MG/1; MG/1
1 TABLET ORAL EVERY 4 HOURS PRN
Qty: 60 TABLET | Refills: 0 | Status: SHIPPED | OUTPATIENT
Start: 2018-03-15 | End: 2019-04-18

## 2018-03-15 NOTE — TELEPHONE ENCOUNTER
GABAPENTIN 300MG CAPSULES      Last Written Prescription Date:  02/01/2018  Last Fill Quantity: 60,   # refills: 0  Last Office Visit: 12/21/17  Future Office visit:       Routing refill request to provider for review/approval because:  Drug not on the [facility], [facility] or [facility] refill protocol or controlled substance    Controlled Substance Refill Request for GABAPENTIN 300MG CAPSULES  Problem List Complete:  Yes  Problem Detail      Noted:  7/9/2015      Priority:  Medium      Overview Addendum 11/17/2017  9:56 AM by Lyn Tucker RN     Patient is followed by ANIKA REYES for ongoing prescription of pain medication.  All refills should be approved by this provider, or covering partner.     Medication(s): Norco 7.5mg po three times daily 3.   Maximum quantity per month: 90  Clinic visit frequency required: Q 3 months      Controlled substance agreement on file: Yes       Date(s): 07/09/2015     Pain Clinic evaluation in the past: No       Location(s):  none      DIRE Total Score(s):     7/9/2015   Total Score 20         Last [facility] website verification:  11/17/17 no concerns    [email or web address]        Previous Version       Relevant Medications         checked in past 6 months?  Yes 11/17/17

## 2018-03-16 RX ORDER — GABAPENTIN 300 MG/1
CAPSULE ORAL
Qty: 60 CAPSULE | Refills: 0 | Status: SHIPPED | OUTPATIENT
Start: 2018-03-16 | End: 2018-04-23

## 2018-03-19 ENCOUNTER — THERAPY VISIT (OUTPATIENT)
Dept: PHYSICAL THERAPY | Facility: CLINIC | Age: 53
End: 2018-03-19
Payer: COMMERCIAL

## 2018-03-19 DIAGNOSIS — M25.511 ACUTE PAIN OF RIGHT SHOULDER: ICD-10-CM

## 2018-03-19 DIAGNOSIS — Z98.890 S/P RIGHT ROTATOR CUFF REPAIR: ICD-10-CM

## 2018-03-19 PROCEDURE — 97110 THERAPEUTIC EXERCISES: CPT | Mod: GP | Performed by: PHYSICAL THERAPY ASSISTANT

## 2018-03-19 PROCEDURE — 97112 NEUROMUSCULAR REEDUCATION: CPT | Mod: GP | Performed by: PHYSICAL THERAPY ASSISTANT

## 2018-03-26 ENCOUNTER — THERAPY VISIT (OUTPATIENT)
Dept: PHYSICAL THERAPY | Facility: CLINIC | Age: 53
End: 2018-03-26
Payer: COMMERCIAL

## 2018-03-26 DIAGNOSIS — Z98.890 S/P RIGHT ROTATOR CUFF REPAIR: ICD-10-CM

## 2018-03-26 DIAGNOSIS — M25.511 ACUTE PAIN OF RIGHT SHOULDER: ICD-10-CM

## 2018-03-26 PROCEDURE — 97110 THERAPEUTIC EXERCISES: CPT | Mod: GP | Performed by: PHYSICAL THERAPY ASSISTANT

## 2018-03-26 PROCEDURE — 97112 NEUROMUSCULAR REEDUCATION: CPT | Mod: GP | Performed by: PHYSICAL THERAPY ASSISTANT

## 2018-04-02 ENCOUNTER — THERAPY VISIT (OUTPATIENT)
Dept: PHYSICAL THERAPY | Facility: CLINIC | Age: 53
End: 2018-04-02
Payer: COMMERCIAL

## 2018-04-02 DIAGNOSIS — Z98.890 S/P RIGHT ROTATOR CUFF REPAIR: ICD-10-CM

## 2018-04-02 DIAGNOSIS — M25.511 ACUTE PAIN OF RIGHT SHOULDER: ICD-10-CM

## 2018-04-02 PROCEDURE — 97112 NEUROMUSCULAR REEDUCATION: CPT | Mod: GP | Performed by: PHYSICAL THERAPY ASSISTANT

## 2018-04-02 PROCEDURE — 97110 THERAPEUTIC EXERCISES: CPT | Mod: GP | Performed by: PHYSICAL THERAPY ASSISTANT

## 2018-04-09 ENCOUNTER — THERAPY VISIT (OUTPATIENT)
Dept: PHYSICAL THERAPY | Facility: CLINIC | Age: 53
End: 2018-04-09
Payer: COMMERCIAL

## 2018-04-09 DIAGNOSIS — M25.511 ACUTE PAIN OF RIGHT SHOULDER: ICD-10-CM

## 2018-04-09 DIAGNOSIS — Z98.890 S/P RIGHT ROTATOR CUFF REPAIR: ICD-10-CM

## 2018-04-09 PROCEDURE — 97110 THERAPEUTIC EXERCISES: CPT | Mod: GP | Performed by: PHYSICAL THERAPY ASSISTANT

## 2018-04-09 PROCEDURE — 97112 NEUROMUSCULAR REEDUCATION: CPT | Mod: GP | Performed by: PHYSICAL THERAPY ASSISTANT

## 2018-04-09 PROCEDURE — 97140 MANUAL THERAPY 1/> REGIONS: CPT | Mod: GP | Performed by: PHYSICAL THERAPY ASSISTANT

## 2018-04-23 ENCOUNTER — THERAPY VISIT (OUTPATIENT)
Dept: PHYSICAL THERAPY | Facility: CLINIC | Age: 53
End: 2018-04-23
Payer: COMMERCIAL

## 2018-04-23 DIAGNOSIS — M25.511 ACUTE PAIN OF RIGHT SHOULDER: ICD-10-CM

## 2018-04-23 DIAGNOSIS — G25.81 RESTLESS LEG SYNDROME: ICD-10-CM

## 2018-04-23 DIAGNOSIS — Z98.890 S/P RIGHT ROTATOR CUFF REPAIR: ICD-10-CM

## 2018-04-23 PROCEDURE — 97140 MANUAL THERAPY 1/> REGIONS: CPT | Mod: GP | Performed by: PHYSICAL THERAPY ASSISTANT

## 2018-04-23 PROCEDURE — 97110 THERAPEUTIC EXERCISES: CPT | Mod: GP | Performed by: PHYSICAL THERAPY ASSISTANT

## 2018-04-23 PROCEDURE — 97112 NEUROMUSCULAR REEDUCATION: CPT | Mod: GP | Performed by: PHYSICAL THERAPY ASSISTANT

## 2018-04-25 NOTE — TELEPHONE ENCOUNTER
Controlled Substance Refill Request for Neurontin  Problem List Complete:  No     PROVIDER TO CONSIDER COMPLETION OF PROBLEM LIST AND OVERVIEW/CONTROLLED SUBSTANCE AGREEMENT    Last Written Prescription Date:  3-16-18  Last Fill Quantity: 60,   # refills: 0    Last Office Visit with [facility] primary care provider: 12-21-17 with Dr. Sony Velez      Future Office visit:     Controlled substance agreement on file: Yes:  Date 7-9-2015.     Processing:  Fax Rx to [facility] pharmacy   checked in past 6 months?  Yes 11-17-17 no concerns

## 2018-04-26 RX ORDER — GABAPENTIN 300 MG/1
CAPSULE ORAL
Qty: 60 CAPSULE | Refills: 0 | Status: SHIPPED | OUTPATIENT
Start: 2018-04-26 | End: 2018-06-01

## 2018-05-10 DIAGNOSIS — M54.9 UPPER BACK PAIN: ICD-10-CM

## 2018-05-10 DIAGNOSIS — M54.50 ACUTE MIDLINE LOW BACK PAIN WITHOUT SCIATICA: ICD-10-CM

## 2018-05-10 RX ORDER — CELECOXIB 200 MG/1
CAPSULE ORAL
Qty: 60 CAPSULE | Refills: 0 | Status: SHIPPED | OUTPATIENT
Start: 2018-05-10 | End: 2018-07-15

## 2018-05-10 NOTE — TELEPHONE ENCOUNTER
"Requested Prescriptions   Pending Prescriptions Disp Refills     celecoxib (CELEBREX) 200 MG capsule [Pharmacy Med Name: CELECOXIB 200MG CAPSULES] 60 capsule 0     Sig: TAKE 1 CAPSULE(200 MG) BY MOUTH TWICE DAILY AS NEEDED FOR MODERATE PAIN    NSAID Medications Passed    5/10/2018 11:02 AM       Passed - Blood pressure under 140/90 in past 12 months    BP Readings from Last 3 Encounters:   12/21/17 122/78   11/14/17 116/72   10/16/17 118/76                Passed - Normal ALT on file in past 12 months    Recent Labs   Lab Test  05/23/17   0813   ALT  31            Passed - Normal AST on file in past 12 months    Recent Labs   Lab Test  05/23/17   0813   AST  17            Passed - Recent (12 mo) or future (30 days) visit within the authorizing provider's specialty    Patient had office visit in the last 12 months or has a visit in the next 30 days with authorizing provider or within the authorizing provider's specialty.  See \"Patient Info\" tab in inbasket, or \"Choose Columns\" in Meds & Orders section of the refill encounter.           Passed - Patient is age 6-64 years       Passed - Normal CBC on file in past 12 months    Recent Labs   Lab Test  11/14/17   0846   WBC  6.4   RBC  4.71   HGB  14.7   HCT  44.3   PLT  291            Passed - Normal serum creatinine on file in past 12 months    Recent Labs   Lab Test  11/14/17   0846   CR  0.84             Prescription approved per [facility] Refill Protocol.    "

## 2018-06-01 DIAGNOSIS — G25.81 RESTLESS LEG SYNDROME: ICD-10-CM

## 2018-06-01 DIAGNOSIS — G89.4 CHRONIC PAIN SYNDROME: ICD-10-CM

## 2018-06-01 RX ORDER — GABAPENTIN 300 MG/1
CAPSULE ORAL
Qty: 60 CAPSULE | Refills: 0 | Status: SHIPPED | OUTPATIENT
Start: 2018-06-01 | End: 2018-07-15

## 2018-06-01 NOTE — TELEPHONE ENCOUNTER
Requested Prescriptions   Pending Prescriptions Disp Refills     gabapentin (NEURONTIN) 300 MG capsule [Pharmacy Med Name: GABAPENTIN 300MG CAPSULES]  Last Written Prescription Date:  4/26/18  Last Fill Quantity:60,  # refills: 0   Last office visit: 12/21/2017 with prescribing provider:     Future Office Visit:     60 capsule 0     Sig: TAKE 2 CAPSULES BY MOUTH EVERY NIGHT AT BEDTIME    There is no refill protocol information for this order        Controlled Substance Refill Request for gabapentin (NEURONTIN) 300 MG capsule  Problem List Complete:  Yes  Patient is followed by ANIKA REYES for ongoing prescription of pain medication.  All refills should be approved by this provider, or covering partner.    Medication(s): Norco 7.5mg po three times daily 3.   Maximum quantity per month: 90  Clinic visit frequency required: Q 3 months     Controlled substance agreement on file: Yes       Date(s): 07/09/2015    Pain Clinic evaluation in the past: No       Location(s):  none     DIRE Total Score(s):    7/9/2015   Total Score 20       Last [facility] website verification:  11/17/17 no concerns    [email or web address]     checked in past 6 months?  Yes 6/1/18 no concerns

## 2018-06-26 ENCOUNTER — THERAPY VISIT (OUTPATIENT)
Dept: PHYSICAL THERAPY | Facility: CLINIC | Age: 53
End: 2018-06-26
Payer: COMMERCIAL

## 2018-06-26 DIAGNOSIS — M25.511 ACUTE PAIN OF RIGHT SHOULDER: ICD-10-CM

## 2018-06-26 DIAGNOSIS — Z98.890 S/P RIGHT ROTATOR CUFF REPAIR: ICD-10-CM

## 2018-06-26 PROCEDURE — 97110 THERAPEUTIC EXERCISES: CPT | Mod: GP | Performed by: PHYSICAL THERAPIST

## 2018-06-26 PROCEDURE — 97112 NEUROMUSCULAR REEDUCATION: CPT | Mod: GP | Performed by: PHYSICAL THERAPIST

## 2018-06-26 PROCEDURE — 97140 MANUAL THERAPY 1/> REGIONS: CPT | Mod: GP | Performed by: PHYSICAL THERAPIST

## 2018-06-26 NOTE — MR AVS SNAPSHOT
"              After Visit Summary   2018    Giovanni Cedeno    MRN: [id number]           Patient Information     Date Of Birth          1965        Visit Information        Provider Department      2018 12:00 PM Juan Antonio Pastor PT [facility]        Today's Diagnoses     Acute pain of right shoulder        S/P right rotator cuff repair           Follow-ups after your visit        Who to contact     If you have questions or need follow up information about today's clinic visit or your schedule please contact [facility] directly at [phone].  Normal or non-critical lab and imaging results will be communicated to you by [employer], letter or phone within 4 business days after the clinic has received the results. If you do not hear from us within 7 days, please contact the clinic through [employer] or phone. If you have a critical or abnormal lab result, we will notify you by phone as soon as possible.  Submit refill requests through [employer] or call your pharmacy and they will forward the refill request to us. Please allow 3 business days for your refill to be completed.          Additional Information About Your Visit        MyChart Information     [employer] lets you send messages to your doctor, view your test results, renew your prescriptions, schedule appointments and more. To sign up, go to [email or web address] . Click on \"Log in\" on the left side of the screen, which will take you to the Welcome page. Then click on \"Sign up Now\" on the right side of the page.     You will be asked to enter the access code listed below, as well as some personal information. Please follow the directions to create your username and password.     Your access code is: [email or web address]  Expires: 2018 11:52 AM     Your access code will  in 90 days. If you need help or a new code, please call your [facility] clinic or [phone].        Care EveryWhere ID  "    This is your Care EveryWhere ID. This could be used by other organizations to access your [place] medical records  [id number]         Blood Pressure from Last 3 Encounters:   12/21/17 122/78   11/14/17 116/72   10/16/17 118/76    Weight from Last 3 Encounters:   02/19/18 64.4 kg (142 lb)   01/08/18 64.9 kg (143 lb)   12/21/17 60.1 kg (132 lb 8 oz)              We Performed the Following     SHERIF PROGRESS NOTES REPORT     MANUAL THER TECH,1+REGIONS,EA 15 MIN     NEUROMUSCULAR RE-EDUCATION     THERAPEUTIC EXERCISES        Primary Care Provider Office Phone # Fax #    Sony Medina Velez [phone] [address] [facility] [id number]        Equal Access to Services     FRANKIE SCHMITT : Hadii aad ku hadasho Soomaali, waaxda luqadaha, qaybta kaalmada adeegyada, waxay jag lund . So [facility] [phone].    ATENCIÓN: Si habla español, tiene a adams disposición servicios gratuitos de asistencia lingüística. Ll[facility] [phone].    We comply with applicable federal civil rights laws and [place] laws. We do not discriminate on the basis of race, color, national origin, age, disability, sex, sexual orientation, or gender identity.            Thank you!     Thank you for choosing [facility]   for your care. Our goal is always to provide you with excellent care. Hearing back from our patients is one way we can continue to improve our services. Please take a few minutes to complete the written survey that you may receive in the mail after your visit with us. Thank you!             Your Updated Medication List - Protect others around you: Learn how to safely use, store and throw away your medicines at www.disposemymeds.org.          This list is accurate as of 6/26/18 12:48 PM.  Always use your most recent med list.                   Brand Name Dispense Instructions for use Diagnosis    carbidopa-levodopa  MG per tablet    SINEMET    90  tablet    Take 1 tablet by mouth At Bedtime    Restless legs syndrome (RLS)       celecoxib 200 MG capsule    celeBREX    60 capsule    TAKE 1 CAPSULE(200 MG) BY MOUTH TWICE DAILY AS NEEDED FOR MODERATE PAIN    Upper back pain, Acute midline low back pain without sciatica       cyclobenzaprine 5 MG tablet    FLEXERIL    42 tablet    TAKE 1 TABLET(5 MG) BY MOUTH THREE TIMES DAILY AS NEEDED FOR MUSCLE SPASMS    Acute midline low back pain without sciatica, Upper back pain       finasteride 5 MG tablet    PROSCAR    90 tablet    Take 1 tablet (5 mg) by mouth daily    Benign prostatic hyperplasia with urinary frequency       gabapentin 300 MG capsule    NEURONTIN    60 capsule    TAKE 2 CAPSULES BY MOUTH EVERY NIGHT AT BEDTIME    Restless leg syndrome       HYDROcodone-acetaminophen 7.5-325 MG per tablet    NORCO    60 tablet    Take 1 tablet by mouth every 4 hours as needed for pain maximum  4  tablet(s) per day    Rotator cuff syndrome, right, Chronic pain syndrome       methocarbamol 500 MG tablet    ROBAXIN    60 tablet    Take 2 tablets (1,000 mg) by mouth 3 times daily as needed for muscle spasms    S/P rotator cuff repair, Muscle spasm of right shoulder       mirtazapine 15 MG tablet    REMERON    90 tablet    TAKE 1 TABLET(15 MG) BY MOUTH AT BEDTIME    Primary insomnia       senna-docusate 8.6-50 MG per tablet    SENOKOT-S;PERICOLACE    100 tablet    Take 1-2 tablets by mouth 2 times daily as needed for constipation    Drug-induced constipation       simvastatin 20 MG tablet    ZOCOR    30 tablet    TAKE 1 TABLET BY MOUTH EVERY DAY AT BEDTIME    Hyperlipidemia LDL goal <100, Pure hypercholesterolemia       * tamsulosin 0.4 MG capsule    FLOMAX    90 capsule    Take 1 capsule (0.4 mg) by mouth daily    Benign prostatic hyperplasia with urinary frequency       * tamsulosin 0.4 MG capsule    FLOMAX    90 capsule    TAKE 1 CAPSULE(0.4 MG) BY MOUTH DAILY    Benign prostatic hyperplasia with lower urinary tract  symptoms       * Notice:  This list has 2 medication(s) that are the same as other medications prescribed for you. Read the directions carefully, and ask your doctor or other care provider to review them with you.

## 2018-06-26 NOTE — PROGRESS NOTES
Subjective:  HPI                    Objective:  System    Physical Exam    General     ROS    Assessment/Plan:    PROGRESS  REPORT    Progress reporting period is from 4/23/18 to 6/26/18.       SUBJECTIVE  Subjective changes noted by patient: Patient returns to therapy after 2 months away. He reports that he has been noticing quite a bit of clicking with reaching different directions. Patient reports that he has continued with home exercises every day, but has not been doing resistance with strengthening exercises    Current Pain level: 4/10.      Initial Pain level: 4/10.   Changes in function:  Yes (See Goal flowsheet attached for changes in current functional level)  Adverse reaction to treatment or activity: Increased painful clicking in the shoulder with overhead reaching    OBJECTIVE  Changes noted in objective findings:  AROM right shoulder flexion 158, abduction 160. extension 35, ER 95, IR/Ext T9.  Right shoulder strength: flexion 5-/5, abduction 4/5 (pain), IR 5-/5, ER 4+/5. Clicking abolished with forward flexion with NMR and tactile assist for scapular stability     ASSESSMENT/PLAN  Updated problem list and treatment plan: Diagnosis 1:  S/p right rotator cuff repair      Pain -  self management, education, directional preference exercise and home program  Decreased ROM/flexibility - manual therapy, therapeutic exercise and home program  Decreased strength - therapeutic exercise, therapeutic activities and home program  Impaired muscle performance - neuro re-education and home program  Decreased function - therapeutic activities and home program  STG/LTGs have been met or progress has been made towards goals:  Yes (See Goal flow sheet completed today.)  Assessment of Progress: The patient has had set backs in their progress.  Self Management Plans:  Patient has been instructed in a home treatment program.  Patient  has been instructed in self management of symptoms.  I have re-evaluated this patient and  find that the nature, scope, duration and intensity of the therapy is appropriate for the medical condition of the patient.  Giovanni continues to require the following intervention to meet STG and LTG's:  PT    Recommendations:  This patient would benefit from continued therapy.     Frequency:  2 X a month, once daily  Duration:  for 2 months        Please refer to the daily flowsheet for treatment today, total treatment time and time spent performing 1:1 timed codes.

## 2018-07-02 ENCOUNTER — OFFICE VISIT (OUTPATIENT)
Dept: ORTHOPEDICS | Facility: CLINIC | Age: 53
End: 2018-07-02
Payer: COMMERCIAL

## 2018-07-02 VITALS
HEART RATE: 71 BPM | DIASTOLIC BLOOD PRESSURE: 82 MMHG | HEIGHT: 63 IN | WEIGHT: 141.3 LBS | BODY MASS INDEX: 25.04 KG/M2 | SYSTOLIC BLOOD PRESSURE: 122 MMHG

## 2018-07-02 DIAGNOSIS — Z98.890 S/P RIGHT ROTATOR CUFF REPAIR: Primary | ICD-10-CM

## 2018-07-02 PROCEDURE — 99213 OFFICE O/P EST LOW 20 MIN: CPT | Performed by: ORTHOPAEDIC SURGERY

## 2018-07-02 ASSESSMENT — PAIN SCALES - GENERAL: PAINLEVEL: MODERATE PAIN (4)

## 2018-07-02 NOTE — PROGRESS NOTES
"chief complaint:   Chief Complaint   Patient presents with     Right Shoulder - Surgical Followup     Shoulder arthroscopy - rotator cuff repair (M), subacromial decompression, distal clavicle resection, labral debridement. DOS 11/28/18.     Shoulder Pain     Patient states his shoulder was doing good for a period of time then it started to go the other way. He thought his PT has run out. He has not been going to PT in the last couple months.        SURGERY: right shoulder arthroscopy. ( [facility] )  1. Right shoulder arthroscopic rotator cuff repair, medium repair, double row technique  2. Right shoulder arthroscopic distal clavical resection  3. Right shoulder arthroscopic labral debridement  4. Right shoulder arthroscopic subacromial decompression with partial acromioplasty.    DATE OF SURGERY: 11/28/2017.    HISTORY OF PRESENT ILLNESS:  Giovanni \"Maverick\" Pao is a 52 year old male seen for postoperative evaluation of a right shoulder arthroscopy and rotator cuff repair, medium repair, distal clavical resection, labral debridement, subacromial decompression with partial acromioplasty. Surgery occurred 7 months ago. Patient returns today stating he is doing ok. He continues to have mild-moderate pain, rated a 4/10. Pain is located over the chest area (points to pectoralis area). Pain is worsened with overhead motions. He has some popping that is painful. Overall had been doing well until recently. Stopped Physical Therapy a couple of months ago on his own. We have not seen him since 6 weeks postoperative.    Of note: Occasionally will take flexeril for low back pain.    OR Findings:   Type 1 SLAP tear and anterior labral fraying. Longitudinal striation of the subscapularis without full-thickness tear. Proximal biceps intact with minimal tendinosis with some flattening without any subluxation. Mild glenohumeral chondromalacia. Full-thickness tear of the anterior half of the supraspinatus with retraction " to medial to the articular margin. Thickened subacromial bursa. Anterior inferior acromial spur and acromialclavicular arthritis.    Past medical history:   Past Medical History:   Diagnosis Date     Arthritis      Patient Active Problem List    Diagnosis Date Noted     Major depression in partial remission (H) 11/24/2013     Priority: High     Pre-diabetes 11/24/2013     Priority: High     Acute pain of right shoulder 12/12/2017     Priority: Medium     S/P right rotator cuff repair 12/12/2017     Priority: Medium     Abdominal pain, left upper quadrant 05/23/2017     Priority: Medium     Pain of toe of right foot 05/23/2017     Priority: Medium     Acute midline low back pain without sciatica 05/23/2017     Priority: Medium     Upper back pain 05/23/2017     Priority: Medium     Chronic right shoulder pain 11/10/2016     Priority: Medium     Chronic pain syndrome 07/09/2015     Priority: Medium     Patient is followed by ANIKA REYES for ongoing prescription of pain medication.  All refills should be approved by this provider, or covering partner.    Medication(s): Norco 7.5mg po three times daily 3.   Maximum quantity per month: 90  Clinic visit frequency required: Q 3 months     Controlled substance agreement on file: Yes       Date(s): 07/09/2015    Pain Clinic evaluation in the past: No       Location(s):  none     DIRE Total Score(s):    7/9/2015   Total Score 20       Last [facility] website verification:  6/1/18 no concerns    [email or web address]         Immunization reaction 01/15/2015     Priority: Medium     Epicondylitis, lateral humeral 11/26/2013     Priority: Medium     Restless leg syndrome 05/08/2013     Priority: Medium     Knee bursitis 05/08/2013     Priority: Medium      right posterior medial hamstring insertion site       Pure hypercholesterolemia 05/08/2013     Priority: Medium       Past surgical history: History reviewed. No pertinent surgical history.  Medications:    Current Outpatient Prescriptions:      carbidopa-levodopa (SINEMET)  MG per tablet, Take 1 tablet by mouth At Bedtime, Disp: 90 tablet, Rfl: 5     celecoxib (CELEBREX) 200 MG capsule, TAKE 1 CAPSULE(200 MG) BY MOUTH TWICE DAILY AS NEEDED FOR MODERATE PAIN, Disp: 60 capsule, Rfl: 0     cyclobenzaprine (FLEXERIL) 5 MG tablet, TAKE 1 TABLET(5 MG) BY MOUTH THREE TIMES DAILY AS NEEDED FOR MUSCLE SPASMS, Disp: 42 tablet, Rfl: 0     finasteride (PROSCAR) 5 MG tablet, Take 1 tablet (5 mg) by mouth daily, Disp: 90 tablet, Rfl: 1     gabapentin (NEURONTIN) 300 MG capsule, TAKE 2 CAPSULES BY MOUTH EVERY NIGHT AT BEDTIME, Disp: 60 capsule, Rfl: 0     HYDROcodone-acetaminophen (NORCO) 7.5-325 MG per tablet, Take 1 tablet by mouth every 4 hours as needed for pain maximum  4  tablet(s) per day, Disp: 60 tablet, Rfl: 0     methocarbamol (ROBAXIN) 500 MG tablet, Take 2 tablets (1,000 mg) by mouth 3 times daily as needed for muscle spasms, Disp: 60 tablet, Rfl: 1     mirtazapine (REMERON) 15 MG tablet, TAKE 1 TABLET(15 MG) BY MOUTH AT BEDTIME, Disp: 90 tablet, Rfl: 1     senna-docusate (SENOKOT-S;PERICOLACE) 8.6-50 MG per tablet, Take 1-2 tablets by mouth 2 times daily as needed for constipation, Disp: 100 tablet, Rfl: 11     simvastatin (ZOCOR) 20 MG tablet, TAKE 1 TABLET BY MOUTH EVERY DAY AT BEDTIME, Disp: 30 tablet, Rfl: 11     tamsulosin (FLOMAX) 0.4 MG capsule, TAKE 1 CAPSULE(0.4 MG) BY MOUTH DAILY, Disp: 90 capsule, Rfl: 2     tamsulosin (FLOMAX) 0.4 MG capsule, Take 1 capsule (0.4 mg) by mouth daily, Disp: 90 capsule, Rfl: 3    Allergies: No Known Allergies    REVIEW OF SYSTEMS:   CONSTITUTIONAL:NEGATIVE for fever, chills, night sweats  INTEGUMENTARY/SKIN: NEGATIVE for worrisome wound problems or redness.  MUSCULOSKELETAL:See HPI above  NEURO: NEGATIVE for weakness, dizziness or paresthesias    This document serves as a record of the services and decisions personally performed and made by Jose G Blanchard MD. It was  "created on his behalf by Merced Moran, a trained medical scribe. The creation of this document is based the provider's statements to the medical scribe.    Philip Moran 9:08 AM 7/2/2018       PHYSICAL EXAM:  /82  Pulse 71  Ht 1.588 m (5' 2.5\")  Wt 64.1 kg (141 lb 4.8 oz)  BMI 25.43 kg/m2   GENERAL APPEARANCE: healthy, alert, no distress; presents with friend today.  SKIN: no suspicious lesions or rashes  NEURO: Normal strength and tone, mentation intact and speech normal  PSYCH:  mentation appears normal and affect normal, not anxious  RESPIRATORY: No increased work of breathing.    right SHOULDER:  Shoulder Inspection: no swelling. No abnormal skin discoloration.  Incisions: healed well.  Tender: AC joint, pectoralis muscle   Range of Motion:    Active: forward flexion 170+, external rotation 60 degrees, internal rotation T10, occasional click with range of motion.  Strength: forward flexion 5-/5, external rotation 5-/5  Negative impingement.    X-RAY: none indicated.      Impression: Giovanni Cedeno is a 52 year old male 7 months post right shoulder arthroscopy and rotator cuff repair, medium repair, distal clavical resection, labral debridement, subacromial decompression with partial acromioplasty, doing well.     Plan:   * appears more acromio-clavicular joint and pectoral muscle pain. Overall shoulder itself doesn't appear to be painful. The clicks is likely due to some residual muscle weakness, instability. This should improve with continued Physical Therapy and home exercise program for rotator cuff strengthening.  * WB status: as tolerated  * Activity: return to activities per comfort  * Immobilization: none, work on aggressive range of motion.  * Rest.  * Ice twice daily to three times daily.  * Physical Therapy updated per medium repair protocol.  * Tylenol as needed for pain  * return to clinic as needed     The information in this document, created by a scribe for me, accurately " reflects the services I personally performed and the decisions made by me. I have reviewed and approved this document for accuracy.      Jose G Blanchard M.D., M.S.  Dept. of Orthopaedic Surgery  [facility]

## 2018-07-02 NOTE — LETTER
"    7/2/2018         RE: Giovanni Cedeno  [address]  [place] [address]        Dear Colleague,    Thank you for referring your patient, Giovanni Cedeno, to the [facility] [place]. Please see a copy of my visit note below.    chief complaint:   Chief Complaint   Patient presents with     Right Shoulder - Surgical Followup     Shoulder arthroscopy - rotator cuff repair (M), subacromial decompression, distal clavicle resection, labral debridement. DOS 11/28/18.     Shoulder Pain     Patient states his shoulder was doing good for a period of time then it started to go the other way. He thought his PT has run out. He has not been going to PT in the last couple months.        SURGERY: right shoulder arthroscopy. ( [facility] )  1. Right shoulder arthroscopic rotator cuff repair, medium repair, double row technique  2. Right shoulder arthroscopic distal clavical resection  3. Right shoulder arthroscopic labral debridement  4. Right shoulder arthroscopic subacromial decompression with partial acromioplasty.    DATE OF SURGERY: 11/28/2017.    HISTORY OF PRESENT ILLNESS:  Giovanni \"Maverick\" Pao is a 52 year old male seen for postoperative evaluation of a right shoulder arthroscopy and rotator cuff repair, medium repair, distal clavical resection, labral debridement, subacromial decompression with partial acromioplasty. Surgery occurred 7 months ago. Patient returns today stating he is doing ok. He continues to have mild-moderate pain, rated a 4/10. Pain is located over the chest area (points to pectoralis area). Pain is worsened with overhead motions. He has some popping that is painful. Overall had been doing well until recently. Stopped Physical Therapy a couple of months ago on his own. We have not seen him since 6 weeks postoperative.    Of note: Occasionally will take flexeril for low back pain.    OR Findings:   Type 1 SLAP tear and anterior labral fraying. " Longitudinal striation of the subscapularis without full-thickness tear. Proximal biceps intact with minimal tendinosis with some flattening without any subluxation. Mild glenohumeral chondromalacia. Full-thickness tear of the anterior half of the supraspinatus with retraction to medial to the articular margin. Thickened subacromial bursa. Anterior inferior acromial spur and acromialclavicular arthritis.    Past medical history:   Past Medical History:   Diagnosis Date     Arthritis      Patient Active Problem List    Diagnosis Date Noted     Major depression in partial remission (H) 11/24/2013     Priority: High     Pre-diabetes 11/24/2013     Priority: High     Acute pain of right shoulder 12/12/2017     Priority: Medium     S/P right rotator cuff repair 12/12/2017     Priority: Medium     Abdominal pain, left upper quadrant 05/23/2017     Priority: Medium     Pain of toe of right foot 05/23/2017     Priority: Medium     Acute midline low back pain without sciatica 05/23/2017     Priority: Medium     Upper back pain 05/23/2017     Priority: Medium     Chronic right shoulder pain 11/10/2016     Priority: Medium     Chronic pain syndrome 07/09/2015     Priority: Medium     Patient is followed by ANIKA REYES for ongoing prescription of pain medication.  All refills should be approved by this provider, or covering partner.    Medication(s): Norco 7.5mg po three times daily 3.   Maximum quantity per month: 90  Clinic visit frequency required: Q 3 months     Controlled substance agreement on file: Yes       Date(s): 07/09/2015    Pain Clinic evaluation in the past: No       Location(s):  none     DIRE Total Score(s):    7/9/2015   Total Score 20       Last [facility] website verification:  6/1/18 no concerns    [email or web address]         Immunization reaction 01/15/2015     Priority: Medium     Epicondylitis, lateral humeral 11/26/2013     Priority: Medium     Restless leg syndrome 05/08/2013      Priority: Medium     Knee bursitis 05/08/2013     Priority: Medium      right posterior medial hamstring insertion site       Pure hypercholesterolemia 05/08/2013     Priority: Medium       Past surgical history: History reviewed. No pertinent surgical history.  Medications:   Current Outpatient Prescriptions:      carbidopa-levodopa (SINEMET)  MG per tablet, Take 1 tablet by mouth At Bedtime, Disp: 90 tablet, Rfl: 5     celecoxib (CELEBREX) 200 MG capsule, TAKE 1 CAPSULE(200 MG) BY MOUTH TWICE DAILY AS NEEDED FOR MODERATE PAIN, Disp: 60 capsule, Rfl: 0     cyclobenzaprine (FLEXERIL) 5 MG tablet, TAKE 1 TABLET(5 MG) BY MOUTH THREE TIMES DAILY AS NEEDED FOR MUSCLE SPASMS, Disp: 42 tablet, Rfl: 0     finasteride (PROSCAR) 5 MG tablet, Take 1 tablet (5 mg) by mouth daily, Disp: 90 tablet, Rfl: 1     gabapentin (NEURONTIN) 300 MG capsule, TAKE 2 CAPSULES BY MOUTH EVERY NIGHT AT BEDTIME, Disp: 60 capsule, Rfl: 0     HYDROcodone-acetaminophen (NORCO) 7.5-325 MG per tablet, Take 1 tablet by mouth every 4 hours as needed for pain maximum  4  tablet(s) per day, Disp: 60 tablet, Rfl: 0     methocarbamol (ROBAXIN) 500 MG tablet, Take 2 tablets (1,000 mg) by mouth 3 times daily as needed for muscle spasms, Disp: 60 tablet, Rfl: 1     mirtazapine (REMERON) 15 MG tablet, TAKE 1 TABLET(15 MG) BY MOUTH AT BEDTIME, Disp: 90 tablet, Rfl: 1     senna-docusate (SENOKOT-S;PERICOLACE) 8.6-50 MG per tablet, Take 1-2 tablets by mouth 2 times daily as needed for constipation, Disp: 100 tablet, Rfl: 11     simvastatin (ZOCOR) 20 MG tablet, TAKE 1 TABLET BY MOUTH EVERY DAY AT BEDTIME, Disp: 30 tablet, Rfl: 11     tamsulosin (FLOMAX) 0.4 MG capsule, TAKE 1 CAPSULE(0.4 MG) BY MOUTH DAILY, Disp: 90 capsule, Rfl: 2     tamsulosin (FLOMAX) 0.4 MG capsule, Take 1 capsule (0.4 mg) by mouth daily, Disp: 90 capsule, Rfl: 3    Allergies: No Known Allergies    REVIEW OF SYSTEMS:   CONSTITUTIONAL:NEGATIVE for fever, chills, night  "sweats  INTEGUMENTARY/SKIN: NEGATIVE for worrisome wound problems or redness.  MUSCULOSKELETAL:See HPI above  NEURO: NEGATIVE for weakness, dizziness or paresthesias    This document serves as a record of the services and decisions personally performed and made by Jose G Blanchard MD. It was created on his behalf by Merced Moran, a trained medical scribe. The creation of this document is based the provider's statements to the medical scribe.    Scribe Merced Moran 9:08 AM 7/2/2018       PHYSICAL EXAM:  /82  Pulse 71  Ht 1.588 m (5' 2.5\")  Wt 64.1 kg (141 lb 4.8 oz)  BMI 25.43 kg/m2   GENERAL APPEARANCE: healthy, alert, no distress; presents with friend today.  SKIN: no suspicious lesions or rashes  NEURO: Normal strength and tone, mentation intact and speech normal  PSYCH:  mentation appears normal and affect normal, not anxious  RESPIRATORY: No increased work of breathing.    right SHOULDER:  Shoulder Inspection: no swelling. No abnormal skin discoloration.  Incisions: healed well.  Tender: AC joint, pectoralis muscle   Range of Motion:    Active: forward flexion 170+, external rotation 60 degrees, internal rotation T10, occasional click with range of motion.  Strength: forward flexion 5-/5, external rotation 5-/5  Negative impingement.    X-RAY: none indicated.      Impression: Giovanni Cedeno is a 52 year old male 7 months post right shoulder arthroscopy and rotator cuff repair, medium repair, distal clavical resection, labral debridement, subacromial decompression with partial acromioplasty, doing well.     Plan:   * appears more acromio-clavicular joint and pectoral muscle pain. Overall shoulder itself doesn't appear to be painful. The clicks is likely due to some residual muscle weakness, instability. This should improve with continued Physical Therapy and home exercise program for rotator cuff strengthening.  * WB status: as tolerated  * Activity: return to activities per comfort  * Immobilization: " none, work on aggressive range of motion.  * Rest.  * Ice twice daily to three times daily.  * Physical Therapy updated per medium repair protocol.  * Tylenol as needed for pain  * return to clinic as needed     The information in this document, created by a scribe for me, accurately reflects the services I personally performed and the decisions made by me. I have reviewed and approved this document for accuracy.      Jose G Blanchard M.D., M.S.  Dept. of Orthopaedic Surgery  [facility]      Again, thank you for allowing me to participate in the care of your patient.        Sincerely,        Jose G Blanchard MD

## 2018-07-02 NOTE — MR AVS SNAPSHOT
After Visit Summary   7/2/2018    Giovanni Cedeno    MRN: [id number]           Patient Information     Date Of Birth          1965        Visit Information        Provider Department      7/2/2018 8:45 AM Jose G Blanchard MD [facility] [place]        Today's Diagnoses     S/P right rotator cuff repair    -  1       Follow-ups after your visit        Additional Services     [facility] PT, HAND, AND CHIROPRACTIC REFERRAL       **This order will print in the [facility] Scheduling Office**    Physical Therapy, Hand Therapy and Chiropractic Care are available through:    *[facility]  *[facility]  *[facility]    Call one number to schedule at any of the above locations: [phone].    Your provider has referred you to: Physical Therapy at [facility] or [facility] [place] [facility]    Indication/Reason for Referral: s/p right shoulder arthroscopy - rotator cuff repair (M), subacromial decompression, distal clavicle resection labral debridement   Onset of Illness: 11/28/17  Therapy Orders: Per Protocol or Clinical Pathway  Special Programs: None  Special Request: None    Demetrius Romo      Additional Comments for the Therapist or Chiropractor:     Please be aware that coverage of these services is subject to the terms and limitations of your health insurance plan.  Call member services at your health plan with any benefit or coverage questions.      Please bring the following to your appointment:    *Your personal calendar for scheduling future appointments  *Comfortable clothing                  Follow-up notes from your care team     Return if symptoms worsen or fail to improve.      Who to contact     If you have questions or need follow up information about today's clinic visit or your schedule please contact [facility] directly at [phone].  Normal or non-critical lab and imaging results will be communicated to you by  "MyChart, letter or phone within 4 business days after the clinic has received the results. If you do not hear from us within 7 days, please contact the clinic through [employer] or phone. If you have a critical or abnormal lab result, we will notify you by phone as soon as possible.  Submit refill requests through [employer] or call your pharmacy and they will forward the refill request to us. Please allow 3 business days for your refill to be completed.          Additional Information About Your Visit        [employer] Information     [employer] lets you send messages to your doctor, view your test results, renew your prescriptions, schedule appointments and more. To sign up, go to [email or web address] . Click on \"Log in\" on the left side of the screen, which will take you to the Welcome page. Then click on \"Sign up Now\" on the right side of the page.     You will be asked to enter the access code listed below, as well as some personal information. Please follow the directions to create your username and password.     Your access code is: [email or web address]  Expires: 2018 11:52 AM     Your access code will  in 90 days. If you need help or a new code, please call your [facility] clinic or [phone].        Care EveryWhere ID     This is your Care EveryWhere ID. This could be used by other organizations to access your [facility] medical records  [id number]        Your Vitals Were     Pulse Height BMI (Body Mass Index)             71 5' 2.5\" (1.588 m) 25.43 kg/m2          Blood Pressure from Last 3 Encounters:   18 122/82   17 122/78   17 116/72    Weight from Last 3 Encounters:   18 141 lb 4.8 oz (64.1 kg)   18 142 lb (64.4 kg)   18 143 lb (64.9 kg)              We Performed the Following     [facility] PT, HAND, AND CHIROPRACTIC REFERRAL        Primary Care Provider Office Phone # Fax #    Sony Velez [phone]       [address] [facility] [address]   "      Equal Access to Services     [facility] Hospital[facility] : Hadii raphael anguiano shruthijaymie Leannaali, waaxda luqadaha, qaybta kabenignosagrario song. [facility] [phone].    ATENCIÓN: Si habla español, tiene a adams disposición servicios gratuitos de asistencia lingüística. Soniaame al [phone].    We comply with applicable federal civil rights laws and [place] laws. We do not discriminate on the basis of race, color, national origin, age, disability, sex, sexual orientation, or gender identity.            Thank you!     Thank you for choosing [facility]  for your care. Our goal is always to provide you with excellent care. Hearing back from our patients is one way we can continue to improve our services. Please take a few minutes to complete the written survey that you may receive in the mail after your visit with us. Thank you!             Your Updated Medication List - Protect others around you: Learn how to safely use, store and throw away your medicines at www.disposemymeds.org.          This list is accurate as of 7/2/18 10:11 AM.  Always use your most recent med list.                   Brand Name Dispense Instructions for use Diagnosis    carbidopa-levodopa  MG per tablet    SINEMET    90 tablet    Take 1 tablet by mouth At Bedtime    Restless legs syndrome (RLS)       celecoxib 200 MG capsule    celeBREX    60 capsule    TAKE 1 CAPSULE(200 MG) BY MOUTH TWICE DAILY AS NEEDED FOR MODERATE PAIN    Upper back pain, Acute midline low back pain without sciatica       cyclobenzaprine 5 MG tablet    FLEXERIL    42 tablet    TAKE 1 TABLET(5 MG) BY MOUTH THREE TIMES DAILY AS NEEDED FOR MUSCLE SPASMS    Acute midline low back pain without sciatica, Upper back pain       finasteride 5 MG tablet    PROSCAR    90 tablet    Take 1 tablet (5 mg) by mouth daily    Benign prostatic hyperplasia with urinary frequency       gabapentin 300 MG capsule    NEURONTIN    60  capsule    TAKE 2 CAPSULES BY MOUTH EVERY NIGHT AT BEDTIME    Restless leg syndrome       HYDROcodone-acetaminophen 7.5-325 MG per tablet    NORCO    60 tablet    Take 1 tablet by mouth every 4 hours as needed for pain maximum  4  tablet(s) per day    Rotator cuff syndrome, right, Chronic pain syndrome       methocarbamol 500 MG tablet    ROBAXIN    60 tablet    Take 2 tablets (1,000 mg) by mouth 3 times daily as needed for muscle spasms    S/P rotator cuff repair, Muscle spasm of right shoulder       mirtazapine 15 MG tablet    REMERON    90 tablet    TAKE 1 TABLET(15 MG) BY MOUTH AT BEDTIME    Primary insomnia       senna-docusate 8.6-50 MG per tablet    SENOKOT-S;PERICOLACE    100 tablet    Take 1-2 tablets by mouth 2 times daily as needed for constipation    Drug-induced constipation       simvastatin 20 MG tablet    ZOCOR    30 tablet    TAKE 1 TABLET BY MOUTH EVERY DAY AT BEDTIME    Hyperlipidemia LDL goal <100, Pure hypercholesterolemia       * tamsulosin 0.4 MG capsule    FLOMAX    90 capsule    Take 1 capsule (0.4 mg) by mouth daily    Benign prostatic hyperplasia with urinary frequency       * tamsulosin 0.4 MG capsule    FLOMAX    90 capsule    TAKE 1 CAPSULE(0.4 MG) BY MOUTH DAILY    Benign prostatic hyperplasia with lower urinary tract symptoms       * Notice:  This list has 2 medication(s) that are the same as other medications prescribed for you. Read the directions carefully, and ask your doctor or other care provider to review them with you.

## 2018-07-15 DIAGNOSIS — N40.1 BENIGN PROSTATIC HYPERPLASIA WITH URINARY FREQUENCY: ICD-10-CM

## 2018-07-15 DIAGNOSIS — M54.9 UPPER BACK PAIN: ICD-10-CM

## 2018-07-15 DIAGNOSIS — Z98.890 S/P ROTATOR CUFF REPAIR: ICD-10-CM

## 2018-07-15 DIAGNOSIS — M62.838 MUSCLE SPASM OF RIGHT SHOULDER: ICD-10-CM

## 2018-07-15 DIAGNOSIS — G25.81 RESTLESS LEG SYNDROME: ICD-10-CM

## 2018-07-15 DIAGNOSIS — M54.50 ACUTE MIDLINE LOW BACK PAIN WITHOUT SCIATICA: ICD-10-CM

## 2018-07-15 DIAGNOSIS — R35.0 BENIGN PROSTATIC HYPERPLASIA WITH URINARY FREQUENCY: ICD-10-CM

## 2018-07-16 NOTE — TELEPHONE ENCOUNTER
"methocarbamol (ROBAXIN) 500 MG tablet      Last Written Prescription Date:  3/2/2018  Last Fill Quantity: 60 tablet,   # refills: 1  Last Office Visit: 12/21/2017  Future Office visit:       Routing refill request to provider for review/approval because:  Drug not on the [facility], [facility] or  Health refill protocol or controlled substance      gabapentin (NEURONTIN) 300 MG capsule      Last Written Prescription Date:  6/1/2018  Last Fill Quantity: 60 capsule,   # refills: 0  Last Office Visit: 12/21/2017  Future Office visit:       Routing refill request to provider for review/approval because:  Drug not on the [facility], P or  Health refill protocol or controlled substance             celecoxib (CELEBREX) 200 MG capsule [Pharmacy Med Name: CELECOXIB 200MG CAPSULES]  Last Written Prescription Date:  5/10/2018  Last Fill Quantity: 60 capsule,  # refills: 0   Last Office Visit  12/21/2017        with  [facility], [facility] or  Health prescribing provider:     Future Office Visit:    60 capsule 0     Sig: TAKE 1 CAPSULE(200 MG) BY MOUTH TWICE DAILY AS NEEDED FOR MODERATE PAIN    NSAID Medications Failed    7/15/2018  5:08 PM       Failed - Normal ALT on file in past 12 months    Recent Labs   Lab Test  05/23/17   0813   ALT  31          Failed - Normal AST on file in past 12 months    Recent Labs   Lab Test  05/23/17   0813   AST  17          Passed - Blood pressure under 140/90 in past 12 months    BP Readings from Last 3 Encounters:   07/02/18 122/82   12/21/17 122/78   11/14/17 116/72          Passed - Recent (12 mo) or future (30 days) visit within the authorizing provider's specialty    Patient had office visit in the last 12 months or has a visit in the next 30 days with authorizing provider or within the authorizing provider's specialty.  See \"Patient Info\" tab in inbasket, or \"Choose Columns\" in Meds & Orders section of the refill encounter.           Passed - Patient is age 6-64 years       Passed - Normal CBC on file in past 12 " "months    Recent Labs   Lab Test  11/14/17   0846   WBC  6.4   RBC  4.71   HGB  14.7   HCT  44.3   PLT  291       For [facility] ONLY: [id number] = WBC, VYPR634 = RBC         Passed - Normal serum creatinine on file in past 12 months    Recent Labs   Lab Test  11/14/17   0846   CR  0.84                      finasteride (PROSCAR) 5 MG tablet [Pharmacy Med Name: FINASTERIDE 5MG TABLETS]  Last Written Prescription Date:  12/21/2017  Last Fill Quantity: 90 tablet,  # refills: 1   Last Office Visit  12/21/2017        with  [facility], [facility] or [facility] prescribing provider:     Future Office Visit:    90 tablet 0     Sig: TAKE 1 TABLET(5 MG) BY MOUTH DAILY    Alpha Blockers Passed    7/15/2018  5:08 PM       Passed - Blood pressure under 140/90 in past 12 months    BP Readings from Last 3 Encounters:   07/02/18 122/82   12/21/17 122/78   11/14/17 116/72          Passed - Recent (12 mo) or future (30 days) visit within the authorizing provider's specialty    Patient had office visit in the last 12 months or has a visit in the next 30 days with authorizing provider or within the authorizing provider's specialty.  See \"Patient Info\" tab in inbasket, or \"Choose Columns\" in Meds & Orders section of the refill encounter.           Passed - Patient does not have Tadalafil, Vardenafil, or Sildenafil on their medication list       Passed - Patient is 18 years of age or older          "

## 2018-07-17 ENCOUNTER — THERAPY VISIT (OUTPATIENT)
Dept: PHYSICAL THERAPY | Facility: CLINIC | Age: 53
End: 2018-07-17
Payer: COMMERCIAL

## 2018-07-17 DIAGNOSIS — M25.511 ACUTE PAIN OF RIGHT SHOULDER: ICD-10-CM

## 2018-07-17 DIAGNOSIS — Z98.890 S/P RIGHT ROTATOR CUFF REPAIR: ICD-10-CM

## 2018-07-17 PROCEDURE — 97112 NEUROMUSCULAR REEDUCATION: CPT | Mod: GP | Performed by: PHYSICAL THERAPIST

## 2018-07-17 PROCEDURE — 97110 THERAPEUTIC EXERCISES: CPT | Mod: GP | Performed by: PHYSICAL THERAPIST

## 2018-07-17 RX ORDER — CELECOXIB 200 MG/1
CAPSULE ORAL
Qty: 60 CAPSULE | Refills: 0 | Status: SHIPPED | OUTPATIENT
Start: 2018-07-17 | End: 2018-10-01

## 2018-07-17 RX ORDER — METHOCARBAMOL 500 MG/1
TABLET, FILM COATED ORAL
Qty: 60 TABLET | Refills: 0 | Status: SHIPPED | OUTPATIENT
Start: 2018-07-17 | End: 2019-04-18

## 2018-07-17 RX ORDER — GABAPENTIN 300 MG/1
CAPSULE ORAL
Qty: 60 CAPSULE | Refills: 0 | Status: SHIPPED | OUTPATIENT
Start: 2018-07-17 | End: 2018-08-24

## 2018-07-17 RX ORDER — FINASTERIDE 5 MG/1
TABLET, FILM COATED ORAL
Qty: 90 TABLET | Refills: 1 | Status: SHIPPED | OUTPATIENT
Start: 2018-07-17 | End: 2019-04-18

## 2018-07-17 NOTE — TELEPHONE ENCOUNTER
Routing Rx's to PCP    Gabapentin and Robaxin:   checked 6/1/18- no concerns    Celebrex:  Labs outdated    If patient is chronic pain patient should be seen every 3 months.    Ameena Meyer RN- Triage [employer]

## 2018-07-24 ENCOUNTER — THERAPY VISIT (OUTPATIENT)
Dept: PHYSICAL THERAPY | Facility: CLINIC | Age: 53
End: 2018-07-24
Payer: COMMERCIAL

## 2018-07-24 DIAGNOSIS — Z98.890 S/P RIGHT ROTATOR CUFF REPAIR: ICD-10-CM

## 2018-07-24 DIAGNOSIS — M25.511 ACUTE PAIN OF RIGHT SHOULDER: ICD-10-CM

## 2018-07-24 PROCEDURE — 97112 NEUROMUSCULAR REEDUCATION: CPT | Mod: GP | Performed by: PHYSICAL THERAPIST

## 2018-07-24 PROCEDURE — 97110 THERAPEUTIC EXERCISES: CPT | Mod: GP | Performed by: PHYSICAL THERAPIST

## 2018-07-24 NOTE — MR AVS SNAPSHOT
"              After Visit Summary   2018    Giovanni Cedeno    MRN: [id number]           Patient Information     Date Of Birth          1965        Visit Information        Provider Department      2018 3:10 PM Juan Antonio Pastor PT [facility]        Today's Diagnoses     Acute pain of right shoulder        S/P right rotator cuff repair           Follow-ups after your visit        Who to contact     If you have questions or need follow up information about today's clinic visit or your schedule please contact [facility] directly at [phone].  Normal or non-critical lab and imaging results will be communicated to you by PostPathhart, letter or phone within 4 business days after the clinic has received the results. If you do not hear from us within 7 days, please contact the clinic through PostPathhart or phone. If you have a critical or abnormal lab result, we will notify you by phone as soon as possible.  Submit refill requests through Treatful or call your pharmacy and they will forward the refill request to us. Please allow 3 business days for your refill to be completed.          Additional Information About Your Visit        MyChart Information     Treatful lets you send messages to your doctor, view your test results, renew your prescriptions, schedule appointments and more. To sign up, go to [email or web address] . Click on \"Log in\" on the left side of the screen, which will take you to the Welcome page. Then click on \"Sign up Now\" on the right side of the page.     You will be asked to enter the access code listed below, as well as some personal information. Please follow the directions to create your username and password.     Your access code is: [email or web address]  Expires: 10/22/2018  3:53 PM     Your access code will  in 90 days. If you need help or a new code, please call your [place] clinic or [phone].        Care EveryWhere ID  "    This is your Care EveryWhere ID. This could be used by other organizations to access your [place] medical records  [id number]         Blood Pressure from Last 3 Encounters:   07/02/18 122/82   12/21/17 122/78   11/14/17 116/72    Weight from Last 3 Encounters:   07/02/18 64.1 kg (141 lb 4.8 oz)   02/19/18 64.4 kg (142 lb)   01/08/18 64.9 kg (143 lb)              We Performed the Following     NEUROMUSCULAR RE-EDUCATION     THERAPEUTIC EXERCISES        Primary Care Provider Office Phone # Fax #    Sony Medina Velez [phone] [address] [facility] [address]        Equal Access to Services     FRANKIE SCHMITT : Hadii aad ku hadasho Sokizzy, waaxda luqadaha, qaybta kaalmada adeegyada, waxanna lund . So [facility] [phone].    ATENCIÓN: Si habla español, tiene a adams disposición servicios gratuitos de asistencia lingüística. [facility] [phone].    We comply with applicable federal civil rights laws and [place] laws. We do not discriminate on the basis of race, color, national origin, age, disability, sex, sexual orientation, or gender identity.            Thank you!     Thank you for choosing [facility]  for your care. Our goal is always to provide you with excellent care. Hearing back from our patients is one way we can continue to improve our services. Please take a few minutes to complete the written survey that you may receive in the mail after your visit with us. Thank you!             Your Updated Medication List - Protect others around you: Learn how to safely use, store and throw away your medicines at www.disposemymeds.org.          This list is accurate as of 7/24/18  3:53 PM.  Always use your most recent med list.                   Brand Name Dispense Instructions for use Diagnosis    carbidopa-levodopa  MG per tablet    SINEMET    90 tablet    Take 1 tablet by mouth At Bedtime    Restless legs syndrome  (RLS)       celecoxib 200 MG capsule    celeBREX    60 capsule    TAKE 1 CAPSULE(200 MG) BY MOUTH TWICE DAILY AS NEEDED FOR MODERATE PAIN    Upper back pain, Acute midline low back pain without sciatica       cyclobenzaprine 5 MG tablet    FLEXERIL    42 tablet    TAKE 1 TABLET(5 MG) BY MOUTH THREE TIMES DAILY AS NEEDED FOR MUSCLE SPASMS    Acute midline low back pain without sciatica, Upper back pain       finasteride 5 MG tablet    PROSCAR    90 tablet    TAKE 1 TABLET(5 MG) BY MOUTH DAILY    Benign prostatic hyperplasia with urinary frequency       gabapentin 300 MG capsule    NEURONTIN    60 capsule    TAKE 2 CAPSULES BY MOUTH EVERY NIGHT AT BEDTIME    Restless leg syndrome       HYDROcodone-acetaminophen 7.5-325 MG per tablet    NORCO    60 tablet    Take 1 tablet by mouth every 4 hours as needed for pain maximum  4  tablet(s) per day    Rotator cuff syndrome, right, Chronic pain syndrome       methocarbamol 500 MG tablet    ROBAXIN    60 tablet    TAKE 2 TABLETS(1000 MG) BY MOUTH THREE TIMES DAILY AS NEEDED FOR MUSCLE SPASMS    S/P rotator cuff repair, Muscle spasm of right shoulder       mirtazapine 15 MG tablet    REMERON    90 tablet    TAKE 1 TABLET(15 MG) BY MOUTH AT BEDTIME    Primary insomnia       senna-docusate 8.6-50 MG per tablet    SENOKOT-S;PERICOLACE    100 tablet    Take 1-2 tablets by mouth 2 times daily as needed for constipation    Drug-induced constipation       simvastatin 20 MG tablet    ZOCOR    30 tablet    TAKE 1 TABLET BY MOUTH EVERY DAY AT BEDTIME    Hyperlipidemia LDL goal <100, Pure hypercholesterolemia       * tamsulosin 0.4 MG capsule    FLOMAX    90 capsule    Take 1 capsule (0.4 mg) by mouth daily    Benign prostatic hyperplasia with urinary frequency       * tamsulosin 0.4 MG capsule    FLOMAX    90 capsule    TAKE 1 CAPSULE(0.4 MG) BY MOUTH DAILY    Benign prostatic hyperplasia with lower urinary tract symptoms       * Notice:  This list has 2 medication(s) that are the same  as other medications prescribed for you. Read the directions carefully, and ask your doctor or other care provider to review them with you.

## 2018-08-09 ENCOUNTER — OFFICE VISIT (OUTPATIENT)
Dept: URGENT CARE | Facility: URGENT CARE | Age: 53
End: 2018-08-09
Payer: COMMERCIAL

## 2018-08-09 VITALS
HEART RATE: 80 BPM | WEIGHT: 132 LBS | BODY MASS INDEX: 23.76 KG/M2 | TEMPERATURE: 97.2 F | SYSTOLIC BLOOD PRESSURE: 121 MMHG | OXYGEN SATURATION: 95 % | DIASTOLIC BLOOD PRESSURE: 79 MMHG

## 2018-08-09 DIAGNOSIS — M25.561 ACUTE PAIN OF RIGHT KNEE: ICD-10-CM

## 2018-08-09 DIAGNOSIS — M25.461 SWELLING OF RIGHT KNEE JOINT: Primary | ICD-10-CM

## 2018-08-09 PROCEDURE — 99213 OFFICE O/P EST LOW 20 MIN: CPT | Performed by: FAMILY MEDICINE

## 2018-08-09 ASSESSMENT — PAIN SCALES - GENERAL: PAINLEVEL: SEVERE PAIN (6)

## 2018-08-09 NOTE — MR AVS SNAPSHOT
"              After Visit Summary   2018    Giovanni Cedeno    MRN: [id number]           Patient Information     Date Of Birth          1965        Visit Information        Provider Department      2018 6:10 PM Lester Mariscal MD [facility]        Today's Diagnoses     Swelling of right knee joint    -  1    Acute pain of right knee           Follow-ups after your visit        Who to contact     If you have questions or need follow up information about today's clinic visit or your schedule please contact [facility] directly at [phone].  Normal or non-critical lab and imaging results will be communicated to you by Lumavitahart, letter or phone within 4 business days after the clinic has received the results. If you do not hear from us within 7 days, please contact the clinic through R17t or phone. If you have a critical or abnormal lab result, we will notify you by phone as soon as possible.  Submit refill requests through Krikle or call your pharmacy and they will forward the refill request to us. Please allow 3 business days for your refill to be completed.          Additional Information About Your Visit        MyChart Information     Krikle lets you send messages to your doctor, view your test results, renew your prescriptions, schedule appointments and more. To sign up, go to [email or web address] . Click on \"Log in\" on the left side of the screen, which will take you to the Welcome page. Then click on \"Sign up Now\" on the right side of the page.     You will be asked to enter the access code listed below, as well as some personal information. Please follow the directions to create your username and password.     Your access code is: [email or web address]  Expires: 10/22/2018  3:53 PM     Your access code will  in 90 days. If you need help or a new code, please call your [facility] or [phone].        Care EveryWhere ID     This is your Care EveryWhere " ID. This could be used by other organizations to access your [place] medical records  [id number]        Your Vitals Were     Pulse Temperature Pulse Oximetry BMI (Body Mass Index)          80 97.2  F (36.2  C) (Oral) 95% 23.76 kg/m2         Blood Pressure from Last 3 Encounters:   08/09/18 121/79   07/02/18 122/82   12/21/17 122/78    Weight from Last 3 Encounters:   08/09/18 132 lb (59.9 kg)   07/02/18 141 lb 4.8 oz (64.1 kg)   02/19/18 142 lb (64.4 kg)              Today, you had the following     No orders found for display       Primary Care Provider Office Phone # Fax #    Sony Velez [phone] [address] [facility] [address]        Equal Access to Services     [facility]: Hadii aad ku hadasho Soomaali, waaxda luqadaha, qaybta kaalmada adeegyada, waxay jag lund . So [facility] [phone].    ATENCIÓN: Si habla español, tiene a adams disposición servicios gratuitos de asistencia lingüística. [facility] [phone].    We comply with applicable federal civil rights laws and [place] laws. We do not discriminate on the basis of race, color, national origin, age, disability, sex, sexual orientation, or gender identity.            Thank you!     [facility] you [facility]. Our goal is always to provide you with excellent care. Hearing back from our patients is one way we can continue to improve our services. Please take a few minutes to complete the written survey that you may receive in the mail after your visit with us. Thank you!             Your Updated Medication List - Protect others around you: Learn how to safely use, store and throw away your medicines at www.disposemymeds.org.          This list is accurate as of 8/9/18  7:21 PM.  Always use your most recent med list.                   Brand Name Dispense Instructions for use Diagnosis    carbidopa-levodopa  MG per tablet    SINEMET    90 tablet     Take 1 tablet by mouth At Bedtime    Restless legs syndrome (RLS)       celecoxib 200 MG capsule    celeBREX    60 capsule    TAKE 1 CAPSULE(200 MG) BY MOUTH TWICE DAILY AS NEEDED FOR MODERATE PAIN    Upper back pain, Acute midline low back pain without sciatica       cyclobenzaprine 5 MG tablet    FLEXERIL    42 tablet    TAKE 1 TABLET(5 MG) BY MOUTH THREE TIMES DAILY AS NEEDED FOR MUSCLE SPASMS    Acute midline low back pain without sciatica, Upper back pain       finasteride 5 MG tablet    PROSCAR    90 tablet    TAKE 1 TABLET(5 MG) BY MOUTH DAILY    Benign prostatic hyperplasia with urinary frequency       gabapentin 300 MG capsule    NEURONTIN    60 capsule    TAKE 2 CAPSULES BY MOUTH EVERY NIGHT AT BEDTIME    Restless leg syndrome       HYDROcodone-acetaminophen 7.5-325 MG per tablet    NORCO    60 tablet    Take 1 tablet by mouth every 4 hours as needed for pain maximum  4  tablet(s) per day    Rotator cuff syndrome, right, Chronic pain syndrome       methocarbamol 500 MG tablet    ROBAXIN    60 tablet    TAKE 2 TABLETS(1000 MG) BY MOUTH THREE TIMES DAILY AS NEEDED FOR MUSCLE SPASMS    S/P rotator cuff repair, Muscle spasm of right shoulder       mirtazapine 15 MG tablet    REMERON    90 tablet    TAKE 1 TABLET(15 MG) BY MOUTH AT BEDTIME    Primary insomnia       senna-docusate 8.6-50 MG per tablet    SENOKOT-S;PERICOLACE    100 tablet    Take 1-2 tablets by mouth 2 times daily as needed for constipation    Drug-induced constipation       simvastatin 20 MG tablet    ZOCOR    30 tablet    TAKE 1 TABLET BY MOUTH EVERY DAY AT BEDTIME    Hyperlipidemia LDL goal <100, Pure hypercholesterolemia       * tamsulosin 0.4 MG capsule    FLOMAX    90 capsule    Take 1 capsule (0.4 mg) by mouth daily    Benign prostatic hyperplasia with urinary frequency       * tamsulosin 0.4 MG capsule    FLOMAX    90 capsule    TAKE 1 CAPSULE(0.4 MG) BY MOUTH DAILY    Benign prostatic hyperplasia with lower urinary tract symptoms        * Notice:  This list has 2 medication(s) that are the same as other medications prescribed for you. Read the directions carefully, and ask your doctor or other care provider to review them with you.

## 2018-08-09 NOTE — PROGRESS NOTES
SUBJECTIVE:   Giovanni Cedeno is a 53 year old male who presents to clinic today for the following health issues:      Chief Complaint   Patient presents with     Knee Pain     C/o knealing wrong on right knee yesterday. Today is swollen, painful and hard to walk or bend.       Duration: 1 day    Description (location/character/radiation): was washing floor yesterday, right knee painful and swollen today     Intensity:  moderate    Accompanying signs and symptoms: no fever, chills     History (similar episodes/previous evaluation): None    Precipitating or alleviating factors: None    Therapies tried and outcome: OTC          Problem list and histories reviewed & adjusted, as indicated.  Additional history: as documented    Patient Active Problem List   Diagnosis     Restless leg syndrome     Knee bursitis     Pure hypercholesterolemia     Major depression in partial remission (H)     Pre-diabetes     Epicondylitis, lateral humeral     Immunization reaction     Chronic pain syndrome     Chronic right shoulder pain     Abdominal pain, left upper quadrant     Pain of toe of right foot     Acute midline low back pain without sciatica     Upper back pain     Acute pain of right shoulder     S/P right rotator cuff repair     History reviewed. No pertinent surgical history.    Social History   Substance Use Topics     Smoking status: Never Smoker     Smokeless tobacco: Never Used     Alcohol use No     Family History   Problem Relation Age of Onset     Diabetes Father      HEART DISEASE Father          Current Outpatient Prescriptions   Medication Sig Dispense Refill     carbidopa-levodopa (SINEMET)  MG per tablet Take 1 tablet by mouth At Bedtime 90 tablet 5     celecoxib (CELEBREX) 200 MG capsule TAKE 1 CAPSULE(200 MG) BY MOUTH TWICE DAILY AS NEEDED FOR MODERATE PAIN 60 capsule 0     cyclobenzaprine (FLEXERIL) 5 MG tablet TAKE 1 TABLET(5 MG) BY MOUTH THREE TIMES DAILY AS NEEDED FOR MUSCLE SPASMS 42 tablet 0      finasteride (PROSCAR) 5 MG tablet TAKE 1 TABLET(5 MG) BY MOUTH DAILY 90 tablet 1     gabapentin (NEURONTIN) 300 MG capsule TAKE 2 CAPSULES BY MOUTH EVERY NIGHT AT BEDTIME 60 capsule 0     HYDROcodone-acetaminophen (NORCO) 7.5-325 MG per tablet Take 1 tablet by mouth every 4 hours as needed for pain maximum  4  tablet(s) per day 60 tablet 0     methocarbamol (ROBAXIN) 500 MG tablet TAKE 2 TABLETS(1000 MG) BY MOUTH THREE TIMES DAILY AS NEEDED FOR MUSCLE SPASMS 60 tablet 0     mirtazapine (REMERON) 15 MG tablet TAKE 1 TABLET(15 MG) BY MOUTH AT BEDTIME 90 tablet 1     senna-docusate (SENOKOT-S;PERICOLACE) 8.6-50 MG per tablet Take 1-2 tablets by mouth 2 times daily as needed for constipation 100 tablet 11     simvastatin (ZOCOR) 20 MG tablet TAKE 1 TABLET BY MOUTH EVERY DAY AT BEDTIME 30 tablet 11     tamsulosin (FLOMAX) 0.4 MG capsule TAKE 1 CAPSULE(0.4 MG) BY MOUTH DAILY 90 capsule 2     tamsulosin (FLOMAX) 0.4 MG capsule Take 1 capsule (0.4 mg) by mouth daily 90 capsule 3     No Known Allergies  Recent Labs   Lab Test  11/14/17   0846  05/23/17   0813  11/10/16   0945  07/09/15   1013  03/11/14   1047  05/08/13   0924   A1C   --    --    --   5.4  5.8  6.3*   LDL   --    --   167*   --   218*  150*   HDL   --    --   53   --   48  44   TRIG   --    --   173*   --   325*  184*   ALT   --   31  29   --   29  35   CR  0.84  0.78  0.93   --    --   1.00   GFRESTIMATED  >90  >90  Non African American GFR Calc    85   --    --   79   GFRESTBLACK  >90  >90  African American GFR Calc    >90   --    --   >90   POTASSIUM  4.2  4.2   --    --    --   5.0      BP Readings from Last 3 Encounters:   08/09/18 121/79   07/02/18 122/82   12/21/17 122/78    Wt Readings from Last 3 Encounters:   08/09/18 132 lb (59.9 kg)   07/02/18 141 lb 4.8 oz (64.1 kg)   02/19/18 142 lb (64.4 kg)                  Labs reviewed in EPIC    Reviewed and updated as needed this visit by clinical staff  Tobacco  Allergies  Meds  Med Hx  Surg Hx   Fam Hx  Soc Hx      Reviewed and updated as needed this visit by Provider         ROS:  Constitutional, HEENT, cardiovascular, pulmonary, gi and gu systems are negative, except as otherwise noted.    OBJECTIVE:     /79  Pulse 80  Temp 97.2  F (36.2  C) (Oral)  Wt 132 lb (59.9 kg)  SpO2 95%  BMI 23.76 kg/m2  Body mass index is 23.76 kg/(m^2).  GENERAL: alert and no distress  EYES: Eyes grossly normal to inspection, PERRL and conjunctivae and sclerae normal  NECK: no adenopathy, no asymmetry, masses, or scars and thyroid normal to palpation  RESP: lungs clear to auscultation - no rales, rhonchi or wheezes  CV: regular rates and rhythm, normal S1 S2, no S3 or S4 and no murmur, click or rub  ABDOMEN: soft, nontender, no hepatosplenomegaly, no masses and bowel sounds normal  MS: right knee significantly swollen, tender on palpation, ROM limited due to pain and swelling, no significant erythema noted, gait antalgic  SKIN: no suspicious lesions or rashes  NEURO: Normal strength and tone, mentation intact and speech normal            ASSESSMENT/PLAN:         ICD-10-CM    1. Swelling of right knee joint M25.461    2. Acute pain of right knee M25.561        53-year-old male presents with right knee swelling, pain and difficulty walking.  Patient was cleaning floor yesterday.  Physical examination as described above.  Differentials are broad including infection and ligamentous injury.  Patient transferred to ER to delineate a specific diagnosis.  Did not get x-ray as highly unlikely fracture.  Patient understood and in agreement with above plan.  [facility] ER physician informed.  All questions answered.      Lester Mariscal MD  [facility]

## 2018-08-24 DIAGNOSIS — G25.81 RESTLESS LEG SYNDROME: ICD-10-CM

## 2018-08-24 NOTE — TELEPHONE ENCOUNTER
gabapentin (NEURONTIN) 300 MG capsule      Last Written Prescription Date:  7/17/18  Last Fill Quantity: 60 CAPSULE,   # refills: 0  Last Office Visit: 12/21/17 WITH HERBERT REYES  Future Office visit:       Routing refill request to provider for review/approval because:  Drug not on the [facility], [facility] or [facility] refill protocol or controlled substance

## 2018-08-27 RX ORDER — GABAPENTIN 300 MG/1
CAPSULE ORAL
Qty: 60 CAPSULE | Refills: 0 | Status: SHIPPED | OUTPATIENT
Start: 2018-08-27 | End: 2018-10-10

## 2018-08-27 NOTE — TELEPHONE ENCOUNTER
Controlled Substance Refill Request for gabapentin (NEURONTIN) 300 MG capsule  Problem List Complete:  No   checked in past 3 months?  Yes 6/1/18

## 2018-09-10 ENCOUNTER — OFFICE VISIT (OUTPATIENT)
Dept: FAMILY MEDICINE | Facility: CLINIC | Age: 53
End: 2018-09-10
Payer: COMMERCIAL

## 2018-09-10 VITALS
HEART RATE: 52 BPM | BODY MASS INDEX: 24.3 KG/M2 | DIASTOLIC BLOOD PRESSURE: 80 MMHG | OXYGEN SATURATION: 99 % | RESPIRATION RATE: 16 BRPM | SYSTOLIC BLOOD PRESSURE: 120 MMHG | WEIGHT: 135 LBS

## 2018-09-10 DIAGNOSIS — M25.561 ACUTE PAIN OF RIGHT KNEE: Primary | ICD-10-CM

## 2018-09-10 DIAGNOSIS — Z23 NEED FOR PROPHYLACTIC VACCINATION AND INOCULATION AGAINST INFLUENZA: ICD-10-CM

## 2018-09-10 PROCEDURE — 90471 IMMUNIZATION ADMIN: CPT | Performed by: FAMILY MEDICINE

## 2018-09-10 PROCEDURE — 90686 IIV4 VACC NO PRSV 0.5 ML IM: CPT | Performed by: FAMILY MEDICINE

## 2018-09-10 PROCEDURE — 99213 OFFICE O/P EST LOW 20 MIN: CPT | Mod: 25 | Performed by: FAMILY MEDICINE

## 2018-09-10 NOTE — MR AVS SNAPSHOT
After Visit Summary   9/10/2018    Giovanni Cedeno    MRN: [id number]           Patient Information     Date Of Birth          1965        Visit Information        Provider Department      9/10/2018 3:00 PM Sandra Brennan MD [facility]        Today's Diagnoses     Acute pain of right knee    -  1    Need for prophylactic vaccination and inoculation against influenza          Care Instructions    -Stretching and strengthening exercises, only as discussed.   -Heat/Ice 20 minutes three times daily, only as discussed.  -Please bring your medications to your next appointment with Dr. Velez, as discussed.  -Further medication refills through Dr. Velez, as discussed.  -Follow-up with Orthopedics if persistent right knee pain, as discussed.  -Follow-up in the ER immediately if worsening right knee pain with fever, redness, and swelling.            Follow-ups after your visit        Additional Services     ORTHO  REFERRAL       [facility] is referring you to the Orthopedic  Services at [facility].       The  Representative will assist you in the coordination of your Orthopedic and Musculoskeletal Care as prescribed by your physician.    The  Representative will call you within 1 business day to help schedule your appointment, or you may contact the  Representative at:    All areas ~ [phone]     Type of Referral : Non Surgical       Timeframe requested: Within 2 weeks preferred.    Coverage of these services is subject to the terms and limitations of your health insurance plan.  Please call member services at your health plan with any benefit or coverage questions.      If X-rays, CT or MRI's have been performed, please contact the facility where they were done to arrange for , prior to your scheduled appointment.  Please bring this referral  "request to your appointment and present it to your specialist.                  Who to contact     If you have questions or need follow up information about today's clinic visit or your schedule please contact [facility] directly at [phone].  Normal or non-critical lab and imaging results will be communicated to you by MyChart, letter or phone within 4 business days after the clinic has received the results. If you do not hear from us within 7 days, please contact the clinic through [employer] or phone. If you have a critical or abnormal lab result, we will notify you by phone as soon as possible.  Submit refill requests through [employer] or call your pharmacy and they will forward the refill request to us. Please allow 3 business days for your refill to be completed.          Additional Information About Your Visit        VerivueharLUMI Mask Information     [employer] lets you send messages to your doctor, view your test results, renew your prescriptions, schedule appointments and more. To sign up, go to [email or web address] . Click on \"Log in\" on the left side of the screen, which will take you to the Welcome page. Then click on \"Sign up Now\" on the right side of the page.     You will be asked to enter the access code listed below, as well as some personal information. Please follow the directions to create your username and password.     Your access code is: [email or web address]  Expires: 10/22/2018  3:53 PM     Your access code will  in 90 days. If you need help or a new code, please call your [place] clinic or [phone].        Care EveryWhere ID     This is your Care EveryWhere ID. This could be used by other organizations to access your [place] medical records  [id number]        Your Vitals Were     Pulse Respirations Pulse Oximetry BMI (Body Mass Index)          52 16 99% 24.3 kg/m2         Blood Pressure from Last 3 Encounters:   09/10/18 120/80   18 121/79   18 " 122/82    Weight from Last 3 Encounters:   09/10/18 135 lb (61.2 kg)   08/09/18 132 lb (59.9 kg)   07/02/18 141 lb 4.8 oz (64.1 kg)              We Performed the Following     FLU VACCINE, SPLIT VIRUS, IM (QUADRIVALENT) [81892]- >3 YRS     ORTHO  REFERRAL     Vaccine Administration, Initial [[id number]]        Primary Care Provider Office Phone # Fax #    Sony Medina Velez [phone] [address] [facility] [address]        Equal Access to Services     BRENDA SCHMITT : Hadii aad ku hadasho Sokizzy, waaxda luqadaha, qaybta kaalmada adeegyada, sagrario lund . So [facility] [phone].    ATENCIÓN: Si habla español, tiene a adams disposición servicios gratuitos de asistencia lingüística. [facility] [phone].    We comply with applicable federal civil rights laws and [place] laws. We do not discriminate on the basis of race, color, national origin, age, disability, sex, sexual orientation, or gender identity.            Thank you!     Thank you for choosing [facility]  for your care. Our goal is always to provide you with excellent care. Hearing back from our patients is one way we can continue to improve our services. Please take a few minutes to complete the written survey that you may receive in the mail after your visit with us. Thank you!             Your Updated Medication List - Protect others around you: Learn how to safely use, store and throw away your medicines at www.disposemymeds.org.          This list is accurate as of 9/10/18  4:12 PM.  Always use your most recent med list.                   Brand Name Dispense Instructions for use Diagnosis    carbidopa-levodopa  MG per tablet    SINEMET    90 tablet    Take 1 tablet by mouth At Bedtime    Restless legs syndrome (RLS)       celecoxib 200 MG capsule    celeBREX    60 capsule    TAKE 1 CAPSULE(200 MG) BY MOUTH TWICE DAILY AS NEEDED FOR MODERATE PAIN     Upper back pain, Acute midline low back pain without sciatica       finasteride 5 MG tablet    PROSCAR    90 tablet    TAKE 1 TABLET(5 MG) BY MOUTH DAILY    Benign prostatic hyperplasia with urinary frequency       gabapentin 300 MG capsule    NEURONTIN    60 capsule    TAKE 2 CAPSULES BY MOUTH EVERY NIGHT AT BEDTIME    Restless leg syndrome       HYDROcodone-acetaminophen 7.5-325 MG per tablet    NORCO    60 tablet    Take 1 tablet by mouth every 4 hours as needed for pain maximum  4  tablet(s) per day    Rotator cuff syndrome, right, Chronic pain syndrome       methocarbamol 500 MG tablet    ROBAXIN    60 tablet    TAKE 2 TABLETS(1000 MG) BY MOUTH THREE TIMES DAILY AS NEEDED FOR MUSCLE SPASMS    S/P rotator cuff repair, Muscle spasm of right shoulder       mirtazapine 15 MG tablet    REMERON    90 tablet    TAKE 1 TABLET(15 MG) BY MOUTH AT BEDTIME    Primary insomnia       senna-docusate 8.6-50 MG per tablet    SENOKOT-S;PERICOLACE    100 tablet    Take 1-2 tablets by mouth 2 times daily as needed for constipation    Drug-induced constipation       simvastatin 20 MG tablet    ZOCOR    30 tablet    TAKE 1 TABLET BY MOUTH EVERY DAY AT BEDTIME    Hyperlipidemia LDL goal <100, Pure hypercholesterolemia       tamsulosin 0.4 MG capsule    FLOMAX    90 capsule    TAKE 1 CAPSULE(0.4 MG) BY MOUTH DAILY    Benign prostatic hyperplasia with lower urinary tract symptoms

## 2018-09-10 NOTE — PATIENT INSTRUCTIONS
-Stretching and strengthening exercises, only as discussed.   -Heat/Ice 20 minutes three times daily, only as discussed.  -Please bring your medications to your next appointment with Dr. Velez, as discussed.  -Further medication refills through Dr. Velez, as discussed.  -Follow-up with Orthopedics if persistent right knee pain, as discussed.  -Follow-up in the ER immediately if worsening right knee pain with fever, redness, and swelling.

## 2018-09-10 NOTE — PROGRESS NOTES

## 2018-09-10 NOTE — PROGRESS NOTES
"  SUBJECTIVE:   Giovanni Cedeno is a 53 year old male who presents to clinic today for the following health issues:    Chief Complaint   Patient presents with     Knee Pain     Right Knee Pain      Duration: Condition started approximately 1 month ago, after prolonged kneeling.  Patient works in [employer].    Description  Location: Right Knee    Intensity: 4/10 severity at rest, 8/10 severity with weightbearing, kneeling, and walking.    Accompanying signs and symptoms: Patient notes a persistent \"crunching\" sensation involving his right anterior knee, but the bruising has resolved since his recent ER visit 8/9/18.  Possible swelling, but no redness, warmth, or stiffness reported.  No knee locking, catching, or instability.    History  Previous similar problem: Patient denies having right knee problems prior to 1 month ago.  Previous evaluation: Seen in the ER 8/9/18, with right knee x-ray obtained at that time.  X-ray was negative for fracture or acute bony abnormalities, per review of the ER note.  Formal Radiology report was reviewed by this examiner today, with \"swelling of the superficial tissues just inferior to the patella\" noted, suggestive of a \"superficial soft tissue injury\".  Effusion and fracture were not seen, with normal joint spacing and alignment noted by Radiology.  Ace bandage and Ibuprofen were advised by the ER, as well as heat, ice, compression, and elevation.  Opiates were not recommended, per review of the ER visit note.  Patient was advised to follow-up with primary care within 1 week, told to follow-up sooner with any worsening symptoms.    Precipitating or alleviating factors:  Trauma or overuse: YES - Overuse, related to frequent kneeling for his job.  No specific trauma reported.  Aggravating factors include: As noted above.    Therapies tried and outcome: Patient has tried all of the recommended interventions, as recommended by the ER.  Icing seems to help the best.  Patient states he " has been using Aleve 3 tablets twice daily.  Attempted to verify his medication list with him today, but patient is unable to verify his medication list.  Patient specifically states that he doesn't know if he is taking Celebrex and Flomax.  He is uncertain if Flexeril or Robaxin worked better for him in the past.    Problem list and histories reviewed & adjusted, as indicated.  Additional history: as documented    Patient Active Problem List   Diagnosis     Restless leg syndrome     Knee bursitis     Pure hypercholesterolemia     Major depression in partial remission (H)     Pre-diabetes     Epicondylitis, lateral humeral     Immunization reaction     Chronic pain syndrome     Chronic right shoulder pain     Abdominal pain, left upper quadrant     Pain of toe of right foot     Acute midline low back pain without sciatica     Upper back pain     Acute pain of right shoulder     S/P right rotator cuff repair     History reviewed. No pertinent surgical history.    Social History   Substance Use Topics     Smoking status: Never Smoker     Smokeless tobacco: Never Used     Alcohol use No     Family History   Problem Relation Age of Onset     Diabetes Father      HEART DISEASE Father          Medications: Attempted to review the patient's medication list with him at the time of his visit, but patient is uncertain which medications he is taking at this time.  Patient thinks he is out of his muscle relaxers, but he is unable to comment whether Flexeril versus Robaxin were helpful for him in the past.    No Known Allergies    Reviewed and updated as needed this visit by clinical staff       Reviewed and updated as needed this visit by Provider         ROS: Continued spasms involving the upper back.  No chest pain or shortness of breath.      OBJECTIVE:     /80  Pulse 52  Resp 16  Wt 135 lb (61.2 kg)  SpO2 99%  BMI 24.3 kg/m2  Body mass index is 24.3 kg/(m^2).    GENERAL APPEARANCE:  Awake, alert, and in no acute  distress.  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  HEENT:  Sclera anicteric.  No conjunctivitis.    NECK:  Spontaneous full range of motion.    HEART:  Normal S1, S2.  Regular rate and rhythm.  No murmurs, rubs, or gallops.  LUNGS:  No respiratory distress.  No wheezes, rales, or rhonchi.  EXTREMITIES:  Moves 4 extremities, including the right knee.     RIGHT KNEE:  No gross abnormalities, ecchymosis, erythema, edema, or obvious effusion.  Full range of active and passive range of motion.  No patellar apprehension.  Mild crepitus and tenderness to palpation noted over the patellar tendon and prepatellar bursa.  Knee is otherwise not tender to palpation.  Not tender over the medial and lateral joint lines.  Not tender with varus and valgus stress.  Anterior Drawer's negative.  Demarco's negative.  NEUROLOGIC:  Gait within normal limits.        ASSESSMENT/PLAN:       ICD-10-CM    1. Acute pain of right knee, likely patellar tendinitis versus prepatellar bursitis. M25.561 ORTHO  REFERRAL   2. Need for prophylactic vaccination and inoculation against influenza. Z23 FLU VACCINE, SPLIT VIRUS, IM (QUADRIVALENT) [11113]- >3 YRS     Vaccine Administration, Initial [44298]       Patient Instructions   -Stretching and strengthening exercises, only as discussed.   -Heat/Ice 20 minutes three times daily, only as discussed.  -Please bring your medications to your next appointment with Dr. Velez, as discussed.  -Further medication refills through Dr. Velez, as discussed.  -Follow-up with Orthopedics if persistent right knee pain, as discussed.  -Follow-up in the ER immediately if worsening right knee pain with fever, redness, and swelling.    -Discussed risks and benefits of treatment strategies, as noted in the Assessment and Plan sections.    The patient was discharged ambulatory and in stable condition post  discussion of follow up.     Sandra Brennan MD  [facility]

## 2018-09-11 ASSESSMENT — PATIENT HEALTH QUESTIONNAIRE - PHQ9: SUM OF ALL RESPONSES TO PHQ QUESTIONS 1-9: 10

## 2018-09-12 ENCOUNTER — OFFICE VISIT (OUTPATIENT)
Dept: ORTHOPEDICS | Facility: CLINIC | Age: 53
End: 2018-09-12
Payer: COMMERCIAL

## 2018-09-12 VITALS
WEIGHT: 135 LBS | SYSTOLIC BLOOD PRESSURE: 121 MMHG | HEIGHT: 63 IN | DIASTOLIC BLOOD PRESSURE: 85 MMHG | BODY MASS INDEX: 23.92 KG/M2

## 2018-09-12 DIAGNOSIS — M25.561 ACUTE PAIN OF RIGHT KNEE: Primary | ICD-10-CM

## 2018-09-12 DIAGNOSIS — S86.811D PATELLAR TENDON STRAIN, RIGHT, SUBSEQUENT ENCOUNTER: ICD-10-CM

## 2018-09-12 PROCEDURE — 99203 OFFICE O/P NEW LOW 30 MIN: CPT | Performed by: FAMILY MEDICINE

## 2018-09-12 NOTE — LETTER
"    2018         RE: Giovanni Cedeno  [address] [facility]  [place] [address]        Dear Colleague,    Thank you for referring your patient, Giovanni Cedeno, to the [facility] [place]. Please see a copy of my visit note below.    Giovanni Cedeno  :  1965  DOS: 2018  MRN: [id number]    Sports Medicine Clinic Visit    PCP: Sony Velez    Giovanni Cedeno is a 53 year old male who is seen in consultation at the request of Sandra Brennan MD presenting with right knee pain    Injury: kneeling - 3 weeks ago.  Pain located over right knee, inferior to patella, nonradiating.  Additional Features:  Positive: swelling (initially)and bruising.  Symptoms are better with bracing.  Symptoms are worse with: kneeling.  Other evaluation and/or treatments so far consists of: Tylenol and Ibuprofen.  Recent imaging completed: X-rays completed 2018 at [facility].  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    Past Medical History:   Diagnosis Date     Arthritis      No past surgical history on file.    Objective  /85  Ht 5' 2.5\" (1.588 m)  Wt 135 lb (61.2 kg)  BMI 24.3 kg/m2    General: healthy, alert and in no distress    HEENT: no scleral icterus or conjunctival erythema   Skin: no suspicious lesions or rash. No jaundice.   CV: regular rhythm by palpation, 2+ distal pulses, no pedal edema    Resp: normal respiratory effort without conversational dyspnea   Psych: normal mood and affect    Gait: nonantalgic, appropriate coordination and balance   Neuro: normal light touch sensory exam of the extremities. Motor strength as noted below "     Right Knee exam    ROM:        Full active and passive ROM with flexion and extension       Pain anteriorly with terminal flexion    Inspection:       no visible ecchymosis       edema noted with mild warmth over patellar tendon    Skin:       no visible deformities       well perfused       capillary refill brisk    Patellar Motion:        Normal patellar tracking noted through range of motion    Tender:        infrapatellar tendon    Non Tender:         remainder of knee area        medial patellar border        lateral patellar border        medial joint line        lateral joint line        along MCL        distal IT Band       pes anserine bursa       either hamstring tendon    Special Tests:        neg (-) Demarco       neg (-) Lachman       neg (-) anterior drawer       neg (-) posterior drawer       neg (-) varus at 0 deg and 30 deg       neg (-) valgus at 0 deg and 30 deg       No pain with forced extension    Evaluation of ipsilateral kinetic chain       normal strength with hip extension and abduction      Radiology  XR KNEE 2 VIEWS RIGHT8/9/2018  [facility]  Result Narrative     Clinical History: Knee pain/problem.    Procedure: XR KNEE 2 VIEWS RIGHT    Comparison: Knee MRI 3/12/2012.    Findings: There is swelling of the superficial tissues just inferior to the patella. There is no effusion. No fracture. Joint spacing and alignment are normal.    Impression: Superficial soft tissue injury. No fracture.  [facility]   Other Result Information   Interface, [employer] - 08/09/2018  9:44 PM CDT    Clinical History: Knee pain/problem.    Procedure: XR KNEE 2 VIEWS RIGHT    Comparison: Knee MRI 3/12/2012.    Findings: There is swelling of the superficial tissues just inferior to the patella. There is no effusion. No fracture. Joint spacing and alignment are normal.    Impression: Superficial soft tissue injury. No fracture.  [facility]       Assessment:  1. Acute pain of right knee    2. Patellar tendon strain, right, subsequent encounter        Plan:  Discussed the assessment with the patient.  Follow up: will contact with MR results  Bedside US shows distal infrapatellar tendon damage near tibial tubercle, suspect partial tear  Has improved significantly over the last one month  Good sx relief with patellar tendon strap, will use for comfort  RICE and limited knee flexion reviewed  PT will be considered going forward, based on clinical progress and MRI results  Cannot r/o some measure of other knee pathology or derangement, but far less likely based on US and exam  RICE reviewed  Home handouts provided and supportive care reviewed  All questions were answered today  Contact us with additional questions or concerns  Signs and sx of concern reviewed      Ata Callaway DO, YAYO  Primary Care Sports Medicine  [facility]             Disclaimer: This note consists of symbols derived from keyboarding, dictation and/or voice recognition software. As a result, there may be errors in the script that have gone undetected. Please consider this when interpreting information found in this chart.      Again, thank you for allowing me to participate in the care of your patient.        Sincerely,        Ata Callaway DO

## 2018-09-12 NOTE — PROGRESS NOTES
"Giovanni Cedeno  :  1965  DOS: 2018  MRN: [id number]    Sports Medicine Clinic Visit    PCP: Sony Velez    Giovanni Cedeno is a 53 year old male who is seen in consultation at the request of Sandra Brennan MD presenting with right knee pain    Injury: kneeling - 3 weeks ago.  Pain located over right knee, inferior to patella, nonradiating.  Additional Features:  Positive: swelling (initially)and bruising.  Symptoms are better with bracing.  Symptoms are worse with: kneeling.  Other evaluation and/or treatments so far consists of: Tylenol and Ibuprofen.  Recent imaging completed: X-rays completed 2018 at [facility].  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    Past Medical History:   Diagnosis Date     Arthritis      No past surgical history on file.    Objective  /85  Ht 5' 2.5\" (1.588 m)  Wt 135 lb (61.2 kg)  BMI 24.3 kg/m2    General: healthy, alert and in no distress    HEENT: no scleral icterus or conjunctival erythema   Skin: no suspicious lesions or rash. No jaundice.   CV: regular rhythm by palpation, 2+ distal pulses, no pedal edema    Resp: normal respiratory effort without conversational dyspnea   Psych: normal mood and affect    Gait: nonantalgic, appropriate coordination and balance   Neuro: normal light touch sensory exam of the extremities. Motor strength as noted below     Right Knee exam    ROM:        Full active and passive ROM with flexion and extension       Pain anteriorly with terminal flexion    Inspection:       no visible ecchymosis       edema noted with mild warmth over patellar tendon    Skin:       no visible deformities       " well perfused       capillary refill brisk    Patellar Motion:        Normal patellar tracking noted through range of motion    Tender:        infrapatellar tendon    Non Tender:         remainder of knee area        medial patellar border        lateral patellar border        medial joint line        lateral joint line        along MCL        distal IT Band       pes anserine bursa       either hamstring tendon    Special Tests:        neg (-) Demarco       neg (-) Lachman       neg (-) anterior drawer       neg (-) posterior drawer       neg (-) varus at 0 deg and 30 deg       neg (-) valgus at 0 deg and 30 deg       No pain with forced extension    Evaluation of ipsilateral kinetic chain       normal strength with hip extension and abduction      Radiology  XR KNEE 2 VIEWS RIGHT8/9/2018  [facility]  Result Narrative     Clinical History: Knee pain/problem.    Procedure: XR KNEE 2 VIEWS RIGHT    Comparison: Knee MRI 3/12/2012.    Findings: There is swelling of the superficial tissues just inferior to the patella. There is no effusion. No fracture. Joint spacing and alignment are normal.    Impression: Superficial soft tissue injury. No fracture.  [facility]   Other Result Information   Interface, [employer] - 08/09/2018  9:44 PM CDT    Clinical History: Knee pain/problem.    Procedure: XR KNEE 2 VIEWS RIGHT    Comparison: Knee MRI 3/12/2012.    Findings: There is swelling of the superficial tissues just inferior to the patella. There is no effusion. No fracture. Joint spacing and alignment are normal.    Impression: Superficial soft tissue injury. No fracture.  [facility]       Assessment:  1. Acute pain of right knee    2. Patellar tendon strain, right, subsequent encounter        Plan:  Discussed the assessment with the patient.  Follow up: will contact with MR results  Bedside US shows distal infrapatellar tendon damage  near tibial tubercle, suspect partial tear  Has improved significantly over the last one month  Good sx relief with patellar tendon strap, will use for comfort  RICE and limited knee flexion reviewed  PT will be considered going forward, based on clinical progress and MRI results  Cannot r/o some measure of other knee pathology or derangement, but far less likely based on US and exam  RICE reviewed  Home handouts provided and supportive care reviewed  All questions were answered today  Contact us with additional questions or concerns  Signs and sx of concern reviewed      Ata Callaway DO, CAILANA  Primary Care Sports Medicine  [facility]             Disclaimer: This note consists of symbols derived from keyboarding, dictation and/or voice recognition software. As a result, there may be errors in the script that have gone undetected. Please consider this when interpreting information found in this chart.

## 2018-09-12 NOTE — MR AVS SNAPSHOT
"              After Visit Summary   9/12/2018    Giovanni Cedeno    MRN: [id number]           Patient Information     Date Of Birth          1965        Visit Information        Provider Department      9/12/2018 6:20 PM Ata Callaway DO [facility] [place]        Today's Diagnoses     Acute pain of right knee    -  1    Patellar tendon strain, right, subsequent encounter           Follow-ups after your visit        Future tests that were ordered for you today     Open Future Orders        Priority Expected Expires Ordered    MR Knee Right w/o Contrast Routine  9/12/2019 9/12/2018            Who to contact     If you have questions or need follow up information about today's clinic visit or your schedule please contact [facility] [place] directly at [phone].  Normal or non-critical lab and imaging results will be communicated to you by "[employer]hart, letter or phone within 4 business days after the clinic has received the results. If you do not hear from us within 7 days, please contact the clinic through "[employer]hart or phone. If you have a critical or abnormal lab result, we will notify you by phone as soon as possible.  Submit refill requests through [employer] or call your pharmacy and they will forward the refill request to us. Please allow 3 business days for your refill to be completed.          Additional Information About Your Visit        "[employer]hart Information     [employer] lets you send messages to your doctor, view your test results, renew your prescriptions, schedule appointments and more. To sign up, go to [email or web address] . Click on \"Log in\" on the left side of the screen, which will take you to the Welcome page. Then click on \"Sign up Now\" on the right side of the page.     You will be asked to enter the access code listed below, as well as some personal information. Please follow the directions to create your username and password.   " "  Your access code is: [email or web address]  Expires: 10/22/2018  3:53 PM     Your access code will  in 90 days. If you need help or a new code, please call your [place] clinic or [phone].        Care EveryWhere ID     This is your Care EveryWhere ID. This could be used by other organizations to access your [place] medical records  [id number]        Your Vitals Were     Height BMI (Body Mass Index)                5' 2.5\" (1.588 m) 24.3 kg/m2           Blood Pressure from Last 3 Encounters:   18 121/85   09/10/18 120/80   18 121/79    Weight from Last 3 Encounters:   18 135 lb (61.2 kg)   09/10/18 135 lb (61.2 kg)   18 132 lb (59.9 kg)               Primary Care Provider Office Phone # Fax #    Sony Medina Velez [phone] [address] [facility] [address]        Equal Access to Services     [facility]: Hadii aad ku hadasho Soomaali, waaxda luqadaha, qaybta kaalmada adebud, sagrario lund . So [facility] [phone].    ATENCIÓN: Si habla español, tiene a adams disposición servicios gratuitos de asistencia lingüística. [facility] [phone].    We comply with applicable federal civil rights laws and [place] laws. We do not discriminate on the basis of race, color, national origin, age, disability, sex, sexual orientation, or gender identity.            Thank you!     Thank you for choosing [facility]  for your care. Our goal is always to provide you with excellent care. Hearing back from our patients is one way we can continue to improve our services. Please take a few minutes to complete the written survey that you may receive in the mail after your visit with us. Thank you!             Your Updated Medication List - Protect others around you: Learn how to safely use, store and throw away your medicines at www.disposemymeds.org.          This list is accurate as of 18  7:41 PM.  Always use " your most recent med list.                   Brand Name Dispense Instructions for use Diagnosis    carbidopa-levodopa  MG per tablet    SINEMET    90 tablet    Take 1 tablet by mouth At Bedtime    Restless legs syndrome (RLS)       celecoxib 200 MG capsule    celeBREX    60 capsule    TAKE 1 CAPSULE(200 MG) BY MOUTH TWICE DAILY AS NEEDED FOR MODERATE PAIN    Upper back pain, Acute midline low back pain without sciatica       finasteride 5 MG tablet    PROSCAR    90 tablet    TAKE 1 TABLET(5 MG) BY MOUTH DAILY    Benign prostatic hyperplasia with urinary frequency       gabapentin 300 MG capsule    NEURONTIN    60 capsule    TAKE 2 CAPSULES BY MOUTH EVERY NIGHT AT BEDTIME    Restless leg syndrome       HYDROcodone-acetaminophen 7.5-325 MG per tablet    NORCO    60 tablet    Take 1 tablet by mouth every 4 hours as needed for pain maximum  4  tablet(s) per day    Rotator cuff syndrome, right, Chronic pain syndrome       methocarbamol 500 MG tablet    ROBAXIN    60 tablet    TAKE 2 TABLETS(1000 MG) BY MOUTH THREE TIMES DAILY AS NEEDED FOR MUSCLE SPASMS    S/P rotator cuff repair, Muscle spasm of right shoulder       mirtazapine 15 MG tablet    REMERON    90 tablet    TAKE 1 TABLET(15 MG) BY MOUTH AT BEDTIME    Primary insomnia       senna-docusate 8.6-50 MG per tablet    SENOKOT-S;PERICOLACE    100 tablet    Take 1-2 tablets by mouth 2 times daily as needed for constipation    Drug-induced constipation       simvastatin 20 MG tablet    ZOCOR    30 tablet    TAKE 1 TABLET BY MOUTH EVERY DAY AT BEDTIME    Hyperlipidemia LDL goal <100, Pure hypercholesterolemia       tamsulosin 0.4 MG capsule    FLOMAX    90 capsule    TAKE 1 CAPSULE(0.4 MG) BY MOUTH DAILY    Benign prostatic hyperplasia with lower urinary tract symptoms

## 2018-09-19 ENCOUNTER — RADIANT APPOINTMENT (OUTPATIENT)
Dept: MRI IMAGING | Facility: CLINIC | Age: 53
End: 2018-09-19
Attending: FAMILY MEDICINE
Payer: COMMERCIAL

## 2018-09-19 DIAGNOSIS — M25.561 ACUTE PAIN OF RIGHT KNEE: ICD-10-CM

## 2018-09-19 DIAGNOSIS — S86.811D PATELLAR TENDON STRAIN, RIGHT, SUBSEQUENT ENCOUNTER: ICD-10-CM

## 2018-09-19 PROCEDURE — 73721 MRI JNT OF LWR EXTRE W/O DYE: CPT | Mod: TC

## 2018-09-28 ENCOUNTER — TELEPHONE (OUTPATIENT)
Dept: ORTHOPEDICS | Facility: CLINIC | Age: 53
End: 2018-09-28

## 2018-09-28 DIAGNOSIS — M25.561 ACUTE PAIN OF RIGHT KNEE: Primary | ICD-10-CM

## 2018-09-28 DIAGNOSIS — S86.811D PATELLAR TENDON STRAIN, RIGHT, SUBSEQUENT ENCOUNTER: ICD-10-CM

## 2018-09-28 NOTE — TELEPHONE ENCOUNTER
I have tried unsuccessfully to reach Maverick with his MRI results.  Overall reassuring as the bottom of his patellar tendon where it attaches to his shin bone looks good.  There is still a little fluid above this area, consistent with a resolving bursitis/injury.  He was improving at our last visit, curious if he has continued to improve.  PT available anytime, but no surgery needed and would not recommend injection for this issue.  The cyst in the back of his knee is an incidental finding, unless he has developed new pain in the back of his knee.      Please notify Maverick, thanks.

## 2018-10-01 DIAGNOSIS — M54.50 ACUTE MIDLINE LOW BACK PAIN WITHOUT SCIATICA: ICD-10-CM

## 2018-10-01 DIAGNOSIS — M54.9 UPPER BACK PAIN: ICD-10-CM

## 2018-10-01 NOTE — TELEPHONE ENCOUNTER
"Requested Prescriptions   Pending Prescriptions Disp Refills     celecoxib (CELEBREX) 200 MG capsule [Pharmacy Med Name: CELECOXIB 200MG CAPSULES]  Last Written Prescription Date:  07/17/2018  Last Fill Quantity: 60,  # refills: 0   Last office visit: 9/10/2018 with prescribing provider:  EMILY  Future Office Visit:     60 capsule 0     Sig: TAKE 1 CAPSULE(200 MG) BY MOUTH TWICE DAILY AS NEEDED FOR MODERATE PAIN    NSAID Medications Failed    10/1/2018  2:04 PM       Failed - Normal ALT on file in past 12 months    Recent Labs   Lab Test  05/23/17   0813   ALT  31            Failed - Normal AST on file in past 12 months    Recent Labs   Lab Test  05/23/17   0813   AST  17            Passed - Blood pressure under 140/90 in past 12 months    BP Readings from Last 3 Encounters:   09/12/18 121/85   09/10/18 120/80   08/09/18 121/79                Passed - Recent (12 mo) or future (30 days) visit within the authorizing provider's specialty    Patient had office visit in the last 12 months or has a visit in the next 30 days with authorizing provider or within the authorizing provider's specialty.  See \"Patient Info\" tab in inbasket, or \"Choose Columns\" in Meds & Orders section of the refill encounter.           Passed - Patient is age 6-64 years       Passed - Normal CBC on file in past 12 months    Recent Labs   Lab Test  11/14/17   0846   WBC  6.4   RBC  4.71   HGB  14.7   HCT  44.3   PLT  291       For [facility] ONLY: VMDS748 = WBC, OGJH482 = RBC         Passed - Normal serum creatinine on file in past 12 months    Recent Labs   Lab Test  11/14/17   0846   CR  0.84                 "

## 2018-10-01 NOTE — TELEPHONE ENCOUNTER
Spoke to patient discussed overall reassuring MRI results.  Discussed physical therapy as option.  He will continue with compression bracing and continuing to monitor at this time.  Patient expressed understanding and had no further questions.    Kristofer Day ATC

## 2018-10-03 RX ORDER — CELECOXIB 200 MG/1
CAPSULE ORAL
Qty: 60 CAPSULE | Refills: 0 | Status: SHIPPED | OUTPATIENT
Start: 2018-10-03 | End: 2019-04-18

## 2018-10-03 NOTE — TELEPHONE ENCOUNTER
Routing refill request to provider for review/approval because:  Labs not current:  ALT, AST    Jessica TARANGO RN  [employer] [facility]

## 2018-10-10 DIAGNOSIS — G25.81 RESTLESS LEG SYNDROME: ICD-10-CM

## 2018-10-10 NOTE — TELEPHONE ENCOUNTER
gabapentin      Last Written Prescription Date:  8/27/18  Last Fill Quantity: 60,   # refills: 0  Last Office Visit: 12/21/17  Future Office visit:       Routing refill request to provider for review/approval because:  Drug not on the G, P or [facility] refill protocol or controlled substance

## 2018-10-11 RX ORDER — GABAPENTIN 300 MG/1
CAPSULE ORAL
Qty: 60 CAPSULE | Refills: 0 | Status: SHIPPED | OUTPATIENT
Start: 2018-10-11 | End: 2018-12-02

## 2018-10-18 NOTE — TELEPHONE ENCOUNTER
Received from physical therapy dept that patient scheduled appointment with SHER[facility] for physical therapy of right knee.  Requesting order for physical therapy.  SHERIF order completed.    Kristofer Day, ATC

## 2018-10-19 ENCOUNTER — THERAPY VISIT (OUTPATIENT)
Dept: PHYSICAL THERAPY | Facility: CLINIC | Age: 53
End: 2018-10-19
Payer: COMMERCIAL

## 2018-10-19 DIAGNOSIS — M25.561 ACUTE PAIN OF RIGHT KNEE: ICD-10-CM

## 2018-10-19 DIAGNOSIS — S86.811D PATELLAR TENDON STRAIN, RIGHT, SUBSEQUENT ENCOUNTER: ICD-10-CM

## 2018-10-19 PROCEDURE — 97161 PT EVAL LOW COMPLEX 20 MIN: CPT | Mod: GP | Performed by: PHYSICAL THERAPIST

## 2018-10-19 PROCEDURE — 97035 APP MDLTY 1+ULTRASOUND EA 15: CPT | Mod: GP | Performed by: PHYSICAL THERAPIST

## 2018-10-19 PROCEDURE — 97110 THERAPEUTIC EXERCISES: CPT | Mod: GP | Performed by: PHYSICAL THERAPIST

## 2018-10-19 ASSESSMENT — ACTIVITIES OF DAILY LIVING (ADL)
AS_A_RESULT_OF_YOUR_KNEE_INJURY,_HOW_WOULD_YOU_RATE_YOUR_CURRENT_LEVEL_OF_DAILY_ACTIVITY?: SEVERELY ABNORMAL
GO DOWN STAIRS: ACTIVITY IS FAIRLY DIFFICULT
RISE FROM A CHAIR: ACTIVITY IS FAIRLY DIFFICULT
KNEEL ON THE FRONT OF YOUR KNEE: I AM UNABLE TO DO THE ACTIVITY
HOW_WOULD_YOU_RATE_THE_CURRENT_FUNCTION_OF_YOUR_KNEE_DURING_YOUR_USUAL_DAILY_ACTIVITIES_ON_A_SCALE_FROM_0_TO_100_WITH_100_BEING_YOUR_LEVEL_OF_KNEE_FUNCTION_PRIOR_TO_YOUR_INJURY_AND_0_BEING_THE_INABILITY_TO_PERFORM_ANY_OF_YOUR_USUAL_DAILY_ACTIVITIES?: 30
WALK: ACTIVITY IS FAIRLY DIFFICULT
SWELLING: THE SYMPTOM AFFECTS MY ACTIVITY MODERATELY
SQUAT: I AM UNABLE TO DO THE ACTIVITY
SIT WITH YOUR KNEE BENT: ACTIVITY IS SOMEWHAT DIFFICULT
STIFFNESS: THE SYMPTOM AFFECTS MY ACTIVITY SEVERELY
LIMPING: THE SYMPTOM AFFECTS MY ACTIVITY SEVERELY
PAIN: THE SYMPTOM AFFECTS MY ACTIVITY SEVERELY
GO UP STAIRS: ACTIVITY IS VERY DIFFICULT
WEAKNESS: THE SYMPTOM AFFECTS MY ACTIVITY SEVERELY
KNEE_ACTIVITY_OF_DAILY_LIVING_SUM: 21
GIVING WAY, BUCKLING OR SHIFTING OF KNEE: THE SYMPTOM AFFECTS MY ACTIVITY SLIGHTLY
KNEE_ACTIVITY_OF_DAILY_LIVING_SCORE: 30
RAW_SCORE: 21
HOW_WOULD_YOU_RATE_THE_OVERALL_FUNCTION_OF_YOUR_KNEE_DURING_YOUR_USUAL_DAILY_ACTIVITIES?: SEVERELY ABNORMAL
STAND: ACTIVITY IS FAIRLY DIFFICULT

## 2018-10-19 NOTE — PROGRESS NOTES
[facility] Initial Evaluation  Subjective:  Patient is a 53 year old male presenting with rehab right knee hpi. The history is provided by the patient. No  was used.   Giovanni Cedeno is a 53 year old male with a right knee condition.  Condition occurred with:  Other reason (squatting).  Condition occurred: at work.  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  It progressively got worse that day.  Pt was referred to PT on 10/19/18.    Patient reports pain:  Anterior.    Pain is described as sharp and is intermittent and reported as 4/10.  Associated symptoms:  Loss of motion/stiffness and loss of strength. Pain is the same all the time.  Symptoms are exacerbated by bending/squatting, kneeling, ascending stairs and descending stairs and relieved by NSAID's and bracing/immobilizing.  Since onset symptoms are gradually worsening.  Special tests:  MRI.      General health as reported by patient is fair.  Pertinent medical history includes:  None.    Other surgeries include:  Orthopedic surgery (shoulders, elbow).  Current medications:  None as reported by the patient.  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  Patient is currently not working due to present treatment problem.  Employment tasks: kneeling.    Barriers include:  None as reported by the patient.    Red flags:  None as reported by the patient.                        Objective:  System                                                Knee Evaluation:  ROM:  AROM: normal        Pain: end range flex and ext on R    Strength:     Extension:  Left: 5/5    Pain:-      Right: 4-/5    Pain:++  Flexion:  Left: 5/5    Pain:-      Right: 4-/5    Pain:+            Palpation:      Right knee tenderness present at:  Medial Joint Line and Patellar Tendon  Right knee tenderness not present at:  Lateral Joint Line  and Popliteal      Functional Testing:          Quad:    Single Leg Squat:  Left:      Right:        Bilateral Leg Squat:  Too painful past 30 deg               General     ROS    Assessment/Plan:    Patient is a 53 year old male with right knee complaints.    Patient has the following significant findings with corresponding treatment plan.                Diagnosis 1:  Patellar tendonitis  Pain -  US, splint/taping/bracing/orthotics, self management, education and home program  Decreased strength - therapeutic exercise, therapeutic activities and home program  Inflammation - US and self management/home program  Impaired muscle performance - neuro re-education and home program  Decreased function - therapeutic activities and home program    Therapy Evaluation Codes:   1) History comprised of:   Personal factors that impact the plan of care:      Work status.    Comorbidity factors that impact the plan of care are:      None.     Medications impacting care: None.  2) Examination of Body Systems comprised of:   Body structures and functions that impact the plan of care:      Knee.   Activity limitations that impact the plan of care are:      Squatting/kneeling and Stairs.  3) Clinical presentation characteristics are:   Stable/Uncomplicated.  4) Decision-Making    Low complexity using standardized patient assessment instrument and/or measureable assessment of functional outcome.  Cumulative Therapy Evaluation is: Low complexity.    Previous and current functional limitations:  (See Goal Flow Sheet for this information)    Short term and Long term goals: (See Goal Flow Sheet for this information)     Communication ability:  Patient appears to be able to clearly communicate and understand verbal and written communication and follow directions correctly.  Treatment Explanation - The following has been discussed with the patient:   RX ordered/plan of care  Anticipated outcomes  Possible risks and side effects  This patient  would benefit from PT intervention to resume normal activities.   Rehab potential is good.    Frequency:  1 X week, once daily  Duration:  for 4-6 weeks  Discharge Plan:  Achieve all LTG.  Independent in home treatment program.  Reach maximal therapeutic benefit.    Please refer to the daily flowsheet for treatment today, total treatment time and time spent performing 1:1 timed codes.

## 2018-10-19 NOTE — MR AVS SNAPSHOT
After Visit Summary   10/19/2018    Giovanni Cedeno    MRN: [id number]           Patient Information     Date Of Birth          1965        Visit Information        Provider Department      10/19/2018 4:10 PM Luc Rain PT [facility] [place] [facility]        Today's Diagnoses     Acute pain of right knee        Patellar tendon strain, right, subsequent encounter           Follow-ups after your visit        Your next 10 appointments already scheduled     Oct 25, 2018  5:10 PM CDT   [facility] Extremity with Luc Rain PT   [facility] [place] [facility] ([facility] [place])    [address]  [place] [address]   [phone]            Nov 01, 2018  2:30 PM CDT   [facility] Extremity with Luc Rose PTA   [facility] [place] [facility] ([facility] [place])    [address]  [place] [address]   [phone]              Who to contact     If you have questions or need follow up information about today's clinic visit or your schedule please contact [facility] directly at [phone].  Normal or non-critical lab and imaging results will be communicated to you by MyChart, letter or phone within 4 business days after the clinic has received the results. If you do not hear from us within 7 days, please contact the clinic through MyChart or phone. If you have a critical or abnormal lab result, we will notify you by phone as soon as possible.  Submit refill requests through [employer] or call your pharmacy and they will forward the refill request to us. Please allow 3 business days for your refill to be completed.          Additional Information About Your Visit        Care EveryWhere ID     This is your Care EveryWhere ID. This could be used by other organizations to access your [facility] medical records  [id number]         Blood Pressure from Last 3 Encounters:   09/12/18 121/85    09/10/18 120/80   08/09/18 121/79    Weight from Last 3 Encounters:   09/12/18 61.2 kg (135 lb)   09/10/18 61.2 kg (135 lb)   08/09/18 59.9 kg (132 lb)              We Performed the Following     HC PT EVAL, LOW COMPLEXITY     [facility] PT, HAND, AND CHIROPRACTIC REFERRAL     THERAPEUTIC EXERCISES     ULTRASOUND THERAPY        Primary Care Provider Office Phone # Fax #    Sony Medina Velez [phone] [address] [facility] [address]        Equal Access to Services     [facility]: Hadii raphael anguiano hadasho Sokizzy, waaxda luqadaha, qaybta kaalmada adechristyyaisaías, sagrario lund . So [facility] [phone].    ATENCIÓN: Si habla español, tiene a adams disposición servicios gratuitos de asistencia lingüística. [facility] [phone].    We comply with applicable federal civil rights laws and [place] laws. We do not discriminate on the basis of race, color, national origin, age, disability, sex, sexual orientation, or gender identity.            Thank you!     Thank you for choosing [facility]  for your care. Our goal is always to provide you with excellent care. Hearing back from our patients is one way we can continue to improve our services. Please take a few minutes to complete the written survey that you may receive in the mail after your visit with us. Thank you!             Your Updated Medication List - Protect others around you: Learn how to safely use, store and throw away your medicines at www.disposemymeds.org.          This list is accurate as of 10/19/18  7:43 PM.  Always use your most recent med list.                   Brand Name Dispense Instructions for use Diagnosis    carbidopa-levodopa  MG per tablet    SINEMET    90 tablet    Take 1 tablet by mouth At Bedtime    Restless legs syndrome (RLS)       celecoxib 200 MG capsule    celeBREX    60 capsule    TAKE 1 CAPSULE(200 MG) BY MOUTH TWICE DAILY AS NEEDED FOR MODERATE  PAIN    Upper back pain, Acute midline low back pain without sciatica       finasteride 5 MG tablet    PROSCAR    90 tablet    TAKE 1 TABLET(5 MG) BY MOUTH DAILY    Benign prostatic hyperplasia with urinary frequency       gabapentin 300 MG capsule    NEURONTIN    60 capsule    TAKE 2 CAPSULES BY MOUTH EVERY NIGHT AT BEDTIME    Restless leg syndrome       HYDROcodone-acetaminophen 7.5-325 MG per tablet    NORCO    60 tablet    Take 1 tablet by mouth every 4 hours as needed for pain maximum  4  tablet(s) per day    Rotator cuff syndrome, right, Chronic pain syndrome       methocarbamol 500 MG tablet    ROBAXIN    60 tablet    TAKE 2 TABLETS(1000 MG) BY MOUTH THREE TIMES DAILY AS NEEDED FOR MUSCLE SPASMS    S/P rotator cuff repair, Muscle spasm of right shoulder       mirtazapine 15 MG tablet    REMERON    90 tablet    TAKE 1 TABLET(15 MG) BY MOUTH AT BEDTIME    Primary insomnia       senna-docusate 8.6-50 MG per tablet    SENOKOT-S;PERICOLACE    100 tablet    Take 1-2 tablets by mouth 2 times daily as needed for constipation    Drug-induced constipation       simvastatin 20 MG tablet    ZOCOR    30 tablet    TAKE 1 TABLET BY MOUTH EVERY DAY AT BEDTIME    Hyperlipidemia LDL goal <100, Pure hypercholesterolemia       tamsulosin 0.4 MG capsule    FLOMAX    90 capsule    TAKE 1 CAPSULE(0.4 MG) BY MOUTH DAILY    Benign prostatic hyperplasia with lower urinary tract symptoms

## 2018-10-25 ENCOUNTER — THERAPY VISIT (OUTPATIENT)
Dept: PHYSICAL THERAPY | Facility: CLINIC | Age: 53
End: 2018-10-25
Payer: COMMERCIAL

## 2018-10-25 DIAGNOSIS — S86.811D PATELLAR TENDON STRAIN, RIGHT, SUBSEQUENT ENCOUNTER: ICD-10-CM

## 2018-10-25 DIAGNOSIS — M25.561 ACUTE PAIN OF RIGHT KNEE: ICD-10-CM

## 2018-10-25 PROCEDURE — 97112 NEUROMUSCULAR REEDUCATION: CPT | Mod: GP | Performed by: PHYSICAL THERAPIST

## 2018-10-25 PROCEDURE — 97035 APP MDLTY 1+ULTRASOUND EA 15: CPT | Mod: GP | Performed by: PHYSICAL THERAPIST

## 2018-10-25 PROCEDURE — 97110 THERAPEUTIC EXERCISES: CPT | Mod: GP | Performed by: PHYSICAL THERAPIST

## 2018-10-25 NOTE — PROGRESS NOTES
Subjective:  HPI                    Objective:  System    Physical Exam    General     ROS    Assessment/Plan:    SUBJECTIVE  Subjective: Pt states he has already seen good improvement.  Less pain with his exercises.  Really liked the tape   Current Pain level: 4/10   Changes in function:  None    Adverse reaction to treatment or activity:  None    OBJECTIVE  Objective: Less pain at end range extension.  Significantly less tenderness at R patellar tendon     ASSESSMENT  Giovanni continues to require intervention to meet STG and LTG's: PT  Patient is progressing as expected.  Response to therapy has shown an improvement in  pain level  Progress made towards STG/LTG?  no goals met to date    PLAN  Current treatment program is being advanced to more complex exercises.    PTA/ATC plan:  N/A    Please refer to the daily flowsheet for treatment today, total treatment time and time spent performing 1:1 timed codes.

## 2018-10-25 NOTE — MR AVS SNAPSHOT
After Visit Summary   10/25/2018    Giovanni Cedeno    MRN: [id number]           Patient Information     Date Of Birth          1965        Visit Information        Provider Department      10/25/2018 5:10 PM Luc Rain PT [facility] [place] [facility]        Today's Diagnoses     Acute pain of right knee        Patellar tendon strain, right, subsequent encounter           Follow-ups after your visit        Your next 10 appointments already scheduled     Nov 01, 2018  2:30 PM CDT   SHERIF Extremity with Luc Rose PTA   [facility] [place] [facility] ([facility] [place])    [address]  [address]  [place] [address] [phone]              Who to contact     If you have questions or need follow up information about today's clinic visit or your schedule please contact [facility] directly at [phone].  Normal or non-critical lab and imaging results will be communicated to you by MyChart, letter or phone within 4 business days after the clinic has received the results. If you do not hear from us within 7 days, please contact the clinic through MyChart or phone. If you have a critical or abnormal lab result, we will notify you by phone as soon as possible.  Submit refill requests through [employer] or call your pharmacy and they will forward the refill request to us. Please allow 3 business days for your refill to be completed.          Additional Information About Your Visit        Care EveryWhere ID     This is your Care EveryWhere ID. This could be used by other organizations to access your [facility] medical records  [id number]         Blood Pressure from Last 3 Encounters:   09/12/18 121/85   09/10/18 120/80   08/09/18 121/79    Weight from Last 3 Encounters:   09/12/18 61.2 kg (135 lb)   09/10/18 61.2 kg (135 lb)   08/09/18 59.9 kg (132 lb)              We Performed the Following     NEUROMUSCULAR  RE-EDUCATION     THERAPEUTIC EXERCISES     ULTRASOUND THERAPY        Primary Care Provider Office Phone # Fax #    Sony Medina Velez [phone] [address] [facility] [address]        Equal Access to Services     FRANKIE SCHMITT : Hadii raphael ku shruthio Soomaali, waaxda luqadaha, qaybta kaalmada adeegyada, sagrario mariin hayaan yobanichristy wagoner laMehnazroverto . So [facility] [phone].    ATENCIÓN: Si habla español, tiene a adams disposición servicios gratuitos de asistencia lingüística. Llame al [phone].    We comply with applicable federal civil rights laws and [place] laws. We do not discriminate on the basis of race, color, national origin, age, disability, sex, sexual orientation, or gender identity.            Thank you!     Thank you for choosing [facility]   for your care. Our goal is always to provide you with excellent care. Hearing back from our patients is one way we can continue to improve our services. Please take a few minutes to complete the written survey that you may receive in the mail after your visit with us. Thank you!             Your Updated Medication List - Protect others around you: Learn how to safely use, store and throw away your medicines at www.disposemymeds.org.          This list is accurate as of 10/25/18  5:57 PM.  Always use your most recent med list.                   Brand Name Dispense Instructions for use Diagnosis    carbidopa-levodopa  MG per tablet    SINEMET    90 tablet    Take 1 tablet by mouth At Bedtime    Restless legs syndrome (RLS)       celecoxib 200 MG capsule    celeBREX    60 capsule    TAKE 1 CAPSULE(200 MG) BY MOUTH TWICE DAILY AS NEEDED FOR MODERATE PAIN    Upper back pain, Acute midline low back pain without sciatica       finasteride 5 MG tablet    PROSCAR    90 tablet    TAKE 1 TABLET(5 MG) BY MOUTH DAILY    Benign prostatic hyperplasia with urinary frequency       gabapentin 300 MG capsule     NEURONTIN    60 capsule    TAKE 2 CAPSULES BY MOUTH EVERY NIGHT AT BEDTIME    Restless leg syndrome       HYDROcodone-acetaminophen 7.5-325 MG per tablet    NORCO    60 tablet    Take 1 tablet by mouth every 4 hours as needed for pain maximum  4  tablet(s) per day    Rotator cuff syndrome, right, Chronic pain syndrome       methocarbamol 500 MG tablet    ROBAXIN    60 tablet    TAKE 2 TABLETS(1000 MG) BY MOUTH THREE TIMES DAILY AS NEEDED FOR MUSCLE SPASMS    S/P rotator cuff repair, Muscle spasm of right shoulder       mirtazapine 15 MG tablet    REMERON    90 tablet    TAKE 1 TABLET(15 MG) BY MOUTH AT BEDTIME    Primary insomnia       senna-docusate 8.6-50 MG per tablet    SENOKOT-S;PERICOLACE    100 tablet    Take 1-2 tablets by mouth 2 times daily as needed for constipation    Drug-induced constipation       simvastatin 20 MG tablet    ZOCOR    30 tablet    TAKE 1 TABLET BY MOUTH EVERY DAY AT BEDTIME    Hyperlipidemia LDL goal <100, Pure hypercholesterolemia       tamsulosin 0.4 MG capsule    FLOMAX    90 capsule    TAKE 1 CAPSULE(0.4 MG) BY MOUTH DAILY    Benign prostatic hyperplasia with lower urinary tract symptoms

## 2018-11-01 ENCOUNTER — THERAPY VISIT (OUTPATIENT)
Dept: PHYSICAL THERAPY | Facility: CLINIC | Age: 53
End: 2018-11-01
Payer: COMMERCIAL

## 2018-11-01 DIAGNOSIS — S86.811D PATELLAR TENDON STRAIN, RIGHT, SUBSEQUENT ENCOUNTER: ICD-10-CM

## 2018-11-01 DIAGNOSIS — M25.561 ACUTE PAIN OF RIGHT KNEE: ICD-10-CM

## 2018-11-01 PROCEDURE — 97112 NEUROMUSCULAR REEDUCATION: CPT | Mod: GP | Performed by: PHYSICAL THERAPY ASSISTANT

## 2018-11-01 PROCEDURE — 97110 THERAPEUTIC EXERCISES: CPT | Mod: GP | Performed by: PHYSICAL THERAPY ASSISTANT

## 2018-11-01 PROCEDURE — 97035 APP MDLTY 1+ULTRASOUND EA 15: CPT | Mod: GP | Performed by: PHYSICAL THERAPY ASSISTANT

## 2018-11-01 NOTE — PROGRESS NOTES
Subjective:  HPI                    Objective:  System    Physical Exam    General     ROS    Assessment/Plan:    SUBJECTIVE  Subjective changes as noted by pt:  Pt is feeling less sore along the right knee but, he has not really tried to kneel on it for work yet.    Current pain level: 3/10   Changes in function:  Yes (See Goal flowsheet attached for changes in current functional level)     Adverse reaction to treatment or activity:  None    OBJECTIVE  Changes in objective findings:  Pt remains tender along the right patellar insertion.  AROM: right knee extension 4 degrees. Right SLR noted with 4 degree extensor lag.      ASSESSMENT  Giovanni continues to require intervention to meet STG and LTG's: PT  Patient's symptoms are resolving.  Patient is progressing as expected.  Response to therapy has shown an improvement in  pain level and muscle control  Progress made towards STG/LTG?  Yes (See Goal flowsheet attached for updates on achievement of STG and LTG)    PLAN  Continue current treatment plan until patient demonstrates readiness to progress to higher level exercises.    PTA/ATC plan:  Will continue with present plan of care.    Please refer to the daily flowsheet for treatment today, total treatment time and time spent performing 1:1 timed codes.

## 2018-11-01 NOTE — MR AVS SNAPSHOT
After Visit Summary   11/1/2018    Giovanni Cedeno    MRN: [id number]           Patient Information     Date Of Birth          1965        Visit Information        Provider Department      11/1/2018 2:30 PM Luc Rose PTA [facility] [place] [facility]        Today's Diagnoses     Acute pain of right knee        Patellar tendon strain, right, subsequent encounter           Follow-ups after your visit        Who to contact     If you have questions or need follow up information about today's clinic visit or your schedule please contact [facility] directly at [phone].  Normal or non-critical lab and imaging results will be communicated to you by MyChart, letter or phone within 4 business days after the clinic has received the results. If you do not hear from us within 7 days, please contact the clinic through MyChart or phone. If you have a critical or abnormal lab result, we will notify you by phone as soon as possible.  Submit refill requests through [employer] or call your pharmacy and they will forward the refill request to us. Please allow 3 business days for your refill to be completed.          Additional Information About Your Visit        Care EveryWhere ID     This is your Care EveryWhere ID. This could be used by other organizations to access your [place] medical records  [id number]         Blood Pressure from Last 3 Encounters:   09/12/18 121/85   09/10/18 120/80   08/09/18 121/79    Weight from Last 3 Encounters:   09/12/18 61.2 kg (135 lb)   09/10/18 61.2 kg (135 lb)   08/09/18 59.9 kg (132 lb)              We Performed the Following     Neuromuscular Re-Education     Therapeutic Exercises     Ultrasound Therapy        Primary Care Provider Office Phone # Fax #    Sony Velez [phone] [address] [facility] [address]        Equal Access to Services     FRANKIE SCHMITT AH: Kimi anguiano  kianna Dia, wacalvinda lulatriceadaha, qaybta kabenignoda philippe, sagrario mariin hayaajanna hiltonchristy yevgeniyderek laMehnazroverto roselyn. So [facility] [phone].    ATENCIÓN: Si shirleyla lee, tiene a adams disposición servicios gratuitos de asistencia lingüística. Palak al [phone].    We comply with applicable federal civil rights laws and [place] laws. We do not discriminate on the basis of race, color, national origin, age, disability, sex, sexual orientation, or gender identity.            Thank you!     Thank you for choosing [facility]  for your care. Our goal is always to provide you with excellent care. Hearing back from our patients is one way we can continue to improve our services. Please take a few minutes to complete the written survey that you may receive in the mail after your visit with us. Thank you!             Your Updated Medication List - Protect others around you: Learn how to safely use, store and throw away your medicines at www.disposemymeds.org.          This list is accurate as of 11/1/18  4:00 PM.  Always use your most recent med list.                   Brand Name Dispense Instructions for use Diagnosis    carbidopa-levodopa  MG per tablet    SINEMET    90 tablet    Take 1 tablet by mouth At Bedtime    Restless legs syndrome (RLS)       celecoxib 200 MG capsule    celeBREX    60 capsule    TAKE 1 CAPSULE(200 MG) BY MOUTH TWICE DAILY AS NEEDED FOR MODERATE PAIN    Upper back pain, Acute midline low back pain without sciatica       finasteride 5 MG tablet    PROSCAR    90 tablet    TAKE 1 TABLET(5 MG) BY MOUTH DAILY    Benign prostatic hyperplasia with urinary frequency       gabapentin 300 MG capsule    NEURONTIN    60 capsule    TAKE 2 CAPSULES BY MOUTH EVERY NIGHT AT BEDTIME    Restless leg syndrome       HYDROcodone-acetaminophen 7.5-325 MG per tablet    NORCO    60 tablet    Take 1 tablet by mouth every 4 hours as needed for pain maximum  4  tablet(s) per day    Rotator cuff  syndrome, right, Chronic pain syndrome       methocarbamol 500 MG tablet    ROBAXIN    60 tablet    TAKE 2 TABLETS(1000 MG) BY MOUTH THREE TIMES DAILY AS NEEDED FOR MUSCLE SPASMS    S/P rotator cuff repair, Muscle spasm of right shoulder       mirtazapine 15 MG tablet    REMERON    90 tablet    TAKE 1 TABLET(15 MG) BY MOUTH AT BEDTIME    Primary insomnia       senna-docusate 8.6-50 MG per tablet    SENOKOT-S;PERICOLACE    100 tablet    Take 1-2 tablets by mouth 2 times daily as needed for constipation    Drug-induced constipation       simvastatin 20 MG tablet    ZOCOR    30 tablet    TAKE 1 TABLET BY MOUTH EVERY DAY AT BEDTIME    Hyperlipidemia LDL goal <100, Pure hypercholesterolemia       tamsulosin 0.4 MG capsule    FLOMAX    90 capsule    TAKE 1 CAPSULE(0.4 MG) BY MOUTH DAILY    Benign prostatic hyperplasia with lower urinary tract symptoms

## 2018-11-27 ENCOUNTER — THERAPY VISIT (OUTPATIENT)
Dept: PHYSICAL THERAPY | Facility: CLINIC | Age: 53
End: 2018-11-27
Payer: COMMERCIAL

## 2018-11-27 DIAGNOSIS — M25.561 ACUTE PAIN OF RIGHT KNEE: ICD-10-CM

## 2018-11-27 DIAGNOSIS — J45.20 MILD INTERMITTENT ASTHMA, UNSPECIFIED WHETHER COMPLICATED: Primary | ICD-10-CM

## 2018-11-27 DIAGNOSIS — S86.811D PATELLAR TENDON STRAIN, RIGHT, SUBSEQUENT ENCOUNTER: ICD-10-CM

## 2018-11-27 DIAGNOSIS — M25.811 SHOULDER IMPINGEMENT, RIGHT: ICD-10-CM

## 2018-11-27 PROCEDURE — 97035 APP MDLTY 1+ULTRASOUND EA 15: CPT | Mod: GP | Performed by: PHYSICAL THERAPIST

## 2018-11-27 PROCEDURE — 97112 NEUROMUSCULAR REEDUCATION: CPT | Mod: GP | Performed by: PHYSICAL THERAPIST

## 2018-11-27 PROCEDURE — 97110 THERAPEUTIC EXERCISES: CPT | Mod: GP | Performed by: PHYSICAL THERAPIST

## 2018-11-27 RX ORDER — ALBUTEROL SULFATE 90 UG/1
2 AEROSOL, METERED RESPIRATORY (INHALATION) EVERY 6 HOURS PRN
Qty: 1 INHALER | Refills: 11 | Status: SHIPPED | OUTPATIENT
Start: 2018-11-27 | End: 2019-04-18

## 2018-11-27 NOTE — MR AVS SNAPSHOT
After Visit Summary   11/27/2018    Giovanni Cedeno    MRN: [id number]           Patient Information     Date Of Birth          1965        Visit Information        Provider Department      11/27/2018 11:20 AM Luc Rain, PT [facility] [place] [facility]        Today's Diagnoses     Acute pain of right knee        Patellar tendon strain, right, subsequent encounter           Follow-ups after your visit        Your next 10 appointments already scheduled     Dec 04, 2018  8:40 AM CST   SHERIF Extremity with Luc Rose, PTA   [facility] [place] [facility] ([facility] [place])    [address]  [place] [address]   [phone]            Dec 11, 2018  9:20 AM CST   SHERIF Extremity with Luc Rain, PT   [facility] [place] [facility] ([facility] [place])    [address]  [place] [address]   [phone]            Dec 18, 2018  8:40 AM CST   SHERIF Extremity with Luc Rain, PT   [facility] [place] [facility] ([facility] [place])    [address]  [place] [address]   [phone]              Who to contact     If you have questions or need follow up information about today's clinic visit or your schedule please contact [facility] directly at [phone].  Normal or non-critical lab and imaging results will be communicated to you by MyChart, letter or phone within 4 business days after the clinic has received the results. If you do not hear from us within 7 days, please contact the clinic through MyChart or phone. If you have a critical or abnormal lab result, we will notify you by phone as soon as possible.  Submit refill requests through [email or web address] or call your pharmacy and they will forward the refill request to us. Please allow 3 business days for your refill to be completed.          Additional Information About Your  Visit        Care EveryWhere ID     This is your Care EveryWhere ID. This could be used by other organizations to access your [facility] medical records  [id number]         Blood Pressure from Last 3 Encounters:   09/12/18 121/85   09/10/18 120/80   08/09/18 121/79    Weight from Last 3 Encounters:   09/12/18 61.2 kg (135 lb)   09/10/18 61.2 kg (135 lb)   08/09/18 59.9 kg (132 lb)              We Performed the Following     NEUROMUSCULAR RE-EDUCATION     THERAPEUTIC EXERCISES     ULTRASOUND THERAPY          Today's Medication Changes          These changes are accurate as of 11/27/18  1:00 PM.  If you have any questions, ask your nurse or doctor.               Start taking these medicines.        Dose/Directions    albuterol 108 (90 Base) MCG/ACT inhaler   Commonly known as:  PROAIR HFA/PROVENTIL HFA/VENTOLIN HFA   Used for:  Mild intermittent asthma, unspecified whether complicated   Started by:  Sony Velez MD        Dose:  2 puff   Inhale 2 puffs into the lungs every 6 hours as needed for shortness of breath / dyspnea or wheezing   Quantity:  1 Inhaler   Refills:  11            Where to get your medicines      These medications were sent to [facility] [address] [place] [address] & [facility]  [id number] [place] [address]    Hours:  24-hours Phone:  [phone]     albuterol 108 (90 Base) MCG/ACT inhaler                Primary Care Provider Office Phone # Fax #    Sony Velez [phone] [address] [facility] [address]        Equal Access to Services     FRANKIE SCHMITT : Hadii raphael Dia, waaxda luqadaha, qaybta kaalmasagrario song. So [facility] [phone].    ATENCIÓN: Si habla español, tiene a adams disposición servicios gratuitos de asistencia lingüística. Palak al [phone].    We comply with applicable federal civil rights laws and  [place] laws. We do not discriminate on the basis of race, color, national origin, age, disability, sex, sexual orientation, or gender identity.            Thank you!     Thank you for choosing [facility]  for your care. Our goal is always to provide you with excellent care. Hearing back from our patients is one way we can continue to improve our services. Please take a few minutes to complete the written survey that you may receive in the mail after your visit with us. Thank you!             Your Updated Medication List - Protect others around you: Learn how to safely use, store and throw away your medicines at www.disposemymeds.org.          This list is accurate as of 11/27/18  1:00 PM.  Always use your most recent med list.                   Brand Name Dispense Instructions for use Diagnosis    albuterol 108 (90 Base) MCG/ACT inhaler    PROAIR HFA/PROVENTIL HFA/VENTOLIN HFA    1 Inhaler    Inhale 2 puffs into the lungs every 6 hours as needed for shortness of breath / dyspnea or wheezing    Mild intermittent asthma, unspecified whether complicated       carbidopa-levodopa  MG per tablet    SINEMET    90 tablet    Take 1 tablet by mouth At Bedtime    Restless legs syndrome (RLS)       celecoxib 200 MG capsule    celeBREX    60 capsule    TAKE 1 CAPSULE(200 MG) BY MOUTH TWICE DAILY AS NEEDED FOR MODERATE PAIN    Upper back pain, Acute midline low back pain without sciatica       finasteride 5 MG tablet    PROSCAR    90 tablet    TAKE 1 TABLET(5 MG) BY MOUTH DAILY    Benign prostatic hyperplasia with urinary frequency       gabapentin 300 MG capsule    NEURONTIN    60 capsule    TAKE 2 CAPSULES BY MOUTH EVERY NIGHT AT BEDTIME    Restless leg syndrome       HYDROcodone-acetaminophen 7.5-325 MG per tablet    NORCO    60 tablet    Take 1 tablet by mouth every 4 hours as needed for pain maximum  4  tablet(s) per day    Rotator cuff syndrome, right, Chronic pain syndrome        methocarbamol 500 MG tablet    ROBAXIN    60 tablet    TAKE 2 TABLETS(1000 MG) BY MOUTH THREE TIMES DAILY AS NEEDED FOR MUSCLE SPASMS    S/P rotator cuff repair, Muscle spasm of right shoulder       mirtazapine 15 MG tablet    REMERON    90 tablet    TAKE 1 TABLET(15 MG) BY MOUTH AT BEDTIME    Primary insomnia       senna-docusate 8.6-50 MG tablet    SENOKOT-S/PERICOLACE    100 tablet    Take 1-2 tablets by mouth 2 times daily as needed for constipation    Drug-induced constipation       simvastatin 20 MG tablet    ZOCOR    30 tablet    TAKE 1 TABLET BY MOUTH EVERY DAY AT BEDTIME    Hyperlipidemia LDL goal <100, Pure hypercholesterolemia       tamsulosin 0.4 MG capsule    FLOMAX    90 capsule    TAKE 1 CAPSULE(0.4 MG) BY MOUTH DAILY    Benign prostatic hyperplasia with lower urinary tract symptoms

## 2018-11-27 NOTE — PROGRESS NOTES
Subjective:  HPI                    Objective:  System    Physical Exam    General     ROS    Assessment/Plan:    SUBJECTIVE  Subjective: Knee is doing a lot better.  Has to work on a floor tomorrow and will  be kneeling so that will be a good test.  But walking and squatting are going well   Current Pain level: 2/10   Changes in function:  Yes (See Goal flowsheet attached for changes in current functional level)     Adverse reaction to treatment or activity:  None    OBJECTIVE  Objective: R knee AROM WNL.  Improving quad contraction but still not as strong as L     ASSESSMENT  Giovanni continues to require intervention to meet STG and LTG's: PT  Patient is progressing as expected.  Response to therapy has shown an improvement in  pain level, ROM  and strength  Progress made towards STG/LTG?  Yes (See Goal flowsheet attached for updates on achievement of STG and LTG)    PLAN  Current treatment program is being advanced to more complex exercises.    PTA/ATC plan:  N/A    Please refer to the daily flowsheet for treatment today, total treatment time and time spent performing 1:1 timed codes.

## 2018-12-02 DIAGNOSIS — G25.81 RESTLESS LEG SYNDROME: ICD-10-CM

## 2018-12-03 RX ORDER — GABAPENTIN 300 MG/1
CAPSULE ORAL
Qty: 60 CAPSULE | Refills: 0 | Status: SHIPPED | OUTPATIENT
Start: 2018-12-03 | End: 2019-01-07

## 2018-12-03 NOTE — TELEPHONE ENCOUNTER
gabapentin (NEURONTIN) 300 MG capsule      Last Written Prescription Date:  10/11/2018  Last Fill Quantity: 60 capsule,  # refills: 0   Last office visit: 9/10/2018 with prescribing provider:  Anu   Future Office Visit:           Routing refill request to provider for review/approval because:  Drug not on the [facility], [facility] or [facility] refill protocol or controlled substance

## 2018-12-04 ENCOUNTER — THERAPY VISIT (OUTPATIENT)
Dept: PHYSICAL THERAPY | Facility: CLINIC | Age: 53
End: 2018-12-04
Payer: COMMERCIAL

## 2018-12-04 DIAGNOSIS — M25.561 ACUTE PAIN OF RIGHT KNEE: ICD-10-CM

## 2018-12-04 DIAGNOSIS — S86.811D PATELLAR TENDON STRAIN, RIGHT, SUBSEQUENT ENCOUNTER: ICD-10-CM

## 2018-12-04 PROCEDURE — 97110 THERAPEUTIC EXERCISES: CPT | Mod: GP | Performed by: PHYSICAL THERAPY ASSISTANT

## 2018-12-04 PROCEDURE — 97112 NEUROMUSCULAR REEDUCATION: CPT | Mod: GP | Performed by: PHYSICAL THERAPY ASSISTANT

## 2018-12-04 PROCEDURE — 97035 APP MDLTY 1+ULTRASOUND EA 15: CPT | Mod: GP | Performed by: PHYSICAL THERAPY ASSISTANT

## 2018-12-04 NOTE — MR AVS SNAPSHOT
After Visit Summary   12/4/2018    Giovanni Cedeno    MRN: [id number]           Patient Information     Date Of Birth          1965        Visit Information        Provider Department      12/4/2018 8:40 AM Luc Rose PTA [facility] [place] [facility]        Today's Diagnoses     Acute pain of right knee        Patellar tendon strain, right, subsequent encounter           Follow-ups after your visit        Your next 10 appointments already scheduled     Dec 11, 2018  9:20 AM CST   SHERIF Extremity with Luc Rain PT   [facility] [place] [facility] ([facility] [place])    [address]  [place] [address]   [phone]            Dec 18, 2018  8:40 AM CST   SHERIF Extremity with Luc Rain PT   [facility] [place] [facility] ([facility] [place])    [address]  [place] [id number] [phone]              Who to contact     If you have questions or need follow up information about today's clinic visit or your schedule please contact [facility] directly at [phone].  Normal or non-critical lab and imaging results will be communicated to you by MyChart, letter or phone within 4 business days after the clinic has received the results. If you do not hear from us within 7 days, please contact the clinic through MyChart or phone. If you have a critical or abnormal lab result, we will notify you by phone as soon as possible.  Submit refill requests through [employer] or call your pharmacy and they will forward the refill request to us. Please allow 3 business days for your refill to be completed.          Additional Information About Your Visit        Care EveryWhere ID     This is your Care EveryWhere ID. This could be used by other organizations to access your [place] medical records  [id number]         Blood Pressure from Last 3 Encounters:   09/12/18 121/85    09/10/18 120/80   08/09/18 121/79    Weight from Last 3 Encounters:   09/12/18 61.2 kg (135 lb)   09/10/18 61.2 kg (135 lb)   08/09/18 59.9 kg (132 lb)              We Performed the Following     Neuromuscular Re-Education     Therapeutic Exercises     Ultrasound Therapy        Primary Care Provider Office Phone # Fax #    Sony Medina Velez [phone] [address] [facility] [address]        Equal Access to Services     [facility]: Hadii aad ku hadasho Soomaali, waaxda luqadaha, qaybta kaalmada adeegyada, waxay jag lund . So [facility] [phone].    ATENCIÓN: Si habla español, tiene a adams disposición servicios gratuitos de asistencia lingüística. [facility] [phone].    We comply with applicable federal civil rights laws and [place] laws. We do not discriminate on the basis of race, color, national origin, age, disability, sex, sexual orientation, or gender identity.            Thank you!     Thank you for choosing [facility]  for your care. Our goal is always to provide you with excellent care. Hearing back from our patients is one way we can continue to improve our services. Please take a few minutes to complete the written survey that you may receive in the mail after your visit with us. Thank you!             Your Updated Medication List - Protect others around you: Learn how to safely use, store and throw away your medicines at www.disposemymeds.org.          This list is accurate as of 12/4/18  9:06 AM.  Always use your most recent med list.                   Brand Name Dispense Instructions for use Diagnosis    albuterol 108 (90 Base) MCG/ACT inhaler    PROAIR HFA/PROVENTIL HFA/VENTOLIN HFA    1 Inhaler    Inhale 2 puffs into the lungs every 6 hours as needed for shortness of breath / dyspnea or wheezing    Mild intermittent asthma, unspecified whether complicated       carbidopa-levodopa  MG tablet     SINEMET    90 tablet    Take 1 tablet by mouth At Bedtime    Restless legs syndrome (RLS)       celecoxib 200 MG capsule    celeBREX    60 capsule    TAKE 1 CAPSULE(200 MG) BY MOUTH TWICE DAILY AS NEEDED FOR MODERATE PAIN    Upper back pain, Acute midline low back pain without sciatica       finasteride 5 MG tablet    PROSCAR    90 tablet    TAKE 1 TABLET(5 MG) BY MOUTH DAILY    Benign prostatic hyperplasia with urinary frequency       gabapentin 300 MG capsule    NEURONTIN    60 capsule    TAKE 2 CAPSULES BY MOUTH EVERY NIGHT AT BEDTIME    Restless leg syndrome       HYDROcodone-acetaminophen 7.5-325 MG per tablet    NORCO    60 tablet    Take 1 tablet by mouth every 4 hours as needed for pain maximum  4  tablet(s) per day    Rotator cuff syndrome, right, Chronic pain syndrome       methocarbamol 500 MG tablet    ROBAXIN    60 tablet    TAKE 2 TABLETS(1000 MG) BY MOUTH THREE TIMES DAILY AS NEEDED FOR MUSCLE SPASMS    S/P rotator cuff repair, Muscle spasm of right shoulder       mirtazapine 15 MG tablet    REMERON    90 tablet    TAKE 1 TABLET(15 MG) BY MOUTH AT BEDTIME    Primary insomnia       senna-docusate 8.6-50 MG tablet    SENOKOT-S/PERICOLACE    100 tablet    Take 1-2 tablets by mouth 2 times daily as needed for constipation    Drug-induced constipation       simvastatin 20 MG tablet    ZOCOR    30 tablet    TAKE 1 TABLET BY MOUTH EVERY DAY AT BEDTIME    Hyperlipidemia LDL goal <100, Pure hypercholesterolemia       tamsulosin 0.4 MG capsule    FLOMAX    90 capsule    TAKE 1 CAPSULE(0.4 MG) BY MOUTH DAILY    Benign prostatic hyperplasia with lower urinary tract symptoms

## 2018-12-04 NOTE — PROGRESS NOTES
Subjective:  HPI                    Objective:  System    Physical Exam    General     ROS    Assessment/Plan:    SUBJECTIVE  Subjective changes as noted by pt:  Pt finished the starr floor work last week and his right knee feels 50% better. Still can get a little sore in the right inner knee with prolong walking up/down stairs.    Current pain level: 3/10   Changes in function:  None     Adverse reaction to treatment or activity:  None    OBJECTIVE  Changes in objective findings:  AROM: right knee is WNL. Pt remains tender along the pes anserine on the right. HEP: Able to perform partial squats with hands placed onto a table for proper technique, x10 reps.  The soccer kicks with the blue band make his inner right knee sore. Pt was able to perform soccer kicks without the band.     ASSESSMENT  Giovanni continues to require intervention to meet STG and LTG's: PT  Patient is progressing as expected.  Response to therapy has shown an improvement in  function  Progress made towards STG/LTG?  STG met, LTG not fully met.     PLAN  Continue current treatment plan until patient demonstrates readiness to progress to higher level exercises.    PTA/ATC plan:  Will continue with present plan of care.    Please refer to the daily flowsheet for treatment today, total treatment time and time spent performing 1:1 timed codes.

## 2018-12-11 ENCOUNTER — THERAPY VISIT (OUTPATIENT)
Dept: PHYSICAL THERAPY | Facility: CLINIC | Age: 53
End: 2018-12-11
Payer: COMMERCIAL

## 2018-12-11 DIAGNOSIS — S86.811D PATELLAR TENDON STRAIN, RIGHT, SUBSEQUENT ENCOUNTER: ICD-10-CM

## 2018-12-11 DIAGNOSIS — M25.561 ACUTE PAIN OF RIGHT KNEE: ICD-10-CM

## 2018-12-11 PROCEDURE — 97530 THERAPEUTIC ACTIVITIES: CPT | Mod: GP | Performed by: PHYSICAL THERAPIST

## 2018-12-11 PROCEDURE — 97110 THERAPEUTIC EXERCISES: CPT | Mod: GP | Performed by: PHYSICAL THERAPIST

## 2018-12-11 NOTE — PROGRESS NOTES
Subjective:  HPI                    Objective:  System    Physical Exam    General     ROS    Assessment/Plan:    SUBJECTIVE  Subjective: Front of the knee is fine but the inner portion of the knee still hurts with kneeling   Current Pain level: 3/10   Changes in function:  Yes (See Goal flowsheet attached for changes in current functional level)     Adverse reaction to treatment or activity:  None    OBJECTIVE  Objective: TTP R medial femoral condyle, no TTP at joint line.  Poor use of gluts during squats     ASSESSMENT  Giovanni continues to require intervention to meet STG and LTG's: PT  Patient is progressing as expected.  Response to therapy has shown an improvement in  pain level and ROM   Progress made towards STG/LTG?  Yes (See Goal flowsheet attached for updates on achievement of STG and LTG)    PLAN  Current treatment program is being advanced to more complex exercises.    PTA/ATC plan:  N/A    Please refer to the daily flowsheet for treatment today, total treatment time and time spent performing 1:1 timed codes.

## 2019-01-07 DIAGNOSIS — G25.81 RESTLESS LEG SYNDROME: ICD-10-CM

## 2019-01-08 NOTE — TELEPHONE ENCOUNTER
Requested Prescriptions   Pending Prescriptions Disp Refills     gabapentin (NEURONTIN) 300 MG capsule [Pharmacy Med Name: GABAPENTIN 300MG CAPSULES]      Last Written Prescription Date:  12/3/18  Last Fill Quantity: 60,   # refills: 0  Last Office Visit: 9/10/18 yordan  Future Office visit:       Routing refill request to provider for review/approval because:  Drug not on the [facility], [facility] or  Health refill protocol or controlled substance 60 capsule 0     Sig: TAKE 2 CAPSULES BY MOUTH EVERY NIGHT AT BEDTIME    There is no refill protocol information for this order

## 2019-01-10 RX ORDER — GABAPENTIN 300 MG/1
CAPSULE ORAL
Qty: 60 CAPSULE | Refills: 0 | Status: SHIPPED | OUTPATIENT
Start: 2019-01-10 | End: 2019-04-18

## 2019-01-10 NOTE — TELEPHONE ENCOUNTER
Routing refill request to provider for review/approval because:  Drug not on the [facility] refill protocol   Patient has not been seen by PCP since 12/21/2017.    PRASANNA McgowanN, RN  [employer] [facility]

## 2019-04-18 ENCOUNTER — OFFICE VISIT (OUTPATIENT)
Dept: FAMILY MEDICINE | Facility: CLINIC | Age: 54
End: 2019-04-18
Payer: COMMERCIAL

## 2019-04-18 VITALS
OXYGEN SATURATION: 97 % | SYSTOLIC BLOOD PRESSURE: 100 MMHG | WEIGHT: 121 LBS | TEMPERATURE: 97.7 F | BODY MASS INDEX: 21.78 KG/M2 | DIASTOLIC BLOOD PRESSURE: 62 MMHG | RESPIRATION RATE: 16 BRPM | HEART RATE: 77 BPM

## 2019-04-18 DIAGNOSIS — E78.00 PURE HYPERCHOLESTEROLEMIA: ICD-10-CM

## 2019-04-18 DIAGNOSIS — F51.01 PRIMARY INSOMNIA: ICD-10-CM

## 2019-04-18 DIAGNOSIS — G25.81 RESTLESS LEGS SYNDROME (RLS): ICD-10-CM

## 2019-04-18 DIAGNOSIS — J45.20 MILD INTERMITTENT ASTHMA, UNSPECIFIED WHETHER COMPLICATED: ICD-10-CM

## 2019-04-18 DIAGNOSIS — Z12.5 SCREENING PSA (PROSTATE SPECIFIC ANTIGEN): Primary | ICD-10-CM

## 2019-04-18 DIAGNOSIS — E78.5 HYPERLIPIDEMIA LDL GOAL <100: Chronic | ICD-10-CM

## 2019-04-18 DIAGNOSIS — Z98.890 S/P ROTATOR CUFF REPAIR: ICD-10-CM

## 2019-04-18 DIAGNOSIS — M54.9 UPPER BACK PAIN: ICD-10-CM

## 2019-04-18 DIAGNOSIS — M75.101 ROTATOR CUFF SYNDROME, RIGHT: ICD-10-CM

## 2019-04-18 DIAGNOSIS — M54.50 ACUTE MIDLINE LOW BACK PAIN WITHOUT SCIATICA: ICD-10-CM

## 2019-04-18 DIAGNOSIS — K59.03 DRUG-INDUCED CONSTIPATION: ICD-10-CM

## 2019-04-18 DIAGNOSIS — R35.0 BENIGN PROSTATIC HYPERPLASIA WITH URINARY FREQUENCY: ICD-10-CM

## 2019-04-18 DIAGNOSIS — G25.81 RESTLESS LEG SYNDROME: ICD-10-CM

## 2019-04-18 DIAGNOSIS — M62.838 MUSCLE SPASM OF RIGHT SHOULDER: ICD-10-CM

## 2019-04-18 DIAGNOSIS — N40.1 BENIGN PROSTATIC HYPERPLASIA WITH URINARY FREQUENCY: ICD-10-CM

## 2019-04-18 DIAGNOSIS — G89.4 CHRONIC PAIN SYNDROME: ICD-10-CM

## 2019-04-18 PROCEDURE — 84460 ALANINE AMINO (ALT) (SGPT): CPT | Performed by: FAMILY MEDICINE

## 2019-04-18 PROCEDURE — 36415 COLL VENOUS BLD VENIPUNCTURE: CPT | Performed by: FAMILY MEDICINE

## 2019-04-18 PROCEDURE — 80048 BASIC METABOLIC PNL TOTAL CA: CPT | Performed by: FAMILY MEDICINE

## 2019-04-18 PROCEDURE — 99214 OFFICE O/P EST MOD 30 MIN: CPT | Performed by: FAMILY MEDICINE

## 2019-04-18 PROCEDURE — G0103 PSA SCREENING: HCPCS | Performed by: FAMILY MEDICINE

## 2019-04-18 PROCEDURE — 80061 LIPID PANEL: CPT | Performed by: FAMILY MEDICINE

## 2019-04-18 RX ORDER — HYDROCODONE BITARTRATE AND ACETAMINOPHEN 7.5; 325 MG/1; MG/1
1 TABLET ORAL EVERY 4 HOURS PRN
Qty: 30 TABLET | Refills: 0 | Status: SHIPPED | OUTPATIENT
Start: 2019-04-18 | End: 2019-08-22

## 2019-04-18 RX ORDER — AMOXICILLIN 250 MG
1-2 CAPSULE ORAL 2 TIMES DAILY PRN
Qty: 100 TABLET | Refills: 11 | Status: SHIPPED | OUTPATIENT
Start: 2019-04-18 | End: 2023-04-11

## 2019-04-18 RX ORDER — GABAPENTIN 300 MG/1
CAPSULE ORAL
Qty: 60 CAPSULE | Refills: 11 | Status: SHIPPED | OUTPATIENT
Start: 2019-04-18 | End: 2019-10-21

## 2019-04-18 RX ORDER — CARBIDOPA AND LEVODOPA 25; 100 MG/1; MG/1
1 TABLET ORAL AT BEDTIME
Qty: 90 TABLET | Refills: 5 | Status: SHIPPED | OUTPATIENT
Start: 2019-04-18 | End: 2019-10-21

## 2019-04-18 RX ORDER — MIRTAZAPINE 15 MG/1
TABLET, FILM COATED ORAL
Qty: 90 TABLET | Refills: 3 | Status: SHIPPED | OUTPATIENT
Start: 2019-04-18 | End: 2019-10-21

## 2019-04-18 RX ORDER — SIMVASTATIN 20 MG
TABLET ORAL
Qty: 90 TABLET | Refills: 3 | Status: SHIPPED | OUTPATIENT
Start: 2019-04-18 | End: 2020-05-06

## 2019-04-18 RX ORDER — CELECOXIB 200 MG/1
CAPSULE ORAL
Qty: 60 CAPSULE | Refills: 1 | Status: SHIPPED | OUTPATIENT
Start: 2019-04-18 | End: 2019-08-26

## 2019-04-18 RX ORDER — METHOCARBAMOL 500 MG/1
TABLET, FILM COATED ORAL
Qty: 60 TABLET | Refills: 0 | Status: SHIPPED | OUTPATIENT
Start: 2019-04-18 | End: 2019-07-01

## 2019-04-18 RX ORDER — TAMSULOSIN HYDROCHLORIDE 0.4 MG/1
CAPSULE ORAL
Qty: 90 CAPSULE | Refills: 2 | Status: SHIPPED | OUTPATIENT
Start: 2019-04-18 | End: 2019-09-23

## 2019-04-18 RX ORDER — ALBUTEROL SULFATE 90 UG/1
2 AEROSOL, METERED RESPIRATORY (INHALATION) EVERY 6 HOURS PRN
Qty: 90 G | Refills: 3 | Status: SHIPPED | OUTPATIENT
Start: 2019-04-18 | End: 2021-06-08

## 2019-04-18 RX ORDER — FINASTERIDE 5 MG/1
TABLET, FILM COATED ORAL
Qty: 90 TABLET | Refills: 1 | Status: SHIPPED | OUTPATIENT
Start: 2019-04-18 | End: 2020-03-03

## 2019-04-18 ASSESSMENT — PATIENT HEALTH QUESTIONNAIRE - PHQ9
10. IF YOU CHECKED OFF ANY PROBLEMS, HOW DIFFICULT HAVE THESE PROBLEMS MADE IT FOR YOU TO DO YOUR WORK, TAKE CARE OF THINGS AT HOME, OR GET ALONG WITH OTHER PEOPLE: EXTREMELY DIFFICULT
SUM OF ALL RESPONSES TO PHQ QUESTIONS 1-9: 17
SUM OF ALL RESPONSES TO PHQ QUESTIONS 1-9: 17

## 2019-04-18 ASSESSMENT — ANXIETY QUESTIONNAIRES
4. TROUBLE RELAXING: NEARLY EVERY DAY
7. FEELING AFRAID AS IF SOMETHING AWFUL MIGHT HAPPEN: MORE THAN HALF THE DAYS
GAD7 TOTAL SCORE: 17
7. FEELING AFRAID AS IF SOMETHING AWFUL MIGHT HAPPEN: MORE THAN HALF THE DAYS
1. FEELING NERVOUS, ANXIOUS, OR ON EDGE: MORE THAN HALF THE DAYS
GAD7 TOTAL SCORE: 17
3. WORRYING TOO MUCH ABOUT DIFFERENT THINGS: MORE THAN HALF THE DAYS
5. BEING SO RESTLESS THAT IT IS HARD TO SIT STILL: NEARLY EVERY DAY
6. BECOMING EASILY ANNOYED OR IRRITABLE: NEARLY EVERY DAY
2. NOT BEING ABLE TO STOP OR CONTROL WORRYING: MORE THAN HALF THE DAYS
GAD7 TOTAL SCORE: 17

## 2019-04-18 NOTE — PATIENT INSTRUCTIONS
(G25.81) Restless legs syndrome (RLS)  Comment:    Plan:      (F51.01) Primary insomnia  Comment:    Plan:      (Z98.890) S/P rotator cuff repair  Comment:    Plan:      (M62.838) Muscle spasm of right shoulder  Comment:    Plan:    (Z12.5) Screening PSA (prostate specific antigen)  (primary encounter diagnosis)  Comment:    Plan:         (G25.81) Restless leg syndrome  Comment:    Plan:      (N40.1,  R35.0) Benign prostatic hyperplasia with urinary frequency  Comment:    Plan:      (M54.9) Upper back pain  Comment:    Plan:      (M54.5) Acute midline low back pain without sciatica  Comment:    Plan:      (J45.20) Mild intermittent asthma, unspecified whether complicated  Comment:     Plan:          (K59.03) Drug-induced constipation  Comment:    Plan:      (E78.5) Hyperlipidemia LDL goal <100  Comment:    Plan:      (E78.00) Pure hypercholesterolemia  Comment:    Plan:      (N40.1) Benign prostatic hyperplasia with lower urinary tract symptoms  Comment:    Plan:

## 2019-04-18 NOTE — LETTER
My Asthma Action Plan  Name: Giovanni Cedeno   YOB: 1965  Date: 4/18/2019   My doctor: ANIKA REYES MD   My clinic: [facility]        My Control Medicine: { :438327}  My Rescue Medicine: { :800602}  {AAP include Oral Steroid:617839} My Asthma Severity: { :600403}  Avoid your asthma triggers: { :872649}        {Is patient a child or adult?:952569}       GREEN ZONE   Good Control    I feel good    No cough or wheeze    Can work, sleep and play without asthma symptoms       Take your asthma control medicine every day.     1. If exercise triggers your asthma, take your rescue medication    15 minutes before exercise or sports, and    During exercise if you have asthma symptoms  2. Spacer to use with inhaler: If you have a spacer, make sure to use it with your inhaler             YELLOW ZONE Getting Worse  I have ANY of these:    I do not feel good    Cough or wheeze    Chest feels tight    Wake up at night   1. Keep taking your Green Zone medications  2. Start taking your rescue medicine:    every 20 minutes for up to 1 hour. Then every 4 hours for 24-48 hours.  3. If you stay in the Yellow Zone for more than 12-24 hours, contact your doctor.  4. If you do not return to the Green Zone in 12-24 hours or you get worse, start taking your oral steroid medicine if prescribed by your provider.           RED ZONE Medical Alert - Get Help  I have ANY of these:    I feel awful    Medicine is not helping    Breathing getting harder    Trouble walking or talking    Nose opens wide to breathe       1. Take your rescue medicine NOW  2. If your provider has prescribed an oral steroid medicine, start taking it NOW  3. Call your doctor NOW  4. If you are still in the Red Zone after 20 minutes and you have not reached your doctor:    Take your rescue medicine again and    Call 911 or go to the emergency room right away    See your regular doctor within 2 weeks of an Emergency  Room or Urgent Care visit for follow-up treatment.          Annual Reminders:  Meet with Asthma Educator,  Flu Shot in the Fall, consider Pneumonia Vaccination for patients with asthma (aged 19 and older).    Pharmacy: [id number] [place] - [address] [facility] [address]                      Asthma Triggers  How To Control Things That Make Your Asthma Worse    Triggers are things that make your asthma worse.  Look at the list below to help you find your triggers and what you can do about them.  You can help prevent asthma flare-ups by staying away from your triggers.      Trigger                                                          What you can do   Cigarette Smoke  Tobacco smoke can make asthma worse. Do not allow smoking in your home, car or around you.  Be sure no one smokes at a child s day care or school.  If you smoke, ask your health care provider for ways to help you quit.  Ask family members to quit too.  Ask your health care provider for a referral to Quit Plan to help you quit smoking, or call 5-127-185-PLAN.     Colds, Flu, Bronchitis  These are common triggers of asthma. Wash your hands often.  Don t touch your eyes, nose or mouth.  Get a flu shot every year.     Dust Mites  These are tiny bugs that live in cloth or carpet. They are too small to see. Wash sheets and blankets in hot water every week.   Encase pillows and mattress in dust mite proof covers.  Avoid having carpet if you can. If you have carpet, vacuum weekly.   Use a dust mask and HEPA vacuum.   Pollen and Outdoor Mold  Some people are allergic to trees, grass, or weed pollen, or molds. Try to keep your windows closed.  Limit time out doors when pollen count is high.   Ask you health care provider about taking medicine during allergy season.     Animal Dander  Some people are allergic to skin flakes, urine or saliva from pets with fur or feathers. Keep pets with fur or feathers out of your  home.    If you can t keep the pet outdoors, then keep the pet out of your bedroom.  Keep the bedroom door closed.  Keep pets off cloth furniture and away from stuffed toys.     Mice, Rats, and Cockroaches  Some people are allergic to the waste from these pests.   Cover food and garbage.  Clean up spills and food crumbs.  Store grease in the refrigerator.   Keep food out of the bedroom.   Indoor Mold  This can be a trigger if your home has high moisture. Fix leaking faucets, pipes, or other sources of water.   Clean moldy surfaces.  Dehumidify basement if it is damp and smelly.   Smoke, Strong Odors, and Sprays  These can reduce air quality. Stay away from strong odors and sprays, such as perfume, powder, hair spray, paints, smoke incense, paint, cleaning products, candles and new carpet.   Exercise or Sports  Some people with asthma have this trigger. Be active!  Ask your doctor about taking medicine before sports or exercise to prevent symptoms.    Warm up for 5-10 minutes before and after sports or exercise.     Other Triggers of Asthma  Cold air:  Cover your nose and mouth with a scarf.  Sometimes laughing or crying can be a trigger.  Some medicines and food can trigger asthma.

## 2019-04-18 NOTE — LETTER
My Depression Action Plan  Name: Giovanni Cedeno   Date of Birth 1965  Date: 4/18/2019    My doctor: Sony Velez   My clinic: [address]  [facility] [address] [phone]          GREEN    ZONE   Good Control    What it looks like:     Things are going generally well. You have normal up s and down s. You may even feel depressed from time to time, but bad moods usually last less than a day.   What you need to do:  1. Continue to care for yourself (see self care plan)  2. Check your depression survival kit and update it as needed  3. Follow your physician s recommendations including any medication.  4. Do not stop taking medication unless you consult with your physician first.           YELLOW         ZONE Getting Worse    What it looks like:     Depression is starting to interfere with your life.     It may be hard to get out of bed; you may be starting to isolate yourself from others.    Symptoms of depression are starting to last most all day and this has happened for several days.     You may have suicidal thoughts but they are not constant.   What you need to do:     1. Call your care team, your response to treatment will improve if you keep your care team informed of your progress. Yellow periods are signs an adjustment may need to be made.     2. Continue your self-care, even if you have to fake it!    3. Talk to someone in your support network    4. Open up your depression survival kit           RED    ZONE Medical Alert - Get Help    What it looks like:     Depression is seriously interfering with your life.     You may experience these or other symptoms: You can t get out of bed most days, can t work or engage in other necessary activities, you have trouble taking care of basic hygiene, or basic responsibilities, thoughts of suicide or death that will not go away, self-injurious behavior.     What you  need to do:  1. Call your care team and request a same-day appointment. If they are not available (weekends or after hours) call your local crisis line, emergency room or 911.            Depression Self Care Plan / Survival Kit    Self-Care for Depression  Here s the deal. Your body and mind are really not as separate as most people think.  What you do and think affects how you feel and how you feel influences what you do and think. This means if you do things that people who feel good do, it will help you feel better.  Sometimes this is all it takes.  There is also a place for medication and therapy depending on how severe your depression is, so be sure to consult with your medical provider and/ or Behavioral Health Consultant if your symptoms are worsening or not improving.     In order to better manage my stress, I will:    Exercise  Get some form of exercise, every day. This will help reduce pain and release endorphins, the  feel good  chemicals in your brain. This is almost as good as taking antidepressants!  This is not the same as joining a gym and then never going! (they count on that by the way ) It can be as simple as just going for a walk or doing some gardening, anything that will get you moving.      Hygiene   Maintain good hygiene (Get out of bed in the morning, Make your bed, Brush your teeth, Take a shower, and Get dressed like you were going to work, even if you are unemployed).  If your clothes don't fit try to get ones that do.    Diet  I will strive to eat foods that are good for me, drink plenty of water, and avoid excessive sugar, caffeine, alcohol, and other mood-altering substances.  Some foods that are helpful in depression are: complex carbohydrates, B vitamins, flaxseed, fish or fish oil, fresh fruits and vegetables.    Psychotherapy  I agree to participate in Individual Therapy (if recommended).    Medication  If prescribed medications, I agree to take them.  Missing doses can result in  serious side effects.  I understand that drinking alcohol, or other illicit drug use, may cause potential side effects.  I will not stop my medication abruptly without first discussing it with my provider.    Staying Connected With Others  I will stay in touch with my friends, family members, and my primary care provider/team.    Use your imagination  Be creative.  We all have a creative side; it doesn t matter if it s oil painting, sand castles, or mud pies! This will also kick up the endorphins.    Witness Beauty  (AKA stop and smell the roses) Take a look outside, even in mid-winter. Notice colors, textures. Watch the squirrels and birds.     Service to others  Be of service to others.  There is always someone else in need.  By helping others we can  get out of ourselves  and remember the really important things.  This also provides opportunities for practicing all the other parts of the program.    Humor  Laugh and be silly!  Adjust your TV habits for less news and crime-drama and more comedy.    Control your stress  Try breathing deep, massage therapy, biofeedback, and meditation. Find time to relax each day.     My support system    Clinic Contact:  Phone number:    Contact 1:  Phone number:    Contact 2:  Phone number:    [employer]/:  Phone number:    Therapist:  Phone number:    Local crisis center:    Phone number:    Other community support:  Phone number:

## 2019-04-18 NOTE — PROGRESS NOTES
SUBJECTIVE:   Giovanni Cedeno is a 53 year old male who presents to clinic today for the following   health issues:        Needs refills  Hasn't had ins since beginning of the year  Last meds were end of Jan      Duration: restless leg    Description (location/character/radiation): left leg    Intensity:  severe    Accompanying signs and symptoms: leg bounces at night    History (similar episodes/previous evaluation): yes    Precipitating or alleviating factors: None    Therapies tried and outcome: carbidopa-levodopa helps  Restless leg syndrome helped by levodopa carbidopa  Improved bilateral knee bursitis  Hyperlipidemia controlled  Major depression slightly worse because of his friends of flexion and caretaking responsibilities  History of pre-diabetes stable and improved because of weight loss  History of pain in toe resolved  History of low back pain with sciatica and upper back pain improved  History of shoulder pain with a right rotator cuff repair  History of right knee pain from patellofemoral femoral syndrome and patella strain on the right  History of mild intermittent asthma at the present asymptomatic          .ANIKA REYES MD .4/18/2019 9:32 PM .April 18, 2019              Topic Date Due     ASTHMA CONTROL TEST Q6 MOS  04/22/1970     PHQ-9 Q6 MONTHS  03/10/2019               .  Current Outpatient Medications   Medication Sig Dispense Refill     albuterol (PROAIR HFA/PROVENTIL HFA/VENTOLIN HFA) 108 (90 Base) MCG/ACT inhaler Inhale 2 puffs into the lungs every 6 hours as needed for shortness of breath / dyspnea or wheezing 90 g 3     carbidopa-levodopa (SINEMET)  MG tablet Take 1 tablet by mouth At Bedtime 90 tablet 5     celecoxib (CELEBREX) 200 MG capsule TAKE 1 CAPSULE(200 MG) BY MOUTH TWICE DAILY AS NEEDED FOR MODERATE PAIN 60 capsule 1     finasteride (PROSCAR) 5 MG tablet TAKE 1 TABLET(5 MG) BY MOUTH DAILY 90 tablet 1     gabapentin (NEURONTIN) 300 MG capsule TAKE 2 CAPSULES BY  MOUTH EVERY NIGHT AT BEDTIME 60 capsule 11     HYDROcodone-acetaminophen (NORCO) 7.5-325 MG per tablet Take 1 tablet by mouth every 4 hours as needed for pain maximum  4  tablet(s) per day 30 tablet 0     methocarbamol (ROBAXIN) 500 MG tablet TAKE 2 TABLETS(1000 MG) BY MOUTH THREE TIMES DAILY AS NEEDED FOR MUSCLE SPASMS 60 tablet 0     mirtazapine (REMERON) 15 MG tablet TAKE 1 TABLET(15 MG) BY MOUTH AT BEDTIME 90 tablet 3     senna-docusate (SENOKOT-S/PERICOLACE) 8.6-50 MG tablet Take 1-2 tablets by mouth 2 times daily as needed for constipation 100 tablet 11     simvastatin (ZOCOR) 20 MG tablet TAKE 1 TABLET BY MOUTH EVERY DAY AT BEDTIME 90 tablet 3     tamsulosin (FLOMAX) 0.4 MG capsule TAKE 1 CAPSULE(0.4 MG) BY MOUTH DAILY 90 capsule 2              No Known Allergies      Immunization History   Administered Date(s) Administered     Influenza (IIV3) PF 10/01/2013, 09/20/2014     Influenza Vaccine IM 3yrs+ 4 Valent IIV4 11/10/2016, 12/21/2017, 09/10/2018     TDAP Vaccine (Adacel) 05/21/2012     Zoster vaccine, live 01/09/2015               reports that he does not drink alcohol.          reports that he does not use drugs.        family history includes Diabetes in his father; Heart Disease in his father.        indicated that his mother is alive. He indicated that his father is alive.             has no past surgical history on file.         reports that he does not currently engage in sexual activity but has had partners who are Female.    .  Pediatric History   Patient Guardian Status     Mother:  Rae Cedeno     Other Topics Concern     Parent/sibling w/ CABG, MI or angioplasty before 65F 55M? Yes   Social History Narrative     Not on file               reports that he has never smoked. He has never used smokeless tobacco.        Medical, social, surgical, and family histories reviewed.        Labs reviewed in EPIC  Patient Active Problem List   Diagnosis     Restless leg syndrome     Knee bursitis     Pure  hypercholesterolemia     Major depression in partial remission (H)     Pre-diabetes     Epicondylitis, lateral humeral     Immunization reaction     Chronic pain syndrome     Chronic right shoulder pain     Abdominal pain, left upper quadrant     Pain of toe of right foot     Acute midline low back pain without sciatica     Upper back pain     Acute pain of right shoulder     S/P right rotator cuff repair     Acute pain of right knee     Patellar tendon strain, right, subsequent encounter     Mild intermittent asthma, unspecified whether complicated         History reviewed. No pertinent surgical history.    Social History     Tobacco Use     Smoking status: Never Smoker     Smokeless tobacco: Never Used   Substance Use Topics     Alcohol use: No       Family History   Problem Relation Age of Onset     Diabetes Father      Heart Disease Father              Current Outpatient Medications   Medication Sig Dispense Refill     albuterol (PROAIR HFA/PROVENTIL HFA/VENTOLIN HFA) 108 (90 Base) MCG/ACT inhaler Inhale 2 puffs into the lungs every 6 hours as needed for shortness of breath / dyspnea or wheezing 90 g 3     carbidopa-levodopa (SINEMET)  MG tablet Take 1 tablet by mouth At Bedtime 90 tablet 5     celecoxib (CELEBREX) 200 MG capsule TAKE 1 CAPSULE(200 MG) BY MOUTH TWICE DAILY AS NEEDED FOR MODERATE PAIN 60 capsule 1     finasteride (PROSCAR) 5 MG tablet TAKE 1 TABLET(5 MG) BY MOUTH DAILY 90 tablet 1     gabapentin (NEURONTIN) 300 MG capsule TAKE 2 CAPSULES BY MOUTH EVERY NIGHT AT BEDTIME 60 capsule 11     HYDROcodone-acetaminophen (NORCO) 7.5-325 MG per tablet Take 1 tablet by mouth every 4 hours as needed for pain maximum  4  tablet(s) per day 30 tablet 0     methocarbamol (ROBAXIN) 500 MG tablet TAKE 2 TABLETS(1000 MG) BY MOUTH THREE TIMES DAILY AS NEEDED FOR MUSCLE SPASMS 60 tablet 0     mirtazapine (REMERON) 15 MG tablet TAKE 1 TABLET(15 MG) BY MOUTH AT BEDTIME 90 tablet 3     senna-docusate  (SENOKOT-S/PERICOLACE) 8.6-50 MG tablet Take 1-2 tablets by mouth 2 times daily as needed for constipation 100 tablet 11     simvastatin (ZOCOR) 20 MG tablet TAKE 1 TABLET BY MOUTH EVERY DAY AT BEDTIME 90 tablet 3     tamsulosin (FLOMAX) 0.4 MG capsule TAKE 1 CAPSULE(0.4 MG) BY MOUTH DAILY 90 capsule 2           Recent Labs   Lab Test 11/14/17  0846 05/23/17  0813 11/10/16  0945 07/09/15  1013 03/11/14  1047 05/08/13  0924   A1C  --   --   --  5.4 5.8 6.3*   LDL  --   --  167*  --  218* 150*   HDL  --   --  53  --  48 44   TRIG  --   --  173*  --  325* 184*   ALT  --  31 29  --  29 35   CR 0.84 0.78 0.93  --   --  1.00   GFRESTIMATED >90 >90  Non  GFR Calc   85  --   --  79   GFRESTBLACK >90 >90   GFR Calc   >90  --   --  >90   POTASSIUM 4.2 4.2  --   --   --  5.0            BP Readings from Last 6 Encounters:   04/18/19 100/62   09/12/18 121/85   09/10/18 120/80   08/09/18 121/79   07/02/18 122/82   12/21/17 122/78           Wt Readings from Last 3 Encounters:   04/18/19 54.9 kg (121 lb)   09/12/18 61.2 kg (135 lb)   09/10/18 61.2 kg (135 lb)                 Positive symptoms or findings indicated by bold designation:         ROS: 10 point ROS neg other than the symptoms noted above in the HPI.except  has Restless leg syndrome; Knee bursitis; Pure hypercholesterolemia; Major depression in partial remission (H); Pre-diabetes; Epicondylitis, lateral humeral; Immunization reaction; Chronic pain syndrome; Chronic right shoulder pain; Abdominal pain, left upper quadrant; Pain of toe of right foot; Acute midline low back pain without sciatica; Upper back pain; Acute pain of right shoulder; S/P right rotator cuff repair; Acute pain of right knee; Patellar tendon strain, right, subsequent encounter; and Mild intermittent asthma, unspecified whether complicated on their problem list.  Review Of Systems    Skin: negative    Eyes: negative    Ears/Nose/Throat: negative    Respiratory: No  shortness of breath, dyspnea on exertion, cough, or hemoptysis    Cardiovascular: negative    Gastrointestinal: negative    Genitourinary: negative    Musculoskeletal: Right shoulder syndrome    Low back pain    Upper back pain    Neurologic: negative    Psychiatric: Mild worsening of depression    Hematologic/Lymphatic/Immunologic: negative    Endocrine: Prediabetes                PE:  /62   Pulse 77   Temp 97.7  F (36.5  C) (Tympanic)   Resp 16   Wt 54.9 kg (121 lb)   SpO2 97%   BMI 21.78 kg/m   Body mass index is 21.78 kg/m .        Constitutional: general appearance, well nourished, well developed, in no acute distress, well developed, appears stated age, normal body habitus,          Eyes:; The patient has normal eyelids sclerae and conjunctivae :          Ears/Nose/Throat: external ear, overall: normal appearance; external nose, overall: benign appearance, normal moujth gums and lips           Neck: thyroid, overall: normal size, normal consistency, nontender,          Respiratory:  palpation of chest, overall: normal excursion,    Clear to percussion and auscultation     NO Tachypnea    NORMAL  Color          Cardiovascular:  Good color with no peripheral edema    Regular sinus rhythm without murmur.  Physiologic heart sounds   Heart is unelarged    .     Chest/Breast: normal shape           Abdominal exam,  Liver and spleen are  unenlarged        Tenderness    Scars              Urogenital; no renal, flank or bladder  tenderness;          Lymphatic: neck nodes,     Other nodes         Musculoskeletal:  Brief ortho exam normal except:   Decreased range of motion back  Negative straight leg raising test  Mild patellofemoral syndrome        Integument: inspection of skin, no rash, lesions; and, palpation, no induration, no tenderness.          Neurologic mental status, overall: alert and oriented; gait, no ataxia, no unsteadiness; coordination, no tremors; cranial nerves, overall: normal motor,  overall: normal bulk, tone.          Psychiatric: orientation/consciousness, overall: oriented to person, place and time; behavior/psychomotor activity, no tics, normal psychomotor activity; mood and affect, overall: normal mood and affect; appearance, overall: well-groomed, good eye contact; speech, overall: normal quality, no aphasia and normal quality, quantity, intact.        Diagnostic Test Results:  Results for orders placed or performed in visit on 09/19/18   MR Knee Right w/o Contrast    Narrative    MR RIGHT KNEE WITHOUT CONTRAST  9/19/2018 10:41 AM    HISTORY:  Right knee pain for one month. Evaluate for patellar tendon  injury.     TECHNIQUE:  Axial and coronal T2 with fat suppression. Coronal T1.  Sagittal dual echo T2.    COMPARISON: Plain films 11/10/2016 and 6/21/2013.    FINDINGS:   Medial Meniscus: No tear, displaced fragment, or extrusion.        Lateral Meniscus: No tear, displaced fragment, or extrusion.        Anterior Cruciate Ligament: Unremarkable.     Posterior Cruciate Ligament: Unremarkable.     Medial Collateral Ligament: Unremarkable.    Lateral Collateral Ligament Complex, Popliteus Tendon: The iliotibial  band, fibular collateral ligament, biceps femoris tendon, and  popliteus tendon are unremarkable. There is a multiloculated cystic  structure surrounding the myotendinous junction of the popliteus  measuring up to 4.4 x 2.5 x 1 cm. This is most likely an  intra-articular ganglion cyst dissecting into the popliteus recess or  abnormal extension of the popliteus tendon synovial recess itself.    Osseous and Cartilaginous Structures: Unremarkable. No bone contusion,  marrow edema, or chondromalacia identified.    Extensor Mechanism: The quadriceps and patellar tendons are  unremarkable. The medial and lateral patellar retinacula are normal.    Joint Space: No significant joint effusion. No definite loose bodies  appreciated.    Additional Findings: No popliteal cyst. No  semimembranosus-tibial  collateral ligament or pes anserine bursitis. There is an irregularly  marginated collection of fluid in the subcutaneous fat anterior to the  patellar tendon measuring up to 2.4 x 1.4 x 0.5 cm (image 8 of series  4 and image 14 of series 2). This is associated with thickening and  mild edema in the remaining subcutaneous fat in this region. The  underlying patellar tendon is intrinsically normal. This may be an  inflamed bursa or a resolving hemorrhage.    There are amorphous regions of calcification within the proximal  tendon of the medial head of the gastrocnemius. This can be seen on  the lateral view of plain film 11/10/2016 and were present on plain  films of 6/21/2013 also. This is probably related to an old injury.      Impression    IMPRESSION:   1. Multiloculated cystic structure surrounding the myotendinous  junction of the popliteus, most likely an intra-articular ganglion  cyst or extension of the popliteal synovial recess.  2. Edema and irregularly marginated focus of fluid anterior to the  patellar tendon. This could represent an inflamed bursa or a resolving  area of hemorrhage.  3. Calcifications within the proximal tendon origin of the medial head  of gastrocnemius, likely related to old injury.    ALYSIA CLEMENT MD           ICD-10-CM    1. Screening PSA (prostate specific antigen) Z12.5 Prostate spec antigen screen   2. Restless legs syndrome (RLS) G25.81 carbidopa-levodopa (SINEMET)  MG tablet   3. Primary insomnia F51.01 mirtazapine (REMERON) 15 MG tablet   4. S/P rotator cuff repair Z98.890 methocarbamol (ROBAXIN) 500 MG tablet   5. Muscle spasm of right shoulder M62.838 methocarbamol (ROBAXIN) 500 MG tablet   6. Restless leg syndrome G25.81 gabapentin (NEURONTIN) 300 MG capsule   7. Benign prostatic hyperplasia with urinary frequency N40.1 finasteride (PROSCAR) 5 MG tablet    R35.0 tamsulosin (FLOMAX) 0.4 MG capsule   8. Upper back pain M54.9 celecoxib  (CELEBREX) 200 MG capsule   9. Acute midline low back pain without sciatica M54.5 celecoxib (CELEBREX) 200 MG capsule   10. Mild intermittent asthma, unspecified whether complicated J45.20 albuterol (PROAIR HFA/PROVENTIL HFA/VENTOLIN HFA) 108 (90 Base) MCG/ACT inhaler   11. Drug-induced constipation K59.03 senna-docusate (SENOKOT-S/PERICOLACE) 8.6-50 MG tablet   12. Hyperlipidemia LDL goal <100 E78.5 simvastatin (ZOCOR) 20 MG tablet     Basic metabolic panel     Lipid panel reflex to direct LDL Fasting     ALT   13. Pure hypercholesterolemia E78.00 simvastatin (ZOCOR) 20 MG tablet   14. Rotator cuff syndrome, right M75.101 HYDROcodone-acetaminophen (NORCO) 7.5-325 MG per tablet   15. Chronic pain syndrome G89.4 HYDROcodone-acetaminophen (NORCO) 7.5-325 MG per tablet              .    Side effects benefits and risks thoroughly discussed. .he may come in early if unimproved or getting worse          Please drink 2 glasses of water prior to meals and walk 15-30 minutes after meals        I spent 25 minutes with patient discussing the following issues   The primary encounter diagnosis was Screening PSA (prostate specific antigen). Diagnoses of Restless legs syndrome (RLS), Primary insomnia, S/P rotator cuff repair, Muscle spasm of right shoulder, Restless leg syndrome, Benign prostatic hyperplasia with urinary frequency, Upper back pain, Acute midline low back pain without sciatica, Mild intermittent asthma, unspecified whether complicated, Drug-induced constipation, Hyperlipidemia LDL goal <100, Pure hypercholesterolemia, Rotator cuff syndrome, right, and Chronic pain syndrome were also pertinent to this visit. over half of which involved counseling and coordination of care.      Patient Instructions     (G25.81) Restless legs syndrome (RLS)  Comment:    Plan:      (F51.01) Primary insomnia  Comment:    Plan:      (Z98.890) S/P rotator cuff repair  Comment:    Plan:      (M62.838) Muscle spasm of right  shoulder  Comment:    Plan:    (Z12.5) Screening PSA (prostate specific antigen)  (primary encounter diagnosis)  Comment:    Plan:         (G25.81) Restless leg syndrome  Comment:    Plan:      (N40.1,  R35.0) Benign prostatic hyperplasia with urinary frequency  Comment:    Plan:      (M54.9) Upper back pain  Comment:    Plan:      (M54.5) Acute midline low back pain without sciatica  Comment:    Plan:      (J45.20) Mild intermittent asthma, unspecified whether complicated  Comment:     Plan:          (K59.03) Drug-induced constipation  Comment:    Plan:      (E78.5) Hyperlipidemia LDL goal <100  Comment:    Plan:      (E78.00) Pure hypercholesterolemia  Comment:    Plan:      (N40.1) Benign prostatic hyperplasia with lower urinary tract symptoms  Comment:    Plan:                   ALL THE ABOVE PROBLEMS ARE STABLE AND MED CHANGES AS NOTED        Diet: Mediterranean diabetic diet        Exercise: Upper and lower extremities low back and neck exercises right shoulder exercises Range of motion, balance, isometric, and strengthening exercises 30 repetitions twice daily of involved joints            .ANIKA REYES MD 4/18/2019 9:32 PM  April 18, 2019

## 2019-04-19 PROBLEM — M25.511 ACUTE PAIN OF RIGHT SHOULDER: Status: RESOLVED | Noted: 2017-12-12 | Resolved: 2019-04-19

## 2019-04-19 PROBLEM — Z98.890 S/P RIGHT ROTATOR CUFF REPAIR: Status: RESOLVED | Noted: 2017-12-12 | Resolved: 2019-04-19

## 2019-04-19 LAB
ALT SERPL W P-5'-P-CCNC: 29 U/L (ref 0–70)
ANION GAP SERPL CALCULATED.3IONS-SCNC: 6 MMOL/L (ref 3–14)
BUN SERPL-MCNC: 18 MG/DL (ref 7–30)
CALCIUM SERPL-MCNC: 9.5 MG/DL (ref 8.5–10.1)
CHLORIDE SERPL-SCNC: 105 MMOL/L (ref 94–109)
CHOLEST SERPL-MCNC: 206 MG/DL
CO2 SERPL-SCNC: 28 MMOL/L (ref 20–32)
CREAT SERPL-MCNC: 0.92 MG/DL (ref 0.66–1.25)
GFR SERPL CREATININE-BSD FRML MDRD: >90 ML/MIN/{1.73_M2}
GLUCOSE SERPL-MCNC: 60 MG/DL (ref 70–99)
HDLC SERPL-MCNC: 55 MG/DL
LDLC SERPL CALC-MCNC: 117 MG/DL
NONHDLC SERPL-MCNC: 151 MG/DL
POTASSIUM SERPL-SCNC: 4.5 MMOL/L (ref 3.4–5.3)
PSA SERPL-ACNC: 1.8 UG/L (ref 0–4)
SODIUM SERPL-SCNC: 139 MMOL/L (ref 133–144)
TRIGL SERPL-MCNC: 168 MG/DL

## 2019-04-19 ASSESSMENT — ANXIETY QUESTIONNAIRES: GAD7 TOTAL SCORE: 17

## 2019-04-19 NOTE — PROGRESS NOTES
Subjective:  HPI                    Objective:  System    Physical Exam    General     ROS    Assessment/Plan:    DISCHARGE REPORT    Progress reporting period is as of 7/24/18    SUBJECTIVE  Subjective: Pt reports that his shoulder has been a little sore this week since he started a new job; he reports that he has had to do a lot of hammering to get the noreen removed   Current Pain level: 5/10   Initial Pain level: 4/10   Changes in function: No changes noted in function since last SOAP note   Adverse reactions: Activity:;   , Adverse reaction activity: increased pain & decreased ROM following repetitive work   Patient has failed to return to therapy so current objective findings are unknown.  The subjective and objective information are from the last SOAP note on this patient.    OBJECTIVE  Objective: AROM right shoulder flexion 160, abd 90; pt demonstrates improved control with strengthening exercises      ASSESSMENT/PLAN  Updated problem list and treatment plan: Diagnosis 1:  S/p right rotator cuff repair       Pain -  home program  Decreased ROM/flexibility - home program  Decreased strength - home program  STG/LTGs have been met or progress has been made towards goals:  Yes (See Goal flow sheet completed today.)  Assessment of Progress: The patient has not returned to therapy. Current status is unknown.  Self Management Plans:  Patient has been instructed in a home treatment program.  Patient is independent in a home treatment program.  Patient  has been instructed in self management of symptoms.  Patient is independent in self management of symptoms.  I have re-evaluated this patient and find that the nature, scope, duration and intensity of the therapy is appropriate for the medical condition of the patient.  Giovanni continues to require the following intervention to meet STG and LTG's: PT intervention is no longer required to meet STG/LTG.  The patient failed to complete scheduled/ordered appointments so  current information is unknown.  We will discharge this patient from PT.    Recommendations:  This patient is ready to be discharged from therapy and continue their home treatment program.    Please refer to the daily flowsheet for treatment today, total treatment time and time spent performing 1:1 timed codes.

## 2019-07-01 ENCOUNTER — TELEPHONE (OUTPATIENT)
Dept: FAMILY MEDICINE | Facility: CLINIC | Age: 54
End: 2019-07-01

## 2019-07-01 DIAGNOSIS — Z98.890 S/P ROTATOR CUFF REPAIR: ICD-10-CM

## 2019-07-01 DIAGNOSIS — M62.838 MUSCLE SPASM OF RIGHT SHOULDER: ICD-10-CM

## 2019-07-01 RX ORDER — METHOCARBAMOL 500 MG/1
TABLET, FILM COATED ORAL
Qty: 60 TABLET | Refills: 0 | Status: SHIPPED | OUTPATIENT
Start: 2019-07-01 | End: 2019-08-13

## 2019-07-01 NOTE — TELEPHONE ENCOUNTER
Patient returned call and provider's message was relayed. He scheduled an OV with provider to discuss larger quantity of Robaxin. Until he sees his provider, he would like a refill of Robaxin from covering provider and understands that it will only be a quantity of 60.   Routing to team to advise.

## 2019-07-01 NOTE — TELEPHONE ENCOUNTER
"    Patient Contact    Attempt # 1    Was call answered?  No.  Left message on voicemail with information to call the clinic for a message from your provider.       On call back: Please share provider message with patient.   \"mediction was sent to provider pharmacy\" No further follow up.     "

## 2019-07-01 NOTE — TELEPHONE ENCOUNTER
Patient Contact    Attempt # 1    Was call answered?  No.  Left message on voicemail with information to call triage back.    Upon callback, please relay covering provider's message:   Chart reviewed. No clear documentation on monthly limits of this medication per PCP, but has always filled for only #60 each month. Medication is meant to be used prn, rather than scheduled. Therefore cannot authorized higher dosing in absence of PCP. If patient is needing more than prescribed, recommend he schedule f/u with PCP upon his return to discuss further. Does patient need a refill for robaxin right now? We can send another #60 to last until he is able to f/u with [facility].

## 2019-07-01 NOTE — TELEPHONE ENCOUNTER
methocarbamol (ROBAXIN) 500 MG tablet    Patient would like to get 180 tabs rather than 60 for a 30 day supply , rather than 10 could we get a new rx perhaps?  thanks

## 2019-07-09 PROBLEM — S86.811D: Status: RESOLVED | Noted: 2018-10-19 | Resolved: 2019-07-09

## 2019-07-09 PROBLEM — M25.561 ACUTE PAIN OF RIGHT KNEE: Status: RESOLVED | Noted: 2018-10-19 | Resolved: 2019-07-09

## 2019-07-09 NOTE — PROGRESS NOTES
Discharge Note    Progress reporting period is from initial evaluation date (please see noted date below) to Dec 11, 2018.  No linked episodes      Giovanni failed to follow up and current status is unknown.  Please see information below for last relevant information on current status.  Patient seen for 6 visits.    SUBJECTIVE  Subjective changes noted by patient:  Front of the knee is fine but the inner portion of the knee still hurts with kneeling  .  Current pain level is 3/10.     Previous pain level was  8/10.   Changes in function:  Yes (See Goal flowsheet attached for changes in current functional level)  Adverse reaction to treatment or activity: None    OBJECTIVE  Changes noted in objective findings: TTP R medial femoral condyle, no TTP at joint line.  Poor use of gluts during squats      ASSESSMENT/PLAN  Diagnosis: R knee pain   Updated problem list and treatment plan:   Pain - HEP  STG/LTGs have been met or progress has been made towards goals:  Yes, please see goal flowsheet for most current information  Assessment of Progress: current status is unknown.    Last current status:     Self Management Plans:  HEP  I have re-evaluated this patient and find that the nature, scope, duration and intensity of the therapy is appropriate for the medical condition of the patient.  Giovanni continues to require the following intervention to meet STG and LTG's:  HEP.    Recommendations:  Discharge with current home program.  Patient to follow up with MD as needed.    Please refer to the daily flowsheet for treatment today, total treatment time and time spent performing 1:1 timed codes.

## 2019-08-08 ENCOUNTER — OFFICE VISIT (OUTPATIENT)
Dept: ORTHOPEDICS | Facility: CLINIC | Age: 54
End: 2019-08-08
Payer: COMMERCIAL

## 2019-08-08 ENCOUNTER — ANCILLARY PROCEDURE (OUTPATIENT)
Dept: GENERAL RADIOLOGY | Facility: CLINIC | Age: 54
End: 2019-08-08
Attending: ORTHOPAEDIC SURGERY
Payer: COMMERCIAL

## 2019-08-08 VITALS
SYSTOLIC BLOOD PRESSURE: 122 MMHG | WEIGHT: 121 LBS | BODY MASS INDEX: 21.78 KG/M2 | DIASTOLIC BLOOD PRESSURE: 83 MMHG | HEART RATE: 89 BPM | OXYGEN SATURATION: 95 %

## 2019-08-08 DIAGNOSIS — M77.12 LATERAL EPICONDYLITIS OF LEFT ELBOW: Primary | ICD-10-CM

## 2019-08-08 DIAGNOSIS — M25.522 LEFT ELBOW PAIN: ICD-10-CM

## 2019-08-08 PROCEDURE — 73080 X-RAY EXAM OF ELBOW: CPT | Mod: LT

## 2019-08-08 PROCEDURE — 99213 OFFICE O/P EST LOW 20 MIN: CPT | Mod: 25 | Performed by: ORTHOPAEDIC SURGERY

## 2019-08-08 PROCEDURE — 20551 NJX 1 TENDON ORIGIN/INSJ: CPT | Mod: LT | Performed by: ORTHOPAEDIC SURGERY

## 2019-08-08 RX ORDER — TRIAMCINOLONE ACETONIDE 40 MG/ML
20 INJECTION, SUSPENSION INTRA-ARTICULAR; INTRAMUSCULAR
Status: DISCONTINUED | OUTPATIENT
Start: 2019-08-08 | End: 2020-07-08

## 2019-08-08 RX ORDER — LIDOCAINE HYDROCHLORIDE 10 MG/ML
1 INJECTION, SOLUTION INFILTRATION; PERINEURAL
Status: DISCONTINUED | OUTPATIENT
Start: 2019-08-08 | End: 2020-07-08

## 2019-08-08 RX ADMIN — TRIAMCINOLONE ACETONIDE 20 MG: 40 INJECTION, SUSPENSION INTRA-ARTICULAR; INTRAMUSCULAR at 14:29

## 2019-08-08 RX ADMIN — LIDOCAINE HYDROCHLORIDE 1 ML: 10 INJECTION, SOLUTION INFILTRATION; PERINEURAL at 14:29

## 2019-08-08 ASSESSMENT — PAIN SCALES - GENERAL: PAINLEVEL: EXTREME PAIN (8)

## 2019-08-08 NOTE — PROGRESS NOTES
Patient was fitted with an elbow strap. All questions were answered to patient's satisfaction. DME form explained, signed, and copy given to the patient for their records.   Mildred Hernandez Clinical Medical Assistant

## 2019-08-08 NOTE — PROGRESS NOTES
Chief Complaint:   Chief Complaint   Patient presents with     Left Elbow - Pain     Pt here re: left elbow pain X 1 month ago.  Pt states he just woke up and felt the pain.  Pt has been taking Ibuprofen and ice to help with the pain.        HPI: Giovanni Cedeno is a 54 year old male , right -hand dominant, who presents for evaluation and management of a left elbow pain, without specific injury. Pain has been present for 1 months. Just woke up with pain one day. Ok at rest, pain with activities, movement. Taking ibuprofen, using ice for the pain. No change in activity or increased activity that he can recall leading to the pain.      He reports having moderate - severe  pain/discomfort at the elbow. He denies numbness or tingling.   Pain severity: 8/10  Pain quality: sharp  Frequency of symptoms: frequently  Associated symptoms: denies  Aggravated by: activities  Relieved by: rest    Treatment up to this point:ice and NSAIDS    Prior history of related problems: right elbow lateral epicondylitis treated with injections and eventual surgery.    Significant Orthopedic past medical history: right lateral epicondylitis, right rotator cuff repair. Knee pain    Past medical history:  has a past medical history of Arthritis.   Patient Active Problem List   Diagnosis     Restless leg syndrome     Knee bursitis     Pure hypercholesterolemia     Major depression in partial remission (H)     Pre-diabetes     Epicondylitis, lateral humeral     Immunization reaction     Chronic pain syndrome     Chronic right shoulder pain     Abdominal pain, left upper quadrant     Pain of toe of right foot     Acute midline low back pain without sciatica     Upper back pain     Mild intermittent asthma, unspecified whether complicated       Past surgical history:  has no past surgical history on file.     Medications:   Current Outpatient Medications:      carbidopa-levodopa (SINEMET)  MG tablet, Take 1 tablet by mouth At Bedtime,  Disp: 90 tablet, Rfl: 5     celecoxib (CELEBREX) 200 MG capsule, TAKE 1 CAPSULE(200 MG) BY MOUTH TWICE DAILY AS NEEDED FOR MODERATE PAIN, Disp: 60 capsule, Rfl: 1     finasteride (PROSCAR) 5 MG tablet, TAKE 1 TABLET(5 MG) BY MOUTH DAILY, Disp: 90 tablet, Rfl: 1     gabapentin (NEURONTIN) 300 MG capsule, TAKE 2 CAPSULES BY MOUTH EVERY NIGHT AT BEDTIME, Disp: 60 capsule, Rfl: 11     HYDROcodone-acetaminophen (NORCO) 7.5-325 MG per tablet, Take 1 tablet by mouth every 4 hours as needed for pain maximum  4  tablet(s) per day, Disp: 30 tablet, Rfl: 0     methocarbamol (ROBAXIN) 500 MG tablet, TAKE 2 TABLETS(1000 MG) BY MOUTH THREE TIMES DAILY AS NEEDED FOR MUSCLE SPASMS, Disp: 60 tablet, Rfl: 0     mirtazapine (REMERON) 15 MG tablet, TAKE 1 TABLET(15 MG) BY MOUTH AT BEDTIME, Disp: 90 tablet, Rfl: 3     senna-docusate (SENOKOT-S/PERICOLACE) 8.6-50 MG tablet, Take 1-2 tablets by mouth 2 times daily as needed for constipation, Disp: 100 tablet, Rfl: 11     simvastatin (ZOCOR) 20 MG tablet, TAKE 1 TABLET BY MOUTH EVERY DAY AT BEDTIME, Disp: 90 tablet, Rfl: 3     tamsulosin (FLOMAX) 0.4 MG capsule, TAKE 1 CAPSULE(0.4 MG) BY MOUTH DAILY, Disp: 90 capsule, Rfl: 2     albuterol (PROAIR HFA/PROVENTIL HFA/VENTOLIN HFA) 108 (90 Base) MCG/ACT inhaler, Inhale 2 puffs into the lungs every 6 hours as needed for shortness of breath / dyspnea or wheezing (Patient not taking: Reported on 8/8/2019), Disp: 90 g, Rfl: 3    Allergies:   No Known Allergies     Family History: family history includes Diabetes in his father; Heart Disease in his father.     Social History:  reports that he has never smoked. He has never used smokeless tobacco. He reports that he does not drink alcohol or use drugs.    Review of Systems:  ROS: 10 point ROS neg other than the symptoms noted above in the HPI and past medical history.    Physical Exam  GENERAL APPEARANCE: healthy, alert, no distress. Accompanied by his friend Vinh.  SKIN: no suspicious lesions or  rashes  RESPIRATORY: No increased work of breathing.  NEURO: Normal strength and tone, mentation intact and speech normal  PSYCH:  mentation appears normal and affect normal. Not anxious.  Hands: no clubbing, nail pitting or nodes.    /83 (BP Location: Right arm, Patient Position: Sitting, Cuff Size: Adult Regular)   Pulse 89   Wt 54.9 kg (121 lb)   SpO2 95%   BMI 21.78 kg/m         left UPPER EXTREMITY:    Sensation intact to light touch in median, radial, ulnar and axillary nerve distributions  Palpable 2+ radial pulse, brisk capillary refill to all fingers, wwp  Intact epl fpl fdp edc wrist flexion/extension biceps triceps deltoid  DTR's normal biceps and brachioradialis    ELBOW:  Skin intact. No open wounds. No skin maceration.  Inspection: no swelling, no ecchymosis, no olecranon bursa swelling, no distal bicep tendon defect  There is palpable atrophy at the common extensor  Tender: lateral epicondyle, common extensor tendon and extensor muscle of forearm  Range of Motion: all normal  Strength: elbow strength full  Special tests: normal stability, pain with resisted wrist extension, pain with resisted middle finger extension, no pain with resisted wrist flexion:   There is no deformity in the area.      X-rays:  3 views left elbow from 8/8/2019 were reviewed in clinic today.  No obvious fractures or dislocations. Mild elbow degenerative changes.    Assessment: 54 year old male , right -hand dominant, with acute left elbow pain, lateral epicondylitis    Plan:   * discussed with patient history and clinical exam consistent with tennis elbow, or lateral epicondylitis, which is tendonitis at the lateral aspect of the elbow.  * treatment is nonoperative, with surgical treatment reserved for recalcitrant symptoms despite long-term nonperative treatment regimen. Most cases expected to resolve over 1-2 years based on natural history.    Treatment includes:  * rest  * activity modification - avoid aggravating  activities and reduce strenuous activities for 6-8 weeks  * ice, ice massage  * Physical therapy, modalities as indicated including ultrasound, massage, iontophoresis  * counterforce elbow brace/strap, available OTC  * wrist brace to prevent wrist extension  * modify lifting technique, palm upwards with lifting to relieve stress on extensor tendons of wrist.  * NDAIDS regularly three times daily x2-3 weeks  * cortisone injection discussed, placed at point of maximal tenderness.  * return to clinic as needed.      Jose G Blanchard M.D., M.S.  Dept. of Orthopaedic Surgery  [facility]    Hand / Upper Extremity Injection/Arthrocentesis: L elbow  Date/Time: 8/8/2019 2:29 PM  Performed by: Jose G Blanchard MD  Authorized by: Jose G Blanchard MD     Indications:  Pain  Needle Size:  25 G  Guidance: landmark    Approach:  Lateral  Condition: epicondylitis, lateral      Site:  L elbow  Medications:  20 mg triamcinolone 40 MG/ML; 1 mL lidocaine 1 %  Outcome:  Tolerated well, no immediate complications  Procedure discussed: discussed risks, benefits, and alternatives    Timeout: timeout called immediately prior to procedure    Prep: patient was prepped and draped in usual sterile fashion

## 2019-08-08 NOTE — LETTER
8/8/2019         RE: Giovanni Cedeno  [address]  [place] [address]        Dear Colleague,    Thank you for referring your patient, Giovanni Cedeno, to the [facility] [place]. Please see a copy of my visit note below.    Chief Complaint:   Chief Complaint   Patient presents with     Left Elbow - Pain     Pt here re: left elbow pain X 1 month ago.  Pt states he just woke up and felt the pain.  Pt has been taking Ibuprofen and ice to help with the pain.        HPI: Giovanni Cedeno is a 54 year old male , right -hand dominant, who presents for evaluation and management of a left elbow pain, without specific injury. Pain has been present for 1 months. Just woke up with pain one day. Ok at rest, pain with activities, movement. Taking ibuprofen, using ice for the pain. No change in activity or increased activity that he can recall leading to the pain.      He reports having moderate - severe  pain/discomfort at the elbow. He denies numbness or tingling.   Pain severity: 8/10  Pain quality: sharp  Frequency of symptoms: frequently  Associated symptoms: denies  Aggravated by: activities  Relieved by: rest    Treatment up to this point:ice and NSAIDS    Prior history of related problems: right elbow lateral epicondylitis treated with injections and eventual surgery.    Significant Orthopedic past medical history: right lateral epicondylitis, right rotator cuff repair. Knee pain    Past medical history:  has a past medical history of Arthritis.   Patient Active Problem List   Diagnosis     Restless leg syndrome     Knee bursitis     Pure hypercholesterolemia     Major depression in partial remission (H)     Pre-diabetes     Epicondylitis, lateral humeral     Immunization reaction     Chronic pain syndrome     Chronic right shoulder pain     Abdominal pain, left upper quadrant     Pain of toe of right foot     Acute midline low back pain without sciatica     Upper back pain      Mild intermittent asthma, unspecified whether complicated       Past surgical history:  has no past surgical history on file.     Medications:   Current Outpatient Medications:      carbidopa-levodopa (SINEMET)  MG tablet, Take 1 tablet by mouth At Bedtime, Disp: 90 tablet, Rfl: 5     celecoxib (CELEBREX) 200 MG capsule, TAKE 1 CAPSULE(200 MG) BY MOUTH TWICE DAILY AS NEEDED FOR MODERATE PAIN, Disp: 60 capsule, Rfl: 1     finasteride (PROSCAR) 5 MG tablet, TAKE 1 TABLET(5 MG) BY MOUTH DAILY, Disp: 90 tablet, Rfl: 1     gabapentin (NEURONTIN) 300 MG capsule, TAKE 2 CAPSULES BY MOUTH EVERY NIGHT AT BEDTIME, Disp: 60 capsule, Rfl: 11     HYDROcodone-acetaminophen (NORCO) 7.5-325 MG per tablet, Take 1 tablet by mouth every 4 hours as needed for pain maximum  4  tablet(s) per day, Disp: 30 tablet, Rfl: 0     methocarbamol (ROBAXIN) 500 MG tablet, TAKE 2 TABLETS(1000 MG) BY MOUTH THREE TIMES DAILY AS NEEDED FOR MUSCLE SPASMS, Disp: 60 tablet, Rfl: 0     mirtazapine (REMERON) 15 MG tablet, TAKE 1 TABLET(15 MG) BY MOUTH AT BEDTIME, Disp: 90 tablet, Rfl: 3     senna-docusate (SENOKOT-S/PERICOLACE) 8.6-50 MG tablet, Take 1-2 tablets by mouth 2 times daily as needed for constipation, Disp: 100 tablet, Rfl: 11     simvastatin (ZOCOR) 20 MG tablet, TAKE 1 TABLET BY MOUTH EVERY DAY AT BEDTIME, Disp: 90 tablet, Rfl: 3     tamsulosin (FLOMAX) 0.4 MG capsule, TAKE 1 CAPSULE(0.4 MG) BY MOUTH DAILY, Disp: 90 capsule, Rfl: 2     albuterol (PROAIR HFA/PROVENTIL HFA/VENTOLIN HFA) 108 (90 Base) MCG/ACT inhaler, Inhale 2 puffs into the lungs every 6 hours as needed for shortness of breath / dyspnea or wheezing (Patient not taking: Reported on 8/8/2019), Disp: 90 g, Rfl: 3    Allergies:   No Known Allergies     Family History: family history includes Diabetes in his father; Heart Disease in his father.     Social History:  reports that he has never smoked. He has never used smokeless tobacco. He reports that he does not drink alcohol  or use drugs.    Review of Systems:  ROS: 10 point ROS neg other than the symptoms noted above in the HPI and past medical history.    Physical Exam  GENERAL APPEARANCE: healthy, alert, no distress. Accompanied by his friend Vinh.  SKIN: no suspicious lesions or rashes  RESPIRATORY: No increased work of breathing.  NEURO: Normal strength and tone, mentation intact and speech normal  PSYCH:  mentation appears normal and affect normal. Not anxious.  Hands: no clubbing, nail pitting or nodes.    /83 (BP Location: Right arm, Patient Position: Sitting, Cuff Size: Adult Regular)   Pulse 89   Wt 54.9 kg (121 lb)   SpO2 95%   BMI 21.78 kg/m          left UPPER EXTREMITY:    Sensation intact to light touch in median, radial, ulnar and axillary nerve distributions  Palpable 2+ radial pulse, brisk capillary refill to all fingers, wwp  Intact epl fpl fdp edc wrist flexion/extension biceps triceps deltoid  DTR's normal biceps and brachioradialis    ELBOW:  Skin intact. No open wounds. No skin maceration.  Inspection: no swelling, no ecchymosis, no olecranon bursa swelling, no distal bicep tendon defect  There is palpable atrophy at the common extensor  Tender: lateral epicondyle, common extensor tendon and extensor muscle of forearm  Range of Motion: all normal  Strength: elbow strength full  Special tests: normal stability, pain with resisted wrist extension, pain with resisted middle finger extension, no pain with resisted wrist flexion:   There is no deformity in the area.      X-rays:  3 views left elbow from 8/8/2019 were reviewed in clinic today.  No obvious fractures or dislocations. Mild elbow degenerative changes.    Assessment: 54 year old male , right -hand dominant, with acute left elbow pain, lateral epicondylitis    Plan:   * discussed with patient history and clinical exam consistent with tennis elbow, or lateral epicondylitis, which is tendonitis at the lateral aspect of the elbow.  * treatment is  nonoperative, with surgical treatment reserved for recalcitrant symptoms despite long-term nonperative treatment regimen. Most cases expected to resolve over 1-2 years based on natural history.    Treatment includes:  * rest  * activity modification - avoid aggravating activities and reduce strenuous activities for 6-8 weeks  * ice, ice massage  * Physical therapy, modalities as indicated including ultrasound, massage, iontophoresis  * counterforce elbow brace/strap, available OTC  * wrist brace to prevent wrist extension  * modify lifting technique, palm upwards with lifting to relieve stress on extensor tendons of wrist.  * NDAIDS regularly three times daily x2-3 weeks  * cortisone injection discussed, placed at point of maximal tenderness.  * return to clinic as needed.      Jose G Blanchard M.D., M.S.  Dept. of Orthopaedic Surgery  [facility]    Hand / Upper Extremity Injection/Arthrocentesis: L elbow  Date/Time: 8/8/2019 2:29 PM  Performed by: Jose G Blanchard MD  Authorized by: Jose G Blanchard MD     Indications:  Pain  Needle Size:  25 G  Guidance: landmark    Approach:  Lateral  Condition: epicondylitis, lateral      Site:  L elbow  Medications:  20 mg triamcinolone 40 MG/ML; 1 mL lidocaine 1 %  Outcome:  Tolerated well, no immediate complications  Procedure discussed: discussed risks, benefits, and alternatives    Timeout: timeout called immediately prior to procedure    Prep: patient was prepped and draped in usual sterile fashion            Patient was fitted with an elbow strap. All questions were answered to patient's satisfaction. DME form explained, signed, and copy given to the patient for their records.   Mildred Hernandez Clinical Medical Assistant        Again, thank you for allowing me to participate in the care of your patient.        Sincerely,        Jose G Blanchard MD

## 2019-08-13 DIAGNOSIS — Z98.890 S/P ROTATOR CUFF REPAIR: ICD-10-CM

## 2019-08-13 DIAGNOSIS — M62.838 MUSCLE SPASM OF RIGHT SHOULDER: ICD-10-CM

## 2019-08-14 RX ORDER — METHOCARBAMOL 500 MG/1
TABLET, FILM COATED ORAL
Qty: 60 TABLET | Refills: 0 | Status: SHIPPED | OUTPATIENT
Start: 2019-08-14 | End: 2019-09-23

## 2019-08-14 NOTE — TELEPHONE ENCOUNTER
Requested Prescriptions   Pending Prescriptions Disp Refills     methocarbamol (ROBAXIN) 500 MG tablet [Pharmacy Med Name: METHOCARBAMOL 500MG TABLETS] 60 tablet 0     Sig: TAKE 2 TABLETS(1000 MG) BY MOUTH THREE TIMES DAILY AS NEEDED FOR MUSCLE SPASMS   Last Written Prescription Date:  7/1/2019  Last Fill Quantity: 60,  # refills: 0   Last office visit: 4/18/2019 with prescribing provider:  HERBERT Velez  Future Office Visit:   Next 5 appointments (look out 90 days)    Aug 22, 2019 11:30 AM CDT  SHORT with Sony Velez MD  [facility] [address] [place] [address] [address]  [facility] [address]  [phone]   Oct 21, 2019  9:15 AM CDT  SHORT with Sony Velez MD  [facility] ([facility]) [address] [place] [id number]  [phone]             There is no refill protocol information for this order        PRASANNA McgowanN, RN  [facility]

## 2019-08-22 ENCOUNTER — OFFICE VISIT (OUTPATIENT)
Dept: FAMILY MEDICINE | Facility: CLINIC | Age: 54
End: 2019-08-22
Payer: COMMERCIAL

## 2019-08-22 VITALS
DIASTOLIC BLOOD PRESSURE: 64 MMHG | OXYGEN SATURATION: 100 % | RESPIRATION RATE: 16 BRPM | BODY MASS INDEX: 25.2 KG/M2 | TEMPERATURE: 97 F | HEART RATE: 78 BPM | SYSTOLIC BLOOD PRESSURE: 118 MMHG | WEIGHT: 140 LBS

## 2019-08-22 DIAGNOSIS — Z98.890 S/P ROTATOR CUFF REPAIR: ICD-10-CM

## 2019-08-22 DIAGNOSIS — F32.4 MAJOR DEPRESSIVE DISORDER WITH SINGLE EPISODE, IN PARTIAL REMISSION (H): Chronic | ICD-10-CM

## 2019-08-22 DIAGNOSIS — M75.101 ROTATOR CUFF SYNDROME, RIGHT: ICD-10-CM

## 2019-08-22 DIAGNOSIS — M62.838 MUSCLE SPASM OF RIGHT SHOULDER: ICD-10-CM

## 2019-08-22 DIAGNOSIS — R39.12 BENIGN PROSTATIC HYPERPLASIA WITH WEAK URINARY STREAM: ICD-10-CM

## 2019-08-22 DIAGNOSIS — R35.1 NOCTURIA: ICD-10-CM

## 2019-08-22 DIAGNOSIS — Z11.59 ENCOUNTER FOR HEPATITIS C SCREENING TEST FOR LOW RISK PATIENT: Primary | ICD-10-CM

## 2019-08-22 DIAGNOSIS — N40.1 BENIGN PROSTATIC HYPERPLASIA WITH WEAK URINARY STREAM: ICD-10-CM

## 2019-08-22 DIAGNOSIS — Z11.4 ENCOUNTER FOR SCREENING FOR HIV: ICD-10-CM

## 2019-08-22 DIAGNOSIS — G89.4 CHRONIC PAIN SYNDROME: ICD-10-CM

## 2019-08-22 PROCEDURE — 99000 SPECIMEN HANDLING OFFICE-LAB: CPT | Performed by: FAMILY MEDICINE

## 2019-08-22 PROCEDURE — 99214 OFFICE O/P EST MOD 30 MIN: CPT | Performed by: FAMILY MEDICINE

## 2019-08-22 PROCEDURE — 80307 DRUG TEST PRSMV CHEM ANLYZR: CPT | Mod: 90 | Performed by: FAMILY MEDICINE

## 2019-08-22 RX ORDER — HYDROCODONE BITARTRATE AND ACETAMINOPHEN 7.5; 325 MG/1; MG/1
1 TABLET ORAL EVERY 4 HOURS PRN
Qty: 30 TABLET | Refills: 0 | Status: SHIPPED | OUTPATIENT
Start: 2019-08-22 | End: 2019-09-26

## 2019-08-22 RX ORDER — TAMSULOSIN HYDROCHLORIDE 0.4 MG/1
0.4 CAPSULE ORAL DAILY
Qty: 30 CAPSULE | Refills: 11 | Status: SHIPPED | OUTPATIENT
Start: 2019-08-22 | End: 2019-10-21

## 2019-08-22 ASSESSMENT — ANXIETY QUESTIONNAIRES
1. FEELING NERVOUS, ANXIOUS, OR ON EDGE: NEARLY EVERY DAY
GAD7 TOTAL SCORE: 20
7. FEELING AFRAID AS IF SOMETHING AWFUL MIGHT HAPPEN: MORE THAN HALF THE DAYS
6. BECOMING EASILY ANNOYED OR IRRITABLE: NEARLY EVERY DAY
IF YOU CHECKED OFF ANY PROBLEMS ON THIS QUESTIONNAIRE, HOW DIFFICULT HAVE THESE PROBLEMS MADE IT FOR YOU TO DO YOUR WORK, TAKE CARE OF THINGS AT HOME, OR GET ALONG WITH OTHER PEOPLE: VERY DIFFICULT
3. WORRYING TOO MUCH ABOUT DIFFERENT THINGS: NEARLY EVERY DAY
2. NOT BEING ABLE TO STOP OR CONTROL WORRYING: NEARLY EVERY DAY
5. BEING SO RESTLESS THAT IT IS HARD TO SIT STILL: NEARLY EVERY DAY

## 2019-08-22 ASSESSMENT — PATIENT HEALTH QUESTIONNAIRE - PHQ9
SUM OF ALL RESPONSES TO PHQ QUESTIONS 1-9: 24
5. POOR APPETITE OR OVEREATING: NEARLY EVERY DAY

## 2019-08-22 NOTE — TELEPHONE ENCOUNTER
Requested Prescriptions   Pending Prescriptions Disp Refills     methocarbamol (ROBAXIN) 500 MG tablet [Pharmacy Med Name: METHOCARBAMOL 500MG TABLETS] 60 tablet 0     Sig: TAKE 2 TABLETS(1000 MG) BY MOUTH THREE TIMES DAILY AS NEEDED FOR MUSCLE SPASMS    Last Written Prescription Date:  8/14/2019  Last Fill Quantity: 60 tablet,   # refills: 0  Last Office Visit: 08/22/2019   HERBERT Velez  Future Office visit:    Next 5 appointments (look out 90 days)    Oct 21, 2019  9:15 AM CDT  SHORT with Sony Velez MD  [facility] [address] [place] [id number] [phone]           Routing refill request to provider for review/approval because:  Drug not on the [facility], [facility] or  Health refill protocol or controlled substance         There is no refill protocol information for this order

## 2019-08-22 NOTE — LETTER
August 29, 2019      Giovanni Cedeno  [address]  [place] [address]        Dear ,    We are writing to inform you of your test results.    NORMAL URINE DRUG SCREEN   Resulted Orders   Pain Drug Scr UR W Rptd Meds   Result Value Ref Range    Pain Drug SCR UR W RPTD Meds FINAL       Comment:      (Note)  ====================================================================  [facility] COMP DRUG ANALYSIS,[facility]  ====================================================================  Test                             Result       Flag       Units        Drug Present and Declared for Prescription Verification   Hydrocodone                    969          EXPECTED   ng/mg creat   Norhydrocodone                 1122         EXPECTED   ng/mg creat    Sources of hydrocodone include scheduled prescription    medications. Norhydrocodone is an expected metabolite of    hydrocodone.   Gabapentin                     PRESENT      EXPECTED                 Mirtazapine                    PRESENT      EXPECTED                 Acetaminophen                  PRESENT      EXPECTED                Drug Present not Declared for Prescription Verification   Naproxen                       PRESENT      UNEXPECTED              Drug Absent but Declared for Prescription Verification   Methocarbamol                    Not Detected UNEXPECTED               Fluoxetine                     Not Detected UNEXPECTED              ====================================================================  Test                      Result    Flag   Units      Ref Range        Creatinine              49               mg/dL      >=20            ====================================================================  Declared Medications:  The flagging and interpretation on this report are based on the  following declared medications.  Unexpected results may arise from  inaccuracies in the declared medications.  **Note: The testing scope of this panel  includes these medications:  Fluoxetine (Prozac)  Gabapentin (Neurontin)  Hydrocodone (Norco)  Methocarbamol (Robaxin)  Mirtazapine (Remeron)  **Note: The testing scope of this panel does not include small to  moderate amounts of these reported medications:  Acetaminophen (Norco)  **Note: The testing scope of this panel does not include following  reported medications:  Alb  uterol  Carbidopa (Carbidopa/Levodopa)  Celecoxib (Celebrex)  Docusate (Senokot-S)  Finasteride (Proscar)  Levodopa (Carbidopa/Levodopa)  Sennosides (Senokot-S)  Simvastatin (Zocor)  Tamsulosin (Flomax)  ====================================================================  For clinical consultation, please call [phone].  ====================================================================  Analysis performed by [employer] [place] [address]         If you have any questions or concerns, please call the clinic at the number listed above.       Sincerely,        ANIKA REYES MD

## 2019-08-22 NOTE — PROGRESS NOTES
Subjective     Giovanni Cedeno is a 54 year old male who presents to clinic today for the following health issues:    HPI   Genitourinary symptoms  NOCTURIA AND WEAK DRIBBLING URINE      Duration: little over 1 year    Description:  frequency and nocturia x 4    Intensity:  severe    Accompanying signs and symptoms (fever/discharge/nausea/vomiting/back or abdominal pain):  None    History (frequent UTI's/kidney stones/prostate problems): None  Sexually active: no     Precipitating or alleviating factors: None    Therapies tried and outcome: none   Outcome: na    NORMAL PSA OR PROSTATE CANCER SCREENING TEST,    X 2     ASSESSMENT BENIGN PROSTATIC HYPERTROPHY with SYMPTOMS     PLAN FLOMAX 0.4MG Q HOUR OF SLEEP     CONSULT DR ALLY MOLINA [facility]    Hyperlipidemia Follow-Up      Are you having any of the following symptoms? (Select all that apply)  No complaints of shortness of breath, chest pain or pressure.  No increased sweating or nausea with activity.  No left-sided neck or arm pain.  No complaints of pain in calves when walking 1-2 blocks.    Are you regularly taking any medication or supplement to lower your cholesterol? SIMVASTATIN 20MG DAILY     Are you having muscle aches or other side effects that you think could be caused by your cholesterol lowering medication?  No       CHRONIC PAIN SYNDROME     HAVING LEFT ELBOW PAIN     RARELY USING NARCOTICS     LEFT LATERAL EPICONDYLITIS     USING ROBAXIN AND GABAPENTIN     CELEBREX TWICE DAILY     HISTORY RESTLESS LEG SYNDROME     CHRONIC RIGHT SHOULDER PAIN     CHRONIC LOWER BACK PAIN     CHRONIC UPPER BACK PAIN           Current Outpatient Medications   Medication     FLUoxetine (PROZAC) 20 MG capsule     HYDROcodone-acetaminophen (NORCO) 7.5-325 MG per tablet     tamsulosin (FLOMAX) 0.4 MG capsule     albuterol (PROAIR HFA/PROVENTIL HFA/VENTOLIN HFA) 108 (90 Base) MCG/ACT inhaler     carbidopa-levodopa (SINEMET)  MG tablet      celecoxib (CELEBREX) 200 MG capsule     finasteride (PROSCAR) 5 MG tablet     gabapentin (NEURONTIN) 300 MG capsule     methocarbamol (ROBAXIN) 500 MG tablet     mirtazapine (REMERON) 15 MG tablet     senna-docusate (SENOKOT-S/PERICOLACE) 8.6-50 MG tablet     simvastatin (ZOCOR) 20 MG tablet     tamsulosin (FLOMAX) 0.4 MG capsule     Current Facility-Administered Medications   Medication     lidocaine 1 % injection 1 mL     triamcinolone (KENALOG-40) injection 20 mg         Depression and Anxiety Follow-Up    How are you doing with your depression since your last visit? Worsened  NOT SUICIDAL     How are you doing with your anxiety since your last visit?  No change    Are you having other symptoms that might be associated with depression or anxiety? Yes:   FATIGUE INSOMNIA  CONCENTRATION DECION ISSUES     Have you had a significant life event? Health Concerns     Do you have any concerns with your use of alcohol or other drugs? No    Social History     Tobacco Use     Smoking status: Never Smoker     Smokeless tobacco: Never Used   Substance Use Topics     Alcohol use: No     Drug use: No     PHQ 9/10/2018 4/18/2019 8/22/2019   PHQ-9 Total Score 10 17 24   Q9: Thoughts of better off dead/self-harm past 2 weeks Not at all Not at all Several days     JEIMY-7 SCORE 11/14/2017 4/18/2019 8/22/2019   Total Score 0 (minimal anxiety) 17 (severe anxiety) -   Total Score 0 17 20     No flowsheet data found.  No flowsheet data found.  NONE NOTED  ASSESSMENT  WORSENING DEPRESSION DESPITE MIRTAZAPINE 15MG AT HOUR OF SLEEP  PLAN   REFERRAL COUNSELING AND PSYCHIATRY  [facility]   ADD FLUOXETINE 20MG DAILY   FOLLOW UP  2 WEEKS   Suicide Assessment Five-step Evaluation and Treatment (SAFE-T)  Chronic Pain Follow-Up       Type / Location of Pain:  LEFT ELBOW BACK AND RIGHT SHOULDER   Analgesia/pain control:       Recent changes:  same      Overall control: Tolerable with discomfort  Activity level/function:      Daily activities:  Can do  most things most days, with some rest    Work:  Pain does not limit any  work  Adverse effects:  No  Adherance    Taking medication as directed?  Yes    Participating in other treatments: yes  Risk Factors:    Sleep:  Fair    Mood/anxiety:  worsened    Recent family or social stressors:  none noted and  ILL FRIEND    Other aggravating factors: repetitive activities - STANDING GRIPPING AND GRASPIN AND SHOULDER ROM  PHQ-9 SCORE 9/10/2018 4/18/2019 8/22/2019   PHQ-9 Total Score - - -   PHQ-9 Total Score MyChart - 17 (Moderately severe depression) -   PHQ-9 Total Score 10 17 24     JEIMY-7 SCORE 11/14/2017 4/18/2019 8/22/2019   Total Score 0 (minimal anxiety) 17 (severe anxiety) -   Total Score 0 17 20     Encounter-Level CSA - 07/09/2015:    Controlled Substance Agreement - Scan on 7/13/2015  2:12 PM: [place], Controlled Substance Contract, 07-09-15 (below)       Patient-Level CSA:    There are no patient-level csa.                There are no preventive care reminders to display for this patient.          .  Current Outpatient Medications   Medication Sig Dispense Refill     FLUoxetine (PROZAC) 20 MG capsule Take 1 capsule (20 mg) by mouth daily 30 capsule 11     HYDROcodone-acetaminophen (NORCO) 7.5-325 MG per tablet Take 1 tablet by mouth every 4 hours as needed for pain maximum  4  tablet(s) per day 30 tablet 0     tamsulosin (FLOMAX) 0.4 MG capsule Take 1 capsule (0.4 mg) by mouth daily 30 capsule 11     albuterol (PROAIR HFA/PROVENTIL HFA/VENTOLIN HFA) 108 (90 Base) MCG/ACT inhaler Inhale 2 puffs into the lungs every 6 hours as needed for shortness of breath / dyspnea or wheezing (Patient not taking: Reported on 8/8/2019) 90 g 3     carbidopa-levodopa (SINEMET)  MG tablet Take 1 tablet by mouth At Bedtime 90 tablet 5     celecoxib (CELEBREX) 200 MG capsule TAKE 1 CAPSULE(200 MG) BY MOUTH TWICE DAILY AS NEEDED FOR MODERATE PAIN 60 capsule 1     finasteride (PROSCAR) 5 MG tablet TAKE 1 TABLET(5 MG) BY MOUTH DAILY  90 tablet 1     gabapentin (NEURONTIN) 300 MG capsule TAKE 2 CAPSULES BY MOUTH EVERY NIGHT AT BEDTIME 60 capsule 11     methocarbamol (ROBAXIN) 500 MG tablet TAKE 2 TABLETS(1000 MG) BY MOUTH THREE TIMES DAILY AS NEEDED FOR MUSCLE SPASMS 60 tablet 0     mirtazapine (REMERON) 15 MG tablet TAKE 1 TABLET(15 MG) BY MOUTH AT BEDTIME 90 tablet 3     senna-docusate (SENOKOT-S/PERICOLACE) 8.6-50 MG tablet Take 1-2 tablets by mouth 2 times daily as needed for constipation 100 tablet 11     simvastatin (ZOCOR) 20 MG tablet TAKE 1 TABLET BY MOUTH EVERY DAY AT BEDTIME 90 tablet 3     tamsulosin (FLOMAX) 0.4 MG capsule TAKE 1 CAPSULE(0.4 MG) BY MOUTH DAILY 90 capsule 2              No Known Allergies      Immunization History   Administered Date(s) Administered     Influenza (IIV3) PF 10/01/2013, 09/20/2014     Influenza Vaccine IM 3yrs+ 4 Valent IIV4 11/10/2016, 12/21/2017, 09/10/2018     TDAP Vaccine (Adacel) 05/21/2012     Zoster vaccine, live 01/09/2015               reports that he does not drink alcohol.          reports that he does not use drugs.        family history includes Diabetes in his father; Heart Disease in his father.        indicated that his mother is alive. He indicated that his father is alive.             has no past surgical history on file.         reports that he does not currently engage in sexual activity but has had partners who are Female.    .  Pediatric History   Patient Guardian Status     Mother:  Rae Cedeno     Other Topics Concern     Parent/sibling w/ CABG, MI or angioplasty before 65F 55M? Yes   Social History Narrative     Not on file               reports that he has never smoked. He has never used smokeless tobacco.        Medical, social, surgical, and family histories reviewed.        Labs reviewed in EPIC  Patient Active Problem List   Diagnosis     Restless leg syndrome     Knee bursitis     Pure hypercholesterolemia     Major depression in partial remission (H)     Pre-diabetes      Epicondylitis, lateral humeral     Immunization reaction     Chronic pain syndrome     Chronic right shoulder pain     Abdominal pain, left upper quadrant     Pain of toe of right foot     Acute midline low back pain without sciatica     Upper back pain     Mild intermittent asthma, unspecified whether complicated         History reviewed. No pertinent surgical history.    Social History     Tobacco Use     Smoking status: Never Smoker     Smokeless tobacco: Never Used   Substance Use Topics     Alcohol use: No       Family History   Problem Relation Age of Onset     Diabetes Father      Heart Disease Father              Current Outpatient Medications   Medication Sig Dispense Refill     FLUoxetine (PROZAC) 20 MG capsule Take 1 capsule (20 mg) by mouth daily 30 capsule 11     HYDROcodone-acetaminophen (NORCO) 7.5-325 MG per tablet Take 1 tablet by mouth every 4 hours as needed for pain maximum  4  tablet(s) per day 30 tablet 0     tamsulosin (FLOMAX) 0.4 MG capsule Take 1 capsule (0.4 mg) by mouth daily 30 capsule 11     albuterol (PROAIR HFA/PROVENTIL HFA/VENTOLIN HFA) 108 (90 Base) MCG/ACT inhaler Inhale 2 puffs into the lungs every 6 hours as needed for shortness of breath / dyspnea or wheezing (Patient not taking: Reported on 8/8/2019) 90 g 3     carbidopa-levodopa (SINEMET)  MG tablet Take 1 tablet by mouth At Bedtime 90 tablet 5     celecoxib (CELEBREX) 200 MG capsule TAKE 1 CAPSULE(200 MG) BY MOUTH TWICE DAILY AS NEEDED FOR MODERATE PAIN 60 capsule 1     finasteride (PROSCAR) 5 MG tablet TAKE 1 TABLET(5 MG) BY MOUTH DAILY 90 tablet 1     gabapentin (NEURONTIN) 300 MG capsule TAKE 2 CAPSULES BY MOUTH EVERY NIGHT AT BEDTIME 60 capsule 11     methocarbamol (ROBAXIN) 500 MG tablet TAKE 2 TABLETS(1000 MG) BY MOUTH THREE TIMES DAILY AS NEEDED FOR MUSCLE SPASMS 60 tablet 0     mirtazapine (REMERON) 15 MG tablet TAKE 1 TABLET(15 MG) BY MOUTH AT BEDTIME 90 tablet 3     senna-docusate (SENOKOT-S/PERICOLACE)  8.6-50 MG tablet Take 1-2 tablets by mouth 2 times daily as needed for constipation 100 tablet 11     simvastatin (ZOCOR) 20 MG tablet TAKE 1 TABLET BY MOUTH EVERY DAY AT BEDTIME 90 tablet 3     tamsulosin (FLOMAX) 0.4 MG capsule TAKE 1 CAPSULE(0.4 MG) BY MOUTH DAILY 90 capsule 2           Recent Labs   Lab Test 04/18/19  1622 11/14/17  0846 05/23/17  0813 11/10/16  0945 07/09/15  1013 03/11/14  1047 05/08/13  0924   A1C  --   --   --   --  5.4 5.8 6.3*   *  --   --  167*  --  218* 150*   HDL 55  --   --  53  --  48 44   TRIG 168*  --   --  173*  --  325* 184*   ALT 29  --  31 29  --  29 35   CR 0.92 0.84 0.78 0.93  --   --  1.00   GFRESTIMATED >90 >90 >90  Non  GFR Calc   85  --   --  79   GFRESTBLACK >90 >90 >90   GFR Calc   >90  --   --  >90   POTASSIUM 4.5 4.2 4.2  --   --   --  5.0            BP Readings from Last 6 Encounters:   08/22/19 118/64   08/08/19 122/83   04/18/19 100/62   09/12/18 121/85   09/10/18 120/80   08/09/18 121/79           Wt Readings from Last 3 Encounters:   08/22/19 63.5 kg (140 lb)   08/08/19 54.9 kg (121 lb)   04/18/19 54.9 kg (121 lb)                 Positive symptoms or findings indicated by bold designation:         ROS: 10 point ROS neg other than the symptoms noted above in the HPI.except  has Restless leg syndrome; Knee bursitis; Pure hypercholesterolemia; Major depression in partial remission (H); Pre-diabetes; Epicondylitis, lateral humeral; Immunization reaction; Chronic pain syndrome; Chronic right shoulder pain; Abdominal pain, left upper quadrant; Pain of toe of right foot; Acute midline low back pain without sciatica; Upper back pain; and Mild intermittent asthma, unspecified whether complicated on their problem list.  Review Of Systems    Skin: negative    Eyes: negative    Ears/Nose/Throat: negative    Respiratory: No shortness of breath, dyspnea on exertion, cough, or hemoptysis    Cardiovascular: negative    Gastrointestinal:  negative    Genitourinary: nocturia, frequency and urgency    Musculoskeletal: negative, back pain, neck pain, arthritis, joint pain and SHOULDER PIAN    LEFT ELBOW PAIN    Neurologic: negative    Psychiatric: anxiety and depression    Hematologic/Lymphatic/Immunologic: negative    Endocrine: negative                PE:  /64   Pulse 78   Temp 97  F (36.1  C) (Tympanic)   Resp 16   Wt 63.5 kg (140 lb)   SpO2 100%   BMI 25.20 kg/m   Body mass index is 25.2 kg/m .        Constitutional: general appearance, well nourished, well developed, in no acute distress, well developed, appears stated age, normal body habitus,          Eyes:; The patient has normal eyelids sclerae and conjunctivae :          Ears/Nose/Throat: external ear, overall: normal appearance; external nose, overall: benign appearance, normal moujth gums and lips           Neck: thyroid, overall: normal size, normal consistency, nontender,          Respiratory:  palpation of chest, overall: normal excursion,    Clear to percussion and auscultation     NO Tachypnea    NORMAL  Color          Cardiovascular:  Good color with no peripheral edema     Regular sinus rhythm without murmur.  Physiologic heart sounds   Heart is unelarged    .     Chest/Breast: normal shape           Abdominal exam,  Liver and spleen are  unenlarged        Tenderness    Scars              Urogenital; no renal, flank or bladder  tenderness;          Lymphatic: neck nodes,     Other nodes         Musculoskeletal:  Brief ortho exam normal except:   PAIN MEDIAL EPICONDYLE     LEFT ELBOW    FULL RANGE OF MOTION LEFT AND RIGHT SHOULDER     FULL RANGE OF MOTION BACK         Integument: inspection of skin, no rash, lesions; and, palpation, no induration, no tenderness.          Neurologic mental status, overall: alert and oriented; gait, no ataxia, no unsteadiness; coordination, no tremors; cranial nerves, overall: normal motor, overall: normal bulk, tone.          Psychiatric:  orientation/consciousness, overall: oriented to person, place and time; behavior/psychomotor activity, no tics, normal psychomotor activity; mood and affect, overall: normal mood and affect; appearance, overall: well-groomed, good eye contact; speech, overall: normal quality, no aphasia and normal quality, quantity, intact.        Diagnostic Test Results:  Results for orders placed or performed in visit on 08/08/19   XR Elbow Left G/E 3 Views    Narrative    LEFT ELBOW 3 VIEWS  8/8/2019 1:37 PM    HISTORY:  Left elbow pain    COMPARISON:  None.    FINDINGS:  No fracture or osseous lesion is seen. The joint spaces are  well preserved. No soft tissue pathology is seen.      Impression    IMPRESSION:  Unremarkable examination.    ALONDRA JONES MD           ICD-10-CM    1. Encounter for hepatitis C screening test for low risk patient Z11.59 CANCELED: Hepatitis C Screen Reflex to HCV RNA Quant and Genotype   2. Encounter for screening for HIV Z11.4 CANCELED: HIV Screening   3. Nocturia R35.1 tamsulosin (FLOMAX) 0.4 MG capsule     UROLOGY ADULT REFERRAL   4. Benign prostatic hyperplasia with weak urinary stream N40.1     R39.12    5. Major depressive disorder with single episode, in partial remission (H) F32.4 FLUoxetine (PROZAC) 20 MG capsule     MENTAL HEALTH REFERRAL  - Adult; Outpatient Treatment; Individual/Couples/Family/Group Therapy/Health Psychology; [place]: [facility] [phone]; We will contact you to schedule the appointment or please call with any questions   6. Chronic pain syndrome G89.4 Pain Drug Scr UR W Rptd Meds     HYDROcodone-acetaminophen (NORCO) 7.5-325 MG per tablet   7. Rotator cuff syndrome, right M75.101 HYDROcodone-acetaminophen (NORCO) 7.5-325 MG per tablet              .    Side effects benefits and risks thoroughly discussed. .he may come in early if unimproved or getting worse          Please drink 2 glasses of water prior to meals and walk 15-30 minutes after  meals        I spent  25 MINUTES SPENT  with patient discussing the following issues   The primary encounter diagnosis was Encounter for hepatitis C screening test for low risk patient. Diagnoses of Encounter for screening for HIV, Nocturia, Benign prostatic hyperplasia with weak urinary stream, Major depressive disorder with single episode, in partial remission (H), Chronic pain syndrome, and Rotator cuff syndrome, right were also pertinent to this visit. over half of which involved counseling and coordination of care.      Patient Instructions   (Z11.59) Encounter for hepatitis C screening test for low risk patient  (primary encounter diagnosis)  Comment:    Plan: Hepatitis C Screen Reflex to HCV RNA Quant and         Genotype             (Z11.4) Encounter for screening for HIV  Comment:    Plan: HIV Screening             (R35.1) Nocturia  Comment:    Plan: tamsulosin (FLOMAX) 0.4 MG capsule, UROLOGY         ADULT REFERRAL             (N40.1,  R39.12) Benign prostatic hyperplasia with weak urinary stream  Comment:    Plan:      (F32.4) Major depressive disorder with single episode, in partial remission (H)  Comment:    Plan: FLUoxetine (PROZAC) 20 MG capsule, MENTAL         HEALTH REFERRAL  - Adult; Outpatient Treatment;        Individual/Couples/Family/Group Therapy/Health         [place] [facility] [place] [facility]         [phone]; We will contact you to schedule        the appointment or please call with any         questions             (G89.4) Chronic pain syndrome  Comment:    Plan: Pain Drug Scr UR W Rptd Meds,         HYDROcodone-acetaminophen (NORCO) 7.5-325 MG         per tablet             (M75.101) Rotator cuff syndrome, right  Comment:    Plan: HYDROcodone-acetaminophen (NORCO) 7.5-325 MG         per tablet                              ALL THE ABOVE PROBLEMS ARE STABLE AND MED CHANGES AS NOTED        Diet:  MEDITERRANEAN DIET         Exercise:  ELBOW SHOULDER AND LOWER BACK PAIN AND  UPPER BACK PAIN   Exercises Range of motion, balance, isometric, and strengthening exercises 30 repetitions twice daily of involved joints            .ANIKA REYES MD 8/22/2019 1:17 PM  August 22, 2019

## 2019-08-22 NOTE — LETTER
[facility]  08/22/19    Patient: Giovanni Cedeno  YOB: 1965  Medical Record Number: [id number]                                                                  Opioid / Opioid Plus Controlled Substance Agreement    I understand that my care provider has prescribed an opioid (narcotic) controlled substance to help manage my condition(s). I am taking this medicine to help me function or work. I know this is strong medicine, and that it can cause serious side effects. Opioid medicine can be sedating, addicting and may cause a dependency on the drug. They can affect my ability to drive or think, and cause depression. They need to be taken exactly as prescribed. Combining opioids with certain medicines or chemicals (such as cocaine, sedatives and tranquilizers, sleeping pills, meth) can be dangerous or even fatal. Also, if I stop opioids suddenly, I may have severe withdrawal symptoms. Last, I understand that opioids do not work for all types of pain nor for all patients. If not helpful, I may be asked to stop them.        The risks, benefits, and side effects of these medicine(s) were explained to me. I agree that:    1. I will take part in other treatments as advised by my care team. This may be psychiatry or counseling, physical therapy, behavioral therapy, group treatment or a referral to a pain clinic. I will reduce or stop my medicine when my care team tells me to do so.  2. I will take my medicines as prescribed. I will not change the dose or schedule unless my care team tells me to. There will be no refills if I  run out early.   I may be contactedwithout warning and asked to complete a urine drug test or pill count at any time.   3. I will keep all my appointments, and understand this is part of the monitoring of opioids. My care team may require an office visit for EVERY opioid/controlled substance refill. If I miss appointments or don t follow instructions,  my care team may stop my medicine.  4. I will not ask other providers to prescribe controlled substances, and I will not accept controlled substances from other people. If I need another prescribed controlled substance for a new reason, I will tell my care team within 1 business day.  5. I will use one pharmacy to fill all of my controlled substance prescriptions, and it is up to me to make sure that I do not run out of my medicines on weekends or holidays. If my care team is willing to refill my opioid prescription without a visit, I must request refills only during office hours, refills may take up to 3 days to process, and it may take up to 5 to 7 days for my medicine to be mailed and ready at my pharmacy. Prescriptions will not be mailed anywhere except my pharmacy.        [id number]  Rev 12/18         Registration to scan to EHR                             Page 1 of 2               Controlled Substance Agreement [facility]  08/22/19  Patient: Giovanni Cedeno  YOB: 1965  Medical Record Number: [id number]                                                                  6. I am responsible for my prescriptions. If the medicine/prescription is lost or stolen, it will not be replaced. I also agree not to share controlled substance medicines with anyone.  7. I agree to not use ANY illegal or recreational drugs. This includes marijuana, cocaine, bath salts or other drugs. I agree not to use alcohol unless my care team says I may.          I agree to give urine samples whenever asked. If I don t give a urine sample, the care team may stop my medicine.    8. If I enroll in the [place] Medical Marijuana program, I will tell my care team. I will also sign an agreement to share my medical records with my care team.   9. I will bring in my list of medicines (or my medicine bottles) each time I come to the clinic.   10. I will tell my care team right away if I  become pregnant or have a new medical problem treated outside of my regular clinic.  11. I understand that this medicine can affect my thinking and judgment. It may be unsafe for me to drive, use machinery and do dangerous tasks. I will not do any of these things until I know how the medicine affects me. If my dose changes, I will wait to see how it affects me. I will contact my care team if I have concerns about medicine side effects.    I understand that if I do not follow any of the conditions above, my prescriptions or treatment may be stopped.      I agree that my provider, clinic care team, and pharmacy may work with any city, state or federal law enforcement agency that investigates the misuse, sale, or other diversion of my controlled medicine. I will allow my provider to discuss my care with or share a copy of this agreement with any other treating provider, pharmacy or emergency room where I receive care. I agree to give up (waive) any right of privacy or confidentiality with respect to these consents.     I have read this agreement and have asked questions about anything I did not understand.      ________________________________________________________________________  Patient signature - Date/Time -  Giovanni Cedeno                                      ________________________________________________________________________  Witness signature                                                            ________________________________________________________________________  Provider signature - ANIKA REYES MD      [id number]  Rev 12/18         Registration to scan to EHR                         Page 2 of 2                   Controlled Substance Agreement Opioid           Page 1 of 2  Opioid Pain Medicines (also known as Narcotics)  What You Need to Know    What are opioids?   Opioids are pain medicines that must be prescribed by a doctor.  They are also known as narcotics.    Examples are:      morphine (MS Contin, Pat)    oxycodone (Oxycontin)    oxycodone and acetaminophen (Percocet)    hydrocodone and acetaminophen (Vicodin, Norco)     fentanyl patch (Duragesic)     hydromorphone (Dilaudid)     methadone     What do opioids do well?   Opioids are best for short-term pain after a surgery or injury. They also work well for cancer pain. Unlike other pain medicines, they do not cause liver or kidney failure or ulcers. They may help some people with long-lasting (chronic) pain.     What do opioids NOT do well?   Opioids never get rid of pain entirely, and they do not work well for most patients with chronic pain. Opioids do not reduce swelling, one of the causes of pain. They also don t work well for nerve pain.                           For informational purposes only.  Not to replace the advice of your care provider.  Copyright [address] [facility]. All right reserved. [employer] 799256-[id number] 02/18.      Page 2 of 2    Risks and side effects   Talk to your doctor before you start or decide to keep taking one of these medicines. Side effects include:    Lowering your breathing rate enough to cause death    Overdose, including death, especially if taking higher than prescribed doses    Long-term opioid use    Worse depression symptoms; less pleasure in things you usually enjoy    Feeling tired or sluggish    Slower thoughts or cloudy thinking    Being more sensitive to pain over time; pain is harder to control    Trouble sleeping or restless sleep    Changes in hormone levels (for example, less testosterone)    Changes in sex drive or ability to have sex    Constipation    Unsafe driving    Itching and sweating    Feeling dizzy    Nausea, vomiting and dry mouth    What else should I know about opioids?  When someone takes opioids for too long or too often, they become dependent. This means that if you stop or reduce the medicine too quickly, you will have withdrawal symptoms.    Dependence is  not the same as addiction. Addiction is when people keep using a substance that harms their body, their mind or their relations with others. If you have a history of drug or alcohol abuse, taking opioids can cause a relapse.    Over time, opioids don t work as well. Most people will need higher and higher doses. The higher the dose, the more serious the side effects. We don t know the long-term effects of opioids.      Prescribed opioids aren't the best way to manage chronic pain    Other ways to manage pain include:      Ibuprofen or acetaminophen.  You should always try this first.      Treat health problems that may be causing pain.      acupuncture or massage, deep breathing, meditation, visual imagery, aromatherapy.      Use heat or ice at the pain site      Physical therapy and exercise      Stop smoking      See a counselor or therapist                                                  People who have used opioids for a long time may have a lower quality of life, worse depression, higher levels of pain and more visits to doctors.    Never share your opioids with others. Be sure to store opioids in a secure place, locked if possible.Young children can easily swallow them and overdose.     You can overdose on opioids.  Signs of overdose include decrease or loss of consciousness, slowed breathing, trouble waking and blue lips.  If someone is worried about overdose, they should call 911.    If you are at risk for overdose, you may get naloxone (Narcan, a medicine that reverses the effects of opioids.  If you overdose, a friend or family member can give you Narcan while waiting for the ambulance.  They need to know the signs of overdose and how to give Narcan.    While you're taking opioids:    Don't use alcohol or street drugs. Taking them together can cause death.    Don't take any of these medicines unless your doctor says its okay.  Taking these with opioids can cause death.    Benzodiazepines (such as  lorazepam         or diazepam)    Muscle relaxers (such as cyclobenzaprine)    sleeping pills    other opioids    Safe disposal of opioids  Find your area drug take-back program, your pharmacy mail-back program, buy a special disposal bag (such as Deterra) from your pharmacy or flush them down the toilet.  Use the guidelines at:  www.fda.gov/drugs/resourcesforyou

## 2019-08-22 NOTE — PATIENT INSTRUCTIONS
(Z11.59) Encounter for hepatitis C screening test for low risk patient  (primary encounter diagnosis)  Comment:    Plan: Hepatitis C Screen Reflex to HCV RNA Quant and         Genotype             (Z11.4) Encounter for screening for HIV  Comment:    Plan: HIV Screening             (R35.1) Nocturia  Comment:    Plan: tamsulosin (FLOMAX) 0.4 MG capsule, UROLOGY         ADULT REFERRAL             (N40.1,  R39.12) Benign prostatic hyperplasia with weak urinary stream  Comment:    Plan:      (F32.4) Major depressive disorder with single episode, in partial remission (H)  Comment:    Plan: FLUoxetine (PROZAC) 20 MG capsule, MENTAL         HEALTH REFERRAL  - Adult; Outpatient Treatment;        Individual/Couples/Family/Group Therapy/Health         Psychology; [facility] [place]: [facility]         [phone]; We will contact you to schedule        the appointment or please call with any         questions             (G89.4) Chronic pain syndrome  Comment:    Plan: Pain Drug Scr UR W Rptd Meds,         HYDROcodone-acetaminophen (NORCO) 7.5-325 MG         per tablet             (M75.101) Rotator cuff syndrome, right  Comment:    Plan: HYDROcodone-acetaminophen (NORCO) 7.5-325 MG         per tablet

## 2019-08-23 RX ORDER — METHOCARBAMOL 500 MG/1
TABLET, FILM COATED ORAL
Qty: 60 TABLET | Refills: 0 | Status: SHIPPED | OUTPATIENT
Start: 2019-08-23 | End: 2019-09-16

## 2019-08-23 ASSESSMENT — ASTHMA QUESTIONNAIRES: ACT_TOTALSCORE: 25

## 2019-08-23 ASSESSMENT — ANXIETY QUESTIONNAIRES: GAD7 TOTAL SCORE: 20

## 2019-08-26 DIAGNOSIS — M54.50 ACUTE MIDLINE LOW BACK PAIN WITHOUT SCIATICA: ICD-10-CM

## 2019-08-26 DIAGNOSIS — M54.9 UPPER BACK PAIN: ICD-10-CM

## 2019-08-26 RX ORDER — CELECOXIB 200 MG/1
CAPSULE ORAL
Qty: 60 CAPSULE | Refills: 1 | Status: SHIPPED | OUTPATIENT
Start: 2019-08-26 | End: 2019-09-26

## 2019-08-26 NOTE — TELEPHONE ENCOUNTER
"Requested Prescriptions   Pending Prescriptions Disp Refills     celecoxib (CELEBREX) 200 MG capsule  Last Written Prescription Date:  4/18/19  Last Fill Quantity: 60,  # refills: 1   Last office visit: 8/22/2019 with prescribing provider:  hilda   Future Office Visit:   Next 5 appointments (look out 90 days)    Oct 21, 2019  9:15 AM CDT  SHORT with Sony Velez MD  [facility] [address] [phone]          60 capsule 1     Sig: TAKE 1 CAPSULE(200 MG) BY MOUTH TWICE DAILY AS NEEDED FOR MODERATE PAIN       NSAID Medications Failed - 8/26/2019 12:23 PM        Failed - Normal AST on file in past 12 months     Recent Labs   Lab Test 05/23/17  0813   AST 17             Failed - Normal CBC on file in past 12 months     Recent Labs   Lab Test 11/14/17  0846   WBC 6.4   RBC 4.71   HGB 14.7   HCT 44.3                    Passed - Blood pressure under 140/90 in past 12 months     BP Readings from Last 3 Encounters:   08/22/19 118/64   08/08/19 122/83   04/18/19 100/62                 Passed - Normal ALT on file in past 12 months     Recent Labs   Lab Test 04/18/19  1622   ALT 29             Passed - Recent (12 mo) or future (30 days) visit within the authorizing provider's specialty     Patient had office visit in the last 12 months or has a visit in the next 30 days with authorizing provider or within the authorizing provider's specialty.  See \"Patient Info\" tab in inbasket, or \"Choose Columns\" in Meds & Orders section of the refill encounter.              Passed - Patient is age 6-64 years        Passed - Medication is active on med list        Passed - Normal serum creatinine on file in past 12 months     Recent Labs   Lab Test 04/18/19  1622   CR 0.92               "

## 2019-08-26 NOTE — TELEPHONE ENCOUNTER
Last OV 08/22/2019    Routing refill request to provider for review/approval because:  Labs not current:  AST and CBC

## 2019-08-28 LAB — PAIN DRUG SCR UR W RPTD MEDS: NORMAL

## 2019-09-16 DIAGNOSIS — M62.838 MUSCLE SPASM OF RIGHT SHOULDER: ICD-10-CM

## 2019-09-16 DIAGNOSIS — Z98.890 S/P ROTATOR CUFF REPAIR: ICD-10-CM

## 2019-09-16 NOTE — TELEPHONE ENCOUNTER
methocarbamol (ROBAXIN) 500 MG tablet    Last Written Prescription Date:  8/23/2019  Last Fill Quantity: 60 tablet,  # refills: 0   Last office visit: 8/22/2019 with prescribing provider:  HERBERT Velez   Future Office Visit:   Next 5 appointments (look out 90 days)    Sep 26, 2019 10:45 AM CDT  PHYSICAL with Sony Velez MD  [facility] [address]  [facility] [address]  [phone]   Oct 21, 2019  9:15 AM CDT  SHORT with Sony Velez MD  [facility] ([facility]) [address] [facility]  [address]  [facility] [address]  [phone]   Oct 30, 2019  8:30 AM CDT  Return Visit with Rajat Bills, [facility] ([facility]) [address] [place] [id number] [phone]             Routing refill request to provider for review/approval because:  Drug not on the [facility], [facility] or [facility] refill protocol or controlled substance

## 2019-09-18 RX ORDER — METHOCARBAMOL 500 MG/1
500-1000 TABLET, FILM COATED ORAL 3 TIMES DAILY PRN
Qty: 60 TABLET | Refills: 0 | Status: SHIPPED | OUTPATIENT
Start: 2019-09-18 | End: 2019-10-15

## 2019-09-23 RX ORDER — MELOXICAM 15 MG/1
15 TABLET ORAL
COMMUNITY
Start: 2012-02-25 | End: 2019-09-26

## 2019-09-23 RX ORDER — IBUPROFEN 600 MG/1
600 TABLET, FILM COATED ORAL
COMMUNITY
Start: 2014-04-19 | End: 2019-09-26

## 2019-09-24 RX ORDER — CYCLOBENZAPRINE HCL 10 MG
TABLET ORAL
COMMUNITY
Start: 2012-10-30 | End: 2019-09-26

## 2019-09-24 RX ORDER — CYCLOBENZAPRINE HCL 5 MG
5 TABLET ORAL
COMMUNITY
End: 2019-09-26

## 2019-09-26 ENCOUNTER — OFFICE VISIT (OUTPATIENT)
Dept: FAMILY MEDICINE | Facility: CLINIC | Age: 54
End: 2019-09-26
Payer: COMMERCIAL

## 2019-09-26 VITALS
RESPIRATION RATE: 16 BRPM | DIASTOLIC BLOOD PRESSURE: 68 MMHG | WEIGHT: 135 LBS | OXYGEN SATURATION: 97 % | BODY MASS INDEX: 23.92 KG/M2 | HEART RATE: 65 BPM | SYSTOLIC BLOOD PRESSURE: 108 MMHG | HEIGHT: 63 IN | TEMPERATURE: 96.3 F

## 2019-09-26 DIAGNOSIS — M75.101 ROTATOR CUFF SYNDROME, RIGHT: ICD-10-CM

## 2019-09-26 DIAGNOSIS — Z53.20 HIV SCREENING DECLINED: ICD-10-CM

## 2019-09-26 DIAGNOSIS — G89.4 CHRONIC PAIN SYNDROME: ICD-10-CM

## 2019-09-26 DIAGNOSIS — Z11.59 ENCOUNTER FOR HEPATITIS C SCREENING TEST FOR LOW RISK PATIENT: ICD-10-CM

## 2019-09-26 DIAGNOSIS — Z00.00 ROUTINE HISTORY AND PHYSICAL EXAMINATION OF ADULT: Primary | ICD-10-CM

## 2019-09-26 DIAGNOSIS — Z23 NEED FOR PROPHYLACTIC VACCINATION AND INOCULATION AGAINST INFLUENZA: ICD-10-CM

## 2019-09-26 PROCEDURE — 86803 HEPATITIS C AB TEST: CPT | Performed by: FAMILY MEDICINE

## 2019-09-26 PROCEDURE — 90682 RIV4 VACC RECOMBINANT DNA IM: CPT | Performed by: FAMILY MEDICINE

## 2019-09-26 PROCEDURE — 87389 HIV-1 AG W/HIV-1&-2 AB AG IA: CPT | Performed by: FAMILY MEDICINE

## 2019-09-26 PROCEDURE — 90471 IMMUNIZATION ADMIN: CPT | Performed by: FAMILY MEDICINE

## 2019-09-26 PROCEDURE — 36415 COLL VENOUS BLD VENIPUNCTURE: CPT | Performed by: FAMILY MEDICINE

## 2019-09-26 PROCEDURE — 99396 PREV VISIT EST AGE 40-64: CPT | Mod: 25 | Performed by: FAMILY MEDICINE

## 2019-09-26 RX ORDER — HYDROCODONE BITARTRATE AND ACETAMINOPHEN 7.5; 325 MG/1; MG/1
1 TABLET ORAL EVERY 4 HOURS PRN
Qty: 30 TABLET | Refills: 0 | Status: SHIPPED | OUTPATIENT
Start: 2019-09-26 | End: 2019-10-21

## 2019-09-26 ASSESSMENT — PATIENT HEALTH QUESTIONNAIRE - PHQ9
SUM OF ALL RESPONSES TO PHQ QUESTIONS 1-9: 7
10. IF YOU CHECKED OFF ANY PROBLEMS, HOW DIFFICULT HAVE THESE PROBLEMS MADE IT FOR YOU TO DO YOUR WORK, TAKE CARE OF THINGS AT HOME, OR GET ALONG WITH OTHER PEOPLE: SOMEWHAT DIFFICULT
SUM OF ALL RESPONSES TO PHQ QUESTIONS 1-9: 7

## 2019-09-26 ASSESSMENT — MIFFLIN-ST. JEOR: SCORE: 1329.55

## 2019-09-26 NOTE — LETTER
September 27, 2019      Giovanni Casianoru  [address] [facility] [address]        Dear ,    We are writing to inform you of your test results.  NORMAL HEPATITIS C ANTIBODY   NORMAL HUMAN IMMUNO VIRUS ANTIBODY COMBO   CONGRATULATIONS       Resulted Orders   Hepatitis C antibody   Result Value Ref Range    Hepatitis C Antibody Nonreactive NR^Nonreactive      Comment:      Assay performance characteristics have not been established for newborns,   infants, and children     HIV Antigen Antibody Combo   Result Value Ref Range    HIV Antigen Antibody Combo Nonreactive NR^Nonreactive          Comment:      HIV-1 p24 Ag & HIV-1/HIV-2 Ab Not Detected       If you have any questions or concerns, please call the clinic at the number listed above.       Sincerely,        ANIKA REYES MD

## 2019-09-26 NOTE — PATIENT INSTRUCTIONS
1965  HEPATITIS C SCREENING     1965  ADVANCE CARE PLANNING     04/22/1978  HIV SCREENING     03/06/2015  ZOSTER IMMUNIZATION (2 of 3)     11/10/2017  PREVENTIVE CARE VISIT     09/01/2019  INFLUENZA VACCINE (1)      (Z11.59) Encounter for hepatitis C screening test for low risk patient  (primary encounter diagnosis)  Comment:    Plan: Hepatitis C antibody             (M75.101) Rotator cuff syndrome, right  Comment:    Plan: HYDROcodone-acetaminophen (NORCO) 7.5-325 MG         per tablet             (G89.4) Chronic pain syndrome  Comment:    Plan: HYDROcodone-acetaminophen (NORCO) 7.5-325 MG         per tablet             (Z53.20) HIV screening declined  Comment:    Plan: HIV Antigen Antibody Combo5

## 2019-09-26 NOTE — PROGRESS NOTES
SUBJECTIVE:   CC: Giovanni Cedeno is an 54 year old male who presents for preventative health visit.     Healthy Habits:     Getting at least 3 servings of Calcium per day:  Yes    Bi-annual eye exam:  Yes    Dental care twice a year:  Yes    Sleep apnea or symptoms of sleep apnea:  None    Diet:  Regular (no restrictions)    Frequency of exercise:  None    Taking medications regularly:  Yes    Medication side effects:  None    PHQ-2 Total Score: 2    Additional concerns today:  No      Today's PHQ-2 Score:   PHQ-2 ( 1999 Pf[employer]) 9/26/2019   Q1: Little interest or pleasure in doing things 1   Q2: Feeling down, depressed or hopeless 1   PHQ-2 Score 2   Q1: Little interest or pleasure in doing things Several days   Q2: Feeling down, depressed or hopeless Several days   PHQ-2 Score 2       Abuse: Current or Past(Physical, Sexual or Emotional)- No  Do you feel safe in your environment? Yes    Social History     Tobacco Use     Smoking status: Never Smoker     Smokeless tobacco: Never Used   Substance Use Topics     Alcohol use: No         Alcohol Use 9/26/2019   Prescreen: >3 drinks/day or >7 drinks/week? No   Prescreen: >3 drinks/day or >7 drinks/week? -       Last PSA:   PSA   Date Value Ref Range Status   04/18/2019 1.80 0 - 4 ug/L Final     Comment:     Assay Method:  Chemiluminescence using Siemens Vista analyzer       Reviewed orders with patient. Reviewed health maintenance and updated orders accordingly - Yes    Lab work is in process  Labs reviewed in EPIC  BP Readings from Last 3 Encounters:   09/26/19 108/68   08/22/19 118/64   08/08/19 122/83    Wt Readings from Last 3 Encounters:   09/26/19 61.2 kg (135 lb)   08/22/19 63.5 kg (140 lb)   08/08/19 54.9 kg (121 lb)                  Patient Active Problem List   Diagnosis     Restless leg syndrome     Knee bursitis     Pure hypercholesterolemia     Major depression in partial remission (H)     Pre-diabetes     Epicondylitis, lateral humeral     Immunization  reaction     Chronic pain syndrome     Chronic right shoulder pain     Abdominal pain, left upper quadrant     Pain of toe of right foot     Acute midline low back pain without sciatica     Upper back pain     Mild intermittent asthma, unspecified whether complicated     History reviewed. No pertinent surgical history.    Social History     Tobacco Use     Smoking status: Never Smoker     Smokeless tobacco: Never Used   Substance Use Topics     Alcohol use: No     Family History   Problem Relation Age of Onset     Diabetes Father      Heart Disease Father          Current Outpatient Medications   Medication Sig Dispense Refill     albuterol (PROAIR HFA/PROVENTIL HFA/VENTOLIN HFA) 108 (90 Base) MCG/ACT inhaler Inhale 2 puffs into the lungs every 6 hours as needed for shortness of breath / dyspnea or wheezing 90 g 3     carbidopa-levodopa (SINEMET)  MG tablet Take 1 tablet by mouth At Bedtime 90 tablet 5     finasteride (PROSCAR) 5 MG tablet TAKE 1 TABLET(5 MG) BY MOUTH DAILY 90 tablet 1     FLUoxetine (PROZAC) 20 MG capsule Take 1 capsule (20 mg) by mouth daily 30 capsule 11     gabapentin (NEURONTIN) 300 MG capsule TAKE 2 CAPSULES BY MOUTH EVERY NIGHT AT BEDTIME 60 capsule 11     HYDROcodone-acetaminophen (NORCO) 7.5-325 MG per tablet Take 1 tablet by mouth every 4 hours as needed for pain maximum  4  tablet(s) per day 30 tablet 0     methocarbamol (ROBAXIN) 500 MG tablet Take 1-2 tablets (500-1,000 mg) by mouth 3 times daily as needed for muscle spasms 60 tablet 0     mirtazapine (REMERON) 15 MG tablet TAKE 1 TABLET(15 MG) BY MOUTH AT BEDTIME 90 tablet 3     senna-docusate (SENOKOT-S/PERICOLACE) 8.6-50 MG tablet Take 1-2 tablets by mouth 2 times daily as needed for constipation 100 tablet 11     simvastatin (ZOCOR) 20 MG tablet TAKE 1 TABLET BY MOUTH EVERY DAY AT BEDTIME 90 tablet 3     tamsulosin (FLOMAX) 0.4 MG capsule Take 1 capsule (0.4 mg) by mouth daily 30 capsule 11     No Known Allergies  Recent Labs  "  Lab Test 04/18/19  1622 11/14/17  0846 05/23/17  0813 11/10/16  0945 07/09/15  1013 03/11/14  1047 05/08/13  0924   A1C  --   --   --   --  5.4 5.8 6.3*   *  --   --  167*  --  218* 150*   HDL 55  --   --  53  --  48 44   TRIG 168*  --   --  173*  --  325* 184*   ALT 29  --  31 29  --  29 35   CR 0.92 0.84 0.78 0.93  --   --  1.00   GFRESTIMATED >90 >90 >90  Non  GFR Calc   85  --   --  79   GFRESTBLACK >90 >90 >90   GFR Calc   >90  --   --  >90   POTASSIUM 4.5 4.2 4.2  --   --   --  5.0        Reviewed and updated as needed this visit by clinical staff  Tobacco  Allergies  Meds  Med Hx  Surg Hx  Fam Hx  Soc Hx        Reviewed and updated as needed this visit by Provider        Past Medical History:   Diagnosis Date     Arthritis       History reviewed. No pertinent surgical history.    Review of Systems  CONSTITUTIONAL: NEGATIVE for fever, chills, change in weight  INTEGUMENTARY/SKIN: NEGATIVE for worrisome rashes, moles or lesions  EYES: NEGATIVE for vision changes or irritation  ENT: NEGATIVE for ear, mouth and throat problems  RESP: NEGATIVE for significant cough or SOB  CV: NEGATIVE for chest pain, palpitations or peripheral edema  GI: NEGATIVE for nausea, abdominal pain, heartburn, or change in bowel habits   male: negative for dysuria, hematuria, decreased urinary stream, erectile dysfunction, urethral discharge  MUSCULOSKELETAL: NEGATIVE for significant arthralgias or myalgia  NEURO: NEGATIVE for weakness, dizziness or paresthesias  PSYCHIATRIC: NEGATIVE for changes in mood or affect    OBJECTIVE:   /68   Pulse 65   Temp 96.3  F (35.7  C) (Tympanic)   Resp 16   Ht 1.588 m (5' 2.5\")   Wt 61.2 kg (135 lb)   SpO2 97%   BMI 24.30 kg/m      Physical Exam  GENERAL: healthy, alert and no distress  EYES: Eyes grossly normal to inspection, PERRL and conjunctivae and sclerae normal  HENT: ear canals and TM's normal, nose and mouth without ulcers or " "lesions  NECK: no adenopathy, no asymmetry, masses, or scars and thyroid normal to palpation  RESP: lungs clear to auscultation - no rales, rhonchi or wheezes  CV: regular rate and rhythm, normal S1 S2, no S3 or S4, no murmur, click or rub, no peripheral edema and peripheral pulses strong  ABDOMEN: soft, nontender, no hepatosplenomegaly, no masses and bowel sounds normal   (male): normal male genitalia without lesions or urethral discharge, no hernia  MS: no gross musculoskeletal defects noted, no edema  SKIN: no suspicious lesions or rashes  NEURO: Normal strength and tone, mentation intact and speech normal  PSYCH: mentation appears normal, affect normal/bright    Diagnostic Test Results:  Labs reviewed in Epic    ASSESSMENT/PLAN:       ICD-10-CM    1. Routine history and physical examination of adult Z00.00    2. Rotator cuff syndrome, right M75.101 HYDROcodone-acetaminophen (NORCO) 7.5-325 MG per tablet   3. Chronic pain syndrome G89.4 HYDROcodone-acetaminophen (NORCO) 7.5-325 MG per tablet   4. Encounter for hepatitis C screening test for low risk patient Z11.59 Hepatitis C antibody   5. HIV screening declined Z53.20 HIV Antigen Antibody Combo   6. Need for prophylactic vaccination and inoculation against influenza Z23 INFLUENZA QUAD, RECOMBINANT, P-FREE (RIV4) (FLUBLOCK) [20832]     Vaccine Administration, Initial [41488]       COUNSELING:   Reviewed preventive health counseling, as reflected in patient instructions    Estimated body mass index is 24.3 kg/m  as calculated from the following:    Height as of this encounter: 1.588 m (5' 2.5\").    Weight as of this encounter: 61.2 kg (135 lb).          reports that he has never smoked. He has never used smokeless tobacco.      Counseling Resources:  ATP IV Guidelines  Pooled Cohorts Equation Calculator  FRAX Risk Assessment  ICSI Preventive Guidelines  Dietary Guidelines for Americans, 2010  USDA's MyPlate  ASA Prophylaxis  Lung CA Screening    ANIKA " MD AMY  [facility]

## 2019-09-27 LAB
HCV AB SERPL QL IA: NONREACTIVE
HIV 1+2 AB+HIV1 P24 AG SERPL QL IA: NONREACTIVE

## 2019-09-27 ASSESSMENT — PATIENT HEALTH QUESTIONNAIRE - PHQ9: SUM OF ALL RESPONSES TO PHQ QUESTIONS 1-9: 7

## 2019-10-11 DIAGNOSIS — Z98.890 S/P ROTATOR CUFF REPAIR: ICD-10-CM

## 2019-10-11 DIAGNOSIS — M62.838 MUSCLE SPASM OF RIGHT SHOULDER: ICD-10-CM

## 2019-10-11 NOTE — TELEPHONE ENCOUNTER
methocarbamol (ROBAXIN) 500 MG tablet      Last Written Prescription Date:  9/18/2019  Last Fill Quantity: 60 tablet,  # refills: 0   Last office visit: 9/26/2019 with prescribing provider:  HERBERT Velez   Future Office Visit:   Next 5 appointments (look out 90 days)    Oct 21, 2019  9:15 AM CDT  SHORT with Sony Velez MD  [facility] [address] [place] [id number] [phone]   Oct 30, 2019  8:30 AM CDT  Return Visit with Rajat Bills [facility] ([facility]) [address] [facility] [address] [phone]           Routing refill request to provider for review/approval because:  Drug not on the [facility], [facility] or  Health refill protocol or controlled substance

## 2019-10-15 RX ORDER — METHOCARBAMOL 500 MG/1
500-1000 TABLET, FILM COATED ORAL 3 TIMES DAILY PRN
Qty: 60 TABLET | Refills: 0 | Status: SHIPPED | OUTPATIENT
Start: 2019-10-15 | End: 2019-10-21

## 2019-10-21 ENCOUNTER — OFFICE VISIT (OUTPATIENT)
Dept: FAMILY MEDICINE | Facility: CLINIC | Age: 54
End: 2019-10-21
Payer: COMMERCIAL

## 2019-10-21 VITALS
SYSTOLIC BLOOD PRESSURE: 110 MMHG | WEIGHT: 141.5 LBS | BODY MASS INDEX: 25.47 KG/M2 | TEMPERATURE: 97.1 F | OXYGEN SATURATION: 96 % | HEART RATE: 76 BPM | DIASTOLIC BLOOD PRESSURE: 80 MMHG

## 2019-10-21 DIAGNOSIS — M75.101 ROTATOR CUFF SYNDROME, RIGHT: ICD-10-CM

## 2019-10-21 DIAGNOSIS — M62.838 MUSCLE SPASM OF RIGHT SHOULDER: ICD-10-CM

## 2019-10-21 DIAGNOSIS — F32.4 MAJOR DEPRESSIVE DISORDER WITH SINGLE EPISODE, IN PARTIAL REMISSION (H): Chronic | ICD-10-CM

## 2019-10-21 DIAGNOSIS — G25.81 RESTLESS LEG SYNDROME: ICD-10-CM

## 2019-10-21 DIAGNOSIS — Z98.890 S/P ROTATOR CUFF REPAIR: ICD-10-CM

## 2019-10-21 DIAGNOSIS — F51.01 PRIMARY INSOMNIA: ICD-10-CM

## 2019-10-21 DIAGNOSIS — M25.511 CHRONIC RIGHT SHOULDER PAIN: Primary | ICD-10-CM

## 2019-10-21 DIAGNOSIS — G25.81 RESTLESS LEGS SYNDROME (RLS): ICD-10-CM

## 2019-10-21 DIAGNOSIS — G89.29 CHRONIC RIGHT SHOULDER PAIN: Primary | ICD-10-CM

## 2019-10-21 DIAGNOSIS — M54.50 ACUTE MIDLINE LOW BACK PAIN WITHOUT SCIATICA: ICD-10-CM

## 2019-10-21 DIAGNOSIS — G89.4 CHRONIC PAIN SYNDROME: ICD-10-CM

## 2019-10-21 DIAGNOSIS — R35.1 NOCTURIA: ICD-10-CM

## 2019-10-21 DIAGNOSIS — M70.51 BURSITIS OF OTHER BURSA OF RIGHT KNEE: ICD-10-CM

## 2019-10-21 PROCEDURE — 99214 OFFICE O/P EST MOD 30 MIN: CPT | Performed by: FAMILY MEDICINE

## 2019-10-21 RX ORDER — CELECOXIB 200 MG/1
200 CAPSULE ORAL 2 TIMES DAILY
Qty: 60 CAPSULE | Refills: 11 | Status: SHIPPED | OUTPATIENT
Start: 2019-10-21 | End: 2021-01-05

## 2019-10-21 RX ORDER — CARBIDOPA AND LEVODOPA 25; 100 MG/1; MG/1
1 TABLET ORAL AT BEDTIME
Qty: 90 TABLET | Refills: 5 | Status: SHIPPED | OUTPATIENT
Start: 2019-10-21 | End: 2021-01-05

## 2019-10-21 RX ORDER — METHOCARBAMOL 500 MG/1
500-1000 TABLET, FILM COATED ORAL 3 TIMES DAILY PRN
Qty: 60 TABLET | Refills: 0 | Status: SHIPPED | OUTPATIENT
Start: 2019-10-21 | End: 2019-12-03

## 2019-10-21 RX ORDER — GABAPENTIN 300 MG/1
CAPSULE ORAL
Qty: 60 CAPSULE | Refills: 11 | Status: SHIPPED | OUTPATIENT
Start: 2019-10-21 | End: 2020-11-06

## 2019-10-21 RX ORDER — CELECOXIB 200 MG/1
200 CAPSULE ORAL 2 TIMES DAILY
COMMUNITY
End: 2019-10-21

## 2019-10-21 RX ORDER — MIRTAZAPINE 15 MG/1
TABLET, FILM COATED ORAL
Qty: 90 TABLET | Refills: 3 | Status: SHIPPED | OUTPATIENT
Start: 2019-10-21 | End: 2020-05-06

## 2019-10-21 RX ORDER — TAMSULOSIN HYDROCHLORIDE 0.4 MG/1
0.4 CAPSULE ORAL DAILY
Qty: 30 CAPSULE | Refills: 11 | Status: SHIPPED | OUTPATIENT
Start: 2019-10-21 | End: 2020-05-11

## 2019-10-21 RX ORDER — HYDROCODONE BITARTRATE AND ACETAMINOPHEN 7.5; 325 MG/1; MG/1
1 TABLET ORAL EVERY 4 HOURS PRN
Qty: 30 TABLET | Refills: 0 | Status: SHIPPED | OUTPATIENT
Start: 2019-10-21 | End: 2019-12-23

## 2019-10-21 NOTE — PATIENT INSTRUCTIONS
(M25.511,  G89.29) Chronic right shoulder pain  (primary encounter diagnosis)  Comment:    Plan: celecoxib (CELEBREX) 200 MG capsule             (G25.81) Restless legs syndrome (RLS)  Comment:    Plan: carbidopa-levodopa (SINEMET)  MG tablet                 (F32.4) Major depressive disorder with single episode, in partial remission (H)  Comment:     Plan: FLUoxetine (PROZAC) 20 MG capsule             (G25.81) Restless leg syndrome  Comment:    Plan: gabapentin (NEURONTIN) 300 MG capsule             (M75.101) Rotator cuff syndrome, right  Comment:    Plan: HYDROcodone-acetaminophen (NORCO) 7.5-325 MG         per tablet             (G89.4) Chronic pain syndrome  Comment:    Plan: celecoxib (CELEBREX) 200 MG capsule,         HYDROcodone-acetaminophen (NORCO) 7.5-325 MG         per tablet             (Z98.890) S/P rotator cuff repair  Comment:     Plan: celecoxib (CELEBREX) 200 MG capsule,         methocarbamol (ROBAXIN) 500 MG tablet             (M62.838) Muscle spasm of right shoulder  Comment:    Plan: celecoxib (CELEBREX) 200 MG capsule,         methocarbamol (ROBAXIN) 500 MG tablet  Sony Velez Jr., MD              (F51.01) Primary insomnia  Comment:    Plan: mirtazapine (REMERON) 15 MG tablet             (R35.1) Nocturia  Comment:    Plan: tamsulosin (FLOMAX) 0.4 MG capsule             (M70.51) Bursitis of other bursa of right knee  Comment:     Plan: celecoxib (CELEBREX) 200 MG capsule             (M54.5) Acute midline low back pain without sciatica  Comment:    Plan: celecoxib (CELEBREX) 200 MG capsule

## 2019-10-21 NOTE — PROGRESS NOTES
Subjective     Giovanni Cedeno is a 54 year old male who presents to clinic today for the following health issues:    HPI   Depression Followup    How are you doing with your depression since your last visit? No change    Are you having other symptoms that might be associated with depression? No    Have you had a significant life event?  No     Are you feeling anxious or having panic attacks?   No    Do you have any concerns with your use of alcohol or other drugs? No    Social History     Tobacco Use     Smoking status: Never Smoker     Smokeless tobacco: Never Used   Substance Use Topics     Alcohol use: No     Drug use: No     PHQ 4/18/2019 8/22/2019 9/26/2019   PHQ-9 Total Score 17 24 7   Q9: Thoughts of better off dead/self-harm past 2 weeks Not at all Several days Not at all     JEIMY-7 SCORE 11/14/2017 4/18/2019 8/22/2019   Total Score 0 (minimal anxiety) 17 (severe anxiety) -   Total Score 0 17 20     No flowsheet data found.  No flowsheet data found.   doing well     Suicide Assessment Five-step Evaluation and Treatment (SAFE-T)      How many servings of fruits and vegetables do you eat daily?  2-3    On average, how many sweetened beverages do you drink each day (soda, juice, sweet tea, etc)?   1    How many days per week do you miss taking your medication? 0        Medication Followup of all    Taking Medication as prescribed: yes    Side Effects:  None    Medication Helping Symptoms:  yes     RESTLESS LEG SYNDROME     improved with levodopa carbidopa    Knee bursitis is in remission    Hyperlipidemia is responded to treatment    Major depression see history of present illness markedly improved    Prediabetes controlled with diabetes diet exercise weight loss    Chronic right shoulder pain controlled with shoulder exercises    Intermittent abdominal pain improved is on narcotics occasionally and chronically for low back pain    Major depression almost complete remission current regimen    Mild  intermittent asthma asymptomatic at present time  Currently taking celecoxib on a daily basis  For levodopa carbidopa and gabapentin helping restless leg syndrome  Also occasionally taking her Norco only was given 30 tablets to last for at least one month  Taking fluoxetine and mirtazapine 15 mg at at bedtime for sleep as well as for depression depression seems to be markedly improved                There are no preventive care reminders to display for this patient.          .  Current Outpatient Medications   Medication Sig Dispense Refill     carbidopa-levodopa (SINEMET)  MG tablet Take 1 tablet by mouth At Bedtime 90 tablet 5     celecoxib (CELEBREX) 200 MG capsule Take 1 capsule (200 mg) by mouth 2 times daily 60 capsule 11     FLUoxetine (PROZAC) 20 MG capsule Take 1 capsule (20 mg) by mouth daily 30 capsule 11     gabapentin (NEURONTIN) 300 MG capsule TAKE 2 CAPSULES BY MOUTH EVERY NIGHT AT BEDTIME 60 capsule 11     HYDROcodone-acetaminophen (NORCO) 7.5-325 MG per tablet Take 1 tablet by mouth every 4 hours as needed for pain maximum  4  tablet(s) per day 30 tablet 0     methocarbamol (ROBAXIN) 500 MG tablet Take 1-2 tablets (500-1,000 mg) by mouth 3 times daily as needed for muscle spasms 60 tablet 0     mirtazapine (REMERON) 15 MG tablet TAKE 1 TABLET(15 MG) BY MOUTH AT BEDTIME 90 tablet 3     Misc Natural Products (OSTEO BI-FLEX TRIPLE STRENGTH PO) Take 1 tablet by mouth daily       senna-docusate (SENOKOT-S/PERICOLACE) 8.6-50 MG tablet Take 1-2 tablets by mouth 2 times daily as needed for constipation 100 tablet 11     tamsulosin (FLOMAX) 0.4 MG capsule Take 1 capsule (0.4 mg) by mouth daily 30 capsule 11     albuterol (PROAIR HFA/PROVENTIL HFA/VENTOLIN HFA) 108 (90 Base) MCG/ACT inhaler Inhale 2 puffs into the lungs every 6 hours as needed for shortness of breath / dyspnea or wheezing (Patient not taking: Reported on 10/21/2019) 90 g 3     finasteride (PROSCAR) 5 MG tablet TAKE 1 TABLET(5 MG) BY  MOUTH DAILY (Patient not taking: Reported on 10/21/2019) 90 tablet 1     simvastatin (ZOCOR) 20 MG tablet TAKE 1 TABLET BY MOUTH EVERY DAY AT BEDTIME (Patient not taking: Reported on 10/21/2019) 90 tablet 3              No Known Allergies      Immunization History   Administered Date(s) Administered     FLU 6-35 months 11/02/2015     Influenza (IIV3) PF 12/17/2003, 11/17/2006, 01/07/2011, 12/06/2012, 10/01/2013, 09/20/2014     Influenza Quad, Recombinant, p-free (RIV4) 09/26/2019     Influenza Vaccine IM > 6 months Valent IIV4 11/10/2016, 12/21/2017, 09/10/2018     TDAP Vaccine (Adacel) 05/21/2012     Zoster vaccine, live 01/09/2015               reports no history of alcohol use.          reports no history of drug use.        family history includes Diabetes in his father; Heart Disease in his father.        He indicated that his mother is alive. He indicated that his father is alive.             has no past surgical history on file.         reports previously being sexually active and has had partner(s) who are Female.    .  Pediatric History   Patient Guardians     Not on file     Patient does not qualify to have social determinant information on file (likely too young).   Other Topics Concern     Parent/sibling w/ CABG, MI or angioplasty before 65F 55M? Yes   Social History Narrative     Not on file               reports that he has never smoked. He has never used smokeless tobacco.        Medical, social, surgical, and family histories reviewed.        Labs reviewed in EPIC  Patient Active Problem List   Diagnosis     Restless leg syndrome     Knee bursitis     Pure hypercholesterolemia     Major depression in partial remission (H)     Pre-diabetes     Epicondylitis, lateral humeral     Immunization reaction     Chronic pain syndrome     Chronic right shoulder pain     Abdominal pain, left upper quadrant     Pain of toe of right foot     Acute midline low back pain without sciatica     Upper back pain     Mild  intermittent asthma, unspecified whether complicated         No past surgical history on file.    Social History     Tobacco Use     Smoking status: Never Smoker     Smokeless tobacco: Never Used   Substance Use Topics     Alcohol use: No       Family History   Problem Relation Age of Onset     Diabetes Father      Heart Disease Father              Current Outpatient Medications   Medication Sig Dispense Refill     carbidopa-levodopa (SINEMET)  MG tablet Take 1 tablet by mouth At Bedtime 90 tablet 5     celecoxib (CELEBREX) 200 MG capsule Take 1 capsule (200 mg) by mouth 2 times daily 60 capsule 11     FLUoxetine (PROZAC) 20 MG capsule Take 1 capsule (20 mg) by mouth daily 30 capsule 11     gabapentin (NEURONTIN) 300 MG capsule TAKE 2 CAPSULES BY MOUTH EVERY NIGHT AT BEDTIME 60 capsule 11     HYDROcodone-acetaminophen (NORCO) 7.5-325 MG per tablet Take 1 tablet by mouth every 4 hours as needed for pain maximum  4  tablet(s) per day 30 tablet 0     methocarbamol (ROBAXIN) 500 MG tablet Take 1-2 tablets (500-1,000 mg) by mouth 3 times daily as needed for muscle spasms 60 tablet 0     mirtazapine (REMERON) 15 MG tablet TAKE 1 TABLET(15 MG) BY MOUTH AT BEDTIME 90 tablet 3     Misc Natural Products (OSTEO BI-FLEX TRIPLE STRENGTH PO) Take 1 tablet by mouth daily       senna-docusate (SENOKOT-S/PERICOLACE) 8.6-50 MG tablet Take 1-2 tablets by mouth 2 times daily as needed for constipation 100 tablet 11     tamsulosin (FLOMAX) 0.4 MG capsule Take 1 capsule (0.4 mg) by mouth daily 30 capsule 11     albuterol (PROAIR HFA/PROVENTIL HFA/VENTOLIN HFA) 108 (90 Base) MCG/ACT inhaler Inhale 2 puffs into the lungs every 6 hours as needed for shortness of breath / dyspnea or wheezing (Patient not taking: Reported on 10/21/2019) 90 g 3     finasteride (PROSCAR) 5 MG tablet TAKE 1 TABLET(5 MG) BY MOUTH DAILY (Patient not taking: Reported on 10/21/2019) 90 tablet 1     simvastatin (ZOCOR) 20 MG tablet TAKE 1 TABLET BY MOUTH EVERY  DAY AT BEDTIME (Patient not taking: Reported on 10/21/2019) 90 tablet 3           Recent Labs   Lab Test 04/18/19  1622 11/14/17  0846 05/23/17  0813 11/10/16  0945 07/09/15  1013 03/11/14  1047 05/08/13  0924   A1C  --   --   --   --  5.4 5.8 6.3*   *  --   --  167*  --  218* 150*   HDL 55  --   --  53  --  48 44   TRIG 168*  --   --  173*  --  325* 184*   ALT 29  --  31 29  --  29 35   CR 0.92 0.84 0.78 0.93  --   --  1.00   GFRESTIMATED >90 >90 >90  Non  GFR Calc   85  --   --  79   GFRESTBLACK >90 >90 >90   GFR Calc   >90  --   --  >90   POTASSIUM 4.5 4.2 4.2  --   --   --  5.0            BP Readings from Last 6 Encounters:   10/21/19 110/80   09/26/19 108/68   08/22/19 118/64   08/08/19 122/83   04/18/19 100/62   09/12/18 121/85           Wt Readings from Last 3 Encounters:   10/21/19 64.2 kg (141 lb 8 oz)   09/26/19 61.2 kg (135 lb)   08/22/19 63.5 kg (140 lb)                 Positive symptoms or findings indicated by bold designation:         ROS: 10 point ROS neg other than the symptoms noted above in the HPI.except  has Restless leg syndrome; Knee bursitis; Pure hypercholesterolemia; Major depression in partial remission (H); Pre-diabetes; Epicondylitis, lateral humeral; Immunization reaction; Chronic pain syndrome; Chronic right shoulder pain; Abdominal pain, left upper quadrant; Pain of toe of right foot; Acute midline low back pain without sciatica; Upper back pain; and Mild intermittent asthma, unspecified whether complicated on their problem list.  Review Of Systems    Skin:  Within normal limits         Eyes: negative    Ears/Nose/Throat: negative    Respiratory: No shortness of breath, dyspnea on exertion, cough, or hemoptysis    Cardiovascular: negative    Gastrointestinal: negative    Genitourinary: nocturia and frequency    Musculoskeletal: back pain, neck pain, arthritis, joint pain and upper lower back pain    Neurologic: negative    Psychiatric: anxiety,  depression and markedly improved anxiety depression and insomnia    Hematologic/Lymphatic/Immunologic: negative    Endocrine: negative                PE:  /80 (Cuff Size: Adult Regular)   Pulse 76   Temp 97.1  F (36.2  C) (Tympanic)   Wt 64.2 kg (141 lb 8 oz)   SpO2 96%   BMI 25.47 kg/m   Body mass index is 25.47 kg/m .        Constitutional: general appearance, well nourished, well developed, in no acute distress, well developed, appears stated age, normal body habitus, minimally overweight        Eyes:; The patient has normal eyelids sclerae and conjunctivae :          Ears/Nose/Throat: external ear, overall: normal appearance; external nose, overall: benign appearance, normal moujth gums and lips            Neck: thyroid, overall: normal size, normal consistency, nontender,          Respiratory:  palpation of chest, overall: normal excursion,    Clear to percussion and auscultation     NO Tachypnea    NORMAL  Color          Cardiovascular:  Good color with no peripheral edema     Regular sinus rhythm without murmur.  Physiologic heart sounds   Heart is unelarged    .     Chest/Breast: normal shape           Abdominal exam,  Liver and spleen are  unenlarged         Tenderness    Scars              Urogenital; no renal, flank or bladder  tenderness;          Lymphatic: neck nodes,     Other nodes         Musculoskeletal:  Brief ortho exam normal except:           Integument: inspection of skin, no rash, lesions; and, palpation, no induration, no tenderness.          Neurologic mental status, overall: alert and oriented; gait, no ataxia, no unsteadiness; coordination, no tremors; cranial nerves, overall: normal motor, overall: normal bulk, tone.          Psychiatric: orientation/consciousness, overall: oriented to person, place and time; behavior/psychomotor activity, no tics, normal psychomotor activity; mood and affect, overall: normal mood and affect; appearance, overall: well-groomed, good eye  contact; speech, overall: normal quality, no aphasia and normal quality, quantity, intact.        Diagnostic Test Results:  Results for orders placed or performed in visit on 09/26/19   Hepatitis C antibody   Result Value Ref Range    Hepatitis C Antibody Nonreactive NR^Nonreactive   HIV Antigen Antibody Combo   Result Value Ref Range    HIV Antigen Antibody Combo Nonreactive NR^Nonreactive               ICD-10-CM    1. Chronic right shoulder pain M25.511 celecoxib (CELEBREX) 200 MG capsule    G89.29    2. Restless legs syndrome (RLS) G25.81 carbidopa-levodopa (SINEMET)  MG tablet   3. Major depressive disorder with single episode, in partial remission (H) F32.4 FLUoxetine (PROZAC) 20 MG capsule   4. Restless leg syndrome G25.81 gabapentin (NEURONTIN) 300 MG capsule   5. Rotator cuff syndrome, right M75.101 HYDROcodone-acetaminophen (NORCO) 7.5-325 MG per tablet   6. Chronic pain syndrome G89.4 celecoxib (CELEBREX) 200 MG capsule     HYDROcodone-acetaminophen (NORCO) 7.5-325 MG per tablet   7. S/P rotator cuff repair Z98.890 celecoxib (CELEBREX) 200 MG capsule     methocarbamol (ROBAXIN) 500 MG tablet   8. Muscle spasm of right shoulder M62.838 celecoxib (CELEBREX) 200 MG capsule     methocarbamol (ROBAXIN) 500 MG tablet   9. Primary insomnia F51.01 mirtazapine (REMERON) 15 MG tablet   10. Nocturia R35.1 tamsulosin (FLOMAX) 0.4 MG capsule   11. Bursitis of other bursa of right knee M70.51 celecoxib (CELEBREX) 200 MG capsule   12. Acute midline low back pain without sciatica M54.5 celecoxib (CELEBREX) 200 MG capsule              .    Side effects benefits and risks thoroughly discussed. .he may come in early if unimproved or getting worse          Please drink 2 glasses of water prior to meals and walk 15-30 minutes after meals        I spent 25 minutes with patient discussing the following issues    The primary encounter diagnosis was Chronic right shoulder pain. Diagnoses of Restless legs syndrome (RLS),  Major depressive disorder with single episode, in partial remission (H), Restless leg syndrome, Rotator cuff syndrome, right, Chronic pain syndrome, S/P rotator cuff repair, Muscle spasm of right shoulder, Primary insomnia, Nocturia, Bursitis of other bursa of right knee, and Acute midline low back pain without sciatica were also pertinent to this visit. over half of which involved counseling and coordination of care.      Patient Instructions   (M25.511,  G89.29) Chronic right shoulder pain  (primary encounter diagnosis)  Comment:    Plan: celecoxib (CELEBREX) 200 MG capsule             (G25.81) Restless legs syndrome (RLS)  Comment:    Plan: carbidopa-levodopa (SINEMET)  MG tablet                 (F32.4) Major depressive disorder with single episode, in partial remission (H)  Comment:     Plan: FLUoxetine (PROZAC) 20 MG capsule             (G25.81) Restless leg syndrome  Comment:    Plan: gabapentin (NEURONTIN) 300 MG capsule             (M75.101) Rotator cuff syndrome, right  Comment:    Plan: HYDROcodone-acetaminophen (NORCO) 7.5-325 MG         per tablet             (G89.4) Chronic pain syndrome  Comment:    Plan: celecoxib (CELEBREX) 200 MG capsule,         HYDROcodone-acetaminophen (NORCO) 7.5-325 MG         per tablet             (Z98.890) S/P rotator cuff repair  Comment:     Plan: celecoxib (CELEBREX) 200 MG capsule,         methocarbamol (ROBAXIN) 500 MG tablet             (M62.838) Muscle spasm of right shoulder  Comment:    Plan: celecoxib (CELEBREX) 200 MG capsule,         methocarbamol (ROBAXIN) 500 MG tablet  Sony Velez Jr., MD              (F51.01) Primary insomnia  Comment:    Plan: mirtazapine (REMERON) 15 MG tablet             (R35.1) Nocturia  Comment:    Plan: tamsulosin (FLOMAX) 0.4 MG capsule             (M70.51) Bursitis of other bursa of right knee  Comment:     Plan: celecoxib (CELEBREX) 200 MG capsule             (M54.5) Acute midline low back pain without sciatica  Comment:     Plan: celecoxib (CELEBREX) 200 MG capsule                          ALL THE ABOVE PROBLEMS ARE STABLE AND MED CHANGES AS NOTED        Diet: mediterranean diet         Exercise:  Walking upper and lower back pain   Exercises Range of motion, balance, isometric, and strengthening exercises 30 repetitions twice daily of involved joints            .ANIKA REYES MD 10/21/2019 11:06 AM  October 21, 2019

## 2019-10-23 ENCOUNTER — OFFICE VISIT (OUTPATIENT)
Dept: BEHAVIORAL HEALTH | Facility: CLINIC | Age: 54
End: 2019-10-23
Payer: COMMERCIAL

## 2019-10-23 DIAGNOSIS — F32.4 MAJOR DEPRESSION IN PARTIAL REMISSION (H): Primary | ICD-10-CM

## 2019-10-23 PROCEDURE — 90791 PSYCH DIAGNOSTIC EVALUATION: CPT | Performed by: SOCIAL WORKER

## 2019-10-23 ASSESSMENT — COLUMBIA-SUICIDE SEVERITY RATING SCALE - C-SSRS
2. HAVE YOU ACTUALLY HAD ANY THOUGHTS OF KILLING YOURSELF?: NO
5. HAVE YOU STARTED TO WORK OUT OR WORKED OUT THE DETAILS OF HOW TO KILL YOURSELF? DO YOU INTEND TO CARRY OUT THIS PLAN?: NO
5. HAVE YOU STARTED TO WORK OUT OR WORKED OUT THE DETAILS OF HOW TO KILL YOURSELF? DO YOU INTEND TO CARRY OUT THIS PLAN?: NO
4. HAVE YOU HAD THESE THOUGHTS AND HAD SOME INTENTION OF ACTING ON THEM?: NO
1. IN THE PAST MONTH, HAVE YOU WISHED YOU WERE DEAD OR WISHED YOU COULD GO TO SLEEP AND NOT WAKE UP?: NO
1. IN THE PAST MONTH, HAVE YOU WISHED YOU WERE DEAD OR WISHED YOU COULD GO TO SLEEP AND NOT WAKE UP?: NO
3. HAVE YOU BEEN THINKING ABOUT HOW YOU MIGHT KILL YOURSELF?: NO
ATTEMPT PAST THREE MONTHS: NO
4. HAVE YOU HAD THESE THOUGHTS AND HAD SOME INTENTION OF ACTING ON THEM?: NO
2. HAVE YOU ACTUALLY HAD ANY THOUGHTS OF KILLING YOURSELF LIFETIME?: NO
ATTEMPT LIFETIME: NO

## 2019-10-23 ASSESSMENT — PATIENT HEALTH QUESTIONNAIRE - PHQ9: SUM OF ALL RESPONSES TO PHQ QUESTIONS 1-9: 3

## 2019-10-23 NOTE — PROGRESS NOTES
"                                                                                                                                                                        Adult Intake Structured Interview  Standard Diagnostic Assessment      CLIENT'S NAME: Giovanni Cedeno  MRN:   [id number]  :   1965  ACCT. NUMBER: [id number]  DATE OF SERVICE: 10/23/19  VIDEO VISIT: No    Identifying Information:  Client is a 54 year old, ,  male. Client was referred for counseling by primary care provider. Client is currently employed full time and reports @HIS@ is able to function appropriately at work.. Client attended the session alone.       Client's Statement of Presenting Concern:  Client reports the reason for seeking therapy at this time as \"depression.\"  Client stated that his symptoms have resulted in the following functional impairments: self-care      History of Presenting Concern:    Depression started last winter in the context of life stress.  Friend was hospitalized for pneumonia and an infection and client had to care for him.  Noticed low mood, had little hope, stressed, trouble with sleep appetite and energy.  Taking some Prozac and thinks that this has helped.              Social History:    Client reported he grew up in [place], [place].  Raised by bio parents.  Parents are still together and live close to parents.  Client has three older sisters.  Client reported that his childhood was \"pretty good.\"  School was \"I didn't care for it.\"  Got his GED.  Self-employed has a business finishing wood floors.     for 15 years and  for several years now.  Has three adult daughters and one grandchild.      Client identified some stable and meaningful social connections. Client reported that he has not been involved with the legal system.  Client's highest education level was GED. Client did not identify any learning problems. There are no ethnic, cultural or Judaism factors that may be " relevant for therapy. Client identified his preferred language to be English. Client reported he does not need the assistance of an  or other support involved in therapy. Modifications will not be used to assist communication in therapy. Client did not serve in the .     Client reports family history includes Diabetes in his father; Heart Disease in his father.    Mental Health History:  Client reported no family history of mental health issues.  Client previously received the following mental health diagnosis: Depression.  Client has received the following mental health services in the past: medication(s) from physician / PCP.  Hospitalizations: None.  Client is not currently receiving any mental health services.      Chemical Health History:  Client reported no family history of chemical health issues. Client has received chemical dependency treatment in the past at . Client is currently receiving the following services: participate(s) in AA /  . Client reports no problems as a result of their drinking / drug use.      Client Reports:  Client denies using alcohol.  Sober for 15 years.    Client denies using tobacco.  Client denies using marijuana.  Client reports using caffeine 2 times per day and drinks 1 at a time. Patient started using caffeine at age unknown.  Client denies using street drugs.  Client denies the non-medical use of prescription or over the counter drugs.    CAGE: None of the patient's responses to the CAGE screening were positive / Negative CAGE score   Based on the negative Cage-Aid score and clinical interview there  are not indications of drug or alcohol abuse.    Discussed the general effects of drugs and alcohol on health and well-being. Therapist gave client printed information about the effects of chemical use on his health and well being.      Significant Losses / Trauma / Abuse / Neglect Issues:  There are no indications or report of: significant losses, trauma,  abuse or neglect.    Issues of possible neglect are not present.      Medical Issues:  Client has had a physical exam to rule out medical causes for current symptoms. Date of last physical exam was within the past year. Client was encouraged to follow up with PCP if symptoms were to develop. The client has a [facility] Provider, who is named Sony Velez.. The client reports not having a psychiatrist. Client reports the following current medical concerns: per EMR. The client denies the presence of chronic or episodic pain. There are not significant nutritional concerns.     Patient reports current meds as:   No outpatient medications have been marked as taking for the 10/23/19 encounter (Office Visit) with Rajat Bills LICSW.     Current Facility-Administered Medications for the 10/23/19 encounter (Office Visit) with Rajat Bills LICSW   Medication     lidocaine 1 % injection 1 mL     triamcinolone (KENALOG-40) injection 20 mg       Client Allergies:  No Known Allergies  no allergies to medications    Medical History:  Past Medical History:   Diagnosis Date     Arthritis          Medication Adherence:  Client reports taking prescribed medications as prescribed.    Client was provided recommendation to follow-up with prescribing physician.    Mental Status Assessment:  Appearance:   Appropriate   Eye Contact:   Good   Psychomotor Behavior: Normal   Attitude:   Cooperative   Orientation:   All  Speech   Rate / Production: Normal    Volume:  Normal   Mood:    Normal  Affect:    Appropriate  Constricted   Thought Content:  Clear   Thought Form:  Coherent  Logical   Insight:    Good       Review of Symptoms:  Depression: PHQ-9 score of 3  Ani:  No symptoms  Psychosis: No symptoms  Anxiety: No symptoms  Panic:  No symptoms  Post Traumatic Stress Disorder: No symptoms  Obsessive Compulsive Disorder: No symptoms  Eating Disorder: No symptoms  Oppositional Defiant Disorder: No  symptoms  ADD / ADHD: No symptoms  Conduct Disorder: No symptoms      Safety Assessment:    History of Safety Concerns:   See [place]      Current Safety Concerns:  Client denies current suicidal ideation.    Client denies current homicidal ideation and behaviors.  Client denies current self-injurious ideation and behaviors.    Client denies current concerns for personal safety.    Client reports the following protective factors: dedication to family/friends, safe and stable environment, regular sleep, effectively controls impulses, regular physical activity, secure attachment, adherence with prescribed medication, living with other people, daily obligations, structured day, uses community crisis resources, effective problem-solving skills, committment to well-being, sense of meaning, positive social skills, healthy fear of risky behaviors or pain, financial stability, strong sense of self-worth/esteem, sense of personal control or determination and access to a variety of clinical interventions    Client reports there are firearms in the house. The firearms are secured in a locked space.     Plan for Safety and Risk Management:  Recommended that patient call 911 or go to the local ED should there be a change in any of these risk factors.    Client's Strengths and Limitations:  Client identified the following strengths or resources that will help him succeed in counseling: friends / good social support, family support, insight, motivation, sober support group / recovery support  and work ethic. Client identified the following supports: family, friends and sober support group / recovery support . Things that may interfere with the client's success in counseling include: -.      Diagnostic Criteria:  CRITERIA (A-C) REPRESENT A MAJOR DEPRESSIVE EPISODE - SELECT THESE CRITERIA  A) Single episode - symptoms have been present during the same 2-week period and represent a change from previous functioning 5 or more symptoms  (required for diagnosis)   - Depressed mood. Note: In children and adolescents, can be irritable mood.     - Diminished interest or pleasure in all, or almost all, activities.    - Significant weight loss when not dieting decrease in appetite.    - Decreased sleep.    - Psychomotor activity retardation.    - Fatigue or loss of energy.    - Feelings of worthlessness or inappropriate guilt.    - Diminished ability to think or concentrate, or indecisiveness.   B) The symptoms cause clinically significant distress or impairment in social, occupational, or other important areas of functioning  C) The episode is not attributable to the physiological effects of a substance or to another medical condition  D) The occurence of major depressive episode is not better explained by other thought / psychotic disorders  E) There has never been a manic episode or hypomanic episode      Functional Status:  Client's symptoms have caused and are causing reduced functional status in the following areas: Social / Relational - -      DSM5 Diagnoses: (Sustained by DSM5 Criteria Listed Above)  Diagnoses: 296.21 (F32.0) Major Depressive Disorder, Single Episode, Mild _  Psychosocial & Contextual Factors: good social support  WHODAS 2.0 (12 item)            This questionnaire asks about difficulties due to health conditions. Health conditions  include  disease or illnesses, other health problems that may be short or long lasting,  injuries, mental health or emotional problems, and problems with alcohol or drugs.                     Think back over the past 30 days and answer these questions, thinking about how much  difficulty you had doing the following activities. For each question, please [place] only  one response.    Declines WHODAS    Attendance Agreement:  Client has signed Attendance Agreement:Yes      Collaboration:  Collaboration / coordination of treatment will be initiated with the following support professionals: primary care  physician.      Preliminary Treatment Plan:  The client reports no currently identified Denominational, ethnic or cultural issues relevant to therapy.     services are not indicated.    Modifications to assist communication are not indicated.    The concerns identified by the client will be addressed in therapy.    Initial Treatment will focus on: Depressed Mood - -.    As a preliminary treatment goal, client will develop more effective coping skills to manage depressive symptoms.    The focus of initial interventions will be to teach CBT skills.    Referral to another professional/service is not indicated at this time..    A Release of Information is not needed at this time.    Report to child / adult protection services was NA.    Patient will have open access to their mental health medical record.    Jennie Bauer, LICSW  October 23, 2019

## 2019-10-28 DIAGNOSIS — G25.81 RESTLESS LEG SYNDROME: ICD-10-CM

## 2019-10-28 RX ORDER — GABAPENTIN 300 MG/1
CAPSULE ORAL
Qty: 60 CAPSULE | Refills: 11 | OUTPATIENT
Start: 2019-10-28

## 2019-10-28 NOTE — TELEPHONE ENCOUNTER
Controlled Substance Refill Request for gabapentin (NEURONTIN) 300 MG capsule  Problem List Complete:  Yes    Last Written Prescription Date:  10/21/2019  Last Fill Quantity: 60 capsule,  # refills: 11   Last office visit: 10/21/2019 with prescribing provider:  HERBERT Velez   Future Office Visit:   Next 5 appointments (look out 90 days)    Oct 30, 2019  8:30 AM CDT  Return Visit with Rajat Bills [facility] [address] [place] [id number]  [phone]             Controlled substance agreement:   Encounter-Level CSA - 07/09/2015:    Controlled Substance Agreement - Scan on 7/13/2015  2:12 PM: [facility], Controlled Substance Contract, 07-09-15     Patient-Level CSA:    Controlled Substance Agreement - Opioid - Scan on 8/22/2019  1:20 PM         Last Urine Drug Screen:   Pain Drug SCR UR W RPTD Meds   Date Value Ref Range Status   08/22/2019 FINAL  Final     Comment:     (Note)  ====================================================================  [facility] COMP DRUG ANALYSIS,UR  ====================================================================  Test                             Result       Flag       Units        Drug Present and Declared for Prescription Verification   Hydrocodone                    969          EXPECTED   ng/mg creat   Norhydrocodone                 1122         EXPECTED   ng/mg creat    Sources of hydrocodone include scheduled prescription    medications. Norhydrocodone is an expected metabolite of    hydrocodone.   Gabapentin                     PRESENT      EXPECTED                 Mirtazapine                    PRESENT      EXPECTED                 Acetaminophen                  PRESENT      EXPECTED                Drug Present not Declared for Prescription Verification   Naproxen                       PRESENT      UNEXPECTED              Drug Absent but Declared for Prescription Verification   Methocarbamol                   Not Detected UNEXPECTED               Fluoxetine                     Not Detected UNEXPECTED              ====================================================================  Test                      Result    Flag   Units      Ref Range        Creatinine              49               mg/dL      >=20            ====================================================================  Declared Medications:  The flagging and interpretation on this report are based on the  following declared medications.  Unexpected results may arise from  inaccuracies in the declared medications.  **Note: The testing scope of this panel includes these medications:  Fluoxetine (Prozac)  Gabapentin (Neurontin)  Hydrocodone (Norco)  Methocarbamol (Robaxin)  Mirtazapine (Remeron)  **Note: The testing scope of this panel does not include small to  moderate amounts of these reported medications:  Acetaminophen (Norco)  **Note: The testing scope of this panel does not include following  reported medications:  Albuterol  Carbidopa (Carbidopa/Levodopa)  Celecoxib (Celebrex)  Docusate (Senokot-S)  Finasteride (Proscar)  Levodopa (Carbidopa/Levodopa)  Sennosides (Senokot-S)  Simvastatin (Zocor)  Tamsulosin (Flomax)  ====================================================================  For clinical consultation, please call [phone].  ====================================================================  Analysis performed by [facility], [address] [place] [address]     , No results found for: COMDAT, No results found for: THC13, PCP13, COC13, MAMP13, OPI13, AMP13, BZO13, TCA13, MTD13, BAR13, OXY13, PPX13, BUP13         [email or web address]   checked in past 3 months?  No, route to RN

## 2019-12-03 DIAGNOSIS — M62.838 MUSCLE SPASM OF RIGHT SHOULDER: ICD-10-CM

## 2019-12-03 DIAGNOSIS — Z98.890 S/P ROTATOR CUFF REPAIR: ICD-10-CM

## 2019-12-03 NOTE — TELEPHONE ENCOUNTER
methocarbamol (ROBAXIN) 500 MG tablet    Last Written Prescription Date:  10/21/2019  Last Fill Quantity: 60 tablet,  # refills: 0   Last office visit: 10/21/2019 with prescribing provider:  HERBERT Velez   Future Office Visit:          Routing refill request to provider for review/approval because:  Drug not on the [facility], P or [facility] refill protocol or controlled substance

## 2019-12-05 ENCOUNTER — OFFICE VISIT (OUTPATIENT)
Dept: ORTHOPEDICS | Facility: CLINIC | Age: 54
End: 2019-12-05
Payer: COMMERCIAL

## 2019-12-05 VITALS
HEIGHT: 63 IN | DIASTOLIC BLOOD PRESSURE: 85 MMHG | WEIGHT: 140 LBS | SYSTOLIC BLOOD PRESSURE: 124 MMHG | BODY MASS INDEX: 24.8 KG/M2 | RESPIRATION RATE: 16 BRPM

## 2019-12-05 DIAGNOSIS — M77.12 LEFT LATERAL EPICONDYLITIS: Primary | ICD-10-CM

## 2019-12-05 PROCEDURE — 20551 NJX 1 TENDON ORIGIN/INSJ: CPT | Mod: LT | Performed by: ORTHOPAEDIC SURGERY

## 2019-12-05 RX ORDER — TRIAMCINOLONE ACETONIDE 40 MG/ML
40 INJECTION, SUSPENSION INTRA-ARTICULAR; INTRAMUSCULAR
Status: DISCONTINUED | OUTPATIENT
Start: 2019-12-05 | End: 2020-07-08

## 2019-12-05 RX ORDER — LIDOCAINE HYDROCHLORIDE 10 MG/ML
1 INJECTION, SOLUTION INFILTRATION; PERINEURAL
Status: DISCONTINUED | OUTPATIENT
Start: 2019-12-05 | End: 2020-07-08

## 2019-12-05 RX ADMIN — LIDOCAINE HYDROCHLORIDE 1 ML: 10 INJECTION, SOLUTION INFILTRATION; PERINEURAL at 14:18

## 2019-12-05 RX ADMIN — TRIAMCINOLONE ACETONIDE 40 MG: 40 INJECTION, SUSPENSION INTRA-ARTICULAR; INTRAMUSCULAR at 14:18

## 2019-12-05 ASSESSMENT — PAIN SCALES - GENERAL: PAINLEVEL: SEVERE PAIN (7)

## 2019-12-05 ASSESSMENT — MIFFLIN-ST. JEOR: SCORE: 1352.23

## 2019-12-05 NOTE — LETTER
"    12/5/2019         RE: Giovanni Cedeno  [id number]  [place] [address]        Dear Colleague,    Thank you for referring your patient, Giovanni Cedeno, to the [facility]. Please see a copy of my visit note below.    Chief Complaint:   Chief Complaint   Patient presents with     Left Elbow - Pain     Left lateral epicondylitis. Last injection on 8/8/19, worked well up until 2 weeks ago. NKI. No explanation for onset of pain.        HPI: Giovanni \"Maverick\" Pao is a 54 year old male , right -hand dominant, who presents for evaluation and management of a left elbow pain, without specific injury. Pain present since 6-7/2019. Just woke up with pain one day. Had injection for tennis elbow 8/8/2019. Returns today stating it worked well until a couple weeks ago. No recent increased or changed activity that brought on the pain, just woke up with it again. Pain as prior. Ok at rest, pain with activities, movement. Taking ibuprofen, using ice for the pain.       He reports having moderate - severe  pain/discomfort at the elbow. He denies numbness or tingling.   Pain severity: 7/10  Pain quality: sharp  Frequency of symptoms: frequently  Associated symptoms: denies  Aggravated by: activities  Relieved by: rest    Treatment up to this point:ice and NSAIDS, injection 8/2019    Prior history of related problems: right elbow lateral epicondylitis treated with injections and eventual surgery.    Significant Orthopedic past medical history: right lateral epicondylitis, right rotator cuff repair. Knee pain    Past medical history:  has a past medical history of Arthritis.   Patient Active Problem List   Diagnosis     Restless leg syndrome     Knee bursitis     Pure hypercholesterolemia     Major depression in partial remission (H)     Pre-diabetes     Epicondylitis, lateral humeral     Immunization reaction     Chronic pain syndrome     Chronic right shoulder pain     Abdominal pain, " left upper quadrant     Pain of toe of right foot     Acute midline low back pain without sciatica     Upper back pain     Mild intermittent asthma, unspecified whether complicated       Past surgical history:  has no past surgical history on file.     Medications:   Current Outpatient Medications:      albuterol (PROAIR HFA/PROVENTIL HFA/VENTOLIN HFA) 108 (90 Base) MCG/ACT inhaler, Inhale 2 puffs into the lungs every 6 hours as needed for shortness of breath / dyspnea or wheezing, Disp: 90 g, Rfl: 3     carbidopa-levodopa (SINEMET)  MG tablet, Take 1 tablet by mouth At Bedtime, Disp: 90 tablet, Rfl: 5     celecoxib (CELEBREX) 200 MG capsule, Take 1 capsule (200 mg) by mouth 2 times daily, Disp: 60 capsule, Rfl: 11     finasteride (PROSCAR) 5 MG tablet, TAKE 1 TABLET(5 MG) BY MOUTH DAILY, Disp: 90 tablet, Rfl: 1     FLUoxetine (PROZAC) 20 MG capsule, Take 1 capsule (20 mg) by mouth daily, Disp: 30 capsule, Rfl: 11     gabapentin (NEURONTIN) 300 MG capsule, TAKE 2 CAPSULES BY MOUTH EVERY NIGHT AT BEDTIME, Disp: 60 capsule, Rfl: 11     HYDROcodone-acetaminophen (NORCO) 7.5-325 MG per tablet, Take 1 tablet by mouth every 4 hours as needed for pain maximum  4  tablet(s) per day, Disp: 30 tablet, Rfl: 0     methocarbamol (ROBAXIN) 500 MG tablet, Take 1-2 tablets (500-1,000 mg) by mouth 3 times daily as needed for muscle spasms, Disp: 60 tablet, Rfl: 0     mirtazapine (REMERON) 15 MG tablet, TAKE 1 TABLET(15 MG) BY MOUTH AT BEDTIME, Disp: 90 tablet, Rfl: 3     Misc Natural Products (OSTEO BI-FLEX TRIPLE STRENGTH PO), Take 1 tablet by mouth daily, Disp: , Rfl:      senna-docusate (SENOKOT-S/PERICOLACE) 8.6-50 MG tablet, Take 1-2 tablets by mouth 2 times daily as needed for constipation, Disp: 100 tablet, Rfl: 11     simvastatin (ZOCOR) 20 MG tablet, TAKE 1 TABLET BY MOUTH EVERY DAY AT BEDTIME, Disp: 90 tablet, Rfl: 3     tamsulosin (FLOMAX) 0.4 MG capsule, Take 1 capsule (0.4 mg) by mouth daily, Disp: 30 capsule,  "Rfl: 11    Current Facility-Administered Medications:      lidocaine 1 % injection 1 mL, 1 mL, , , Jose G Blanchard MD, 1 mL at 08/08/19 1429     triamcinolone (KENALOG-40) injection 20 mg, 20 mg, , , Jose G Blanchard MD, 20 mg at 08/08/19 1429    Allergies:   No Known Allergies     Family History: family history includes Diabetes in his father; Heart Disease in his father.     Social History:  reports that he has never smoked. He has never used smokeless tobacco. He reports that he does not drink alcohol or use drugs.    Review of Systems:    Denies numbness, tingling, parasthesias.   Denies headaches.   Denies fevers, chills, night sweats   Denies chest pain.   Denies shortness of breath.   Denies any skin problems, abrasions, rashes, irritation.      Physical Exam  GENERAL APPEARANCE: healthy, alert, no distress  SKIN: no suspicious lesions or rashes  RESPIRATORY: No increased work of breathing.  NEURO: Normal strength and tone, mentation intact and speech normal  PSYCH:  mentation appears normal and affect normal. Not anxious.  Hands: no clubbing, nail pitting or nodes.    /85   Resp 16   Ht 1.588 m (5' 2.5\")   Wt 63.5 kg (140 lb)   BMI 25.20 kg/m          left UPPER EXTREMITY:    Sensation intact to light touch in median, radial, ulnar and axillary nerve distributions  Palpable 2+ radial pulse, brisk capillary refill to all fingers, wwp  Intact epl fpl fdp edc wrist flexion/extension biceps triceps deltoid    ELBOW:  Skin intact. No open wounds. No skin maceration.  Inspection: no swelling, no ecchymosis, no olecranon bursa swelling, no distal bicep tendon defect  There is palpable atrophy at the common extensor, slight palpable defect.  Tender: lateral epicondyle, common extensor tendon and extensor muscle of forearm  Range of Motion: all normal  Strength: elbow strength full  Special tests: normal stability, pain with resisted wrist extension, pain with resisted middle finger extension, no pain " with resisted wrist flexion:   There is no deformity in the area.      X-rays: no new images today.  3 views left elbow from 8/8/2019 :  No obvious fractures or dislocations. Mild elbow degenerative changes.    Assessment: 54 year old male , right -hand dominant, with chronic left elbow pain, lateral epicondylitis    Plan:   * discussed with patient history and clinical exam consistent with tennis elbow, or lateral epicondylitis, which is tendonitis at the lateral aspect of the elbow.  * treatment is nonoperative, with surgical treatment reserved for recalcitrant symptoms despite long-term nonperative treatment regimen. Most cases expected to resolve over 1-2 years based on natural history.    Treatment includes:  * rest  * activity modification - avoid aggravating activities and reduce strenuous activities for 6-8 weeks  * ice, ice massage  * Physical therapy, modalities as indicated including ultrasound, massage, iontophoresis  * counterforce elbow brace/strap, available OTC  * wrist brace to prevent wrist extension  * modify lifting technique, palm upwards with lifting to relieve stress on extensor tendons of wrist.  * NDAIDS regularly three times daily x2-3 weeks  * cortisone injection discussed, placed at point of maximal tenderness.  * also discussed PRP injections, out of pocket expense. He will consider in future as needed.  * return to clinic as needed.      Jose G Blanchard M.D., M.S.  Dept. of Orthopaedic Surgery  [facility]    Hand / Upper Extremity Injection/Arthrocentesis: L elbow  Date/Time: 12/5/2019 2:18 PM  Performed by: Ammon Daniel PA  Authorized by: Jose G Blanchard MD     Indications:  Pain and tendon swelling  Needle Size:  25 G  Guidance: landmark    Approach:  Lateral  Condition: epicondylitis, lateral      Site:  L elbow  Medications:  40 mg triamcinolone 40 MG/ML; 1 mL lidocaine 1 %  Outcome:  Tolerated well, no immediate complications  Procedure discussed: discussed  risks, benefits, and alternatives    Timeout: timeout called immediately prior to procedure    Prep: patient was prepped and draped in usual sterile fashion              Again, thank you for allowing me to participate in the care of your patient.        Sincerely,        Jose G Blanchard MD

## 2019-12-05 NOTE — PROGRESS NOTES
"Chief Complaint:   Chief Complaint   Patient presents with     Left Elbow - Pain     Left lateral epicondylitis. Last injection on 8/8/19, worked well up until 2 weeks ago. NKI. No explanation for onset of pain.        HPI: Giovanni \"Maverick\" Pao is a 54 year old male , right -hand dominant, who presents for evaluation and management of a left elbow pain, without specific injury. Pain present since 6-7/2019. Just woke up with pain one day. Had injection for tennis elbow 8/8/2019. Returns today stating it worked well until a couple weeks ago. No recent increased or changed activity that brought on the pain, just woke up with it again. Pain as prior. Ok at rest, pain with activities, movement. Taking ibuprofen, using ice for the pain.       He reports having moderate - severe  pain/discomfort at the elbow. He denies numbness or tingling.   Pain severity: 7/10  Pain quality: sharp  Frequency of symptoms: frequently  Associated symptoms: denies  Aggravated by: activities  Relieved by: rest    Treatment up to this point:ice and NSAIDS, injection 8/2019    Prior history of related problems: right elbow lateral epicondylitis treated with injections and eventual surgery.    Significant Orthopedic past medical history: right lateral epicondylitis, right rotator cuff repair. Knee pain    Past medical history:  has a past medical history of Arthritis.   Patient Active Problem List   Diagnosis     Restless leg syndrome     Knee bursitis     Pure hypercholesterolemia     Major depression in partial remission (H)     Pre-diabetes     Epicondylitis, lateral humeral     Immunization reaction     Chronic pain syndrome     Chronic right shoulder pain     Abdominal pain, left upper quadrant     Pain of toe of right foot     Acute midline low back pain without sciatica     Upper back pain     Mild intermittent asthma, unspecified whether complicated       Past surgical history:  has no past surgical history on file.     Medications: "   Current Outpatient Medications:      albuterol (PROAIR HFA/PROVENTIL HFA/VENTOLIN HFA) 108 (90 Base) MCG/ACT inhaler, Inhale 2 puffs into the lungs every 6 hours as needed for shortness of breath / dyspnea or wheezing, Disp: 90 g, Rfl: 3     carbidopa-levodopa (SINEMET)  MG tablet, Take 1 tablet by mouth At Bedtime, Disp: 90 tablet, Rfl: 5     celecoxib (CELEBREX) 200 MG capsule, Take 1 capsule (200 mg) by mouth 2 times daily, Disp: 60 capsule, Rfl: 11     finasteride (PROSCAR) 5 MG tablet, TAKE 1 TABLET(5 MG) BY MOUTH DAILY, Disp: 90 tablet, Rfl: 1     FLUoxetine (PROZAC) 20 MG capsule, Take 1 capsule (20 mg) by mouth daily, Disp: 30 capsule, Rfl: 11     gabapentin (NEURONTIN) 300 MG capsule, TAKE 2 CAPSULES BY MOUTH EVERY NIGHT AT BEDTIME, Disp: 60 capsule, Rfl: 11     HYDROcodone-acetaminophen (NORCO) 7.5-325 MG per tablet, Take 1 tablet by mouth every 4 hours as needed for pain maximum  4  tablet(s) per day, Disp: 30 tablet, Rfl: 0     methocarbamol (ROBAXIN) 500 MG tablet, Take 1-2 tablets (500-1,000 mg) by mouth 3 times daily as needed for muscle spasms, Disp: 60 tablet, Rfl: 0     mirtazapine (REMERON) 15 MG tablet, TAKE 1 TABLET(15 MG) BY MOUTH AT BEDTIME, Disp: 90 tablet, Rfl: 3     Misc Natural Products (OSTEO BI-FLEX TRIPLE STRENGTH PO), Take 1 tablet by mouth daily, Disp: , Rfl:      senna-docusate (SENOKOT-S/PERICOLACE) 8.6-50 MG tablet, Take 1-2 tablets by mouth 2 times daily as needed for constipation, Disp: 100 tablet, Rfl: 11     simvastatin (ZOCOR) 20 MG tablet, TAKE 1 TABLET BY MOUTH EVERY DAY AT BEDTIME, Disp: 90 tablet, Rfl: 3     tamsulosin (FLOMAX) 0.4 MG capsule, Take 1 capsule (0.4 mg) by mouth daily, Disp: 30 capsule, Rfl: 11    Current Facility-Administered Medications:      lidocaine 1 % injection 1 mL, 1 mL, , , Jose G Blanchard MD, 1 mL at 08/08/19 1426     triamcinolone (KENALOG-40) injection 20 mg, 20 mg, , , Jose G Blanchard MD, 20 mg at 08/08/19 1421    Allergies:   No  "Known Allergies     Family History: family history includes Diabetes in his father; Heart Disease in his father.     Social History:  reports that he has never smoked. He has never used smokeless tobacco. He reports that he does not drink alcohol or use drugs.    Review of Systems:    Denies numbness, tingling, parasthesias.   Denies headaches.   Denies fevers, chills, night sweats   Denies chest pain.   Denies shortness of breath.   Denies any skin problems, abrasions, rashes, irritation.      Physical Exam  GENERAL APPEARANCE: healthy, alert, no distress  SKIN: no suspicious lesions or rashes  RESPIRATORY: No increased work of breathing.  NEURO: Normal strength and tone, mentation intact and speech normal  PSYCH:  mentation appears normal and affect normal. Not anxious.  Hands: no clubbing, nail pitting or nodes.    /85   Resp 16   Ht 1.588 m (5' 2.5\")   Wt 63.5 kg (140 lb)   BMI 25.20 kg/m         left UPPER EXTREMITY:    Sensation intact to light touch in median, radial, ulnar and axillary nerve distributions  Palpable 2+ radial pulse, brisk capillary refill to all fingers, wwp  Intact epl fpl fdp edc wrist flexion/extension biceps triceps deltoid    ELBOW:  Skin intact. No open wounds. No skin maceration.  Inspection: no swelling, no ecchymosis, no olecranon bursa swelling, no distal bicep tendon defect  There is palpable atrophy at the common extensor, slight palpable defect.  Tender: lateral epicondyle, common extensor tendon and extensor muscle of forearm  Range of Motion: all normal  Strength: elbow strength full  Special tests: normal stability, pain with resisted wrist extension, pain with resisted middle finger extension, no pain with resisted wrist flexion:   There is no deformity in the area.      X-rays: no new images today.  3 views left elbow from 8/8/2019 :  No obvious fractures or dislocations. Mild elbow degenerative changes.    Assessment: 54 year old male , right -hand dominant, with " chronic left elbow pain, lateral epicondylitis    Plan:   * discussed with patient history and clinical exam consistent with tennis elbow, or lateral epicondylitis, which is tendonitis at the lateral aspect of the elbow.  * treatment is nonoperative, with surgical treatment reserved for recalcitrant symptoms despite long-term nonperative treatment regimen. Most cases expected to resolve over 1-2 years based on natural history.    Treatment includes:  * rest  * activity modification - avoid aggravating activities and reduce strenuous activities for 6-8 weeks  * ice, ice massage  * Physical therapy, modalities as indicated including ultrasound, massage, iontophoresis  * counterforce elbow brace/strap, available OTC  * wrist brace to prevent wrist extension  * modify lifting technique, palm upwards with lifting to relieve stress on extensor tendons of wrist.  * NDAIDS regularly three times daily x2-3 weeks  * cortisone injection discussed, placed at point of maximal tenderness.  * also discussed PRP injections, out of pocket expense. He will consider in future as needed.  * return to clinic as needed.      Jose G Blanchard M.D., M.S.  Dept. of Orthopaedic Surgery  [facility]    Hand / Upper Extremity Injection/Arthrocentesis: L elbow  Date/Time: 12/5/2019 2:18 PM  Performed by: Ammon Daniel PA  Authorized by: Jose G Blanchard MD     Indications:  Pain and tendon swelling  Needle Size:  25 G  Guidance: landmark    Approach:  Lateral  Condition: epicondylitis, lateral      Site:  L elbow  Medications:  40 mg triamcinolone 40 MG/ML; 1 mL lidocaine 1 %  Outcome:  Tolerated well, no immediate complications  Procedure discussed: discussed risks, benefits, and alternatives    Timeout: timeout called immediately prior to procedure    Prep: patient was prepped and draped in usual sterile fashion

## 2019-12-06 RX ORDER — METHOCARBAMOL 500 MG/1
TABLET, FILM COATED ORAL
Qty: 60 TABLET | Refills: 0 | Status: SHIPPED | OUTPATIENT
Start: 2019-12-06 | End: 2019-12-31

## 2019-12-23 ENCOUNTER — ALLIED HEALTH/NURSE VISIT (OUTPATIENT)
Dept: NURSING | Facility: CLINIC | Age: 54
End: 2019-12-23
Payer: COMMERCIAL

## 2019-12-23 DIAGNOSIS — Z23 NEED FOR VACCINATION: Primary | ICD-10-CM

## 2019-12-23 DIAGNOSIS — G89.4 CHRONIC PAIN SYNDROME: ICD-10-CM

## 2019-12-23 DIAGNOSIS — M75.101 ROTATOR CUFF SYNDROME, RIGHT: ICD-10-CM

## 2019-12-23 PROCEDURE — 99207 ZZC NO CHARGE LOS: CPT

## 2019-12-23 PROCEDURE — 90750 HZV VACC RECOMBINANT IM: CPT

## 2019-12-23 PROCEDURE — 90471 IMMUNIZATION ADMIN: CPT

## 2019-12-23 RX ORDER — HYDROCODONE BITARTRATE AND ACETAMINOPHEN 7.5; 325 MG/1; MG/1
1 TABLET ORAL EVERY 4 HOURS PRN
Qty: 30 TABLET | Refills: 0 | Status: SHIPPED | OUTPATIENT
Start: 2019-12-23 | End: 2020-04-13

## 2019-12-29 DIAGNOSIS — M62.838 MUSCLE SPASM OF RIGHT SHOULDER: ICD-10-CM

## 2019-12-29 DIAGNOSIS — Z98.890 S/P ROTATOR CUFF REPAIR: ICD-10-CM

## 2019-12-30 NOTE — TELEPHONE ENCOUNTER
methocarbamol (ROBAXIN) 500 MG tablet   Last Written Prescription Date:  12/6/2019  Last Fill Quantity: 60 tablet,  # refills: 0   Last office visit: 10/21/2019 with prescribing provider:  HERBERT Velez   Future Office Visit:         Routing refill request to provider for review/approval because:  Drug not on the [facility], [facility] or [facility] refill protocol or controlled substance

## 2019-12-31 RX ORDER — METHOCARBAMOL 500 MG/1
TABLET, FILM COATED ORAL
Qty: 60 TABLET | Refills: 0 | Status: SHIPPED | OUTPATIENT
Start: 2019-12-31 | End: 2020-01-27

## 2020-01-26 DIAGNOSIS — Z98.890 S/P ROTATOR CUFF REPAIR: ICD-10-CM

## 2020-01-26 DIAGNOSIS — M62.838 MUSCLE SPASM OF RIGHT SHOULDER: ICD-10-CM

## 2020-01-27 RX ORDER — METHOCARBAMOL 500 MG/1
TABLET, FILM COATED ORAL
Qty: 60 TABLET | Refills: 0 | Status: SHIPPED | OUTPATIENT
Start: 2020-01-27 | End: 2020-02-28

## 2020-01-27 NOTE — TELEPHONE ENCOUNTER
methocarbamol (ROBAXIN) 500 MG tablet  Last Written Prescription Date:  12/31/2019  Last Fill Quantity: 60 tablet,  # refills: 0   Last office visit: 10/21/2019 with prescribing provider:  HERBERT Velez   Future Office Visit:        Routing refill request to provider for review/approval because:  Drug not on the [facility], [facility] or [facility] refill protocol or controlled substance

## 2020-02-27 DIAGNOSIS — Z98.890 S/P ROTATOR CUFF REPAIR: ICD-10-CM

## 2020-02-27 DIAGNOSIS — M62.838 MUSCLE SPASM OF RIGHT SHOULDER: ICD-10-CM

## 2020-02-27 NOTE — TELEPHONE ENCOUNTER
Requested Prescriptions   Pending Prescriptions Disp Refills     methocarbamol (ROBAXIN) 500 MG tablet [Pharmacy Med Name: METHOCARBAMOL 500MG TABLETS] 60 tablet 0     Sig: TAKE 1 TO 2 TABLETS(500 TO 1000 MG) BY MOUTH THREE TIMES DAILY AS NEEDED FOR MUSCLE SPASMS     Last Written Prescription Date:  1/27/2020  Last Fill Quantity: 60,  # refills: 0   Last office visit: 10/21/2019 with prescribing provider:  10/21/2019   Future Office Visit:   Next 5 appointments (look out 90 days)    Mar 02, 2020  8:30 AM CST  Return Visit with Jose G Blanchard MD  [facility] [place] ([facility] [place]) [address] [address]  [place] [address]  [phone]             There is no refill protocol information for this order

## 2020-02-28 RX ORDER — METHOCARBAMOL 500 MG/1
TABLET, FILM COATED ORAL
Qty: 60 TABLET | Refills: 0 | Status: SHIPPED | OUTPATIENT
Start: 2020-02-28 | End: 2020-04-08

## 2020-03-02 DIAGNOSIS — R35.0 BENIGN PROSTATIC HYPERPLASIA WITH URINARY FREQUENCY: ICD-10-CM

## 2020-03-02 DIAGNOSIS — N40.1 BENIGN PROSTATIC HYPERPLASIA WITH URINARY FREQUENCY: ICD-10-CM

## 2020-03-02 NOTE — TELEPHONE ENCOUNTER
"Requested Prescriptions   Pending Prescriptions Disp Refills     finasteride (PROSCAR) 5 MG tablet [Pharmacy Med Name: FINASTERIDE 5MG TABLETS]  Last Written Prescription Date:  4/18/2019  Last Fill Quantity: 90 tablet,  # refills: 1   Last office visit: 10/21/2019 with prescribing provider:  FABIO Velez   Future Office Visit:     90 tablet 1     Sig: TAKE 1 TABLET(5 MG) BY MOUTH DAILY       BPH Agents Passed - 3/2/2020  3:14 AM        Passed - Recent (12 mo) or future (30 days) visit within the authorizing provider's department     Patient has had an office visit with the authorizing provider or a provider within the authorizing providers department within the previous 12 mos or has a future within next 30 days. See \"Patient Info\" tab in inbasket, or \"Choose Columns\" in Meds & Orders section of the refill encounter.              Passed - Medication is active on med list        Passed - Patient is 18 years of age or older           "

## 2020-03-03 RX ORDER — FINASTERIDE 5 MG/1
TABLET, FILM COATED ORAL
Qty: 90 TABLET | Refills: 1 | Status: SHIPPED | OUTPATIENT
Start: 2020-03-03 | End: 2020-05-11

## 2020-03-03 NOTE — TELEPHONE ENCOUNTER
Prescription approved per [facility] Refill Protocol for 12 months of refills since last appointment, which was 10/21/19

## 2020-03-23 ENCOUNTER — ANCILLARY PROCEDURE (OUTPATIENT)
Dept: GENERAL RADIOLOGY | Facility: CLINIC | Age: 55
End: 2020-03-23
Attending: NURSE PRACTITIONER
Payer: COMMERCIAL

## 2020-03-23 ENCOUNTER — OFFICE VISIT (OUTPATIENT)
Dept: URGENT CARE | Facility: URGENT CARE | Age: 55
End: 2020-03-23
Payer: COMMERCIAL

## 2020-03-23 VITALS
OXYGEN SATURATION: 100 % | TEMPERATURE: 97.6 F | SYSTOLIC BLOOD PRESSURE: 126 MMHG | HEART RATE: 70 BPM | DIASTOLIC BLOOD PRESSURE: 82 MMHG

## 2020-03-23 DIAGNOSIS — L08.9 ABRASION OF LEFT INDEX FINGER WITH INFECTION: ICD-10-CM

## 2020-03-23 DIAGNOSIS — S60.411A ABRASION OF LEFT INDEX FINGER WITH INFECTION: ICD-10-CM

## 2020-03-23 DIAGNOSIS — S60.417A ABRASION OF LEFT LITTLE FINGER, INITIAL ENCOUNTER: ICD-10-CM

## 2020-03-23 DIAGNOSIS — T14.8XXA SUPERFICIAL FOREIGN BODY (SLIVER): Primary | ICD-10-CM

## 2020-03-23 PROCEDURE — 73140 X-RAY EXAM OF FINGER(S): CPT | Mod: LT

## 2020-03-23 PROCEDURE — 99213 OFFICE O/P EST LOW 20 MIN: CPT | Performed by: NURSE PRACTITIONER

## 2020-03-23 RX ORDER — CEPHALEXIN 500 MG/1
500 CAPSULE ORAL 3 TIMES DAILY
Qty: 21 CAPSULE | Refills: 0 | Status: SHIPPED | OUTPATIENT
Start: 2020-03-23 | End: 2020-05-11

## 2020-03-23 NOTE — PROGRESS NOTES
CC: Patient here because of foreign body in left hand pinkie finger. Was splitting wood and got sliver in there. Thought he got it out but more painful red pus the last 2 days. No fever. Unsure if still in there    Past Medical History:   Diagnosis Date     Arthritis      Current Outpatient Medications   Medication     albuterol (PROAIR HFA/PROVENTIL HFA/VENTOLIN HFA) 108 (90 Base) MCG/ACT inhaler     carbidopa-levodopa (SINEMET)  MG tablet     celecoxib (CELEBREX) 200 MG capsule     finasteride (PROSCAR) 5 MG tablet     FLUoxetine (PROZAC) 20 MG capsule     gabapentin (NEURONTIN) 300 MG capsule     HYDROcodone-acetaminophen (NORCO) 7.5-325 MG per tablet     methocarbamol (ROBAXIN) 500 MG tablet     mirtazapine (REMERON) 15 MG tablet     Misc Natural Products (OSTEO BI-FLEX TRIPLE STRENGTH PO)     senna-docusate (SENOKOT-S/PERICOLACE) 8.6-50 MG tablet     simvastatin (ZOCOR) 20 MG tablet     tamsulosin (FLOMAX) 0.4 MG capsule     Current Facility-Administered Medications   Medication     lidocaine 1 % injection 1 mL     lidocaine 1 % injection 1 mL     triamcinolone (KENALOG-40) injection 20 mg     triamcinolone (KENALOG-40) injection 40 mg      No Known Allergies    O:   /82   Pulse 70   Temp 97.6  F (36.4  C) (Tympanic)   SpO2 100%   Alert, well hydrated, nontoxic appearing individual in no acute distress.  CV: S1 and S2 normal, no murmurs, clicks, gallops or rubs. Regular rate and rhythm.   Lungs: Chest is clear; no wheezes or rales. No edema or JVD.  Left hand 5th digit- erythema surrounding opening where splinter went in, there does not appear to be any foreign body seen or felt. Painful to touch, yellow pus., full range of motion, full functionality of all tendons, normal microcirculation, normal nails, normal color and temperature.    Xray reveals no foreign body, reviewed by myself and radiology    A: (T14.8XXA) Superficial foreign body (sliver)  (primary encounter diagnosis)    Plan:    cephALEXin (KEFLEX) 500 MG capsule TID X 7 days  Soak, cleanse bacitracin bandage. Tylenol or ibuprofen for pain as needed  Discussed what symptoms need to be seen again, emergent as reviewed to ER  Patient education given, all questions answered    CARMEL Damon CNP

## 2020-03-23 NOTE — PATIENT INSTRUCTIONS
Patient Education     Splinter Removal  A splinter has been removed from under your skin. Although care was taken to remove all pieces present, there is a chance that a small piece may have been left behind.  Very small particles that remain under the skin usually cause no problem and need no further treatment unless an infection develops.  You may receive a tetanus shot if needed. You may be given antibiotics to prevent or treat infection based on many factors. Some of these factors include location, severity of injury, time since injury, and other concerns.  Home care  The following guidelines will help you care for your wound at home:    Keep the injured part elevated during the first 2 days to reduce swelling and pain.    Keep the wound clean and dry. If a bandage was applied and it becomes wet or dirty, replace it. Otherwise, leave it in place for the first 24 hours. Then, clean the wound daily:  ? After removing the bandage, wash the area with soap and water. Use a wet cotton swab to loosen and remove any blood or crust that forms.  ? After cleaning, apply a thin layer of antibiotic ointment. Put on a fresh bandage.    Unless told otherwise, you may shower as usual, but do not soak the area in water for at least 1 week. This means no baths or swimming.    You may use over-the-counter medication for pain, unless another pain medicine was given. Talk with your doctor before using acetaminophen or ibuprofen if you have chronic liver or kidney disease. Also talk with your doctor if you have ever had a stomach ulcer or GI bleeding.  Follow-up care  Follow up with your healthcare provider, or as advised. Most skin wounds heal within 10 days. But there is a risk of infection if there are any particles that are still under the skin. Therefore, check the wound in 2 days for the signs of infection listed below. Any stitches should be removed within 7 to 14 days. If surgical tape closures were used, remove them  yourself if they have not fallen off after 7 days.  When to seek medical advice  Call your healthcare provider right away if any of these occur:    Increasing pain in the wound    Redness, swelling, or pus coming from the wound    Fever of 100.4 F (38 C) or higher, or as directed by your healthcare provider  Date Last Reviewed: 10/1/2016    9444-4002 [employer]. [address] [place] [address]. All rights reserved. This information is not intended as a substitute for professional medical care. Always follow your healthcare professional's instructions.

## 2020-04-06 PROBLEM — M75.41 IMPINGEMENT SYNDROME OF SHOULDER, RIGHT: Status: ACTIVE | Noted: 2017-11-01

## 2020-04-06 PROBLEM — M75.111 INCOMPLETE TEAR OF RIGHT ROTATOR CUFF: Status: ACTIVE | Noted: 2017-11-01

## 2020-04-07 DIAGNOSIS — M62.838 MUSCLE SPASM OF RIGHT SHOULDER: ICD-10-CM

## 2020-04-07 DIAGNOSIS — Z98.890 S/P ROTATOR CUFF REPAIR: ICD-10-CM

## 2020-04-08 RX ORDER — METHOCARBAMOL 500 MG/1
TABLET, FILM COATED ORAL
Qty: 60 TABLET | Refills: 0 | Status: SHIPPED | OUTPATIENT
Start: 2020-04-08 | End: 2020-05-14

## 2020-04-08 NOTE — TELEPHONE ENCOUNTER
Last office visit 10/21/2019.    Requested Prescriptions   Pending Prescriptions Disp Refills     methocarbamol (ROBAXIN) 500 MG tablet [Pharmacy Med Name: METHOCARBAMOL 500MG TABLETS] 60 tablet 0     Sig: TAKE 1 TO 2 TABLETS(500 TO 1000 MG) BY MOUTH THREE TIMES DAILY AS NEEDED FOR MUSCLE SPASMS       There is no refill protocol information for this order        Routing refill request to provider for review/approval because:  Drug not on the [facility] refill protocol

## 2020-04-13 DIAGNOSIS — G89.4 CHRONIC PAIN SYNDROME: ICD-10-CM

## 2020-04-13 DIAGNOSIS — M75.101 ROTATOR CUFF SYNDROME, RIGHT: ICD-10-CM

## 2020-04-13 RX ORDER — HYDROCODONE BITARTRATE AND ACETAMINOPHEN 7.5; 325 MG/1; MG/1
1 TABLET ORAL EVERY 4 HOURS PRN
Qty: 30 TABLET | Refills: 0 | Status: SHIPPED | OUTPATIENT
Start: 2020-04-13 | End: 2020-08-08

## 2020-05-06 DIAGNOSIS — F51.01 PRIMARY INSOMNIA: ICD-10-CM

## 2020-05-06 DIAGNOSIS — E78.5 HYPERLIPIDEMIA LDL GOAL <100: Chronic | ICD-10-CM

## 2020-05-06 DIAGNOSIS — E78.00 PURE HYPERCHOLESTEROLEMIA: ICD-10-CM

## 2020-05-06 RX ORDER — MIRTAZAPINE 15 MG/1
TABLET, FILM COATED ORAL
Qty: 90 TABLET | Refills: 0 | Status: SHIPPED | OUTPATIENT
Start: 2020-05-06 | End: 2020-08-05

## 2020-05-06 RX ORDER — SIMVASTATIN 20 MG
TABLET ORAL
Qty: 90 TABLET | Refills: 3 | Status: SHIPPED | OUTPATIENT
Start: 2020-05-06 | End: 2021-06-08

## 2020-05-11 ENCOUNTER — VIRTUAL VISIT (OUTPATIENT)
Dept: FAMILY MEDICINE | Facility: CLINIC | Age: 55
End: 2020-05-11
Payer: COMMERCIAL

## 2020-05-11 ENCOUNTER — TELEPHONE (OUTPATIENT)
Dept: FAMILY MEDICINE | Facility: CLINIC | Age: 55
End: 2020-05-11

## 2020-05-11 DIAGNOSIS — R37 SEXUAL DYSFUNCTION: Primary | ICD-10-CM

## 2020-05-11 PROCEDURE — 99213 OFFICE O/P EST LOW 20 MIN: CPT | Mod: 95 | Performed by: FAMILY MEDICINE

## 2020-05-11 RX ORDER — SILDENAFIL 100 MG/1
100 TABLET, FILM COATED ORAL DAILY PRN
Qty: 10 TABLET | Refills: 0 | Status: SHIPPED | OUTPATIENT
Start: 2020-05-11 | End: 2020-09-09

## 2020-05-11 NOTE — PROGRESS NOTES
"Giovanni Cedeno is a 55 year old male who is being evaluated via a billable telephone visit.      The patient has been notified of following:     \"This telephone visit will be conducted via a call between you and your physician/provider. We have found that certain health care needs can be provided without the need for a physical exam.  This service lets us provide the care you need with a short phone conversation.  If a prescription is necessary we can send it directly to your pharmacy.  If lab work is needed we can place an order for that and you can then stop by our lab to have the test done at a later time.    Telephone visits are billed at different rates depending on your insurance coverage. During this emergency period, for some insurers they may be billed the same as an in-person visit.  Please reach out to your insurance provider with any questions.    If during the course of the call the physician/provider feels a telephone visit is not appropriate, you will not be charged for this service.\"    Patient has given verbal consent for Telephone visit?  Yes    What phone number would you like to be contacted at? [phone]    How would you like to obtain your AVS? Mail a copy    Subjective     Giovanni Cedeno is a 55 year old male who presents to clinic today for the following health issues:    HPI  Med request      Duration: n/a    Description (location/character/radiation): pt would like to discuss medications for ED, has never been on meds for this before           .ANIKA REYES MD .5/11/2020 8:08 PM .May 11, 2020        Giovanni Cedeno is a 55 year old male who is who presents with SEXUAL DYSFUNCTION     RECENT NEW RELATIONSHIP WITH SEXUAL DYSFUNCTION NOTED     Onset :  OVER LAST MONTH      Severity:  SEVERE       Home treatments NONE      Additional Symptoms:  HISTORY OF DEPRESSION AND BENIGN PROSTATIC HYPERTROPHY SYMPTOMS      Course  ONGOING     ASSESSMENT SEXUAL DYSFUNCTION     BENIGN " PROSTATIC HYPERTROPHY     DEPRESSION ONGOING     PLAN     STOP FINASTERIDE AND TAMSULOSIN X ONE OR TWO WEEKS     SILDENAFIL 100MG ONE HOUR PRIOR     IF THIS DOESN'T WORK CONSIDER STOPPING FLUOXETINE AND MIRTAZAPINE     NARCOTICS CAN ALSO CONTRIBUTE    ANIKA REYES JR., MD       There are no preventive care reminders to display for this patient.          .  Current Outpatient Medications   Medication Sig Dispense Refill     albuterol (PROAIR HFA/PROVENTIL HFA/VENTOLIN HFA) 108 (90 Base) MCG/ACT inhaler Inhale 2 puffs into the lungs every 6 hours as needed for shortness of breath / dyspnea or wheezing 90 g 3     carbidopa-levodopa (SINEMET)  MG tablet Take 1 tablet by mouth At Bedtime 90 tablet 5     celecoxib (CELEBREX) 200 MG capsule Take 1 capsule (200 mg) by mouth 2 times daily 60 capsule 11     FLUoxetine (PROZAC) 20 MG capsule Take 1 capsule (20 mg) by mouth daily 30 capsule 11     gabapentin (NEURONTIN) 300 MG capsule TAKE 2 CAPSULES BY MOUTH EVERY NIGHT AT BEDTIME 60 capsule 11     HYDROcodone-acetaminophen (NORCO) 7.5-325 MG per tablet Take 1 tablet by mouth every 4 hours as needed for pain maximum  4  tablet(s) per day 30 tablet 0     methocarbamol (ROBAXIN) 500 MG tablet TAKE 1 TO 2 TABLETS(500 TO 1000 MG) BY MOUTH THREE TIMES DAILY AS NEEDED FOR MUSCLE SPASMS 60 tablet 0     mirtazapine (REMERON) 15 MG tablet TAKE 1 TABLET(15 MG) BY MOUTH AT BEDTIME 90 tablet 0     Misc Natural Products (OSTEO BI-FLEX TRIPLE STRENGTH PO) Take 1 tablet by mouth daily       senna-docusate (SENOKOT-S/PERICOLACE) 8.6-50 MG tablet Take 1-2 tablets by mouth 2 times daily as needed for constipation 100 tablet 11     sildenafil (VIAGRA) 100 MG tablet Take 1 tablet (100 mg) by mouth daily as needed (sexual) 10 tablet 0     simvastatin (ZOCOR) 20 MG tablet TAKE 1 TABLET BY MOUTH EVERY DAY AT BEDTIME 90 tablet 3              No Known Allergies      Immunization History   Administered Date(s) Administered     FLU 6-35  months 11/02/2015     Influenza (IIV3) PF 12/17/2003, 11/17/2006, 01/07/2011, 12/06/2012, 10/01/2013, 09/20/2014     Influenza Quad, Recombinant, p-free (RIV4) 09/26/2019     Influenza Vaccine IM > 6 months Valent IIV4 11/10/2016, 12/21/2017, 09/10/2018     TDAP Vaccine (Adacel) 05/21/2012     Zoster vaccine recombinant adjuvanted (SHINGRIX) 12/23/2019     Zoster vaccine, live 01/09/2015               reports no history of alcohol use.          reports no history of drug use.        family history includes Diabetes in his father; Heart Disease in his father.        He indicated that his mother is alive. He indicated that his father is alive.             has no past surgical history on file.         reports being sexually active and has had partner(s) who are Female.    .  Pediatric History   Patient Parents     Rae Cedeno (Mother)     Other Topics Concern     Parent/sibling w/ CABG, MI or angioplasty before 65F 55M? Yes   Social History Narrative     Not on file               reports that he has never smoked. He has never used smokeless tobacco.        Medical, social, surgical, and family histories reviewed.        Labs reviewed in EPIC  Patient Active Problem List   Diagnosis     Restless leg syndrome     Knee bursitis     Pure hypercholesterolemia     Major depression in partial remission (H)     Pre-diabetes     Epicondylitis, lateral humeral     Immunization reaction     Chronic pain syndrome     Chronic right shoulder pain     Abdominal pain, left upper quadrant     Pain of toe of right foot     Acute midline low back pain without sciatica     Upper back pain     Mild intermittent asthma, unspecified whether complicated     Incomplete tear of right rotator cuff     Impingement syndrome of shoulder, right     Degeneration of lumbar or lumbosacral intervertebral disc     Adjustment disorder with depressed mood     Mixed hyperlipidemia     Inguinal hernia         History reviewed. No pertinent surgical  history.    Social History     Tobacco Use     Smoking status: Never Smoker     Smokeless tobacco: Never Used   Substance Use Topics     Alcohol use: No       Family History   Problem Relation Age of Onset     Diabetes Father      Heart Disease Father              Current Outpatient Medications   Medication Sig Dispense Refill     albuterol (PROAIR HFA/PROVENTIL HFA/VENTOLIN HFA) 108 (90 Base) MCG/ACT inhaler Inhale 2 puffs into the lungs every 6 hours as needed for shortness of breath / dyspnea or wheezing 90 g 3     carbidopa-levodopa (SINEMET)  MG tablet Take 1 tablet by mouth At Bedtime 90 tablet 5     celecoxib (CELEBREX) 200 MG capsule Take 1 capsule (200 mg) by mouth 2 times daily 60 capsule 11     FLUoxetine (PROZAC) 20 MG capsule Take 1 capsule (20 mg) by mouth daily 30 capsule 11     gabapentin (NEURONTIN) 300 MG capsule TAKE 2 CAPSULES BY MOUTH EVERY NIGHT AT BEDTIME 60 capsule 11     HYDROcodone-acetaminophen (NORCO) 7.5-325 MG per tablet Take 1 tablet by mouth every 4 hours as needed for pain maximum  4  tablet(s) per day 30 tablet 0     methocarbamol (ROBAXIN) 500 MG tablet TAKE 1 TO 2 TABLETS(500 TO 1000 MG) BY MOUTH THREE TIMES DAILY AS NEEDED FOR MUSCLE SPASMS 60 tablet 0     mirtazapine (REMERON) 15 MG tablet TAKE 1 TABLET(15 MG) BY MOUTH AT BEDTIME 90 tablet 0     Misc Natural Products (OSTEO BI-FLEX TRIPLE STRENGTH PO) Take 1 tablet by mouth daily       senna-docusate (SENOKOT-S/PERICOLACE) 8.6-50 MG tablet Take 1-2 tablets by mouth 2 times daily as needed for constipation 100 tablet 11     sildenafil (VIAGRA) 100 MG tablet Take 1 tablet (100 mg) by mouth daily as needed (sexual) 10 tablet 0     simvastatin (ZOCOR) 20 MG tablet TAKE 1 TABLET BY MOUTH EVERY DAY AT BEDTIME 90 tablet 3           Recent Labs   Lab Test 04/18/19  1622 11/14/17  0846 05/23/17  0813 11/10/16  0945 07/09/15  1013 03/11/14  1047 05/08/13  0924   A1C  --   --   --   --  5.4 5.8 6.3*   *  --   --  167*  --   218* 150*   HDL 55  --   --  53  --  48 44   TRIG 168*  --   --  173*  --  325* 184*   ALT 29  --  31 29  --  29 35   CR 0.92 0.84 0.78 0.93  --   --  1.00   GFRESTIMATED >90 >90 >90  Non  GFR Calc   85  --   --  79   GFRESTBLACK >90 >90 >90   GFR Calc   >90  --   --  >90   POTASSIUM 4.5 4.2 4.2  --   --   --  5.0            BP Readings from Last 6 Encounters:   03/23/20 126/82   12/05/19 124/85   10/21/19 110/80   09/26/19 108/68   08/22/19 118/64   08/08/19 122/83           Wt Readings from Last 3 Encounters:   12/05/19 63.5 kg (140 lb)   10/21/19 64.2 kg (141 lb 8 oz)   09/26/19 61.2 kg (135 lb)                 Positive symptoms or findings indicated by bold designation:         ROS: 10 point ROS neg other than the symptoms noted above in the HPI.except  has Restless leg syndrome; Knee bursitis; Pure hypercholesterolemia; Major depression in partial remission (H); Pre-diabetes; Epicondylitis, lateral humeral; Immunization reaction; Chronic pain syndrome; Chronic right shoulder pain; Abdominal pain, left upper quadrant; Pain of toe of right foot; Acute midline low back pain without sciatica; Upper back pain; Mild intermittent asthma, unspecified whether complicated; Incomplete tear of right rotator cuff; Impingement syndrome of shoulder, right; Degeneration of lumbar or lumbosacral intervertebral disc; Adjustment disorder with depressed mood; Mixed hyperlipidemia; and Inguinal hernia on their problem list.  Review Of Systems    Skin: negative    Eyes: negative    Ears/Nose/Throat: negative    Respiratory: No shortness of breath, dyspnea on exertion, cough, or hemoptysis    Cardiovascular: negative    Gastrointestinal: negative    Genitourinary: nocturia, prostate and erectile dysfunction    Musculoskeletal: negative    Neurologic: negative    Psychiatric: negative    Hematologic/Lymphatic/Immunologic: negative    Endocrine: negative        Psychiatric: orientation/consciousness,  overall: oriented to person, place and time; behavior/ speech, overall: normal quality, no aphasia and normal quality, quantity, intact.        Diagnostic Test Results:    none         ICD-10-CM    1. Sexual dysfunction  R37 sildenafil (VIAGRA) 100 MG tablet              .    Side effects benefits and risks thoroughly discussed. .he may come in early if unimproved or getting worse          Please drink 2 glasses of water prior to meals and walk 15-30 minutes after meals        I spent 15 MINUTES   with patient discussing the following issues   The encounter diagnosis was Sexual dysfunction. over half of which involved counseling and coordination of care.        There are no Patient Instructions on file for this visit.        ALL THE ABOVE PROBLEMS ARE STABLE AND MED CHANGES AS NOTED        Diet:  MEDITERRANEAN DIET         Exercise:  AS TOLERATED   Exercises Range of motion, balance, isometric, and strengthening exercises 30 repetitions twice daily of involved joints            .ANIKA REYES MD 5/11/2020 8:08 PM  May 11, 2020

## 2020-05-11 NOTE — TELEPHONE ENCOUNTER
Patient called with questions regarding ed please call patient regarding this phone number is [phone]

## 2020-05-11 NOTE — TELEPHONE ENCOUNTER
Scheduled pt for telephone visit with Dr HERBERT Velez today at 4:00. Pt has never been on an erectile medication so he wanted an appt to discuss options.

## 2020-05-14 DIAGNOSIS — Z98.890 S/P ROTATOR CUFF REPAIR: ICD-10-CM

## 2020-05-14 DIAGNOSIS — M62.838 MUSCLE SPASM OF RIGHT SHOULDER: ICD-10-CM

## 2020-05-14 RX ORDER — METHOCARBAMOL 500 MG/1
TABLET, FILM COATED ORAL
Qty: 60 TABLET | Refills: 0 | Status: SHIPPED | OUTPATIENT
Start: 2020-05-14 | End: 2020-06-01

## 2020-06-01 DIAGNOSIS — M62.838 MUSCLE SPASM OF RIGHT SHOULDER: ICD-10-CM

## 2020-06-01 DIAGNOSIS — Z98.890 S/P ROTATOR CUFF REPAIR: ICD-10-CM

## 2020-06-01 RX ORDER — METHOCARBAMOL 500 MG/1
TABLET, FILM COATED ORAL
Qty: 60 TABLET | Refills: 0 | Status: SHIPPED | OUTPATIENT
Start: 2020-06-01 | End: 2020-07-08

## 2020-07-02 NOTE — TELEPHONE ENCOUNTER
Requested Prescriptions   Pending Prescriptions Disp Refills     gabapentin (NEURONTIN) 300 MG capsule [Pharmacy Med Name: GABAPENTIN 300MG CAPSULES]     Last Written Prescription Date:  3/16/18  Last Fill Quantity: 60,   # refills: 0  Last Office Visit: 12/21/17 [facility] Office visit:       Routing refill request to provider for review/approval because:  Drug not on the [facility], P or  Health refill protocol or controlled substance   60 capsule 0     Sig: TAKE 2 CAPSULES BY MOUTH EVERY NIGHT AT BEDTIME    There is no refill protocol information for this order           Rendering Text In Billing: The biopsy specimen was grossed and processed into a slide.

## 2020-07-08 ENCOUNTER — OFFICE VISIT (OUTPATIENT)
Dept: FAMILY MEDICINE | Facility: CLINIC | Age: 55
End: 2020-07-08
Payer: COMMERCIAL

## 2020-07-08 VITALS
TEMPERATURE: 98 F | RESPIRATION RATE: 16 BRPM | BODY MASS INDEX: 26.85 KG/M2 | DIASTOLIC BLOOD PRESSURE: 86 MMHG | WEIGHT: 149.2 LBS | SYSTOLIC BLOOD PRESSURE: 137 MMHG | HEART RATE: 55 BPM

## 2020-07-08 DIAGNOSIS — F51.01 PRIMARY INSOMNIA: ICD-10-CM

## 2020-07-08 DIAGNOSIS — M62.838 MUSCLE SPASM OF RIGHT SHOULDER: ICD-10-CM

## 2020-07-08 DIAGNOSIS — G89.29 CHRONIC RIGHT-SIDED LOW BACK PAIN WITHOUT SCIATICA: Primary | ICD-10-CM

## 2020-07-08 DIAGNOSIS — Z98.890 S/P ROTATOR CUFF REPAIR: ICD-10-CM

## 2020-07-08 DIAGNOSIS — M54.50 CHRONIC RIGHT-SIDED LOW BACK PAIN WITHOUT SCIATICA: Primary | ICD-10-CM

## 2020-07-08 DIAGNOSIS — M75.101 ROTATOR CUFF SYNDROME, RIGHT: ICD-10-CM

## 2020-07-08 DIAGNOSIS — G89.4 CHRONIC PAIN SYNDROME: ICD-10-CM

## 2020-07-08 PROCEDURE — 99214 OFFICE O/P EST MOD 30 MIN: CPT | Performed by: FAMILY MEDICINE

## 2020-07-08 RX ORDER — HYDROCODONE BITARTRATE AND ACETAMINOPHEN 7.5; 325 MG/1; MG/1
1 TABLET ORAL EVERY 4 HOURS PRN
Qty: 30 TABLET | Refills: 0 | Status: CANCELLED | OUTPATIENT
Start: 2020-07-08

## 2020-07-08 RX ORDER — MIRTAZAPINE 15 MG/1
TABLET, FILM COATED ORAL
Qty: 90 TABLET | Refills: 0 | Status: CANCELLED | OUTPATIENT
Start: 2020-07-08

## 2020-07-08 RX ORDER — METHOCARBAMOL 500 MG/1
500-1000 TABLET, FILM COATED ORAL 2 TIMES DAILY PRN
Qty: 60 TABLET | Refills: 0 | Status: SHIPPED | OUTPATIENT
Start: 2020-07-08 | End: 2020-08-10

## 2020-07-08 RX ORDER — HYDROCODONE BITARTRATE AND ACETAMINOPHEN 5; 325 MG/1; MG/1
1 TABLET ORAL EVERY 6 HOURS PRN
Qty: 18 TABLET | Refills: 0 | Status: SHIPPED | OUTPATIENT
Start: 2020-07-08 | End: 2020-07-11

## 2020-07-08 ASSESSMENT — PATIENT HEALTH QUESTIONNAIRE - PHQ9
SUM OF ALL RESPONSES TO PHQ QUESTIONS 1-9: 11
5. POOR APPETITE OR OVEREATING: MORE THAN HALF THE DAYS

## 2020-07-08 ASSESSMENT — ANXIETY QUESTIONNAIRES
2. NOT BEING ABLE TO STOP OR CONTROL WORRYING: NOT AT ALL
3. WORRYING TOO MUCH ABOUT DIFFERENT THINGS: NOT AT ALL
5. BEING SO RESTLESS THAT IT IS HARD TO SIT STILL: MORE THAN HALF THE DAYS
7. FEELING AFRAID AS IF SOMETHING AWFUL MIGHT HAPPEN: SEVERAL DAYS
IF YOU CHECKED OFF ANY PROBLEMS ON THIS QUESTIONNAIRE, HOW DIFFICULT HAVE THESE PROBLEMS MADE IT FOR YOU TO DO YOUR WORK, TAKE CARE OF THINGS AT HOME, OR GET ALONG WITH OTHER PEOPLE: SOMEWHAT DIFFICULT
6. BECOMING EASILY ANNOYED OR IRRITABLE: MORE THAN HALF THE DAYS
1. FEELING NERVOUS, ANXIOUS, OR ON EDGE: SEVERAL DAYS
GAD7 TOTAL SCORE: 8

## 2020-07-08 ASSESSMENT — PAIN SCALES - GENERAL: PAINLEVEL: MILD PAIN (2)

## 2020-07-08 NOTE — PROGRESS NOTES
SUBJECTIVE:                                                    Giovanni Cedeno is a 55 year old male who presents to clinic today for the following health issues:    FLANK   PAIN     Onset: a month    Description:   Character: Dull ache  Location: lowerright flank  Radiation: None    Intensity: moderate to severe    Progression of Symptoms:  intermittent    Accompanying Signs & Symptoms:  Fever/Chills?: no   Gas/Bloating: no   Nausea: no   Vomitting: no   Diarrhea?: no   Constipation:no   Dysuria or Hematuria: no    History:   Trauma: no   Previous similar pain: no    Previous tests done: none    Precipitating factors:   Does the pain change with:     Food: no      BM: no     Urination: no     Alleviating factors:  heat    Therapies Tried and outcome: heat has seemed to give some releif    LMP:  not applicable       Problem list and histories reviewed & adjusted, as indicated.  Additional history:     Patient Active Problem List   Diagnosis     Restless leg syndrome     Knee bursitis     Pure hypercholesterolemia     Major depression in partial remission (H)     Pre-diabetes     Epicondylitis, lateral humeral     Immunization reaction     Chronic pain syndrome     Chronic right shoulder pain     Abdominal pain, left upper quadrant     Pain of toe of right foot     Acute midline low back pain without sciatica     Upper back pain     Mild intermittent asthma, unspecified whether complicated     Incomplete tear of right rotator cuff     Impingement syndrome of shoulder, right     Degeneration of lumbar or lumbosacral intervertebral disc     Adjustment disorder with depressed mood     Mixed hyperlipidemia     Inguinal hernia     History reviewed. No pertinent surgical history.    Social History     Tobacco Use     Smoking status: Never Smoker     Smokeless tobacco: Never Used   Substance Use Topics     Alcohol use: No     Family History   Problem Relation Age of Onset     Diabetes Father      Heart Disease Father           Current Outpatient Medications   Medication Sig Dispense Refill     albuterol (PROAIR HFA/PROVENTIL HFA/VENTOLIN HFA) 108 (90 Base) MCG/ACT inhaler Inhale 2 puffs into the lungs every 6 hours as needed for shortness of breath / dyspnea or wheezing 90 g 3     carbidopa-levodopa (SINEMET)  MG tablet Take 1 tablet by mouth At Bedtime 90 tablet 5     celecoxib (CELEBREX) 200 MG capsule Take 1 capsule (200 mg) by mouth 2 times daily 60 capsule 11     FLUoxetine (PROZAC) 20 MG capsule Take 1 capsule (20 mg) by mouth daily 30 capsule 11     gabapentin (NEURONTIN) 300 MG capsule TAKE 2 CAPSULES BY MOUTH EVERY NIGHT AT BEDTIME 60 capsule 11     HYDROcodone-acetaminophen (NORCO) 5-325 MG tablet Take 1 tablet by mouth every 6 hours as needed for pain 18 tablet 0     HYDROcodone-acetaminophen (NORCO) 7.5-325 MG per tablet Take 1 tablet by mouth every 4 hours as needed for pain maximum  4  tablet(s) per day 30 tablet 0     methocarbamol (ROBAXIN) 500 MG tablet Take 1-2 tablets (500-1,000 mg) by mouth 2 times daily as needed for muscle spasms 60 tablet 0     mirtazapine (REMERON) 15 MG tablet TAKE 1 TABLET(15 MG) BY MOUTH AT BEDTIME 90 tablet 0     Misc Natural Products (OSTEO BI-FLEX TRIPLE STRENGTH PO) Take 1 tablet by mouth daily       senna-docusate (SENOKOT-S/PERICOLACE) 8.6-50 MG tablet Take 1-2 tablets by mouth 2 times daily as needed for constipation 100 tablet 11     simvastatin (ZOCOR) 20 MG tablet TAKE 1 TABLET BY MOUTH EVERY DAY AT BEDTIME 90 tablet 3     sildenafil (VIAGRA) 100 MG tablet Take 1 tablet (100 mg) by mouth daily as needed (sexual) 10 tablet 0     No Known Allergies    ROS:  Constitutional, HEENT, cardiovascular, pulmonary, gi and gu systems are negative, except as otherwise noted.    OBJECTIVE:                                                    /86   Pulse 55   Temp 98  F (36.7  C) (Oral)   Resp 16   Wt 67.7 kg (149 lb 3.2 oz)   BMI 26.85 kg/m   Body mass index is 26.85 kg/m .    GENERAL: healthy, alert, well nourished, well hydrated, no distress  HENT: ear canals- normal; TMs- normal; Nose- normal; Mouth- no ulcers, no lesions  NECK: no tenderness, no adenopathy, no asymmetry, no masses, no stiffness; thyroid- normal to palpation  RESP: lungs clear to auscultation - no rales, no rhonchi, no wheezes  CV: regular rates and rhythm, normal S1 S2, no S3 or S4 and no murmur, no click or rub -  ABDOMEN: soft, no tenderness, no  hepatosplenomegaly, no masses, normal bowel sounds  Back: No CVA tenderness. Straigth leg raise neg; + tightness tendernes in perilumbar muscles; reflex 1+ and symetricle.  Knee, hip, great toe, flex and extend strength 5/5 bilat.       ASSESSMENT/PLAN:                                                      (M54.5,  G89.29) Chronic right-sided low back pain without sciatica  (primary encounter diagnosis)   DOES NOT APPEAR TO BE KIDNEY RELATED.  stretching      (M62.838) Muscle spasm of right shoulder  Plan: methocarbamol (ROBAXIN) 500 MG tablet        (G89.4) Chronic pain syndrome  As I am seeing patients at the [facility]  I would consider this refil from there and not a violation of his pain contract  Plan: HYDROcodone-acetaminophen (NORCO) 5-325 MG         tablet       reports that he has never smoked. He has never used smokeless tobacco.      Weight management plan: Discussed healthy diet and exercise guidelines      [facility]

## 2020-07-09 ASSESSMENT — ASTHMA QUESTIONNAIRES: ACT_TOTALSCORE: 25

## 2020-07-09 ASSESSMENT — ANXIETY QUESTIONNAIRES: GAD7 TOTAL SCORE: 8

## 2020-07-21 ENCOUNTER — ANCILLARY PROCEDURE (OUTPATIENT)
Dept: GENERAL RADIOLOGY | Facility: CLINIC | Age: 55
End: 2020-07-21
Attending: PHYSICIAN ASSISTANT
Payer: COMMERCIAL

## 2020-07-21 ENCOUNTER — OFFICE VISIT (OUTPATIENT)
Dept: FAMILY MEDICINE | Facility: CLINIC | Age: 55
End: 2020-07-21
Payer: COMMERCIAL

## 2020-07-21 VITALS
WEIGHT: 149 LBS | DIASTOLIC BLOOD PRESSURE: 78 MMHG | OXYGEN SATURATION: 95 % | SYSTOLIC BLOOD PRESSURE: 120 MMHG | BODY MASS INDEX: 26.82 KG/M2 | HEART RATE: 65 BPM | TEMPERATURE: 96.8 F | RESPIRATION RATE: 20 BRPM

## 2020-07-21 DIAGNOSIS — R10.9 FLANK PAIN: ICD-10-CM

## 2020-07-21 DIAGNOSIS — R10.9 FLANK PAIN: Primary | ICD-10-CM

## 2020-07-21 DIAGNOSIS — N20.0 NEPHROLITHIASIS: ICD-10-CM

## 2020-07-21 LAB
ALBUMIN SERPL-MCNC: 3.5 G/DL (ref 3.4–5)
ALBUMIN UR-MCNC: NEGATIVE MG/DL
ALP SERPL-CCNC: 70 U/L (ref 40–150)
ALT SERPL W P-5'-P-CCNC: 24 U/L (ref 0–70)
ANION GAP SERPL CALCULATED.3IONS-SCNC: 5 MMOL/L (ref 3–14)
APPEARANCE UR: CLEAR
AST SERPL W P-5'-P-CCNC: 13 U/L (ref 0–45)
BILIRUB SERPL-MCNC: 0.2 MG/DL (ref 0.2–1.3)
BILIRUB UR QL STRIP: NEGATIVE
BUN SERPL-MCNC: 14 MG/DL (ref 7–30)
CALCIUM SERPL-MCNC: 9.5 MG/DL (ref 8.5–10.1)
CHLORIDE SERPL-SCNC: 108 MMOL/L (ref 94–109)
CO2 SERPL-SCNC: 27 MMOL/L (ref 20–32)
COLOR UR AUTO: YELLOW
CREAT SERPL-MCNC: 0.97 MG/DL (ref 0.66–1.25)
GFR SERPL CREATININE-BSD FRML MDRD: 86 ML/MIN/{1.73_M2}
GLUCOSE SERPL-MCNC: 85 MG/DL (ref 70–99)
GLUCOSE UR STRIP-MCNC: NEGATIVE MG/DL
HGB UR QL STRIP: NEGATIVE
KETONES UR STRIP-MCNC: NEGATIVE MG/DL
LEUKOCYTE ESTERASE UR QL STRIP: NEGATIVE
NITRATE UR QL: NEGATIVE
PH UR STRIP: 5.5 PH (ref 5–7)
POTASSIUM SERPL-SCNC: 4.1 MMOL/L (ref 3.4–5.3)
PROT SERPL-MCNC: 6.5 G/DL (ref 6.8–8.8)
SODIUM SERPL-SCNC: 140 MMOL/L (ref 133–144)
SOURCE: NORMAL
SP GR UR STRIP: >1.03 (ref 1–1.03)
UROBILINOGEN UR STRIP-ACNC: 0.2 EU/DL (ref 0.2–1)

## 2020-07-21 PROCEDURE — 74019 RADEX ABDOMEN 2 VIEWS: CPT

## 2020-07-21 PROCEDURE — 81003 URINALYSIS AUTO W/O SCOPE: CPT | Performed by: PHYSICIAN ASSISTANT

## 2020-07-21 PROCEDURE — 99214 OFFICE O/P EST MOD 30 MIN: CPT | Performed by: PHYSICIAN ASSISTANT

## 2020-07-21 PROCEDURE — 36415 COLL VENOUS BLD VENIPUNCTURE: CPT | Performed by: PHYSICIAN ASSISTANT

## 2020-07-21 PROCEDURE — 80053 COMPREHEN METABOLIC PANEL: CPT | Performed by: PHYSICIAN ASSISTANT

## 2020-07-21 RX ORDER — DICLOFENAC SODIUM 75 MG/1
75 TABLET, DELAYED RELEASE ORAL 2 TIMES DAILY
Qty: 30 TABLET | Refills: 0 | Status: SHIPPED | OUTPATIENT
Start: 2020-07-21 | End: 2020-08-05

## 2020-07-21 RX ORDER — TAMSULOSIN HYDROCHLORIDE 0.4 MG/1
0.4 CAPSULE ORAL DAILY
Qty: 15 CAPSULE | Refills: 0 | Status: SHIPPED | OUTPATIENT
Start: 2020-07-21 | End: 2020-08-21

## 2020-07-21 NOTE — PROGRESS NOTES
Subjective     Giovanni Cedeno is a 55 year old male who presents to clinic today for the following health issues:    HPI       ABDOMINAL and FLANK - saw Dr. Cline on 7/8/20   PAIN     Onset: 1 month    Description:   Character: Dull ache that comes and goes  Location: right upper quadrant right lower quadrant right flank  Radiation: None    Intensity: varies mild and severe    Progression of Symptoms:  same and intermittent    Accompanying Signs & Symptoms:  Fever/Chills?: no   Gas/Bloating: no   Nausea: no   Vomitting: no   Diarrhea?: no   Constipation:no   Dysuria or Hematuria: no    History:   Trauma: no   Previous similar pain: no    Previous tests done: none    Precipitating factors:   Does the pain change with:     Food: no      BM: no     Urination: no     Alleviating factors:  Heating pad, resting, laying dfown    Therapies Tried and outcome: none    LMP:  not applicable     Not hurting today.  Heating and lying down and resting helps.   No n/v/d/c/blood in stools.   No irritative/obst voiding symptoms. No hematuria.   No injury.   Some pain with bending and twisting.   No fevers.   No radicular pain symptoms. No LE paresthesias.   No pain after meals.  No weight changes.   No rash      Patient Active Problem List   Diagnosis     Restless leg syndrome     Knee bursitis     Pure hypercholesterolemia     Major depression in partial remission (H)     Pre-diabetes     Epicondylitis, lateral humeral     Immunization reaction     Chronic pain syndrome     Chronic right shoulder pain     Abdominal pain, left upper quadrant     Pain of toe of right foot     Acute midline low back pain without sciatica     Upper back pain     Mild intermittent asthma, unspecified whether complicated     Incomplete tear of right rotator cuff     Impingement syndrome of shoulder, right     Degeneration of lumbar or lumbosacral intervertebral disc     Adjustment disorder with depressed mood     Mixed hyperlipidemia     Inguinal  hernia     History reviewed. No pertinent surgical history.    Social History     Tobacco Use     Smoking status: Never Smoker     Smokeless tobacco: Never Used   Substance Use Topics     Alcohol use: No     Family History   Problem Relation Age of Onset     Diabetes Father      Heart Disease Father          Current Outpatient Medications   Medication Sig Dispense Refill     albuterol (PROAIR HFA/PROVENTIL HFA/VENTOLIN HFA) 108 (90 Base) MCG/ACT inhaler Inhale 2 puffs into the lungs every 6 hours as needed for shortness of breath / dyspnea or wheezing 90 g 3     carbidopa-levodopa (SINEMET)  MG tablet Take 1 tablet by mouth At Bedtime 90 tablet 5     celecoxib (CELEBREX) 200 MG capsule Take 1 capsule (200 mg) by mouth 2 times daily 60 capsule 11     FLUoxetine (PROZAC) 20 MG capsule Take 1 capsule (20 mg) by mouth daily 30 capsule 11     gabapentin (NEURONTIN) 300 MG capsule TAKE 2 CAPSULES BY MOUTH EVERY NIGHT AT BEDTIME 60 capsule 11     HYDROcodone-acetaminophen (NORCO) 7.5-325 MG per tablet Take 1 tablet by mouth every 4 hours as needed for pain maximum  4  tablet(s) per day 30 tablet 0     methocarbamol (ROBAXIN) 500 MG tablet Take 1-2 tablets (500-1,000 mg) by mouth 2 times daily as needed for muscle spasms 60 tablet 0     mirtazapine (REMERON) 15 MG tablet TAKE 1 TABLET(15 MG) BY MOUTH AT BEDTIME 90 tablet 0     Misc Natural Products (OSTEO BI-FLEX TRIPLE STRENGTH PO) Take 1 tablet by mouth daily       senna-docusate (SENOKOT-S/PERICOLACE) 8.6-50 MG tablet Take 1-2 tablets by mouth 2 times daily as needed for constipation 100 tablet 11     simvastatin (ZOCOR) 20 MG tablet TAKE 1 TABLET BY MOUTH EVERY DAY AT BEDTIME 90 tablet 3     sildenafil (VIAGRA) 100 MG tablet Take 1 tablet (100 mg) by mouth daily as needed (sexual) (Patient not taking: Reported on 7/21/2020) 10 tablet 0     No Known Allergies  Recent Labs   Lab Test 04/18/19  1622 11/14/17  0846 05/23/17  0813 11/10/16  0945 07/09/15  1013  03/11/14  1047 05/08/13  0924   A1C  --   --   --   --  5.4 5.8 6.3*   *  --   --  167*  --  218* 150*   HDL 55  --   --  53  --  48 44   TRIG 168*  --   --  173*  --  325* 184*   ALT 29  --  31 29  --  29 35   CR 0.92 0.84 0.78 0.93  --   --  1.00   GFRESTIMATED >90 >90 >90  Non  GFR Calc   85  --   --  79   GFRESTBLACK >90 >90 >90   GFR Calc   >90  --   --  >90   POTASSIUM 4.5 4.2 4.2  --   --   --  5.0      BP Readings from Last 3 Encounters:   07/21/20 120/78   07/08/20 137/86   03/23/20 126/82    Wt Readings from Last 3 Encounters:   07/21/20 67.6 kg (149 lb)   07/08/20 67.7 kg (149 lb 3.2 oz)   12/05/19 63.5 kg (140 lb)                    Reviewed and updated as needed this visit by Provider         Review of Systems   Constitutional, HEENT, cardiovascular, pulmonary, GI, , musculoskeletal, neuro, skin, endocrine and psych systems are negative, except as otherwise noted.      Objective    /78   Pulse 65   Temp 96.8  F (36  C) (Tympanic)   Resp 20   Wt 67.6 kg (149 lb)   SpO2 95%   BMI 26.82 kg/m    Body mass index is 26.82 kg/m .  Physical Exam   CHEST:chest clear to IPPA, no tachypnea, retractions or cyanosis and S1, S2 normal, no murmur, no gallop, rate regular.  The abdomen is soft without tenderness, guarding, mass, rebound or organomegaly. Bowel sounds are normal. No CVA tenderness or inguinal adenopathy noted.  Lumbosacral spine area reveals no local tenderness or mass.  Full and painless lumbosacral range of motion is noted. Straight leg raise is negative at 90 degrees on both sides. DTR's, motor strength and sensation normal, including heel and toe gait.  Peripheral pulses are palpable. Hips and knees have full range of motion without pain. No abdominal tenderness, mass or organomegaly.  No rash.    Giovanni was seen today for abdominal pain.    Diagnoses and all orders for this visit:    Flank pain  -     Comprehensive metabolic panel (BMP + Alb, Alk  Phos, ALT, AST, Total. Bili, TP)  -     *UA reflex to Microscopic and Culture ([place] and [facility] (except [place] and [place])  -     XR KUB; Future  -     diclofenac (VOLTAREN) 75 MG EC tablet; Take 1 tablet (75 mg) by mouth 2 times daily  -     tamsulosin (FLOMAX) 0.4 MG capsule; Take 1 capsule (0.4 mg) by mouth daily  -     CT Abdomen Pelvis w/o Contrast; Future    Nephrolithiasis  -     tamsulosin (FLOMAX) 0.4 MG capsule; Take 1 capsule (0.4 mg) by mouth daily  -     CT Abdomen Pelvis w/o Contrast; Future      Advised supportive and symptomatic treatment.  Follow up with Provider - if condition persists or worsens.

## 2020-07-23 ENCOUNTER — ANCILLARY PROCEDURE (OUTPATIENT)
Dept: CT IMAGING | Facility: CLINIC | Age: 55
End: 2020-07-23
Attending: PHYSICIAN ASSISTANT
Payer: COMMERCIAL

## 2020-07-23 DIAGNOSIS — R10.9 FLANK PAIN: ICD-10-CM

## 2020-07-23 DIAGNOSIS — N20.0 NEPHROLITHIASIS: ICD-10-CM

## 2020-07-23 PROCEDURE — 74176 CT ABD & PELVIS W/O CONTRAST: CPT | Mod: TC

## 2020-08-02 DIAGNOSIS — R10.9 FLANK PAIN: ICD-10-CM

## 2020-08-04 DIAGNOSIS — F51.01 PRIMARY INSOMNIA: ICD-10-CM

## 2020-08-04 NOTE — TELEPHONE ENCOUNTER
Last Written Prescription Date:  5/6/20  Last Fill Quantity: 90,  # refills: 0   Last office visit: 10/21/2019 with prescribing provider:  Agustin KENNY with Juan Antonio Pak on 7/21/20 for acute concern  Future Office Visit: none    Routing refill request to provider for review/approval because:  Drug interaction warning    Kathe Barbosa, RN, BSN

## 2020-08-05 RX ORDER — MIRTAZAPINE 15 MG/1
TABLET, FILM COATED ORAL
Qty: 90 TABLET | Refills: 0 | Status: SHIPPED | OUTPATIENT
Start: 2020-08-05 | End: 2020-11-03

## 2020-08-05 NOTE — TELEPHONE ENCOUNTER
Routing refill request to provider for review/approval because:  Labs not current:  CBC    Lab Results   Component Value Date    WBC 6.4 11/14/2017     Lab Results   Component Value Date    RBC 4.71 11/14/2017     Lab Results   Component Value Date    HGB 14.7 11/14/2017     Lab Results   Component Value Date    HCT 44.3 11/14/2017     Lab Results   Component Value Date    MCV 94 11/14/2017     Lab Results   Component Value Date    MCH 31.2 11/14/2017     Lab Results   Component Value Date    MCHC 33.2 11/14/2017     Lab Results   Component Value Date    RDW 12.5 11/14/2017     Lab Results   Component Value Date     11/14/2017     Last Written Prescription Date:  7/21/20  Last Fill Quantity: 30 (15 day supply),  # refills: 0     Last office visit: 7/21/2020 with prescribing provider:  Juan Antonio Pak PA-C     Future Office Visit:  None    Rhonda Landaverde RN BSN

## 2020-08-06 RX ORDER — DICLOFENAC SODIUM 75 MG/1
TABLET, DELAYED RELEASE ORAL
Qty: 30 TABLET | Refills: 0 | Status: SHIPPED | OUTPATIENT
Start: 2020-08-06 | End: 2020-08-21

## 2020-08-07 ENCOUNTER — TELEPHONE (OUTPATIENT)
Dept: FAMILY MEDICINE | Facility: CLINIC | Age: 55
End: 2020-08-07

## 2020-08-07 DIAGNOSIS — G89.4 CHRONIC PAIN SYNDROME: ICD-10-CM

## 2020-08-07 DIAGNOSIS — Z98.890 S/P ROTATOR CUFF REPAIR: ICD-10-CM

## 2020-08-07 DIAGNOSIS — M62.838 MUSCLE SPASM OF RIGHT SHOULDER: ICD-10-CM

## 2020-08-07 DIAGNOSIS — M75.101 ROTATOR CUFF SYNDROME, RIGHT: ICD-10-CM

## 2020-08-07 NOTE — TELEPHONE ENCOUNTER
Reason for Call:  Medication or medication refill:    Do you use a [facility]?  Name of the pharmacy and phone number for the current request:  [facility]    Name of the medication requested: Hydrocodone    Other request: out of medication    Can we leave a detailed message on this number? YES    Phone number patient can be reached at: Home number on file [phone] (home)    Best Time: any     Call taken on 8/7/2020 at 1:52 PM by Janie Huang

## 2020-08-07 NOTE — TELEPHONE ENCOUNTER
Routing refill request to provider for review/approval because:  Drug not on the [facility] refill protocol     Last Written Prescription Date:  4/13/20  Last Fill Quantity: 30,  # refills: 0     Last office visit: 7/21/2020 with prescribing provider:  Juan Antonio Pak PA-C     Future Office Visit:  None    Rhonda Landaverde RN BSN

## 2020-08-07 NOTE — TELEPHONE ENCOUNTER
Routing refill request to provider for review/approval because:  Drug not on the [facility] refill protocol   Gabbie Estrella RN

## 2020-08-08 DIAGNOSIS — G89.4 CHRONIC PAIN SYNDROME: ICD-10-CM

## 2020-08-08 RX ORDER — HYDROCODONE BITARTRATE AND ACETAMINOPHEN 7.5; 325 MG/1; MG/1
1 TABLET ORAL EVERY 4 HOURS PRN
Qty: 30 TABLET | Refills: 0 | Status: SHIPPED | OUTPATIENT
Start: 2020-08-08 | End: 2020-09-09

## 2020-08-10 RX ORDER — METHOCARBAMOL 500 MG/1
TABLET, FILM COATED ORAL
Qty: 60 TABLET | Refills: 0 | Status: SHIPPED | OUTPATIENT
Start: 2020-08-10 | End: 2020-09-08

## 2020-08-20 DIAGNOSIS — N20.0 NEPHROLITHIASIS: ICD-10-CM

## 2020-08-20 DIAGNOSIS — R10.9 FLANK PAIN: ICD-10-CM

## 2020-08-21 RX ORDER — DICLOFENAC SODIUM 75 MG/1
TABLET, DELAYED RELEASE ORAL
Qty: 30 TABLET | Refills: 0 | Status: SHIPPED | OUTPATIENT
Start: 2020-08-21 | End: 2020-09-06

## 2020-08-21 RX ORDER — TAMSULOSIN HYDROCHLORIDE 0.4 MG/1
CAPSULE ORAL
Qty: 30 CAPSULE | Refills: 0 | Status: SHIPPED | OUTPATIENT
Start: 2020-08-21 | End: 2020-10-14

## 2020-08-21 NOTE — TELEPHONE ENCOUNTER
Routing refill request to provider for review/approval because:  Drug interaction warning  Labs not current:  CBC  Lab Results   Component Value Date    WBC 6.4 11/14/2017     Lab Results   Component Value Date    RBC 4.71 11/14/2017     Lab Results   Component Value Date    HGB 14.7 11/14/2017     Lab Results   Component Value Date    HCT 44.3 11/14/2017     No components found for: MCT  Lab Results   Component Value Date    MCV 94 11/14/2017     Lab Results   Component Value Date    MCH 31.2 11/14/2017     Lab Results   Component Value Date    MCHC 33.2 11/14/2017     Lab Results   Component Value Date    RDW 12.5 11/14/2017     Lab Results   Component Value Date     11/14/2017   Tamsulosin (FLOMAX) 0.4 MG capsule   Last Written Prescription Date:  7/21/20  Last Fill Quantity: 15,  # refills: 0    Diclofenac (VOLTAREN) 75 MG EC tablet   Last Written Prescription Date:  8/6/20  Last Fill Quantity: 30,  # refills: 0     Last office visit: 7/21/2020 with prescribing provider:  Juan Antonio Pak PA-C     Future Office Visit:  None    Rhonda Landaverde RN BSN

## 2020-09-04 DIAGNOSIS — G89.4 CHRONIC PAIN SYNDROME: ICD-10-CM

## 2020-09-04 DIAGNOSIS — M75.101 ROTATOR CUFF SYNDROME, RIGHT: ICD-10-CM

## 2020-09-04 DIAGNOSIS — M62.838 MUSCLE SPASM OF RIGHT SHOULDER: ICD-10-CM

## 2020-09-04 DIAGNOSIS — R35.0 BENIGN PROSTATIC HYPERPLASIA WITH URINARY FREQUENCY: ICD-10-CM

## 2020-09-04 DIAGNOSIS — N40.1 BENIGN PROSTATIC HYPERPLASIA WITH URINARY FREQUENCY: ICD-10-CM

## 2020-09-04 DIAGNOSIS — Z98.890 S/P ROTATOR CUFF REPAIR: ICD-10-CM

## 2020-09-04 DIAGNOSIS — R37 SEXUAL DYSFUNCTION: ICD-10-CM

## 2020-09-04 NOTE — TELEPHONE ENCOUNTER
Routing refill request to provider for review/approval because:  Drug not active on patient's medication list  Routing to new PCP

## 2020-09-06 DIAGNOSIS — R10.9 FLANK PAIN: ICD-10-CM

## 2020-09-06 NOTE — TELEPHONE ENCOUNTER
"Routing refill request to provider for review/approval because:  Labs not current:  cbc    Medication pended for approval, 30 day supply with reminder.             Requested Prescriptions   Pending Prescriptions Disp Refills     diclofenac (VOLTAREN) 75 MG EC tablet [Pharmacy Med Name: DICLOFENAC SODIUM 75MG DR TABLETS] 30 tablet 0     Sig: TAKE 1 TABLET(75 MG) BY MOUTH TWICE DAILY       NSAID Medications Failed - 9/6/2020  3:14 AM        Failed - Normal CBC on file in past 12 months     Recent Labs   Lab Test 11/14/17  0846   WBC 6.4   RBC 4.71   HGB 14.7   HCT 44.3                    Passed - Blood pressure under 140/90 in past 12 months     BP Readings from Last 3 Encounters:   07/21/20 120/78   07/08/20 137/86   03/23/20 126/82                 Passed - Normal ALT on file in past 12 months     Recent Labs   Lab Test 07/21/20  0832   ALT 24             Passed - Normal AST on file in past 12 months     Recent Labs   Lab Test 07/21/20  0832   AST 13             Passed - Recent (12 mo) or future (30 days) visit within the authorizing provider's specialty     Patient has had an office visit with the authorizing provider or a provider within the authorizing providers department within the previous 12 mos or has a future within next 30 days. See \"Patient Info\" tab in inbasket, or \"Choose Columns\" in Meds & Orders section of the refill encounter.              Passed - Patient is age 6-64 years        Passed - Medication is active on med list        Passed - Normal serum creatinine on file in past 12 months     Recent Labs   Lab Test 07/21/20  0832   CR 0.97       Ok to refill medication if creatinine is low               "

## 2020-09-07 RX ORDER — DICLOFENAC SODIUM 75 MG/1
TABLET, DELAYED RELEASE ORAL
Qty: 30 TABLET | Refills: 0 | Status: SHIPPED | OUTPATIENT
Start: 2020-09-07 | End: 2020-09-25

## 2020-09-08 NOTE — TELEPHONE ENCOUNTER
Patient did want med to be refilled.  He also gave 3 other rx's that need refilling. Please advise.

## 2020-09-09 RX ORDER — FINASTERIDE 5 MG/1
TABLET, FILM COATED ORAL
Qty: 90 TABLET | Refills: 1 | Status: SHIPPED | OUTPATIENT
Start: 2020-09-09 | End: 2021-03-15

## 2020-09-09 RX ORDER — SILDENAFIL 100 MG/1
100 TABLET, FILM COATED ORAL DAILY PRN
Qty: 10 TABLET | Refills: 0 | Status: SHIPPED | OUTPATIENT
Start: 2020-09-09 | End: 2020-10-27

## 2020-09-09 RX ORDER — METHOCARBAMOL 500 MG/1
TABLET, FILM COATED ORAL
Qty: 60 TABLET | Refills: 0 | Status: SHIPPED | OUTPATIENT
Start: 2020-09-09 | End: 2020-10-13

## 2020-09-09 RX ORDER — HYDROCODONE BITARTRATE AND ACETAMINOPHEN 7.5; 325 MG/1; MG/1
1 TABLET ORAL EVERY 4 HOURS PRN
Qty: 30 TABLET | Refills: 0 | Status: SHIPPED | OUTPATIENT
Start: 2020-09-09 | End: 2020-10-13

## 2020-09-24 DIAGNOSIS — R10.9 FLANK PAIN: ICD-10-CM

## 2020-09-24 NOTE — TELEPHONE ENCOUNTER
Routing refill request to provider for review/approval because:  Labs not current:  CBC    Shruthi Andrea BSN, RN

## 2020-09-25 RX ORDER — DICLOFENAC SODIUM 75 MG/1
TABLET, DELAYED RELEASE ORAL
Qty: 30 TABLET | Refills: 0 | Status: SHIPPED | OUTPATIENT
Start: 2020-09-25 | End: 2020-10-27

## 2020-10-10 DIAGNOSIS — Z98.890 S/P ROTATOR CUFF REPAIR: ICD-10-CM

## 2020-10-10 DIAGNOSIS — M62.838 MUSCLE SPASM OF RIGHT SHOULDER: ICD-10-CM

## 2020-10-12 DIAGNOSIS — G89.4 CHRONIC PAIN SYNDROME: ICD-10-CM

## 2020-10-12 DIAGNOSIS — M75.101 ROTATOR CUFF SYNDROME, RIGHT: ICD-10-CM

## 2020-10-12 NOTE — TELEPHONE ENCOUNTER
Routing refill request to provider for review/approval because:  Drug not on the [facility] refill protocol     Eleanor MIMSN, RN, CPN

## 2020-10-13 RX ORDER — METHOCARBAMOL 500 MG/1
TABLET, FILM COATED ORAL
Qty: 60 TABLET | Refills: 0 | Status: SHIPPED | OUTPATIENT
Start: 2020-10-13 | End: 2020-11-05

## 2020-10-13 NOTE — TELEPHONE ENCOUNTER
Routing refill request to provider for review/approval because:  Drug not on the [facility] refill protocol     HYDROcodone-acetaminophen (NORCO) 7.5-325 MG per tablet  Last Written Prescription Date:  9/9/20  Last Fill Quantity: 30,  # refills: 0   Last office visit: 7/21/2020 with prescribing provider:  darin Pak Office Visit:    Due 8/18/20

## 2020-10-21 RX ORDER — HYDROCODONE BITARTRATE AND ACETAMINOPHEN 7.5; 325 MG/1; MG/1
1 TABLET ORAL EVERY 4 HOURS PRN
Qty: 30 TABLET | Refills: 0 | Status: SHIPPED | OUTPATIENT
Start: 2020-10-21 | End: 2020-10-27

## 2020-10-27 ENCOUNTER — ANCILLARY PROCEDURE (OUTPATIENT)
Dept: GENERAL RADIOLOGY | Facility: CLINIC | Age: 55
End: 2020-10-27
Attending: PHYSICIAN ASSISTANT
Payer: COMMERCIAL

## 2020-10-27 ENCOUNTER — OFFICE VISIT (OUTPATIENT)
Dept: FAMILY MEDICINE | Facility: CLINIC | Age: 55
End: 2020-10-27
Payer: COMMERCIAL

## 2020-10-27 VITALS
OXYGEN SATURATION: 96 % | SYSTOLIC BLOOD PRESSURE: 134 MMHG | BODY MASS INDEX: 26.13 KG/M2 | WEIGHT: 145.2 LBS | DIASTOLIC BLOOD PRESSURE: 80 MMHG | HEART RATE: 84 BPM | RESPIRATION RATE: 20 BRPM | TEMPERATURE: 97.5 F

## 2020-10-27 DIAGNOSIS — R37 SEXUAL DYSFUNCTION: ICD-10-CM

## 2020-10-27 DIAGNOSIS — M79.672 LEFT FOOT PAIN: Primary | ICD-10-CM

## 2020-10-27 DIAGNOSIS — R10.9 FLANK PAIN: ICD-10-CM

## 2020-10-27 DIAGNOSIS — M75.101 ROTATOR CUFF SYNDROME, RIGHT: ICD-10-CM

## 2020-10-27 DIAGNOSIS — M79.672 LEFT FOOT PAIN: ICD-10-CM

## 2020-10-27 DIAGNOSIS — G89.4 CHRONIC PAIN SYNDROME: ICD-10-CM

## 2020-10-27 PROCEDURE — 99213 OFFICE O/P EST LOW 20 MIN: CPT | Performed by: PHYSICIAN ASSISTANT

## 2020-10-27 PROCEDURE — 73630 X-RAY EXAM OF FOOT: CPT | Mod: TC

## 2020-10-27 RX ORDER — SILDENAFIL 100 MG/1
100 TABLET, FILM COATED ORAL DAILY PRN
Qty: 10 TABLET | Refills: 0 | Status: SHIPPED | OUTPATIENT
Start: 2020-10-27 | End: 2020-11-25

## 2020-10-27 RX ORDER — DICLOFENAC SODIUM 75 MG/1
TABLET, DELAYED RELEASE ORAL
Qty: 30 TABLET | Refills: 2 | Status: SHIPPED | OUTPATIENT
Start: 2020-10-27 | End: 2021-10-08

## 2020-10-27 RX ORDER — HYDROCODONE BITARTRATE AND ACETAMINOPHEN 7.5; 325 MG/1; MG/1
1 TABLET ORAL EVERY 4 HOURS PRN
Qty: 30 TABLET | Refills: 0 | Status: SHIPPED | OUTPATIENT
Start: 2020-10-27 | End: 2020-12-04

## 2020-10-27 NOTE — PROGRESS NOTES
Subjective     Giovanni Cedeno is a 55 year old male who presents to clinic today for the following health issues:    HPI         Musculoskeletal problem/pain  Onset/Duration: 2 weeks  Description  Location: foot - left  Joint Swelling: no  Redness: no  Pain: YES  Warmth: no  Intensity:  mild, moderate  Progression of Symptoms:  same  Accompanying signs and symptoms:   Fevers: no  Numbness/tingling/weakness: no  History  Trauma to the area: no  Recent illness:  no  Previous similar problem: no  Previous evaluation:  no  Precipitating or alleviating factors:  Aggravating factors include: walking  Therapies tried and outcome: nothing    Pain weight bearing . No injury. No redness or swelling.     Review of Systems   Constitutional, HEENT, cardiovascular, pulmonary, GI, , musculoskeletal, neuro, skin, endocrine and psych systems are negative, except as otherwise noted.      Objective    /80   Pulse 84   Temp 97.5  F (36.4  C) (Tympanic)   Resp 20   Wt 65.9 kg (145 lb 3.2 oz)   SpO2 96%   BMI 26.13 kg/m    Body mass index is 26.13 kg/m .  Physical Exam   CHEST:chest clear to IPPA, no tachypnea, retractions or cyanosis and S1, S2 normal, no murmur, no gallop, rate regular.  Toe and foot rom normal. Mild tenderness with palpation of the 4th metatarsal head. No swelling or deformity.     Giovanni was seen today for musculoskeletal problem.    Diagnoses and all orders for this visit:    Left foot pain  -     XR Foot Left G/E 3 Views; Future    Rotator cuff syndrome, right  -     HYDROcodone-acetaminophen (NORCO) 7.5-325 MG per tablet; Take 1 tablet by mouth every 4 hours as needed for pain maximum  4  tablet(s) per day    Chronic pain syndrome  -     HYDROcodone-acetaminophen (NORCO) 7.5-325 MG per tablet; Take 1 tablet by mouth every 4 hours as needed for pain maximum  4  tablet(s) per day    Sexual dysfunction  -     sildenafil (VIAGRA) 100 MG tablet; Take 1 tablet (100 mg) by mouth daily as needed  (sexual)    Flank pain  -     diclofenac (VOLTAREN) 75 MG EC tablet; TAKE 1 TABLET(75 MG) BY MOUTH TWICE DAILY      Advised supportive and symptomatic treatment.  Follow up with Provider - if condition persists or worsens.

## 2020-11-03 DIAGNOSIS — F51.01 PRIMARY INSOMNIA: ICD-10-CM

## 2020-11-03 NOTE — TELEPHONE ENCOUNTER
"Routing refill request to provider for review/approval because:  Drug interaction warning    Pended for 1 year supply, just had follow up and not due until 10/27/21                Requested Prescriptions   Pending Prescriptions Disp Refills     mirtazapine (REMERON) 15 MG tablet 90 tablet 0     Sig: Take 1 tablet (15 mg) by mouth At Bedtime       Atypical Antidepressants Protocol Passed - 11/3/2020  7:15 AM        Passed - Recent (12 mo) or future (30 days) visit within the authorizing provider's specialty     Patient has had an office visit with the authorizing provider or a provider within the authorizing providers department within the previous 12 mos or has a future within next 30 days. See \"Patient Info\" tab in inbasket, or \"Choose Columns\" in Meds & Orders section of the refill encounter.              Passed - Medication active on med list        Passed - Patient is age 18 or older             "

## 2020-11-03 NOTE — TELEPHONE ENCOUNTER
Requested Prescriptions   Pending Prescriptions Disp Refills     mirtazapine (REMERON) 15 MG tablet 90 tablet 0   Last Written Prescription Date:  8-5-20  Last Fill Quantity: 90,  # refills: 0   Last office visit: 10/27/2020 with prescribing provider:  10-27-20   Future Office Visit:            There is no refill protocol information for this order

## 2020-11-04 RX ORDER — MIRTAZAPINE 15 MG/1
15 TABLET, FILM COATED ORAL AT BEDTIME
Qty: 90 TABLET | Refills: 3 | Status: SHIPPED | OUTPATIENT
Start: 2020-11-04 | End: 2022-01-14

## 2020-11-05 DIAGNOSIS — G25.81 RESTLESS LEG SYNDROME: ICD-10-CM

## 2020-11-05 DIAGNOSIS — M62.838 MUSCLE SPASM OF RIGHT SHOULDER: ICD-10-CM

## 2020-11-05 DIAGNOSIS — Z98.890 S/P ROTATOR CUFF REPAIR: ICD-10-CM

## 2020-11-05 NOTE — TELEPHONE ENCOUNTER
Routing refill request to provider for review/approval because:  Drug not on the [facility] refill protocol. LOV=10/27/20

## 2020-11-06 RX ORDER — GABAPENTIN 300 MG/1
CAPSULE ORAL
Qty: 60 CAPSULE | Refills: 11 | Status: SHIPPED | OUTPATIENT
Start: 2020-11-06 | End: 2022-01-14

## 2020-11-06 RX ORDER — METHOCARBAMOL 500 MG/1
TABLET, FILM COATED ORAL
Qty: 60 TABLET | Refills: 0 | Status: SHIPPED | OUTPATIENT
Start: 2020-11-06 | End: 2020-12-04

## 2020-11-23 DIAGNOSIS — R37 SEXUAL DYSFUNCTION: ICD-10-CM

## 2020-11-23 NOTE — TELEPHONE ENCOUNTER
Requested Prescriptions   Pending Prescriptions Disp Refills     sildenafil (VIAGRA) 100 MG tablet 10 tablet 0     Sig: Take 1 tablet (100 mg) by mouth daily as needed (sexual)   Last Written Prescription Date:  10-27-20  Last Fill Quantity: 10,  # refills: 0   Last office visit: 10/27/2020 with prescribing provider:  10-27-20   Future Office Visit:            There is no refill protocol information for this order

## 2020-11-24 DIAGNOSIS — F32.4 MAJOR DEPRESSIVE DISORDER WITH SINGLE EPISODE, IN PARTIAL REMISSION (H): Chronic | ICD-10-CM

## 2020-11-24 NOTE — TELEPHONE ENCOUNTER
Routing refill request to provider for review/approval because:      Erectile Dysfuction Protocol [id number]/23/2020 08:37 AM   Absence of Alpha Blockers on Med list     Shruthi MIMSN, RN

## 2020-11-25 RX ORDER — SILDENAFIL 100 MG/1
100 TABLET, FILM COATED ORAL DAILY PRN
Qty: 10 TABLET | Refills: 0 | Status: SHIPPED | OUTPATIENT
Start: 2020-11-25 | End: 2020-12-04

## 2020-11-25 NOTE — TELEPHONE ENCOUNTER
Routing refill request to provider for review/approval because:  PHQ-9 >4, provider review needed

## 2020-12-01 DIAGNOSIS — G89.4 CHRONIC PAIN SYNDROME: ICD-10-CM

## 2020-12-01 DIAGNOSIS — M62.838 MUSCLE SPASM OF RIGHT SHOULDER: ICD-10-CM

## 2020-12-01 DIAGNOSIS — R37 SEXUAL DYSFUNCTION: ICD-10-CM

## 2020-12-01 DIAGNOSIS — Z98.890 S/P ROTATOR CUFF REPAIR: ICD-10-CM

## 2020-12-01 DIAGNOSIS — M75.101 ROTATOR CUFF SYNDROME, RIGHT: ICD-10-CM

## 2020-12-01 NOTE — TELEPHONE ENCOUNTER
Reason for call:  Medication   If this is a refill request, has the caller requested the refill from the pharmacy already? No  Will the patient be using a [facility]? No  Name of the pharmacy and phone number for the current request: [id number] - [place], [place] - [address] [address] [facility] [address]  [phone]    Name of the medication requested: Hydrocodone, Methocarbamol, Fluoxeting and Sildenafil    Other request: requesting refills    Phone number to reach patient:  Home number on file [phone] (home)    Best Time:  any    Can we leave a detailed message on this number?  YES    Travel screening: Not Applicable

## 2020-12-04 RX ORDER — HYDROCODONE BITARTRATE AND ACETAMINOPHEN 7.5; 325 MG/1; MG/1
1 TABLET ORAL EVERY 4 HOURS PRN
Qty: 30 TABLET | Refills: 0 | Status: SHIPPED | OUTPATIENT
Start: 2020-12-04 | End: 2021-01-23

## 2020-12-04 RX ORDER — METHOCARBAMOL 500 MG/1
TABLET, FILM COATED ORAL
Qty: 60 TABLET | Refills: 0 | Status: SHIPPED | OUTPATIENT
Start: 2020-12-04 | End: 2020-12-24

## 2020-12-04 RX ORDER — SILDENAFIL 100 MG/1
100 TABLET, FILM COATED ORAL DAILY PRN
Qty: 10 TABLET | Refills: 0 | Status: SHIPPED | OUTPATIENT
Start: 2020-12-04 | End: 2021-01-23

## 2020-12-21 DIAGNOSIS — Z98.890 S/P ROTATOR CUFF REPAIR: ICD-10-CM

## 2020-12-21 DIAGNOSIS — M62.838 MUSCLE SPASM OF RIGHT SHOULDER: ICD-10-CM

## 2020-12-23 NOTE — TELEPHONE ENCOUNTER
Routing refill request to provider for review/approval because:  Drug not on the [facility] refill protocol       Gunjan Lorenzana RN

## 2020-12-24 RX ORDER — METHOCARBAMOL 500 MG/1
TABLET, FILM COATED ORAL
Qty: 60 TABLET | Refills: 0 | Status: SHIPPED | OUTPATIENT
Start: 2020-12-24 | End: 2021-01-23

## 2020-12-29 DIAGNOSIS — M70.51 BURSITIS OF OTHER BURSA OF RIGHT KNEE: ICD-10-CM

## 2020-12-29 DIAGNOSIS — M62.838 MUSCLE SPASM OF RIGHT SHOULDER: ICD-10-CM

## 2020-12-29 DIAGNOSIS — M54.50 ACUTE MIDLINE LOW BACK PAIN WITHOUT SCIATICA: ICD-10-CM

## 2020-12-29 DIAGNOSIS — G25.81 RESTLESS LEGS SYNDROME (RLS): ICD-10-CM

## 2020-12-29 DIAGNOSIS — M25.511 CHRONIC RIGHT SHOULDER PAIN: ICD-10-CM

## 2020-12-29 DIAGNOSIS — G89.29 CHRONIC RIGHT SHOULDER PAIN: ICD-10-CM

## 2020-12-29 DIAGNOSIS — Z98.890 S/P ROTATOR CUFF REPAIR: ICD-10-CM

## 2020-12-29 DIAGNOSIS — G89.4 CHRONIC PAIN SYNDROME: ICD-10-CM

## 2021-01-04 NOTE — TELEPHONE ENCOUNTER
"Requested Prescriptions   Pending Prescriptions Disp Refills    celecoxib (CELEBREX) 200 MG capsule [Pharmacy Med Name: CELECOXIB 200MG CAPSULES] 60 capsule 11     Sig: TAKE 1 CAPSULE(200 MG) BY MOUTH TWICE DAILY       NSAID Medications Failed - 12/29/2020  5:35 PM        Failed - Normal CBC on file in past 12 months     Recent Labs   Lab Test 11/14/17  0846   WBC 6.4   RBC 4.71   HGB 14.7   HCT 44.3                    Passed - Blood pressure under 140/90 in past 12 months     BP Readings from Last 3 Encounters:   10/27/20 134/80   07/21/20 120/78   07/08/20 137/86                 Passed - Normal ALT on file in past 12 months     Recent Labs   Lab Test 07/21/20  0832   ALT 24             Passed - Normal AST on file in past 12 months     Recent Labs   Lab Test 07/21/20  0832   AST 13             Passed - Recent (12 mo) or future (30 days) visit within the authorizing provider's specialty     Patient has had an office visit with the authorizing provider or a provider within the authorizing providers department within the previous 12 mos or has a future within next 30 days. See \"Patient Info\" tab in inbasket, or \"Choose Columns\" in Meds & Orders section of the refill encounter.              Passed - Patient is age 6-64 years        Passed - Medication is active on med list        Passed - Normal serum creatinine on file in past 12 months     Recent Labs   Lab Test 07/21/20  0832   CR 0.97       Ok to refill medication if creatinine is low            carbidopa-levodopa (SINEMET)  MG tablet [Pharmacy Med Name: CARBIDOPA/LEVODOPA 25-100MG TABS] 90 tablet 5     Sig: Take 1 tablet by mouth At Bedtime       Antiparkinson's Agents Protocol Passed - 12/29/2020  5:35 PM        Passed - Recent (12 mo) or future (30 days) visit within the authorizing provider's specialty     Patient has had an office visit with the authorizing provider or a provider within the authorizing providers department within the previous 12 " "mos or has a future within next 30 days. See \"Patient Info\" tab in inbasket, or \"Choose Columns\" in Meds & Orders section of the refill encounter.              Passed - Medication is active on med list        Passed - Patient is age 18 or older           Due to the high volume of refills.  We have been advised to start routing them directly to the provider.    Cynthia CHRISTIAN-RN  Triage Nurse  [facility]: [facility]        "

## 2021-01-05 RX ORDER — CARBIDOPA AND LEVODOPA 25; 100 MG/1; MG/1
1 TABLET ORAL AT BEDTIME
Qty: 90 TABLET | Refills: 5 | Status: SHIPPED | OUTPATIENT
Start: 2021-01-05 | End: 2022-01-11

## 2021-01-05 RX ORDER — CELECOXIB 200 MG/1
CAPSULE ORAL
Qty: 60 CAPSULE | Refills: 11 | Status: SHIPPED | OUTPATIENT
Start: 2021-01-05 | End: 2024-01-25

## 2021-01-09 DIAGNOSIS — N20.0 NEPHROLITHIASIS: ICD-10-CM

## 2021-01-09 DIAGNOSIS — R10.9 FLANK PAIN: ICD-10-CM

## 2021-01-13 RX ORDER — TAMSULOSIN HYDROCHLORIDE 0.4 MG/1
CAPSULE ORAL
Qty: 90 CAPSULE | Refills: 0 | Status: SHIPPED | OUTPATIENT
Start: 2021-01-13 | End: 2021-04-06

## 2021-01-13 NOTE — TELEPHONE ENCOUNTER
Prescription approved per [facility] Refill Protocol.  Jessica Desai RN

## 2021-01-14 ENCOUNTER — OFFICE VISIT (OUTPATIENT)
Dept: PODIATRY | Facility: CLINIC | Age: 56
End: 2021-01-14
Payer: COMMERCIAL

## 2021-01-14 VITALS
BODY MASS INDEX: 25.72 KG/M2 | SYSTOLIC BLOOD PRESSURE: 126 MMHG | HEIGHT: 63 IN | DIASTOLIC BLOOD PRESSURE: 74 MMHG | HEART RATE: 74 BPM

## 2021-01-14 DIAGNOSIS — M77.8 CAPSULITIS OF LEFT FOOT: Primary | ICD-10-CM

## 2021-01-14 PROCEDURE — 99243 OFF/OP CNSLTJ NEW/EST LOW 30: CPT | Performed by: PODIATRIST

## 2021-01-14 NOTE — PROGRESS NOTES
S:  Patient seen today in consult from Juan Antonio Pak and complains of left foot pain.  Points to plantar third toe PIPJ.  Describes as a burning pain.  aggravated by activity and relieved by rest.  Has had this for 3 months.  No pain anywhere else on feet.  Patient has been working out of his house.  He is wearing an old worn-out shoe.  States it feels better and is stiffer boots.  He denies edema weakness numbness ecchymosis or injury    ROS:  A 10-point review of systems was performed and is positive for that noted in the HPI and as seen above.  All other areas are negative.      No Known Allergies    Current Outpatient Medications   Medication Sig Dispense Refill     albuterol (PROAIR HFA/PROVENTIL HFA/VENTOLIN HFA) 108 (90 Base) MCG/ACT inhaler Inhale 2 puffs into the lungs every 6 hours as needed for shortness of breath / dyspnea or wheezing 90 g 3     carbidopa-levodopa (SINEMET)  MG tablet TAKE 1 TABLET BY MOUTH AT BEDTIME 90 tablet 5     celecoxib (CELEBREX) 200 MG capsule TAKE 1 CAPSULE(200 MG) BY MOUTH TWICE DAILY 60 capsule 11     diclofenac (VOLTAREN) 75 MG EC tablet TAKE 1 TABLET(75 MG) BY MOUTH TWICE DAILY 30 tablet 2     finasteride (PROSCAR) 5 MG tablet TAKE 1 TABLET(5 MG) BY MOUTH DAILY 90 tablet 1     FLUoxetine (PROZAC) 20 MG capsule Take 1 capsule (20 mg) by mouth daily 30 capsule 5     gabapentin (NEURONTIN) 300 MG capsule TAKE 2 CAPSULES BY MOUTH EVERY NIGHT AT BEDTIME 60 capsule 11     HYDROcodone-acetaminophen (NORCO) 7.5-325 MG per tablet Take 1 tablet by mouth every 4 hours as needed for pain maximum  4  tablet(s) per day 30 tablet 0     methocarbamol (ROBAXIN) 500 MG tablet TAKE 1 TO 2 TABLETS(500 TO 1000 MG) BY MOUTH TWICE DAILY AS NEEDED FOR MUSCLE SPASMS 60 tablet 0     mirtazapine (REMERON) 15 MG tablet Take 1 tablet (15 mg) by mouth At Bedtime 90 tablet 3     Misc Natural Products (OSTEO BI-FLEX TRIPLE STRENGTH PO) Take 1 tablet by mouth daily       senna-docusate  "(SENOKOT-S/PERICOLACE) 8.6-50 MG tablet Take 1-2 tablets by mouth 2 times daily as needed for constipation 100 tablet 11     sildenafil (VIAGRA) 100 MG tablet Take 1 tablet (100 mg) by mouth daily as needed (sexual) 10 tablet 0     simvastatin (ZOCOR) 20 MG tablet TAKE 1 TABLET BY MOUTH EVERY DAY AT BEDTIME 90 tablet 3     tamsulosin (FLOMAX) 0.4 MG capsule TAKE 1 CAPSULE(0.4 MG) BY MOUTH DAILY 90 capsule 0       Patient Active Problem List   Diagnosis     Restless leg syndrome     Knee bursitis     Pure hypercholesterolemia     Major depression in partial remission (H)     Pre-diabetes     Epicondylitis, lateral humeral     Immunization reaction     Chronic pain syndrome     Chronic right shoulder pain     Abdominal pain, left upper quadrant     Pain of toe of right foot     Acute midline low back pain without sciatica     Upper back pain     Mild intermittent asthma, unspecified whether complicated     Incomplete tear of right rotator cuff     Impingement syndrome of shoulder, right     Degeneration of lumbar or lumbosacral intervertebral disc     Adjustment disorder with depressed mood     Mixed hyperlipidemia     Inguinal hernia       Past Medical History:   Diagnosis Date     Arthritis        No past surgical history on file.    Family History   Problem Relation Age of Onset     Diabetes Father      Heart Disease Father        Social History     Tobacco Use     Smoking status: Never Smoker     Smokeless tobacco: Never Used   Substance Use Topics     Alcohol use: No         O:   /74   Pulse 74   Ht 1.6 m (5' 3\")   BMI 25.72 kg/m  .      Constitutional/ general:  Pt is in no apparent distress, appears well-nourished.  Cooperative with history and physical exam.     Psych:  The patient answered questions appropriately.  Normal affect.  Seems to have reasonable expectations, in terms of treatment.     Eyes:  Visual scanning/ tracking without deficit.     Ears:  Response to auditory stimuli is normal.  No " hearing aid devices.  Auricles in proper alignment.     Lymphatic:  Popliteal lymph nodes not enlarged.     Lungs:  Non labored breathing, non labored speech. No cough.  No audible wheezing. Even, quiet breathing.       Vascular:  Pedal pulses are palpable bilaterally for both the DP and PT arteries.  CFT < 3 sec.  No edema.  Pedal hair growth noted.     Neuro:  Alert and oriented x 3. Coordinated gait.  Light touch sensation is intact to the L4, L5, S1 distributions. No obvious deficits.  No evidence of neurological-based weakness, spasticity, or contracture in the lower extremities.     Derm: Normal texture and turgor.  No erythema, ecchymosis, or cyanosis.  No open lesions.     Musculoskeletal:    Lower extremity muscle strength is normal.  Patient is ambulatory without an assistive device or brace.  Normal arch with weightbearing.  No forefoot or rear foot deformities noted.  MS 5/5 all compartments.  Normal ROM all fore foot and rearfoot joints.  No equinus.  Pain under left third toe PIPJ.  No pain with stressing flexor tendon.  No pain with range of motion.  Negative Lachmans test.  Negative Mulders click.  No pain anywhere else.  No erythema, edema, ecchymosis, or subcutaneous masses noted.      Radiographic Exam:   Unremarkable    A: Left third toe PIPJ plantar capsulitis    P:  X rays personally reviewed.  Discussed cause of this with patient and how he is getting too much pressure here.  Will massage this to relieve the inflammation.  For both inside and outside the house ice bid.  Instructed to wear stiff soles shoes at all times and I made suggestions.  Dispensed crest pads to offload this.  Avoid activities that bother this and discussed which activities will aggravate.  RETURN TO CLINIC PRN.  Thank you for allowing me participate in the care of this patient.        Иван Bailey, GHAZAL HARMAN, FACFAS

## 2021-01-14 NOTE — LETTER
1/14/2021         RE: Giovanni Cedeno  [address]  [place] [address]        Dear Colleague,    Thank you for referring your patient, Giovanni Cedeno, to the [facility]. Please see a copy of my visit note below.    S:  Patient seen today in consult from Juan Antonio Pak and complains of left foot pain.  Points to plantar third toe PIPJ.  Describes as a burning pain.  aggravated by activity and relieved by rest.  Has had this for 3 months.  No pain anywhere else on feet.  Patient has been working out of his house.  He is wearing an old worn-out shoe.  States it feels better and is stiffer boots.  He denies edema weakness numbness ecchymosis or injury    ROS:  A 10-point review of systems was performed and is positive for that noted in the HPI and as seen above.  All other areas are negative.      No Known Allergies    Current Outpatient Medications   Medication Sig Dispense Refill     albuterol (PROAIR HFA/PROVENTIL HFA/VENTOLIN HFA) 108 (90 Base) MCG/ACT inhaler Inhale 2 puffs into the lungs every 6 hours as needed for shortness of breath / dyspnea or wheezing 90 g 3     carbidopa-levodopa (SINEMET)  MG tablet TAKE 1 TABLET BY MOUTH AT BEDTIME 90 tablet 5     celecoxib (CELEBREX) 200 MG capsule TAKE 1 CAPSULE(200 MG) BY MOUTH TWICE DAILY 60 capsule 11     diclofenac (VOLTAREN) 75 MG EC tablet TAKE 1 TABLET(75 MG) BY MOUTH TWICE DAILY 30 tablet 2     finasteride (PROSCAR) 5 MG tablet TAKE 1 TABLET(5 MG) BY MOUTH DAILY 90 tablet 1     FLUoxetine (PROZAC) 20 MG capsule Take 1 capsule (20 mg) by mouth daily 30 capsule 5     gabapentin (NEURONTIN) 300 MG capsule TAKE 2 CAPSULES BY MOUTH EVERY NIGHT AT BEDTIME 60 capsule 11     HYDROcodone-acetaminophen (NORCO) 7.5-325 MG per tablet Take 1 tablet by mouth every 4 hours as needed for pain maximum  4  tablet(s) per day 30 tablet 0     methocarbamol (ROBAXIN) 500 MG tablet TAKE 1 TO 2 TABLETS(500 TO 1000 MG) BY MOUTH TWICE DAILY AS  "NEEDED FOR MUSCLE SPASMS 60 tablet 0     mirtazapine (REMERON) 15 MG tablet Take 1 tablet (15 mg) by mouth At Bedtime 90 tablet 3     Misc Natural Products (OSTEO BI-FLEX TRIPLE STRENGTH PO) Take 1 tablet by mouth daily       senna-docusate (SENOKOT-S/PERICOLACE) 8.6-50 MG tablet Take 1-2 tablets by mouth 2 times daily as needed for constipation 100 tablet 11     sildenafil (VIAGRA) 100 MG tablet Take 1 tablet (100 mg) by mouth daily as needed (sexual) 10 tablet 0     simvastatin (ZOCOR) 20 MG tablet TAKE 1 TABLET BY MOUTH EVERY DAY AT BEDTIME 90 tablet 3     tamsulosin (FLOMAX) 0.4 MG capsule TAKE 1 CAPSULE(0.4 MG) BY MOUTH DAILY 90 capsule 0       Patient Active Problem List   Diagnosis     Restless leg syndrome     Knee bursitis     Pure hypercholesterolemia     Major depression in partial remission (H)     Pre-diabetes     Epicondylitis, lateral humeral     Immunization reaction     Chronic pain syndrome     Chronic right shoulder pain     Abdominal pain, left upper quadrant     Pain of toe of right foot     Acute midline low back pain without sciatica     Upper back pain     Mild intermittent asthma, unspecified whether complicated     Incomplete tear of right rotator cuff     Impingement syndrome of shoulder, right     Degeneration of lumbar or lumbosacral intervertebral disc     Adjustment disorder with depressed mood     Mixed hyperlipidemia     Inguinal hernia       Past Medical History:   Diagnosis Date     Arthritis        No past surgical history on file.    Family History   Problem Relation Age of Onset     Diabetes Father      Heart Disease Father        Social History     Tobacco Use     Smoking status: Never Smoker     Smokeless tobacco: Never Used   Substance Use Topics     Alcohol use: No         O:   /74   Pulse 74   Ht 1.6 m (5' 3\")   BMI 25.72 kg/m  .      Constitutional/ general:  Pt is in no apparent distress, appears well-nourished.  Cooperative with history and physical exam. "     Psych:  The patient answered questions appropriately.  Normal affect.  Seems to have reasonable expectations, in terms of treatment.     Eyes:  Visual scanning/ tracking without deficit.     Ears:  Response to auditory stimuli is normal.  No hearing aid devices.  Auricles in proper alignment.     Lymphatic:  Popliteal lymph nodes not enlarged.     Lungs:  Non labored breathing, non labored speech. No cough.  No audible wheezing. Even, quiet breathing.       Vascular:  Pedal pulses are palpable bilaterally for both the DP and PT arteries.  CFT < 3 sec.  No edema.  Pedal hair growth noted.     Neuro:  Alert and oriented x 3. Coordinated gait.  Light touch sensation is intact to the L4, L5, S1 distributions. No obvious deficits.  No evidence of neurological-based weakness, spasticity, or contracture in the lower extremities.     Derm: Normal texture and turgor.  No erythema, ecchymosis, or cyanosis.  No open lesions.     Musculoskeletal:    Lower extremity muscle strength is normal.  Patient is ambulatory without an assistive device or brace.  Normal arch with weightbearing.  No forefoot or rear foot deformities noted.  MS 5/5 all compartments.  Normal ROM all fore foot and rearfoot joints.  No equinus.  Pain under left third toe PIPJ.  No pain with stressing flexor tendon.  No pain with range of motion.  Negative Lachmans test.  Negative Mulders click.  No pain anywhere else.  No erythema, edema, ecchymosis, or subcutaneous masses noted.      Radiographic Exam:   Unremarkable    A: Left third toe PIPJ plantar capsulitis    P:  X rays personally reviewed.  Discussed cause of this with patient and how he is getting too much pressure here.  Will massage this to relieve the inflammation.  For both inside and outside the house ice bid.  Instructed to wear stiff soles shoes at all times and I made suggestions.  Dispensed crest pads to offload this.  Avoid activities that bother this and discussed which activities will  aggravate.  RETURN TO CLINIC PRN.  Thank you for allowing me participate in the care of this patient.        Иван Bailey DPM DPM, FACFAS        Again, thank you for allowing me to participate in the care of your patient.        Sincerely,        Иван Bailey DPM

## 2021-01-14 NOTE — PATIENT INSTRUCTIONS
We wish you continued good healing. If you have any questions or concerns, please do not hesitate to contact us at [phone]    [facility] (secure e-mail communication and access to your chart) to send a message or to make an appointment.    Please remember to call and schedule a follow up appointment if one was recommended at your earliest convenience.     +++OF MARCH 2020+++ LOCATION AND HOURS HAVE CHANGED    PLEASE CALL CLINICS TO VERIFY DAYS AND TIMES  PODIATRY CLINIC HOURS  TELEPHONE NUMBER    Dr. Иван HOLMPHAYDE [facility]        Clinics:  [place] [facility]   Tuesday 1PM-6PM  [place]  Wednesday 745AM-330PM  [place]  Thursday/Friday 745AM-230PM  [place] APPOINTMENTS  [phone]    [place] APPOINTMENTS  [phone]          If you need a medication refill, please contact us you may need lab work and/or a follow up visit prior to your refill (i.e. Antifungal medications).    If MRI needed please call Imaging at [phone] or [phone]    HOW DO I GET MY KNEE SCOOTER? Knee scooters can be picked up at ANY Medical Supply stores with your knee scooter Prescription.  OR    Bring your signed prescription to an [facility] showroom.

## 2021-01-19 DIAGNOSIS — G89.4 CHRONIC PAIN SYNDROME: ICD-10-CM

## 2021-01-19 DIAGNOSIS — R37 SEXUAL DYSFUNCTION: ICD-10-CM

## 2021-01-19 DIAGNOSIS — Z98.890 S/P ROTATOR CUFF REPAIR: ICD-10-CM

## 2021-01-19 DIAGNOSIS — M62.838 MUSCLE SPASM OF RIGHT SHOULDER: ICD-10-CM

## 2021-01-19 DIAGNOSIS — M75.101 ROTATOR CUFF SYNDROME, RIGHT: ICD-10-CM

## 2021-01-22 NOTE — TELEPHONE ENCOUNTER
Routing refill request to provider for review/approval because:  Drug not on the [facility] refill protocol.  Hydro codone  Last Written Prescription Date:  12/4/20  Last Fill Quantity: 30,  # refills: 0   Last office visit: 10/27/2020 with prescribing provider:    Future Office Visit:      robaxin  Last Written Prescription Date:  12/24/20  Last Fill Quantity: 60,  # refills: 0   Last office visit: 10/27/2020 with prescribing provider:    Future Office Visit:

## 2021-01-23 RX ORDER — METHOCARBAMOL 500 MG/1
TABLET, FILM COATED ORAL
Qty: 60 TABLET | Refills: 0 | Status: SHIPPED | OUTPATIENT
Start: 2021-01-23 | End: 2021-02-18

## 2021-01-23 RX ORDER — SILDENAFIL 100 MG/1
100 TABLET, FILM COATED ORAL DAILY PRN
Qty: 10 TABLET | Refills: 0 | Status: SHIPPED | OUTPATIENT
Start: 2021-01-23 | End: 2021-02-25

## 2021-01-23 RX ORDER — HYDROCODONE BITARTRATE AND ACETAMINOPHEN 7.5; 325 MG/1; MG/1
1 TABLET ORAL EVERY 4 HOURS PRN
Qty: 30 TABLET | Refills: 0 | Status: SHIPPED | OUTPATIENT
Start: 2021-01-23 | End: 2021-02-25

## 2021-02-16 DIAGNOSIS — Z98.890 S/P ROTATOR CUFF REPAIR: ICD-10-CM

## 2021-02-16 DIAGNOSIS — M62.838 MUSCLE SPASM OF RIGHT SHOULDER: ICD-10-CM

## 2021-02-17 NOTE — TELEPHONE ENCOUNTER
Requested Prescriptions   Pending Prescriptions Disp Refills     methocarbamol (ROBAXIN) 500 MG tablet 60 tablet 0     Sig: TAKE 1 TO 2 TABLETS(500 TO 1000 MG) BY MOUTH TWICE DAILY AS NEEDED FOR MUSCLE SPASMS       There is no refill protocol information for this order        Last Written Prescription Date: 1/23/21  Last Fill Quantity: 60,  # refills: 0   Last office visit: 10/27/2020 with prescribing provider    Routing refill request to provider for review/approval because:  Drug not on the [facility] refill protocol   Cynthia MIMSN-RN  Triage Nurse  [facility]: [facility]

## 2021-02-18 RX ORDER — METHOCARBAMOL 500 MG/1
TABLET, FILM COATED ORAL
Qty: 60 TABLET | Refills: 0 | Status: SHIPPED | OUTPATIENT
Start: 2021-02-18 | End: 2021-03-24

## 2021-02-24 DIAGNOSIS — M75.101 ROTATOR CUFF SYNDROME, RIGHT: ICD-10-CM

## 2021-02-24 DIAGNOSIS — G89.4 CHRONIC PAIN SYNDROME: ICD-10-CM

## 2021-02-24 DIAGNOSIS — R37 SEXUAL DYSFUNCTION: ICD-10-CM

## 2021-02-24 NOTE — TELEPHONE ENCOUNTER
Refill on sildenafil (VIAGRA) 100 MG tablet and HYDROcodone-acetaminophen (NORCO) 7.5-325 MG per tablet    Is completely out of both.     Call back [phone]

## 2021-02-25 RX ORDER — HYDROCODONE BITARTRATE AND ACETAMINOPHEN 7.5; 325 MG/1; MG/1
1 TABLET ORAL EVERY 4 HOURS PRN
Qty: 30 TABLET | Refills: 0 | Status: SHIPPED | OUTPATIENT
Start: 2021-02-25 | End: 2021-04-06

## 2021-02-25 RX ORDER — SILDENAFIL 100 MG/1
100 TABLET, FILM COATED ORAL DAILY PRN
Qty: 10 TABLET | Refills: 0 | Status: SHIPPED | OUTPATIENT
Start: 2021-02-25 | End: 2021-03-10

## 2021-03-08 DIAGNOSIS — R37 SEXUAL DYSFUNCTION: ICD-10-CM

## 2021-03-09 NOTE — TELEPHONE ENCOUNTER
"Requested Prescriptions   Pending Prescriptions Disp Refills     sildenafil (VIAGRA) 100 MG tablet [Pharmacy Med Name: SILDENAFIL 100MG TABLETS] 10 tablet 0     Sig: TAKE ONE TABLET BY MOUTH EVERY DAY AS NEEDED       Erectile Dysfuction Protocol Failed - 3/8/2021  3:53 AM        Failed - Absence of Alpha Blockers on Med list        Passed - Absence of nitrates on medication list        Passed - Recent (12 mo) or future (30 days) visit within the authorizing provider's specialty     Patient has had an office visit with the authorizing provider or a provider within the authorizing providers department within the previous 12 mos or has a future within next 30 days. See \"Patient Info\" tab in inbasket, or \"Choose Columns\" in Meds & Orders section of the refill encounter.              Passed - Medication is active on med list        Passed - Patient is age 18 or older           Routing refill request to provider for review/approval because:  Fails protocol per above, alpha blocker on med list.    Asia León RN  [facility]            "

## 2021-03-10 RX ORDER — SILDENAFIL 100 MG/1
TABLET, FILM COATED ORAL
Qty: 10 TABLET | Refills: 0 | Status: SHIPPED | OUTPATIENT
Start: 2021-03-10 | End: 2021-06-08

## 2021-03-22 ENCOUNTER — IMMUNIZATION (OUTPATIENT)
Dept: NURSING | Facility: CLINIC | Age: 56
End: 2021-03-22
Payer: COMMERCIAL

## 2021-03-22 DIAGNOSIS — M62.838 MUSCLE SPASM OF RIGHT SHOULDER: ICD-10-CM

## 2021-03-22 DIAGNOSIS — Z98.890 S/P ROTATOR CUFF REPAIR: ICD-10-CM

## 2021-03-22 PROCEDURE — 0001A PR COVID VAC PFIZER DIL RECON 30 MCG/0.3 ML IM: CPT

## 2021-03-22 PROCEDURE — 91300 PR COVID VAC PFIZER DIL RECON 30 MCG/0.3 ML IM: CPT

## 2021-03-23 NOTE — TELEPHONE ENCOUNTER
Last Written Prescription Date:  2/18/21  Last Fill Quantity: 60,  # refills: 0   Last office visit: 10/27/2020 with prescribing provider:  Juan Antonio Pak with advised F/U in 1 year  Future Office Visit: none    Routing refill request to provider for review/approval because:  Drug not on the [facility] refill protocol     Kathe Barbosa, RN, BSN  [facility]

## 2021-03-24 RX ORDER — METHOCARBAMOL 500 MG/1
TABLET, FILM COATED ORAL
Qty: 60 TABLET | Refills: 0 | Status: SHIPPED | OUTPATIENT
Start: 2021-03-24 | End: 2021-04-22

## 2021-04-06 DIAGNOSIS — N20.0 NEPHROLITHIASIS: ICD-10-CM

## 2021-04-06 DIAGNOSIS — G89.4 CHRONIC PAIN SYNDROME: ICD-10-CM

## 2021-04-06 DIAGNOSIS — R10.9 FLANK PAIN: ICD-10-CM

## 2021-04-06 DIAGNOSIS — M75.101 ROTATOR CUFF SYNDROME, RIGHT: ICD-10-CM

## 2021-04-06 DIAGNOSIS — F32.4 MAJOR DEPRESSIVE DISORDER WITH SINGLE EPISODE, IN PARTIAL REMISSION (H): Chronic | ICD-10-CM

## 2021-04-06 RX ORDER — TAMSULOSIN HYDROCHLORIDE 0.4 MG/1
CAPSULE ORAL
Qty: 30 CAPSULE | Refills: 0 | Status: SHIPPED | OUTPATIENT
Start: 2021-04-06 | End: 2021-05-06

## 2021-04-06 RX ORDER — HYDROCODONE BITARTRATE AND ACETAMINOPHEN 7.5; 325 MG/1; MG/1
1 TABLET ORAL EVERY 4 HOURS PRN
Qty: 30 TABLET | Refills: 0 | Status: SHIPPED | OUTPATIENT
Start: 2021-04-06 | End: 2021-06-08

## 2021-04-12 ENCOUNTER — IMMUNIZATION (OUTPATIENT)
Dept: NURSING | Facility: CLINIC | Age: 56
End: 2021-04-12
Attending: FAMILY MEDICINE
Payer: COMMERCIAL

## 2021-04-12 PROCEDURE — 0002A PR COVID VAC PFIZER DIL RECON 30 MCG/0.3 ML IM: CPT

## 2021-04-12 PROCEDURE — 91300 PR COVID VAC PFIZER DIL RECON 30 MCG/0.3 ML IM: CPT

## 2021-04-22 DIAGNOSIS — M62.838 MUSCLE SPASM OF RIGHT SHOULDER: ICD-10-CM

## 2021-04-22 DIAGNOSIS — Z98.890 S/P ROTATOR CUFF REPAIR: ICD-10-CM

## 2021-04-23 RX ORDER — METHOCARBAMOL 500 MG/1
TABLET, FILM COATED ORAL
Qty: 60 TABLET | Refills: 0 | Status: SHIPPED | OUTPATIENT
Start: 2021-04-23 | End: 2021-06-08

## 2021-04-23 NOTE — TELEPHONE ENCOUNTER
Requested Prescriptions   Pending Prescriptions Disp Refills     methocarbamol (ROBAXIN) 500 MG tablet 60 tablet 0       There is no refill protocol information for this order        Last Written Prescription Date: 3/24/21  Last Fill Quantity: 60,  # refills: 0   Last office visit: 10/27/2020 with prescribing provider    Routing refill request to provider for review/approval because:  Drug not on the [facility] refill protocol     Cynthia CHRISTIAN-RN  Triage Nurse  [facility]: [facility]

## 2021-05-04 DIAGNOSIS — F32.4 MAJOR DEPRESSIVE DISORDER WITH SINGLE EPISODE, IN PARTIAL REMISSION (H): Chronic | ICD-10-CM

## 2021-05-04 NOTE — TELEPHONE ENCOUNTER
I see one month of prozac was sent to pharmacy by Juan Antonio Pak 4/6/21.  No documentation attached?    See follow up plan in Epic:          10/27/2020 Juan Antonio Pak PA-C Office Visit  Return in about 1 year (around 10/27/2021) for Physical Exam.     PHQ-9 score:    PHQ 7/8/2020   PHQ-9 Total Score 11   Q9: Thoughts of better off dead/self-harm past 2 weeks Not at all       Routing refill request to provider for review/approval because:  Unsure of plan for this med, not due for follow up until this October.    Asia León, RN  [facility]

## 2021-05-05 DIAGNOSIS — R10.9 FLANK PAIN: ICD-10-CM

## 2021-05-05 DIAGNOSIS — N20.0 NEPHROLITHIASIS: ICD-10-CM

## 2021-05-05 NOTE — TELEPHONE ENCOUNTER
Last Written Prescription Date:  4/6/21  Last Fill Quantity: 30,  # refills: 0   Last office visit: 10/27/2020 with prescribing provider:  Juan Antonio Pak   Future Office Visit: none    Routing refill request to provider for review/approval because:  Failed protocol due to patient on Sildenafil    Kathe Barbosa RN, BSN  [facility]

## 2021-05-06 RX ORDER — TAMSULOSIN HYDROCHLORIDE 0.4 MG/1
CAPSULE ORAL
Qty: 30 CAPSULE | Refills: 0 | Status: SHIPPED | OUTPATIENT
Start: 2021-05-06 | End: 2021-06-08

## 2021-05-06 NOTE — TELEPHONE ENCOUNTER
oswald is due for a visit with me. Schedule him for a virtual (video or phone based on patient preference. Always promote a video visit first) visit with me.

## 2021-06-04 DIAGNOSIS — F32.4 MAJOR DEPRESSIVE DISORDER WITH SINGLE EPISODE, IN PARTIAL REMISSION (H): Chronic | ICD-10-CM

## 2021-06-04 NOTE — TELEPHONE ENCOUNTER
"Prozac refill    See provider note in 5/6/21 refill encounter for the same:    Juan Antonio Pak PA-C  to Be Reception       6:06 AM  Note     oswald is due for a visit with me. Schedule him for a virtual (video or phone based on patient preference. Always promote a video visit first) visit with me.          I see one call attempt was made.    PHQ-9 score:    PHQ 7/8/2020   PHQ-9 Total Score 11   Q9: Thoughts of better off dead/self-harm past 2 weeks Not at all         Due for virtual visit for med check/mood follow up.    I called and spoke to patient, scheduled for phone visit next Tuesday, he states he will not run out of med before then.    \"Refusal\" routed back to pharmacy with note.    Asia León RN  [facility]                  "

## 2021-06-08 ENCOUNTER — VIRTUAL VISIT (OUTPATIENT)
Dept: FAMILY MEDICINE | Facility: CLINIC | Age: 56
End: 2021-06-08
Payer: COMMERCIAL

## 2021-06-08 DIAGNOSIS — R10.9 FLANK PAIN: ICD-10-CM

## 2021-06-08 DIAGNOSIS — M62.838 MUSCLE SPASM OF RIGHT SHOULDER: ICD-10-CM

## 2021-06-08 DIAGNOSIS — R37 SEXUAL DYSFUNCTION: ICD-10-CM

## 2021-06-08 DIAGNOSIS — N20.0 NEPHROLITHIASIS: ICD-10-CM

## 2021-06-08 DIAGNOSIS — E78.00 PURE HYPERCHOLESTEROLEMIA: ICD-10-CM

## 2021-06-08 DIAGNOSIS — J45.20 MILD INTERMITTENT ASTHMA, UNSPECIFIED WHETHER COMPLICATED: ICD-10-CM

## 2021-06-08 DIAGNOSIS — M75.101 ROTATOR CUFF SYNDROME, RIGHT: ICD-10-CM

## 2021-06-08 DIAGNOSIS — F32.4 MAJOR DEPRESSIVE DISORDER WITH SINGLE EPISODE, IN PARTIAL REMISSION (H): Chronic | ICD-10-CM

## 2021-06-08 DIAGNOSIS — G89.4 CHRONIC PAIN SYNDROME: ICD-10-CM

## 2021-06-08 DIAGNOSIS — N40.1 BENIGN PROSTATIC HYPERPLASIA WITH URINARY FREQUENCY: ICD-10-CM

## 2021-06-08 DIAGNOSIS — E78.5 HYPERLIPIDEMIA LDL GOAL <100: Chronic | ICD-10-CM

## 2021-06-08 DIAGNOSIS — Z98.890 S/P ROTATOR CUFF REPAIR: ICD-10-CM

## 2021-06-08 DIAGNOSIS — R35.0 BENIGN PROSTATIC HYPERPLASIA WITH URINARY FREQUENCY: ICD-10-CM

## 2021-06-08 PROCEDURE — 99214 OFFICE O/P EST MOD 30 MIN: CPT | Mod: 95 | Performed by: PHYSICIAN ASSISTANT

## 2021-06-08 PROCEDURE — 96127 BRIEF EMOTIONAL/BEHAV ASSMT: CPT | Mod: 95 | Performed by: PHYSICIAN ASSISTANT

## 2021-06-08 RX ORDER — SIMVASTATIN 20 MG
TABLET ORAL
Qty: 90 TABLET | Refills: 3 | Status: SHIPPED | OUTPATIENT
Start: 2021-06-08 | End: 2023-03-03

## 2021-06-08 RX ORDER — SILDENAFIL 100 MG/1
TABLET, FILM COATED ORAL
Qty: 10 TABLET | Refills: 0 | Status: SHIPPED | OUTPATIENT
Start: 2021-06-08 | End: 2021-07-15

## 2021-06-08 RX ORDER — ALBUTEROL SULFATE 90 UG/1
2 AEROSOL, METERED RESPIRATORY (INHALATION) EVERY 6 HOURS PRN
Qty: 90 G | Refills: 3 | Status: SHIPPED | OUTPATIENT
Start: 2021-06-08 | End: 2021-10-08

## 2021-06-08 RX ORDER — TAMSULOSIN HYDROCHLORIDE 0.4 MG/1
CAPSULE ORAL
Qty: 90 CAPSULE | Refills: 3 | Status: SHIPPED | OUTPATIENT
Start: 2021-06-08 | End: 2022-08-19

## 2021-06-08 RX ORDER — METHOCARBAMOL 500 MG/1
TABLET, FILM COATED ORAL
Qty: 60 TABLET | Refills: 0 | Status: SHIPPED | OUTPATIENT
Start: 2021-06-08 | End: 2021-07-15

## 2021-06-08 RX ORDER — FINASTERIDE 5 MG/1
TABLET, FILM COATED ORAL
Qty: 90 TABLET | Refills: 1 | Status: SHIPPED | OUTPATIENT
Start: 2021-06-08 | End: 2021-12-11

## 2021-06-08 RX ORDER — HYDROCODONE BITARTRATE AND ACETAMINOPHEN 7.5; 325 MG/1; MG/1
1 TABLET ORAL EVERY 4 HOURS PRN
Qty: 30 TABLET | Refills: 0 | Status: SHIPPED | OUTPATIENT
Start: 2021-06-08 | End: 2021-07-15

## 2021-06-08 ASSESSMENT — ANXIETY QUESTIONNAIRES
IF YOU CHECKED OFF ANY PROBLEMS ON THIS QUESTIONNAIRE, HOW DIFFICULT HAVE THESE PROBLEMS MADE IT FOR YOU TO DO YOUR WORK, TAKE CARE OF THINGS AT HOME, OR GET ALONG WITH OTHER PEOPLE: NOT DIFFICULT AT ALL
6. BECOMING EASILY ANNOYED OR IRRITABLE: NOT AT ALL
7. FEELING AFRAID AS IF SOMETHING AWFUL MIGHT HAPPEN: NOT AT ALL
5. BEING SO RESTLESS THAT IT IS HARD TO SIT STILL: NOT AT ALL
1. FEELING NERVOUS, ANXIOUS, OR ON EDGE: NOT AT ALL
GAD7 TOTAL SCORE: 0
3. WORRYING TOO MUCH ABOUT DIFFERENT THINGS: NOT AT ALL
2. NOT BEING ABLE TO STOP OR CONTROL WORRYING: NOT AT ALL

## 2021-06-08 ASSESSMENT — PATIENT HEALTH QUESTIONNAIRE - PHQ9
SUM OF ALL RESPONSES TO PHQ QUESTIONS 1-9: 0
5. POOR APPETITE OR OVEREATING: NOT AT ALL

## 2021-06-08 NOTE — PROGRESS NOTES
Maverick is a 56 year old who is being evaluated via a billable telephone visit.      What phone number would you like to be contacted at? [phone]   How would you like to obtain your AVS? Mail a copy        Subjective   Maverick is a 56 year old who presents for the following health issues    HPI     Depression and Anxiety Follow-Up    How are you doing with your depression since your last visit? No change    How are you doing with your anxiety since your last visit?  No change    Are you having other symptoms that might be associated with depression or anxiety? No    Have you had a significant life event? No     Do you have any concerns with your use of alcohol or other drugs? No  No depression currently. No anhedonia. Sleeping ok. No panic attacks.    Social History     Tobacco Use     Smoking status: Never Smoker     Smokeless tobacco: Never Used   Substance Use Topics     Alcohol use: No     Drug use: No     PHQ 9/26/2019 10/23/2019 7/8/2020   PHQ-9 Total Score 7 3 11   Q9: Thoughts of better off dead/self-harm past 2 weeks Not at all Not at all Not at all     JEIMY-7 SCORE 4/18/2019 8/22/2019 7/8/2020   Total Score 17 (severe anxiety) - -   Total Score 17 20 8     Last PHQ-9 7/8/2020   1.  Little interest or pleasure in doing things 2   2.  Feeling down, depressed, or hopeless 1   3.  Trouble falling or staying asleep, or sleeping too much 1   4.  Feeling tired or having little energy 2   5.  Poor appetite or overeating 2   6.  Feeling bad about yourself 1   7.  Trouble concentrating 2   8.  Moving slowly or restless 0   Q9: Thoughts of better off dead/self-harm past 2 weeks 0   PHQ-9 Total Score 11   Difficulty at work, home, or with people Not difficult at all     JEIMY-7  7/8/2020   1. Feeling nervous, anxious, or on edge 1   2. Not being able to stop or control worrying 0   3. Worrying too much about different things 0   4. Trouble relaxing 2   5. Being so restless that it is hard to sit still 2   6. Becoming  easily annoyed or irritable 2   7. Feeling afraid, as if something awful might happen 1   JEIMY-7 Total Score 8   If you checked any problems, how difficult have they made it for you to do your work, take care of things at home, or get along with other people? Somewhat difficult       Suicide Assessment Five-step Evaluation and Treatment (SAFE-T)      Asthma Follow-Up  Doing well. No inhalers recently.       Chronic Pain Follow-Up    Where in your body do you have pain? Lower back and right shoulder  How has your pain affected your ability to work? NO  Which of these pain treatments have you tried since your last clinic visit? Chiropractor  How well are you sleeping? Fair  How has your mood been since your last visit? About the same  Have you had a significant life event? No  Uses norco intermittently. Low back pain and shoulder pain stable. Not profoundly limiting/effecting his qol.      Eileen Sidhu [facility]    PHQ-9 SCORE 9/26/2019 10/23/2019 7/8/2020   PHQ-9 Total Score - - -   PHQ-9 Total Score MyChart 7 (Mild depression) - -   PHQ-9 Total Score 7 3 11     JEIMY-7 SCORE 4/18/2019 8/22/2019 7/8/2020   Total Score 17 (severe anxiety) - -   Total Score 17 20 8     No flowsheet data found.  Encounter-Level CSA - 07/09/2015:    Controlled Substance Agreement - Scan on 7/13/2015  2:12 PM: [facility], Controlled Substance Contract, 07-09-15     Patient-Level CSA:    Controlled Substance Agreement - Opioid - Scan on 8/22/2019  1:20 PM               Review of Systems   Constitutional, HEENT, cardiovascular, pulmonary, GI, , musculoskeletal, neuro, skin, endocrine and psych systems are negative, except as otherwise noted.      Objective           Vitals:  No vitals were obtained today due to virtual visit.    Physical Exam   healthy, alert and no distress  PSYCH: Alert and oriented times 3; coherent speech, normal   rate and volume, able to articulate logical thoughts, able   to abstract reason, no tangential thoughts, no  hallucinations   or delusions  His affect is normal  RESP: No cough, no audible wheezing, able to talk in full sentences  Remainder of exam unable to be completed due to telephone visits    Giovanni was seen today for telephone.    Diagnoses and all orders for this visit:    Major depressive disorder with single episode, in partial remission (H)  -     FLUoxetine (PROZAC) 20 MG capsule; Take 1 capsule (20 mg) by mouth daily    Mild intermittent asthma, unspecified whether complicated  -     albuterol (PROAIR HFA/PROVENTIL HFA/VENTOLIN HFA) 108 (90 Base) MCG/ACT inhaler; Inhale 2 puffs into the lungs every 6 hours as needed for shortness of breath / dyspnea or wheezing    Flank pain  -     tamsulosin (FLOMAX) 0.4 MG capsule; TAKE 1 CAPSULE(0.4 MG) BY MOUTH DAILY    Nephrolithiasis  -     tamsulosin (FLOMAX) 0.4 MG capsule; TAKE 1 CAPSULE(0.4 MG) BY MOUTH DAILY    Hyperlipidemia LDL goal <100  -     simvastatin (ZOCOR) 20 MG tablet; TAKE 1 TABLET BY MOUTH EVERY DAY AT BEDTIME    Pure hypercholesterolemia  -     simvastatin (ZOCOR) 20 MG tablet; TAKE 1 TABLET BY MOUTH EVERY DAY AT BEDTIME    Benign prostatic hyperplasia with urinary frequency  -     finasteride (PROSCAR) 5 MG tablet; TAKE 1 TABLET(5 MG) BY MOUTH DAILY    Rotator cuff syndrome, right  -     HYDROcodone-acetaminophen (NORCO) 7.5-325 MG per tablet; Take 1 tablet by mouth every 4 hours as needed for pain maximum  4  tablet(s) per day    Chronic pain syndrome  -     HYDROcodone-acetaminophen (NORCO) 7.5-325 MG per tablet; Take 1 tablet by mouth every 4 hours as needed for pain maximum  4  tablet(s) per day    S/P rotator cuff repair  -     methocarbamol (ROBAXIN) 500 MG tablet; TAKE 1 TO 2 TABLETS(500 TO 1000 MG) BY MOUTH TWICE DAILY AS NEEDED FOR MUSCLE SPASMS    Muscle spasm of right shoulder  -     methocarbamol (ROBAXIN) 500 MG tablet; TAKE 1 TO 2 TABLETS(500 TO 1000 MG) BY MOUTH TWICE DAILY AS NEEDED FOR MUSCLE SPASMS    Sexual dysfunction  -      sildenafil (VIAGRA) 100 MG tablet; TAKE ONE TABLET BY MOUTH EVERY DAY AS NEEDED    Other orders  -     REVIEW OF HEALTH MAINTENANCE PROTOCOL ORDERS      work on lifestyle modification  Advised supportive and symptomatic treatment.  Follow up with Provider - if condition persists or worsens.       Phone call duration: 8 minutes

## 2021-06-09 ASSESSMENT — ASTHMA QUESTIONNAIRES: ACT_TOTALSCORE: 25

## 2021-06-09 ASSESSMENT — ANXIETY QUESTIONNAIRES: GAD7 TOTAL SCORE: 0

## 2021-06-10 DIAGNOSIS — R35.0 BENIGN PROSTATIC HYPERPLASIA WITH URINARY FREQUENCY: ICD-10-CM

## 2021-06-10 DIAGNOSIS — N40.1 BENIGN PROSTATIC HYPERPLASIA WITH URINARY FREQUENCY: ICD-10-CM

## 2021-06-12 RX ORDER — FINASTERIDE 5 MG/1
TABLET, FILM COATED ORAL
Qty: 90 TABLET | Refills: 1 | OUTPATIENT
Start: 2021-06-12

## 2021-06-22 DIAGNOSIS — G25.81 RESTLESS LEG SYNDROME: ICD-10-CM

## 2021-06-22 RX ORDER — GABAPENTIN 300 MG/1
CAPSULE ORAL
Qty: 60 CAPSULE | Refills: 11 | OUTPATIENT
Start: 2021-06-22

## 2021-07-14 DIAGNOSIS — G89.4 CHRONIC PAIN SYNDROME: ICD-10-CM

## 2021-07-14 DIAGNOSIS — R37 SEXUAL DYSFUNCTION: ICD-10-CM

## 2021-07-14 DIAGNOSIS — Z98.890 S/P ROTATOR CUFF REPAIR: ICD-10-CM

## 2021-07-14 DIAGNOSIS — M75.101 ROTATOR CUFF SYNDROME, RIGHT: ICD-10-CM

## 2021-07-14 DIAGNOSIS — M62.838 MUSCLE SPASM OF RIGHT SHOULDER: ICD-10-CM

## 2021-07-15 RX ORDER — SILDENAFIL 100 MG/1
TABLET, FILM COATED ORAL
Qty: 10 TABLET | Refills: 0 | Status: SHIPPED | OUTPATIENT
Start: 2021-07-15 | End: 2021-12-14

## 2021-07-15 RX ORDER — METHOCARBAMOL 500 MG/1
TABLET, FILM COATED ORAL
Qty: 60 TABLET | Refills: 0 | Status: SHIPPED | OUTPATIENT
Start: 2021-07-15 | End: 2021-09-30

## 2021-07-15 RX ORDER — HYDROCODONE BITARTRATE AND ACETAMINOPHEN 7.5; 325 MG/1; MG/1
1 TABLET ORAL EVERY 4 HOURS PRN
Qty: 30 TABLET | Refills: 0 | Status: SHIPPED | OUTPATIENT
Start: 2021-07-15 | End: 2021-08-19

## 2021-07-28 ENCOUNTER — OFFICE VISIT (OUTPATIENT)
Dept: ORTHOPEDICS | Facility: CLINIC | Age: 56
End: 2021-07-28
Payer: COMMERCIAL

## 2021-07-28 ENCOUNTER — ANCILLARY PROCEDURE (OUTPATIENT)
Dept: GENERAL RADIOLOGY | Facility: CLINIC | Age: 56
End: 2021-07-28
Attending: ORTHOPAEDIC SURGERY
Payer: COMMERCIAL

## 2021-07-28 VITALS
WEIGHT: 132.9 LBS | HEART RATE: 80 BPM | SYSTOLIC BLOOD PRESSURE: 115 MMHG | DIASTOLIC BLOOD PRESSURE: 83 MMHG | BODY MASS INDEX: 23.55 KG/M2 | HEIGHT: 63 IN

## 2021-07-28 DIAGNOSIS — M77.01 MEDIAL EPICONDYLITIS, RIGHT ELBOW: Primary | ICD-10-CM

## 2021-07-28 DIAGNOSIS — M25.521 RIGHT ELBOW PAIN: ICD-10-CM

## 2021-07-28 PROCEDURE — 73080 X-RAY EXAM OF ELBOW: CPT | Mod: RT | Performed by: RADIOLOGY

## 2021-07-28 PROCEDURE — 99213 OFFICE O/P EST LOW 20 MIN: CPT | Mod: 25 | Performed by: ORTHOPAEDIC SURGERY

## 2021-07-28 PROCEDURE — 20605 DRAIN/INJ JOINT/BURSA W/O US: CPT | Mod: RT | Performed by: ORTHOPAEDIC SURGERY

## 2021-07-28 RX ADMIN — BUPIVACAINE HYDROCHLORIDE 3 ML: 2.5 INJECTION, SOLUTION INFILTRATION; PERINEURAL at 09:44

## 2021-07-28 RX ADMIN — TRIAMCINOLONE ACETONIDE 40 MG: 40 INJECTION, SUSPENSION INTRA-ARTICULAR; INTRAMUSCULAR at 09:44

## 2021-07-28 ASSESSMENT — MIFFLIN-ST. JEOR: SCORE: 1317.96

## 2021-07-28 ASSESSMENT — PAIN SCALES - GENERAL: PAINLEVEL: SEVERE PAIN (7)

## 2021-07-28 NOTE — LETTER
7/28/2021         RE: Giovanni Cedeno  [id number]  [place] [address]        Dear Colleague,    Thank you for referring your patient, Giovanni Cedeno, to the [facility] [place]. Please see a copy of my visit note below.    Chief Complaint:   Chief Complaint   Patient presents with     Right Elbow - Pain     2 weeks ago he began to notice pain on the medial aspect of his elbow without injury or trauma. He notices pain with elbow flexion, pain is rated as 7/10. He has used ice to help with the pain.  He is right handed.         HPI: Giovanni Cedeno is a 56 year old male , right -hand dominant, who presents for evaluation and management of a right elbow pain, without specific injury. Pain has been present for 2 weeks. Pain with bending the elbow . Locates pain to the inner aspect of the elbow. Treatment with ice. No ointments or over the counter medications. No bracing.     Prior right elbow lateral epicondylitis treated with numerous injections, eventual surgery 5/2014.      He reports having mild pain/discomfort at the elbow. He denies numbness or tingling.   Pain severity: 7/10  Pain quality: aching  Frequency of symptoms: occasionally  Associated symptoms: denies  Aggravated by: bending the elbow, washing his face, lifting  Relieved by: ice, rest.    Treatment up to this point:ice  Has not tried: Heat, Tylenol, NSAIDS, PT, Corticosteroid injection and bracing  Prior history of related problems: previous right elbow lateral epicondylitis, left elbow lateral epicondylitis.    Usual level of work activity: heavy  - .    Past medical history:  has a past medical history of Arthritis.   Patient Active Problem List   Diagnosis     Restless leg syndrome     Knee bursitis     Pure hypercholesterolemia     Major depression in partial remission (H)     Pre-diabetes     Epicondylitis, lateral humeral     Immunization reaction     Chronic pain  syndrome     Chronic right shoulder pain     Abdominal pain, left upper quadrant     Pain of toe of right foot     Acute midline low back pain without sciatica     Upper back pain     Mild intermittent asthma, unspecified whether complicated     Incomplete tear of right rotator cuff     Impingement syndrome of shoulder, right     Degeneration of lumbar or lumbosacral intervertebral disc     Adjustment disorder with depressed mood     Mixed hyperlipidemia     Inguinal hernia       Past surgical history:  has no past surgical history on file.     Medications:   Current Outpatient Medications:      albuterol (PROAIR HFA/PROVENTIL HFA/VENTOLIN HFA) 108 (90 Base) MCG/ACT inhaler, Inhale 2 puffs into the lungs every 6 hours as needed for shortness of breath / dyspnea or wheezing, Disp: 90 g, Rfl: 3     carbidopa-levodopa (SINEMET)  MG tablet, TAKE 1 TABLET BY MOUTH AT BEDTIME, Disp: 90 tablet, Rfl: 5     celecoxib (CELEBREX) 200 MG capsule, TAKE 1 CAPSULE(200 MG) BY MOUTH TWICE DAILY, Disp: 60 capsule, Rfl: 11     diclofenac (VOLTAREN) 75 MG EC tablet, TAKE 1 TABLET(75 MG) BY MOUTH TWICE DAILY, Disp: 30 tablet, Rfl: 2     finasteride (PROSCAR) 5 MG tablet, TAKE 1 TABLET(5 MG) BY MOUTH DAILY, Disp: 90 tablet, Rfl: 1     FLUoxetine (PROZAC) 20 MG capsule, Take 1 capsule (20 mg) by mouth daily, Disp: 90 capsule, Rfl: 1     gabapentin (NEURONTIN) 300 MG capsule, TAKE 2 CAPSULES BY MOUTH EVERY NIGHT AT BEDTIME, Disp: 60 capsule, Rfl: 11     HYDROcodone-acetaminophen (NORCO) 7.5-325 MG per tablet, Take 1 tablet by mouth every 4 hours as needed for pain maximum  4  tablet(s) per day, Disp: 30 tablet, Rfl: 0     methocarbamol (ROBAXIN) 500 MG tablet, TAKE 1 TO 2 TABLETS(500 TO 1000 MG) BY MOUTH TWICE DAILY AS NEEDED FOR MUSCLE SPASMS, Disp: 60 tablet, Rfl: 0     mirtazapine (REMERON) 15 MG tablet, Take 1 tablet (15 mg) by mouth At Bedtime, Disp: 90 tablet, Rfl: 3     Misc Natural Products (OSTEO BI-FLEX TRIPLE STRENGTH PO),  "Take 1 tablet by mouth daily, Disp: , Rfl:      senna-docusate (SENOKOT-S/PERICOLACE) 8.6-50 MG tablet, Take 1-2 tablets by mouth 2 times daily as needed for constipation (Patient not taking: Reported on 6/8/2021), Disp: 100 tablet, Rfl: 11     sildenafil (VIAGRA) 100 MG tablet, TAKE ONE TABLET BY MOUTH EVERY DAY AS NEEDED, Disp: 10 tablet, Rfl: 0     simvastatin (ZOCOR) 20 MG tablet, TAKE 1 TABLET BY MOUTH EVERY DAY AT BEDTIME, Disp: 90 tablet, Rfl: 3     tamsulosin (FLOMAX) 0.4 MG capsule, TAKE 1 CAPSULE(0.4 MG) BY MOUTH DAILY, Disp: 90 capsule, Rfl: 3     Allergies:   No Known Allergies     Family History: family history includes Diabetes in his father; Heart Disease in his father.     Social History: .  reports that he has never smoked. He has never used smokeless tobacco. He reports that he does not drink alcohol and does not use drugs.    Review of Systems:  ROS: 10 point ROS neg other than the symptoms noted above in the HPI and past medical history.    Physical Exam  GENERAL APPEARANCE: healthy, alert, no distress.   SKIN: no suspicious lesions or rashes  RESPIRATORY: No increased work of breathing.  NEURO: Normal strength and tone, mentation intact and speech normal  PSYCH:  mentation appears normal and affect normal. Not anxious.  Hands: no clubbing, nail pitting or nodes.    /83   Pulse 80   Ht 1.6 m (5' 3\")   Wt 60.3 kg (132 lb 14.4 oz)   BMI 23.54 kg/m         right UPPER EXTREMITY:    Sensation intact to light touch in median, radial, ulnar and axillary nerve distributions  Palpable 2+ radial pulse, brisk capillary refill to all fingers, wwp  Intact epl fpl fdp edc wrist flexion/extension biceps triceps deltoid  DTR's normal biceps and brachioradialis    ELBOW:  Skin intact. No open wounds. No skin maceration. Lateral based incision from previous surgery.  Inspection: no swelling, no ecchymosis, no olecranon bursa swelling, no distal bicep tendon defect  Tender: medial " "epicondyle and common flexor tendon  Non-tender: lateral epicondyle, common extensor tendon, extensor muscle of forearm, flexor muscle of forearm, supracondylar notch, olecranon bursa, distal bicep tendon and radial head/neck  Range of Motion: all normal  Strength: elbow strength full  Special tests: no pain with resisted wrist extension, no pain with resisted middle finger extension, pain with resisted wrist flexion:   There is no gross deformity in the area.          X-rays:  3 views right elbow from 7/28/2021 were reviewed in clinic today. Normal joint spacing. No evidence of fracture or effusion.  Nonunited moderate olecranon spur. Small ossicles adjacent to the lateral epicondyles.      Assessment: 56 year old male , right -hand dominant, with acute medial elbow pain, medial epicondylitis.    Plan:   * discussed with patient history and clinical exam consistent with \"golfer's\" elbow, or medial epicondylitis, which is tendonitis at the medial or inner aspect of the elbow. This is where the flexor tendons originate for the wrist.  * treatment is nonoperative, with surgical treatment reserved for recalcitrant symptoms despite long-term nonperative treatment regimen    Treatment includes:  * rest  * activity modification - avoid aggravating activities and reduce strenuous activities for 6-8 weeks  * ice, ice massage  * Physical therapy, modalities as indicated including ultrasound, massage, iontophoresis  * counterforce elbow brace/strap, available OTC  * NDAIDS regularly three times daily x2-3 weeks  * cortisone injection discussed, placed at point of maximal tenderness . Patient elects today.  * return to clinic as needed.  .    Medium Joint Injection/Arthrocentesis: R elbow    Date/Time: 7/28/2021 9:44 AM  Performed by: Jose G Blanchard MD  Authorized by: Jose G Blanchard MD     Indications:  Pain  Needle Size:  25 G  Guidance: surface landmarks    Approach:  Medial  Location:  Elbow  Site:  R " elbow  Medications:  40 mg triamcinolone 40 MG/ML; 3 mL bupivacaine 0.25 %  Medications comment:  1mL (not 3mL) 0.25% Bupivacaine used with steroid  Procedure discussed: discussed risks, benefits, and alternatives    Consent Given by:  Patient  Timeout: timeout called immediately prior to procedure    Prep: patient was prepped and draped in usual sterile fashion            Jose G Blanchard M.D., M.S.  Dept. of Orthopaedic Surgery  [facility]      Again, thank you for allowing me to participate in the care of your patient.        Sincerely,        Jose G Blanchard MD

## 2021-07-28 NOTE — PROGRESS NOTES
Chief Complaint:   Chief Complaint   Patient presents with     Right Elbow - Pain     2 weeks ago he began to notice pain on the medial aspect of his elbow without injury or trauma. He notices pain with elbow flexion, pain is rated as 7/10. He has used ice to help with the pain.  He is right handed.         HPI: Giovanni Cedeno is a 56 year old male , right -hand dominant, who presents for evaluation and management of a right elbow pain, without specific injury. Pain has been present for 2 weeks. Pain with bending the elbow . Locates pain to the inner aspect of the elbow. Treatment with ice. No ointments or over the counter medications. No bracing.     Prior right elbow lateral epicondylitis treated with numerous injections, eventual surgery 5/2014.      He reports having mild pain/discomfort at the elbow. He denies numbness or tingling.   Pain severity: 7/10  Pain quality: aching  Frequency of symptoms: occasionally  Associated symptoms: denies  Aggravated by: bending the elbow, washing his face, lifting  Relieved by: ice, rest.    Treatment up to this point:ice  Has not tried: Heat, Tylenol, NSAIDS, PT, Corticosteroid injection and bracing  Prior history of related problems: previous right elbow lateral epicondylitis, left elbow lateral epicondylitis.    Usual level of work activity: heavy  - .    Past medical history:  has a past medical history of Arthritis.   Patient Active Problem List   Diagnosis     Restless leg syndrome     Knee bursitis     Pure hypercholesterolemia     Major depression in partial remission (H)     Pre-diabetes     Epicondylitis, lateral humeral     Immunization reaction     Chronic pain syndrome     Chronic right shoulder pain     Abdominal pain, left upper quadrant     Pain of toe of right foot     Acute midline low back pain without sciatica     Upper back pain     Mild intermittent asthma, unspecified whether complicated     Incomplete tear of right  rotator cuff     Impingement syndrome of shoulder, right     Degeneration of lumbar or lumbosacral intervertebral disc     Adjustment disorder with depressed mood     Mixed hyperlipidemia     Inguinal hernia       Past surgical history:  has no past surgical history on file.     Medications:   Current Outpatient Medications:      albuterol (PROAIR HFA/PROVENTIL HFA/VENTOLIN HFA) 108 (90 Base) MCG/ACT inhaler, Inhale 2 puffs into the lungs every 6 hours as needed for shortness of breath / dyspnea or wheezing, Disp: 90 g, Rfl: 3     carbidopa-levodopa (SINEMET)  MG tablet, TAKE 1 TABLET BY MOUTH AT BEDTIME, Disp: 90 tablet, Rfl: 5     celecoxib (CELEBREX) 200 MG capsule, TAKE 1 CAPSULE(200 MG) BY MOUTH TWICE DAILY, Disp: 60 capsule, Rfl: 11     diclofenac (VOLTAREN) 75 MG EC tablet, TAKE 1 TABLET(75 MG) BY MOUTH TWICE DAILY, Disp: 30 tablet, Rfl: 2     finasteride (PROSCAR) 5 MG tablet, TAKE 1 TABLET(5 MG) BY MOUTH DAILY, Disp: 90 tablet, Rfl: 1     FLUoxetine (PROZAC) 20 MG capsule, Take 1 capsule (20 mg) by mouth daily, Disp: 90 capsule, Rfl: 1     gabapentin (NEURONTIN) 300 MG capsule, TAKE 2 CAPSULES BY MOUTH EVERY NIGHT AT BEDTIME, Disp: 60 capsule, Rfl: 11     HYDROcodone-acetaminophen (NORCO) 7.5-325 MG per tablet, Take 1 tablet by mouth every 4 hours as needed for pain maximum  4  tablet(s) per day, Disp: 30 tablet, Rfl: 0     methocarbamol (ROBAXIN) 500 MG tablet, TAKE 1 TO 2 TABLETS(500 TO 1000 MG) BY MOUTH TWICE DAILY AS NEEDED FOR MUSCLE SPASMS, Disp: 60 tablet, Rfl: 0     mirtazapine (REMERON) 15 MG tablet, Take 1 tablet (15 mg) by mouth At Bedtime, Disp: 90 tablet, Rfl: 3     Misc Natural Products (OSTEO BI-FLEX TRIPLE STRENGTH PO), Take 1 tablet by mouth daily, Disp: , Rfl:      senna-docusate (SENOKOT-S/PERICOLACE) 8.6-50 MG tablet, Take 1-2 tablets by mouth 2 times daily as needed for constipation (Patient not taking: Reported on 6/8/2021), Disp: 100 tablet, Rfl: 11     sildenafil (VIAGRA) 100  "MG tablet, TAKE ONE TABLET BY MOUTH EVERY DAY AS NEEDED, Disp: 10 tablet, Rfl: 0     simvastatin (ZOCOR) 20 MG tablet, TAKE 1 TABLET BY MOUTH EVERY DAY AT BEDTIME, Disp: 90 tablet, Rfl: 3     tamsulosin (FLOMAX) 0.4 MG capsule, TAKE 1 CAPSULE(0.4 MG) BY MOUTH DAILY, Disp: 90 capsule, Rfl: 3     Allergies:   No Known Allergies     Family History: family history includes Diabetes in his father; Heart Disease in his father.     Social History: .  reports that he has never smoked. He has never used smokeless tobacco. He reports that he does not drink alcohol and does not use drugs.    Review of Systems:  ROS: 10 point ROS neg other than the symptoms noted above in the HPI and past medical history.    Physical Exam  GENERAL APPEARANCE: healthy, alert, no distress.   SKIN: no suspicious lesions or rashes  RESPIRATORY: No increased work of breathing.  NEURO: Normal strength and tone, mentation intact and speech normal  PSYCH:  mentation appears normal and affect normal. Not anxious.  Hands: no clubbing, nail pitting or nodes.    /83   Pulse 80   Ht 1.6 m (5' 3\")   Wt 60.3 kg (132 lb 14.4 oz)   BMI 23.54 kg/m         right UPPER EXTREMITY:    Sensation intact to light touch in median, radial, ulnar and axillary nerve distributions  Palpable 2+ radial pulse, brisk capillary refill to all fingers, wwp  Intact epl fpl fdp edc wrist flexion/extension biceps triceps deltoid  DTR's normal biceps and brachioradialis    ELBOW:  Skin intact. No open wounds. No skin maceration. Lateral based incision from previous surgery.  Inspection: no swelling, no ecchymosis, no olecranon bursa swelling, no distal bicep tendon defect  Tender: medial epicondyle and common flexor tendon  Non-tender: lateral epicondyle, common extensor tendon, extensor muscle of forearm, flexor muscle of forearm, supracondylar notch, olecranon bursa, distal bicep tendon and radial head/neck  Range of Motion: all normal  Strength: " "elbow strength full  Special tests: no pain with resisted wrist extension, no pain with resisted middle finger extension, pain with resisted wrist flexion:   There is no gross deformity in the area.          X-rays:  3 views right elbow from 7/28/2021 were reviewed in clinic today. Normal joint spacing. No evidence of fracture or effusion.  Nonunited moderate olecranon spur. Small ossicles adjacent to the lateral epicondyles.      Assessment: 56 year old male , right -hand dominant, with acute medial elbow pain, medial epicondylitis.    Plan:   * discussed with patient history and clinical exam consistent with \"golfer's\" elbow, or medial epicondylitis, which is tendonitis at the medial or inner aspect of the elbow. This is where the flexor tendons originate for the wrist.  * treatment is nonoperative, with surgical treatment reserved for recalcitrant symptoms despite long-term nonperative treatment regimen    Treatment includes:  * rest  * activity modification - avoid aggravating activities and reduce strenuous activities for 6-8 weeks  * ice, ice massage  * Physical therapy, modalities as indicated including ultrasound, massage, iontophoresis  * counterforce elbow brace/strap, available OTC  * NDAIDS regularly three times daily x2-3 weeks  * cortisone injection discussed, placed at point of maximal tenderness . Patient elects today.  * return to clinic as needed.  .    Medium Joint Injection/Arthrocentesis: R elbow    Date/Time: 7/28/2021 9:44 AM  Performed by: Jose G Blanchard MD  Authorized by: Jose G Blanchard MD     Indications:  Pain  Needle Size:  25 G  Guidance: surface landmarks    Approach:  Medial  Location:  Elbow  Site:  R elbow  Medications:  40 mg triamcinolone 40 MG/ML; 3 mL bupivacaine 0.25 %  Medications comment:  1mL (not 3mL) 0.25% Bupivacaine used with steroid  Procedure discussed: discussed risks, benefits, and alternatives    Consent Given by:  Patient  Timeout: timeout called immediately " prior to procedure    Prep: patient was prepped and draped in usual sterile fashion            Jose G Blanchard M.D., M.S.  Dept. of Orthopaedic Surgery  [facility]

## 2021-07-29 RX ORDER — TRIAMCINOLONE ACETONIDE 40 MG/ML
40 INJECTION, SUSPENSION INTRA-ARTICULAR; INTRAMUSCULAR
Status: DISCONTINUED | OUTPATIENT
Start: 2021-07-28 | End: 2022-04-11

## 2021-07-29 RX ORDER — BUPIVACAINE HYDROCHLORIDE 2.5 MG/ML
3 INJECTION, SOLUTION INFILTRATION; PERINEURAL
Status: DISCONTINUED | OUTPATIENT
Start: 2021-07-28 | End: 2022-04-11

## 2021-08-18 DIAGNOSIS — M75.101 ROTATOR CUFF SYNDROME, RIGHT: ICD-10-CM

## 2021-08-18 DIAGNOSIS — G89.4 CHRONIC PAIN SYNDROME: ICD-10-CM

## 2021-08-18 NOTE — TELEPHONE ENCOUNTER
Requested Prescriptions   Pending Prescriptions Disp Refills     HYDROcodone-acetaminophen (NORCO) 7.5-325 MG per tablet 30 tablet 0     Sig: Take 1 tablet by mouth every 4 hours as needed for pain maximum  4  tablet(s) per day       There is no refill protocol information for this order        Last Written Prescription Date:  7/15/21  Last Fill Quantity: 30,  # refills: 0   Last office visit: 6/8/21 with prescribing provider  Routing refill request to provider for review/approval because:  Drug not on the [facility] refill protocol

## 2021-08-18 NOTE — TELEPHONE ENCOUNTER
Reason for Call:  Medication or medication refill:    Do you use a [facility]?  Name of the pharmacy and phone number for the current request:    [id number] [address] [facility] [address]    Name of the medication requested: HYDROcodone 7.5-325 mg and methocarbamol 500 mg     Other request: would like prescriptions refilled    Can we leave a detailed message on this number? YES    Phone number patient can be reached at: Cell number on file:    Telephone Information:   Mobile [phone]       Best Time: anytime    Call taken on 8/18/2021 at 1:19 PM by Honey Boone

## 2021-08-19 RX ORDER — HYDROCODONE BITARTRATE AND ACETAMINOPHEN 7.5; 325 MG/1; MG/1
1 TABLET ORAL EVERY 4 HOURS PRN
Qty: 30 TABLET | Refills: 0 | Status: SHIPPED | OUTPATIENT
Start: 2021-08-19 | End: 2021-09-30

## 2021-09-29 DIAGNOSIS — M62.838 MUSCLE SPASM OF RIGHT SHOULDER: ICD-10-CM

## 2021-09-29 DIAGNOSIS — G89.4 CHRONIC PAIN SYNDROME: ICD-10-CM

## 2021-09-29 DIAGNOSIS — M75.101 ROTATOR CUFF SYNDROME, RIGHT: ICD-10-CM

## 2021-09-29 DIAGNOSIS — Z98.890 S/P ROTATOR CUFF REPAIR: ICD-10-CM

## 2021-09-29 NOTE — TELEPHONE ENCOUNTER
Patient requesting refill of HYDROcodone-acetaminophen (NORCO) 7.5-325 MG per tablet, methocarbamol (ROBAXIN) 500 MG tablet. Please send to [facility] off [facility].

## 2021-09-29 NOTE — TELEPHONE ENCOUNTER
Requested Prescriptions   Pending Prescriptions Disp Refills     HYDROcodone-acetaminophen (NORCO) 7.5-325 MG per tablet 30 tablet 0     Sig: Take 1 tablet by mouth every 4 hours as needed for pain maximum  4  tablet(s) per day       There is no refill protocol information for this order        methocarbamol (ROBAXIN) 500 MG tablet 60 tablet 0     Sig: TAKE 1 TO 2 TABLETS(500 TO 1000 MG) BY MOUTH TWICE DAILY AS NEEDED FOR MUSCLE SPASMS       There is no refill protocol information for this order        Last office visit: 6/8/21with prescribing provider    Routing refill request to provider for review/approval because:  Drugs not on the [facility] refill protocol     CynthiaRN,BSN  Triage Nurse  [facility]: [facility]

## 2021-09-30 RX ORDER — HYDROCODONE BITARTRATE AND ACETAMINOPHEN 7.5; 325 MG/1; MG/1
1 TABLET ORAL EVERY 4 HOURS PRN
Qty: 30 TABLET | Refills: 0 | Status: SHIPPED | OUTPATIENT
Start: 2021-09-30 | End: 2021-12-14

## 2021-09-30 RX ORDER — METHOCARBAMOL 500 MG/1
TABLET, FILM COATED ORAL
Qty: 60 TABLET | Refills: 0 | Status: SHIPPED | OUTPATIENT
Start: 2021-09-30 | End: 2021-11-24

## 2021-10-06 DIAGNOSIS — J45.20 MILD INTERMITTENT ASTHMA, UNSPECIFIED WHETHER COMPLICATED: ICD-10-CM

## 2021-10-07 DIAGNOSIS — R10.9 FLANK PAIN: ICD-10-CM

## 2021-10-07 NOTE — TELEPHONE ENCOUNTER
"Requested Prescriptions   Pending Prescriptions Disp Refills     diclofenac (VOLTAREN) 75 MG EC tablet 30 tablet 2     Sig: TAKE 1 TABLET(75 MG) BY MOUTH TWICE DAILY       NSAID Medications Failed - 10/7/2021 12:59 PM        Failed - Normal ALT on file in past 12 months     Recent Labs   Lab Test 07/21/20  0832   ALT 24             Failed - Normal AST on file in past 12 months     Recent Labs   Lab Test 07/21/20  0832   AST 13             Failed - Normal CBC on file in past 12 months     Recent Labs   Lab Test 11/14/17  0846   WBC 6.4   RBC 4.71   HGB 14.7   HCT 44.3                    Failed - Normal serum creatinine on file in past 12 months     Recent Labs   Lab Test 07/21/20  0832   CR 0.97       Ok to refill medication if creatinine is low          Passed - Blood pressure under 140/90 in past 12 months     BP Readings from Last 3 Encounters:   07/28/21 115/83   01/14/21 126/74   10/27/20 134/80                 Passed - Recent (12 mo) or future (30 days) visit within the authorizing provider's specialty     Patient has had an office visit with the authorizing provider or a provider within the authorizing providers department within the previous 12 mos or has a future within next 30 days. See \"Patient Info\" tab in inbasket, or \"Choose Columns\" in Meds & Orders section of the refill encounter.              Passed - Patient is age 6-64 years        Passed - Medication is active on med list           Routing refill request to provider for review/approval because:  Failed protocols      Gunjan Lorenzana RN    "

## 2021-10-07 NOTE — TELEPHONE ENCOUNTER
Patient requesting refill of diclofenac (VOLTAREN) 75 MG EC tablet. Patient states his kidney stone is back. Please send to [facility].

## 2021-10-08 ENCOUNTER — NURSE TRIAGE (OUTPATIENT)
Dept: FAMILY MEDICINE | Facility: CLINIC | Age: 56
End: 2021-10-08

## 2021-10-08 RX ORDER — DICLOFENAC SODIUM 75 MG/1
TABLET, DELAYED RELEASE ORAL
Qty: 30 TABLET | Refills: 2 | Status: SHIPPED | OUTPATIENT
Start: 2021-10-08 | End: 2024-01-25

## 2021-10-08 RX ORDER — ALBUTEROL SULFATE 90 UG/1
AEROSOL, METERED RESPIRATORY (INHALATION)
Qty: 8.5 G | Refills: 0 | Status: SHIPPED | OUTPATIENT
Start: 2021-10-08 | End: 2021-11-06

## 2021-10-08 NOTE — TELEPHONE ENCOUNTER
Patient calling, see triage below:      SEE TODAY IN OFFICE:   * You need to be examined today. Let me give you an appointment.  No openings in clinic this afternoon, patient declines to go to another clinic, actually is in [place] right now, he is doing some remodeling on a house and is currently laying on a heating pad on the client's couch.    See triage, he wants to schedule for Monday, scheduled with Maverick Odom at 8 am.   Advised to get seen in ER this weekend if pain escalates, is constant (currently comes and goes), or if he is vomiting, having blood in urine, fever or chills.    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  I see he actually had a CT 7/23/20 identifying kidney stone.    Advised he push fluids.    Patient verbalized understanding of and agreement with plan.    Asia León RN  [facility]    Reason for Disposition    MODERATE pain (e.g., interferes with normal activities or awakens from sleep)    Additional Information    Negative: Passed out (i.e., lost consciousness, collapsed and was not responding)    Negative: Shock suspected (e.g., cold/pale/clammy skin, too weak to stand, low BP, rapid pulse)    Negative: Sounds like a life-threatening emergency to the triager    Negative: Followed a major injury to the back (e.g., MVA, fall > 10 feet or 3 meters, penetrating injury, etc.)    Negative: Upper, mid or lower back pain that occurs mainly in the midline    Negative: SEVERE pain (e.g., excruciating, scale 8-10) and present > 1 hour    Negative: Sudden onset of severe flank pain and age > 60    Negative: Abdominal pain and age > 60    Negative: Unable to urinate (or only a few drops) > 4 hours and bladder feels very full (e.g., palpable bladder or strong urge to urinate)    Negative: Pain radiates into groin, scrotum    Negative: Blood in urine (red, pink, or tea-colored)    Negative: Vomiting    Negative: Weakness of a leg or  "foot (e.g., unable to bear weight, dragging foot)    Negative: Patient sounds very sick or weak to the triager    Negative: Fever > 100.4 F (38.0 C)    Negative: Pain or burning with passing urine (urination)    Answer Assessment - Initial Assessment Questions  1. LOCATION: \"Where does it hurt?\" (e.g., left, right)      Right flank  2. ONSET: \"When did the pain start?\"      3 days ago  3. SEVERITY: \"How bad is the pain?\" (e.g., Scale 1-10; mild, moderate, or severe)    - MILD (1-3): doesn't interfere with normal activities     - MODERATE (4-7): interferes with normal activities or awakens from sleep     - SEVERE (8-10): excruciating pain and patient unable to do normal activities (stays in bed)        6-7  4. PATTERN: \"Does the pain come and go, or is it constant?\"       Comes and goes, lasts hours when it happens  5. CAUSE: \"What do you think is causing the pain?\"      Possibly kidney stone, had kidney stone identified in recent MRI  6. OTHER SYMPTOMS:  \"Do you have any other symptoms?\" (e.g., fever, abdominal pain, vomiting, leg weakness, burning with urination, blood in urine)      Abdominal pain comes and goes with the flank pain  7. PREGNANCY:  \"Is there any chance you are pregnant?\" \"When was your last menstrual period?\"      n/s    Protocols used: FLANK PAIN-A-OH      "

## 2021-10-08 NOTE — TELEPHONE ENCOUNTER
"Requested Prescriptions   Pending Prescriptions Disp Refills     albuterol (PROAIR HFA/PROVENTIL HFA/VENTOLIN HFA) 108 (90 Base) MCG/ACT inhaler [Pharmacy Med Name: ALBUTEROL HFA INH (200 PUFFS)8.5GM] 8.5 g      Sig: INHALE 2 PUFFS INTO THE LUNGS EVERY 6 HOURS AS NEEDED FOR SHORTNESS OF BREATH/ DYSPNEA OR WHEEZING       Asthma Maintenance Inhalers - Anticholinergics Passed - 10/6/2021  3:14 AM        Passed - Patient is age 12 years or older        Passed - Asthma control assessment score within normal limits in last 6 months     Please review ACT score.           Passed - Medication is active on med list        Passed - Recent (6 mo) or future (30 days) visit within the authorizing provider's specialty     Patient had office visit in the last 6 months or has a visit in the next 30 days with authorizing provider or within the authorizing provider's specialty.  See \"Patient Info\" tab in inbasket, or \"Choose Columns\" in Meds & Orders section of the refill encounter.           Short-Acting Beta Agonist Inhalers Protocol  Passed - 10/6/2021  3:14 AM        Passed - Patient is age 12 or older        Passed - Asthma control assessment score within normal limits in last 6 months     Please review ACT score.           Passed - Medication is active on med list        Passed - Recent (6 mo) or future (30 days) visit within the authorizing provider's specialty     Patient had office visit in the last 6 months or has a visit in the next 30 days with authorizing provider or within the authorizing provider's specialty.  See \"Patient Info\" tab in inbasket, or \"Choose Columns\" in Meds & Orders section of the refill encounter.               ACT Total Scores 8/22/2019 7/8/2020 6/8/2021   ACT TOTAL SCORE (Goal Greater than or Equal to 20) 25 25 25   In the past 12 months, how many times did you visit the emergency room for your asthma without being admitted to the hospital? 0 0 0   In the past 12 months, how many times were you " hospitalized overnight because of your asthma? 0 0 0       Prescription approved per [facility] Refill Protocol.  CynthiaRN,BSN  Triage Nurse  [facility]: [facility]

## 2021-10-11 ENCOUNTER — ANCILLARY PROCEDURE (OUTPATIENT)
Dept: CT IMAGING | Facility: CLINIC | Age: 56
End: 2021-10-11
Attending: PHYSICIAN ASSISTANT
Payer: COMMERCIAL

## 2021-10-11 ENCOUNTER — OFFICE VISIT (OUTPATIENT)
Dept: FAMILY MEDICINE | Facility: CLINIC | Age: 56
End: 2021-10-11
Payer: COMMERCIAL

## 2021-10-11 ENCOUNTER — TELEPHONE (OUTPATIENT)
Dept: UROLOGY | Facility: CLINIC | Age: 56
End: 2021-10-11

## 2021-10-11 VITALS
DIASTOLIC BLOOD PRESSURE: 85 MMHG | OXYGEN SATURATION: 98 % | HEART RATE: 71 BPM | BODY MASS INDEX: 25.72 KG/M2 | TEMPERATURE: 96.2 F | WEIGHT: 145.2 LBS | RESPIRATION RATE: 14 BRPM | SYSTOLIC BLOOD PRESSURE: 126 MMHG

## 2021-10-11 DIAGNOSIS — R10.84 ABDOMINAL PAIN, GENERALIZED: ICD-10-CM

## 2021-10-11 DIAGNOSIS — R10.84 ABDOMINAL PAIN, GENERALIZED: Primary | ICD-10-CM

## 2021-10-11 LAB
ALBUMIN SERPL-MCNC: 3.4 G/DL (ref 3.4–5)
ALBUMIN UR-MCNC: NEGATIVE MG/DL
ALP SERPL-CCNC: 59 U/L (ref 40–150)
ALT SERPL W P-5'-P-CCNC: 37 U/L (ref 0–70)
ANION GAP SERPL CALCULATED.3IONS-SCNC: 7 MMOL/L (ref 3–14)
APPEARANCE UR: CLEAR
AST SERPL W P-5'-P-CCNC: 21 U/L (ref 0–45)
BILIRUB SERPL-MCNC: 0.3 MG/DL (ref 0.2–1.3)
BILIRUB UR QL STRIP: NEGATIVE
BUN SERPL-MCNC: 14 MG/DL (ref 7–30)
CALCIUM SERPL-MCNC: 8.7 MG/DL (ref 8.5–10.1)
CHLORIDE BLD-SCNC: 107 MMOL/L (ref 94–109)
CO2 SERPL-SCNC: 25 MMOL/L (ref 20–32)
COLOR UR AUTO: YELLOW
CREAT BLD-MCNC: 0.8 MG/DL (ref 0.5–1.2)
CREAT SERPL-MCNC: 0.85 MG/DL (ref 0.66–1.25)
ERYTHROCYTE [DISTWIDTH] IN BLOOD BY AUTOMATED COUNT: 13.1 % (ref 10–15)
GFR SERPL CREATININE-BSD FRML MDRD: 98 ML/MIN/{1.73_M2}
GFR SERPL CREATININE-BSD FRML MDRD: >90 ML/MIN/1.73M2
GLUCOSE BLD-MCNC: 94 MG/DL (ref 70–99)
GLUCOSE UR STRIP-MCNC: NEGATIVE MG/DL
HCT VFR BLD AUTO: 42 % (ref 40–53)
HGB BLD-MCNC: 13.4 G/DL (ref 13.3–17.7)
HGB UR QL STRIP: ABNORMAL
KETONES UR STRIP-MCNC: NEGATIVE MG/DL
LEUKOCYTE ESTERASE UR QL STRIP: NEGATIVE
LIPASE SERPL-CCNC: 133 U/L (ref 73–393)
MCH RBC QN AUTO: 30.5 PG (ref 26.5–33)
MCHC RBC AUTO-ENTMCNC: 31.9 G/DL (ref 31.5–36.5)
MCV RBC AUTO: 96 FL (ref 78–100)
NITRATE UR QL: NEGATIVE
PH UR STRIP: 5 [PH] (ref 5–7)
PLATELET # BLD AUTO: 241 10E3/UL (ref 150–450)
POTASSIUM BLD-SCNC: 4.1 MMOL/L (ref 3.4–5.3)
PROT SERPL-MCNC: 6.4 G/DL (ref 6.8–8.8)
RBC # BLD AUTO: 4.39 10E6/UL (ref 4.4–5.9)
RBC #/AREA URNS AUTO: ABNORMAL /HPF
SODIUM SERPL-SCNC: 139 MMOL/L (ref 133–144)
SP GR UR STRIP: 1.02 (ref 1–1.03)
UROBILINOGEN UR STRIP-ACNC: 0.2 E.U./DL
WBC # BLD AUTO: 6.1 10E3/UL (ref 4–11)
WBC #/AREA URNS AUTO: ABNORMAL /HPF

## 2021-10-11 PROCEDURE — 74177 CT ABD & PELVIS W/CONTRAST: CPT | Mod: TC | Performed by: RADIOLOGY

## 2021-10-11 PROCEDURE — 83690 ASSAY OF LIPASE: CPT | Performed by: PHYSICIAN ASSISTANT

## 2021-10-11 PROCEDURE — 81001 URINALYSIS AUTO W/SCOPE: CPT | Performed by: PHYSICIAN ASSISTANT

## 2021-10-11 PROCEDURE — 85027 COMPLETE CBC AUTOMATED: CPT | Performed by: PHYSICIAN ASSISTANT

## 2021-10-11 PROCEDURE — 99215 OFFICE O/P EST HI 40 MIN: CPT | Performed by: PHYSICIAN ASSISTANT

## 2021-10-11 PROCEDURE — 36415 COLL VENOUS BLD VENIPUNCTURE: CPT | Performed by: PHYSICIAN ASSISTANT

## 2021-10-11 PROCEDURE — 80053 COMPREHEN METABOLIC PANEL: CPT | Performed by: PHYSICIAN ASSISTANT

## 2021-10-11 RX ORDER — IOPAMIDOL 755 MG/ML
89 INJECTION, SOLUTION INTRAVASCULAR ONCE
Status: COMPLETED | OUTPATIENT
Start: 2021-10-11 | End: 2021-10-11

## 2021-10-11 RX ADMIN — IOPAMIDOL 89 ML: 755 INJECTION, SOLUTION INTRAVASCULAR at 09:26

## 2021-10-11 ASSESSMENT — PAIN SCALES - GENERAL: PAINLEVEL: SEVERE PAIN (6)

## 2021-10-11 NOTE — RESULT ENCOUNTER NOTE
I called patient with results. Dr. Monique of urology will have his team contact the patient for likely outpatient stone removal. All questions and concerns addressed.    COREY Salgado

## 2021-10-11 NOTE — PATIENT INSTRUCTIONS
Amita Temple,    Thank you for allowing [facility] to manage your care.    I am unsure of the cause of your symptoms, but your exam is reassuring. We will see what our workup shows.     If you develop worsening/changing symptoms at any time, please call 911 or go to the emergency department for evaluation.    I ordered some lab work, please go to the laboratory to get your studies.    I ordered a CT, please go to the  to check in for the study. You may leave after the CT is done and I will call you with results. DO NOT EAT OR DRINK ANYTHING AFTER THE CT UNTIL I HAVE CALLED YOU.    For your pain, please use Tylenol 650mg every 4 hours as needed.     Max acetaminophen (Tylenol) 4,000mg/24 hours    Please allow 1-2 business days for our office to contact you in regards to your laboratory/radiological studies.  If not done so, I encourage you to login into [employer] ([email or web address] to review your results as well.     If you have any questions or concerns, please feel free to call us at [phone]    Sincerely,    Maverick Odom PA-C    Did you know?      You can schedule a video visit for follow-up appointments as well as future appointments for certain conditions.  Please see the below link.     [email or web address]    If you have not already done so,  I encourage you to sign up for [employer] ([email or web address]).  This will allow you to review your results, securely communicate with a provider, and schedule virtual visits as well.      Patient Education     Abdominal Pain  Abdominal pain is pain in the stomach or belly area. Everyone has this pain from time to time. In many cases it goes away on its own. But abdominal pain can sometimes be due to a serious problem, such as appendicitis. So it s important to know when to get help.    Causes of abdominal pain  There are many possible causes of abdominal pain. Common causes in  adults include:    Constipation, diarrhea, or gas    Stomach acid flowing back up into the esophagus (acid reflux or heartburn)    Severe acid reflux, called GERD (gastroesophageal reflux disease)    A sore in the lining of the stomach or small intestine (peptic ulcer)    Inflammation of the gallbladder, liver, or pancreas    Gallstones or kidney stones    Appendicitis     Intestinal blockage     An internal organ pushing through a muscle or other tissue (hernia)    Urinary tract infections    In women, menstrual cramps, fibroids, ovarian cysts, pelvic inflammatory disease, or endometriosis    Inflammation or infection of the intestines, including Crohn's disease and ulcerative colitis    Irritable bowel syndrome  Diagnosing the cause of abdominal pain  Your healthcare provider will give you a physical exam help find the cause of your pain. If needed, you will have tests. Belly pain has many possible causes. So it can be hard to find the reason for your pain. Giving details about your pain can help. Tell your provider where and when you feel the pain, and what makes it better or worse. Also let your provider know if you have other symptoms such as:    Fever    Tiredness    Upset stomach (nausea)    Vomiting    Changes in bathroom habits    Blood in the stool or black, tarry stool    Weight loss that you can't explain (involuntary weight loss?)  Also report any family history of stomach or intestinal problems, or cancers. Tell your provider about all your alcohol use and drug use. Tell your provider about all medicines you use, including herbs, vitamins, and supplements.  Treating abdominal pain  Some causes of pain need emergency medical treatment right away. These include appendicitis or a bowel blockage. Other problems can be treated with rest, fluids, or medicines. Your healthcare provider can give you specific instructions for treatment or self-care based on what is causing your pain.     If you have vomiting or  diarrhea, sip water or other clear fluids. When you are ready to eat solid foods again, start with small amounts of easy-to-digest, low-fat foods. These include apple sauce, toast, or crackers.  When to get medical care  Call 911 or go to the hospital right away if you:    Can t pass stool and are vomiting    Are vomiting blood or have bloody diarrhea or black, tarry diarrhea    Have chest, neck, or shoulder pain    Feel like you might pass out    Have pain in your shoulder blades with nausea    Have sudden, severe belly pain    Have new, severe pain unlike any you have felt before    Have a belly that is rigid, hard, and hurts to touch  Call your healthcare provider if you have:    Pain for more than 5 days    Bloating for more than 2 days    Diarrhea for more than 5 days    A fever of 100.4 F (38 C) or higher, or as directed by your healthcare provider    Pain that gets worse    Weight loss for no reason    Continued lack of appetite    Blood in your stool  How to prevent abdominal pain  Here are some tips to help prevent abdominal pain:    Eat smaller amounts of food at each meal.    Don't eat greasy, fried, or other high-fat foods.    Don't eat foods that give you gas.    Exercise regularly.    Drink plenty of fluids.  To help prevent GERD symptoms:    Quit smoking.    Reduce alcohol and foods that increase stomach acid.    Don't use aspirin or over-the-counter pain and fever medicines, if possible. This includes nonsteroidal anti-inflammatory drugs (NSAIDs).    Lose excess weight.    Finish eating at least 2 hours before you go to bed or lie down.    Raise the head of your bed.  [employer] last reviewed this educational content on 4/1/2019 2000-2021 [employer]. All rights reserved. This information is not intended as a substitute for professional medical care. Always follow your healthcare professional's instructions.

## 2021-10-11 NOTE — H&P (VIEW-ONLY)
Assessment & Plan   Problem List Items Addressed This Visit     None      Visit Diagnoses     Abdominal pain, generalized    -  Primary    Relevant Orders    UA macro with reflex to Microscopic and Culture - Clinc Collect (Completed)    CT Abdomen Pelvis w Contrast    CBC with platelets (Completed)    Comprehensive metabolic panel (BMP + Alb, Alk Phos, ALT, AST, Total. Bili, TP)    Lipase    UA Macro with Reflex to Micro and Culture - lab collect    Urine Microscopic (Completed)         Giovanni Cedeno is a 56 year old male w/ right lower abdominal and right sided flank pain.    No rash present so I can not diagnosis this to be shingles.    I do not believe a fracture to be the source of this patient's pain as there was no preceding trauma.  Based on this, no imaging of the spine was done.      I do not believe the pain is caused by an epidural abscess as the patient denies hx of IV drug use nor recent procedures.      I do not believe this patient's pain is from an infiltrative vertebral tumor as the patient does not have weight loss or night sweats and no known history of cancer.      Urinalysis shows hematuria with no signs of concurrent UTI.    iStat serum creatinine was 0.9.    CT ABD Stone protocol, discussed with radiologist show a 9mm stone in the right renal pelvis with no hydroureter, but signs of intrarenal obstruction. No other concerning findings.    Based on history, exam and diagnostic studies, the most likely cause of this patient's flank pain is partially obstructing nephrolithiasis in a solitary kidney. I spoke with on call urologist, Dr. Monique, who will have his team contact the patient and facilitate outpatient eval and treatment. Patient updated via phone and was given follow up contact info for urology.    Complete history and physical exam as below. AF with normal VS.    DDx and Dx discussed with and explained to the pt to their satisfaction.  All questions were answered at this  time. Pt expressed understanding of and agreement with this dx, tx, and plan. No further workup warranted and standard medication warnings given. I have given the patient a list of pertinent indications for re-evaluation. Will go to the Emergency Department if symptoms worsen or new concerning symptoms arise. Patient left in no apparent distress.     53 minutes spent on the date of the encounter doing chart review, history and exam, documentation and further activities per the note     See Patient Instructions    Return in about 3 days (around 10/14/2021) for a recheck of your symptoms if not improving, or call 911/go to an ER anytime if worsening.    COREY Salgado  [facility] is a 56 year old who presents for the following health issues   HPI     Abdominal/Flank Pain  1 week of acute onset pain. Has never passed a kidney stone, but had a non-obstructive stone in 7/2020 found on CT. No change with breathing or movement.   Onset/Duration: 1 week  Description:   Character: Sharp and Stabbing  Location: right side  Radiation: None  Intensity: moderate, 6/10.   Progression of Symptoms:  same, constant and intermittent  Accompanying Signs & Symptoms:  Fever/Chills: no  Gas/Bloating: no  Nausea: no  Vomitting: YES  Diarrhea: no  Constipation: no  Dysuria or Hematuria: no  History:   Trauma: no  Previous similar pain: YES  Previous tests done: none  Precipitating factors:   Does the pain change with:     Food: no    Bowel Movement: no    Urination: no   Other factors:  no  Therapies tried and outcome: lay down, heat pack      Review of Systems   Constitutional, HEENT, cardiovascular, pulmonary, gi and gu systems are negative, except as otherwise noted.      Objective    /85   Pulse 71   Temp (!) 96.2  F (35.7  C) (Tympanic)   Resp 14   Wt 65.9 kg (145 lb 3.2 oz)   SpO2 98%   BMI 25.72 kg/m    Body mass index is 25.72 kg/m .  Physical Exam  Vitals and nursing  note reviewed.   Constitutional:       General: He is not in acute distress.     Appearance: He is not ill-appearing or diaphoretic.   HENT:      Head: Normocephalic and atraumatic.      Mouth/Throat:      Mouth: Mucous membranes are moist.   Eyes:      Conjunctiva/sclera: Conjunctivae normal.   Cardiovascular:      Rate and Rhythm: Normal rate and regular rhythm.      Heart sounds: Normal heart sounds. No murmur heard.   No friction rub. No gallop.    Pulmonary:      Effort: Pulmonary effort is normal. No respiratory distress.      Breath sounds: Normal breath sounds. No stridor. No wheezing, rhonchi or rales.   Abdominal:      General: Bowel sounds are normal. There is no distension.      Palpations: Abdomen is soft. There is no mass.      Tenderness: There is abdominal tenderness (mild RLQ). There is no right CVA tenderness, left CVA tenderness, guarding or rebound.      Hernia: No hernia is present.   Skin:     General: Skin is warm and dry.   Neurological:      General: No focal deficit present.      Mental Status: He is alert. Mental status is at baseline.   Psychiatric:         Mood and Affect: Mood normal.         Behavior: Behavior normal.          Results for orders placed or performed in visit on 10/11/21   UA macro with reflex to Microscopic and Culture - Clinc Collect     Status: Abnormal    Specimen: Urine, Midstream   Result Value Ref Range    Color Urine Yellow Colorless, Straw, Light Yellow, Yellow    Appearance Urine Clear Clear    Glucose Urine Negative Negative mg/dL    Bilirubin Urine Negative Negative    Ketones Urine Negative Negative mg/dL    Specific Gravity Urine 1.025 1.003 - 1.035    Blood Urine Moderate (A) Negative    pH Urine 5.0 5.0 - 7.0    Protein Albumin Urine Negative Negative mg/dL    Urobilinogen Urine 0.2 0.2, 1.0 E.U./dL    Nitrite Urine Negative Negative    Leukocyte Esterase Urine Negative Negative   CBC with platelets     Status: Abnormal   Result Value Ref Range    WBC  Count 6.1 4.0 - 11.0 10e3/uL    RBC Count 4.39 (L) 4.40 - 5.90 10e6/uL    Hemoglobin 13.4 13.3 - 17.7 g/dL    Hematocrit 42.0 40.0 - 53.0 %    MCV 96 78 - 100 fL    MCH 30.5 26.5 - 33.0 pg    MCHC 31.9 31.5 - 36.5 g/dL    RDW 13.1 10.0 - 15.0 %    Platelet Count 241 150 - 450 10e3/uL   Urine Microscopic     Status: Abnormal   Result Value Ref Range    RBC Urine 10-25 (A) 0-2 /HPF /HPF    WBC Urine 0-5 0-5 /HPF /HPF    Narrative    Urine Culture not indicated

## 2021-10-11 NOTE — PROGRESS NOTES
Assessment & Plan   Problem List Items Addressed This Visit     None      Visit Diagnoses     Abdominal pain, generalized    -  Primary    Relevant Orders    UA macro with reflex to Microscopic and Culture - Clinc Collect (Completed)    CT Abdomen Pelvis w Contrast    CBC with platelets (Completed)    Comprehensive metabolic panel (BMP + Alb, Alk Phos, ALT, AST, Total. Bili, TP)    Lipase    UA Macro with Reflex to Micro and Culture - lab collect    Urine Microscopic (Completed)         Giovanni Cedeno is a 56 year old male w/ right lower abdominal and right sided flank pain.    No rash present so I can not diagnosis this to be shingles.    I do not believe a fracture to be the source of this patient's pain as there was no preceding trauma.  Based on this, no imaging of the spine was done.      I do not believe the pain is caused by an epidural abscess as the patient denies hx of IV drug use nor recent procedures.      I do not believe this patient's pain is from an infiltrative vertebral tumor as the patient does not have weight loss or night sweats and no known history of cancer.      Urinalysis shows hematuria with no signs of concurrent UTI.    iStat serum creatinine was 0.9.    CT ABD Stone protocol, discussed with radiologist show a 9mm stone in the right renal pelvis with no hydroureter, but signs of intrarenal obstruction. No other concerning findings.    Based on history, exam and diagnostic studies, the most likely cause of this patient's flank pain is partially obstructing nephrolithiasis in a solitary kidney. I spoke with on call urologist, Dr. Monique, who will have his team contact the patient and facilitate outpatient eval and treatment. Patient updated via phone and was given follow up contact info for urology.    Complete history and physical exam as below. AF with normal VS.    DDx and Dx discussed with and explained to the pt to their satisfaction.  All questions were answered at this  time. Pt expressed understanding of and agreement with this dx, tx, and plan. No further workup warranted and standard medication warnings given. I have given the patient a list of pertinent indications for re-evaluation. Will go to the Emergency Department if symptoms worsen or new concerning symptoms arise. Patient left in no apparent distress.     53 minutes spent on the date of the encounter doing chart review, history and exam, documentation and further activities per the note     See Patient Instructions    Return in about 3 days (around 10/14/2021) for a recheck of your symptoms if not improving, or call 911/go to an ER anytime if worsening.    COREY Salgado  [facility] is a 56 year old who presents for the following health issues   HPI     Abdominal/Flank Pain  1 week of acute onset pain. Has never passed a kidney stone, but had a non-obstructive stone in 7/2020 found on CT. No change with breathing or movement.   Onset/Duration: 1 week  Description:   Character: Sharp and Stabbing  Location: right side  Radiation: None  Intensity: moderate, 6/10.   Progression of Symptoms:  same, constant and intermittent  Accompanying Signs & Symptoms:  Fever/Chills: no  Gas/Bloating: no  Nausea: no  Vomitting: YES  Diarrhea: no  Constipation: no  Dysuria or Hematuria: no  History:   Trauma: no  Previous similar pain: YES  Previous tests done: none  Precipitating factors:   Does the pain change with:     Food: no    Bowel Movement: no    Urination: no   Other factors:  no  Therapies tried and outcome: lay down, heat pack      Review of Systems   Constitutional, HEENT, cardiovascular, pulmonary, gi and gu systems are negative, except as otherwise noted.      Objective    /85   Pulse 71   Temp (!) 96.2  F (35.7  C) (Tympanic)   Resp 14   Wt 65.9 kg (145 lb 3.2 oz)   SpO2 98%   BMI 25.72 kg/m    Body mass index is 25.72 kg/m .  Physical Exam  Vitals and nursing  note reviewed.   Constitutional:       General: He is not in acute distress.     Appearance: He is not ill-appearing or diaphoretic.   HENT:      Head: Normocephalic and atraumatic.      Mouth/Throat:      Mouth: Mucous membranes are moist.   Eyes:      Conjunctiva/sclera: Conjunctivae normal.   Cardiovascular:      Rate and Rhythm: Normal rate and regular rhythm.      Heart sounds: Normal heart sounds. No murmur heard.   No friction rub. No gallop.    Pulmonary:      Effort: Pulmonary effort is normal. No respiratory distress.      Breath sounds: Normal breath sounds. No stridor. No wheezing, rhonchi or rales.   Abdominal:      General: Bowel sounds are normal. There is no distension.      Palpations: Abdomen is soft. There is no mass.      Tenderness: There is abdominal tenderness (mild RLQ). There is no right CVA tenderness, left CVA tenderness, guarding or rebound.      Hernia: No hernia is present.   Skin:     General: Skin is warm and dry.   Neurological:      General: No focal deficit present.      Mental Status: He is alert. Mental status is at baseline.   Psychiatric:         Mood and Affect: Mood normal.         Behavior: Behavior normal.          Results for orders placed or performed in visit on 10/11/21   UA macro with reflex to Microscopic and Culture - Clinc Collect     Status: Abnormal    Specimen: Urine, Midstream   Result Value Ref Range    Color Urine Yellow Colorless, Straw, Light Yellow, Yellow    Appearance Urine Clear Clear    Glucose Urine Negative Negative mg/dL    Bilirubin Urine Negative Negative    Ketones Urine Negative Negative mg/dL    Specific Gravity Urine 1.025 1.003 - 1.035    Blood Urine Moderate (A) Negative    pH Urine 5.0 5.0 - 7.0    Protein Albumin Urine Negative Negative mg/dL    Urobilinogen Urine 0.2 0.2, 1.0 E.U./dL    Nitrite Urine Negative Negative    Leukocyte Esterase Urine Negative Negative   CBC with platelets     Status: Abnormal   Result Value Ref Range    WBC  Count 6.1 4.0 - 11.0 10e3/uL    RBC Count 4.39 (L) 4.40 - 5.90 10e6/uL    Hemoglobin 13.4 13.3 - 17.7 g/dL    Hematocrit 42.0 40.0 - 53.0 %    MCV 96 78 - 100 fL    MCH 30.5 26.5 - 33.0 pg    MCHC 31.9 31.5 - 36.5 g/dL    RDW 13.1 10.0 - 15.0 %    Platelet Count 241 150 - 450 10e3/uL   Urine Microscopic     Status: Abnormal   Result Value Ref Range    RBC Urine 10-25 (A) 0-2 /HPF /HPF    WBC Urine 0-5 0-5 /HPF /HPF    Narrative    Urine Culture not indicated

## 2021-10-11 NOTE — TELEPHONE ENCOUNTER
[facility] Call Center    Phone Message    May a detailed message be left on voicemail: yes     Reason for Call: Appointment Intake    Referring Provider Name: Sacha Odom PA   Diagnosis and/or Symptoms: Abdominal Pain Other Comments: intrarenal stone with signs of obstruction in patient with solitary kidney. Dr. Monique recommended expedited follow up.    Patient declined virtual visit with Eneida, referral also states this is Urgent 3-5 days. Please follow up with pt    Action Taken: Message routed to:  [facility] Urology    Travel Screening: Not Applicable

## 2021-10-12 ENCOUNTER — PRE VISIT (OUTPATIENT)
Dept: UROLOGY | Facility: CLINIC | Age: 56
End: 2021-10-12

## 2021-10-12 ENCOUNTER — VIRTUAL VISIT (OUTPATIENT)
Dept: UROLOGY | Facility: CLINIC | Age: 56
End: 2021-10-12
Payer: COMMERCIAL

## 2021-10-12 ENCOUNTER — TELEPHONE (OUTPATIENT)
Dept: UROLOGY | Facility: CLINIC | Age: 56
End: 2021-10-12

## 2021-10-12 ENCOUNTER — TELEPHONE (OUTPATIENT)
Dept: FAMILY MEDICINE | Facility: CLINIC | Age: 56
End: 2021-10-12

## 2021-10-12 VITALS — BODY MASS INDEX: 24.16 KG/M2 | WEIGHT: 145 LBS | HEIGHT: 65 IN

## 2021-10-12 DIAGNOSIS — N20.0 KIDNEY STONE: Primary | ICD-10-CM

## 2021-10-12 PROCEDURE — 99203 OFFICE O/P NEW LOW 30 MIN: CPT | Mod: 95 | Performed by: UROLOGY

## 2021-10-12 ASSESSMENT — PAIN SCALES - GENERAL: PAINLEVEL: SEVERE PAIN (6)

## 2021-10-12 ASSESSMENT — MIFFLIN-ST. JEOR: SCORE: 1404.6

## 2021-10-12 NOTE — TELEPHONE ENCOUNTER
[facility] Call Center    Phone Message    May a detailed message be left on voicemail: no     Reason for Call: Other: Pt has a referral for kidney stones.  Pt only has one kidney. Soonest appt was 12.2.21 with Dr Yanez.  Pt asking for consideration for earlier appt.  Referral stated urgent.      Action Taken: Message routed to:  Adult Clinics: Urology p [id number]    Travel Screening: Not Applicable

## 2021-10-12 NOTE — TELEPHONE ENCOUNTER
Chart reviewed. Patient is now scheduled for virtual visit with Dr. Monique today 10/12/21. Will close encounter at this time.    Eleanor Lozano RN, BSN

## 2021-10-12 NOTE — TELEPHONE ENCOUNTER
High priority message sent to Dr. Yanez with request to review and advise on scheduling.    Eleanor Lozano RN, BSN

## 2021-10-12 NOTE — TELEPHONE ENCOUNTER
Reason for Call:  Other     Detailed comments: Patient would like a recommendation of another urologist due to the one he was referred to is book out for a long time.    Phone Number Patient can be reached at: Home number on file [phone] (home)    Best Time:     Can we leave a detailed message on this number? YES    Call taken on 10/12/2021 at 7:38 AM by April Rodriguez

## 2021-10-12 NOTE — PROGRESS NOTES
Maverick is a 56 year old who is being evaluated via a billable video visit.  Changed to telephone for service and access reasons.          UROLOGY VIRTUAL VISIT      Chief Complaint:   Right renal stone      Synopsis    Giovanni Cedeno is a very pleasant 55 yo M with history of congenital solitary right kidney.  He was seen last week by s PMD for right sided flank pain and found to have a 1 cm renal stone in an upper pole infundibulum.  He was previously aware of this stone one year ago but has not had a urology evaluation.  Denies urinary symptoms, fevers, chills.             Assessment/Plan   56 year old male with large stone in solitary kidne  We discussed the surgical treatment options for renal stones including shock wave lithotripsy, ureteroscopy, and percutaneous nephrolithotomy.  We discussed the risks and benefits of each approach in the context of the patient's current stone burden.  After consideration of each the decision was made to proceed with ureteroscopy     We discussed the risks of bleeding, infection, incomplete stone removal, damage to adjacent organs, and need for staged procedures.        The following  distinct labs were reviewed    I personally reviewed all applicable laboratory data and went over findings with patient  Significant for:    CBC RESULTS:  Recent Labs   Lab Test 10/11/21  0839 11/14/17  0846 05/23/17  0813 04/21/14  1027   WBC 6.1 6.4 9.9 10.0   HGB 13.4 14.7 16.8 14.9    291 323 296        BMP RESULTS:  Recent Labs   Lab Test 10/11/21  0839 07/21/20  0832 04/18/19  1622 11/14/17  0846 05/23/17  0813 05/23/17  0813    140 139 138   < > 140   POTASSIUM 4.1 4.1 4.5 4.2   < > 4.2   CHLORIDE 107 108 105 105   < > 105   CO2 25 27 28 25   < > 23   ANIONGAP 7 5 6 8   < > 12   GLC 94 85 60* 92   < > 103*   BUN 14 14 18 20   < > 14   CR 0.85 0.97 0.92 0.84   < > 0.78   GFRESTIMATED >90 86 >90 >90   < > >90  Non  GFR Calc     GFRESTBLACK  --  >90 >90 >90  --   >90   GFR Calc     TODD 8.7 9.5 9.5 9.4   < > 9.4    < > = values in this interval not displayed.       UA RESULTS:   Recent Labs   Lab Test 10/11/21  0806 07/21/20  0832 05/23/17  0812   SG 1.025 >1.030 1.020   URINEPH 5.0 5.5 6.0   NITRITE Negative Negative Negative   RBCU 10-25*  --   --    WBCU 0-5  --   --        PSA RESULTS  PSA   Date Value Ref Range Status   04/18/2019 1.80 0 - 4 ug/L Final     Comment:     Assay Method:  Chemiluminescence using Siemens Vista analyzer   11/10/2016 1.86 0 - 4 ug/L Final   05/08/2013 2.48 0 - 4 ug/L Final         Recent Imaging Report    I personally reviewed all applicable imaging and went over the below findings with patient.    Results for orders placed or performed in visit on 10/11/21   CT Abdomen Pelvis w Contrast    Narrative    CT ABDOMEN/PELVIS WITH CONTRAST October 11, 2021 9:29 AM    CLINICAL HISTORY: Right lower quadrant and right flank pain.    TECHNIQUE: CT scan of the abdomen and pelvis was performed following  injection of IV contrast. Multiplanar reformats were obtained. Dose  reduction techniques were used.  CONTRAST: 89 mL Isovue-370.    COMPARISON: CT of the abdomen and pelvis performed 7/23/2020.    FINDINGS:   LOWER CHEST: The visualized lung bases are clear.    HEPATOBILIARY: A few tiny low-density liver lesions are too small to  characterize, and were not well seen on the prior noncontrast scan. No  hepatic masses are seen. The gallbladder is unremarkable.    PANCREAS: Normal.    SPLEEN: Normal.    ADRENAL GLANDS: Normal.    KIDNEYS/BLADDER: Solitary right kidney. There is a 0.9 cm stone within  the right renal pelvis, causing mild calyceal dilatation, new since  the previous exam. 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 There are at least three smaller nonobstructing  stones in the right kidney, with the largest in the lower pole  measuring 0.4 cm. Overall stone burden in the right kidney has  increased  slightly since the previous exam. No ureteral calculi or  hydronephrosis.    BOWEL: No bowel obstruction. No convincing evidence for colitis or  diverticulitis. Unremarkable appendix.    PELVIC ORGANS: Unremarkable.    LYMPH NODES: No enlarged lymph nodes are identified in the abdomen or  pelvis. A few mildly prominent periportal and gastrohepatic ligament  lymph nodes have increased slightly in prominence.    VASCULATURE: Unremarkable.    ADDITIONAL FINDINGS: None.    MUSCULOSKELETAL: Bilateral L5 spondylolysis is unchanged.      Impression    IMPRESSION:   1.  Solitary right kidney.  2.  A 0.9 cm stone in the right renal pelvis appears to cause  dilatation of the right intrarenal calyces.  3.  There are at least three smaller nonobstructing stones in the  right kidney. Overall stone burden in the right kidney has increased  slightly since the previous exam.  4.  A few mildly prominent periportal and gastrohepatic ligament lymph  nodes have increased slightly in prominence.    I discussed these findings with COREY Mota, at 10:06 AM on  10/11/2021.    CALLIE BAUGH MD         SYSTEM ID:  [id number]       Personal Review of Recent Imaging    I personally reviewed the above CT and based on my own interpretation I supsect the stone in not in the renal pelvis but rather an upper pole infundibulum           Past Medical History:     Past Medical History:   Diagnosis Date     Arthritis             Past Surgical History:   No past surgical history on file.         Medications     No current facility-administered medications for this visit.     No current outpatient medications on file.     Facility-Administered Medications Ordered in Other Visits   Medication     ceFAZolin (ANCEF) intermittent infusion 2 g in 100 mL dextrose PRE-MIX     ceFAZolin (ANCEF) intermittent infusion 2 g in 100 mL dextrose PRE-MIX     lactated ringers infusion     lactated ringers infusion     lidocaine (LMX4) cream     lidocaine (LMX4)  cream     lidocaine 1 % 0.1-1 mL     lidocaine 1 % 0.1-1 mL     sodium chloride (PF) 0.9% PF flush 3 mL     sodium chloride (PF) 0.9% PF flush 3 mL     sodium chloride (PF) 0.9% PF flush 3 mL     sodium chloride (PF) 0.9% PF flush 3 mL            Family History:     Family History   Problem Relation Age of Onset     Diabetes Father      Heart Disease Father             Social History:     Social History     Socioeconomic History     Marital status: Single     Spouse name: Not on file     Number of children: Not on file     Years of education: Not on file     Highest education level: Not on file   Occupational History     Not on file   Tobacco Use     Smoking status: Never Smoker     Smokeless tobacco: Never Used   Vaping Use     Vaping Use: Never used   Substance and Sexual Activity     Alcohol use: No     Drug use: No     Sexual activity: Yes     Partners: Female   Other Topics Concern     Parent/sibling w/ CABG, MI or angioplasty before 65F 55M? Yes   Social History Narrative     Not on file     Social Determinants of Health     Financial Resource Strain:      Difficulty of Paying Living Expenses:    Food Insecurity:      Worried About Running Out of Food in the Last Year:      Ran Out of Food in the Last Year:    Transportation Needs:      Lack of Transportation (Medical):      Lack of Transportation (Non-Medical):    Physical Activity:      Days of Exercise per Week:      Minutes of Exercise per Session:    Stress:      Feeling of Stress :    Social Connections:      Frequency of Communication with Friends and Family:      Frequency of Social Gatherings with Friends and Family:      Attends Jewish Services:      Active Member of Clubs or Organizations:      Attends Club or Organization Meetings:      Marital Status:    Intimate Partner Violence:      Fear of Current or Ex-Partner:      Emotionally Abused:      Physically Abused:      Sexually Abused:             Allergies:   Patient has no known  allergies.         Review of Systems:  From intake questionnaire   Negative 14 system review except as noted on HPI, nurse's note.      CC:  Juan Antonio Pak    16 were spent on the above clinical visit during the encounter date.  9 minutes spent on phone          Distant Location (provider location):  [facility] [place]     Platform used for Video Visit: [email or web address]

## 2021-10-12 NOTE — LETTER
10/12/2021       RE: Giovanni Cedeno  [id number]  [place] [address]     Dear Colleague,    Thank you for referring your patient, Giovanni Cedeno, to the [facility] [place] at [facility]. Please see a copy of my visit note below.    Maverick is a 56 year old who is being evaluated via a billable video visit.  Changed to telephone for service and access reasons.          UROLOGY VIRTUAL VISIT      Chief Complaint:   Right renal stone      Synopsis    Giovanni Cedeno is a very pleasant 57 yo M with history of congenital solitary right kidney.  He was seen last week by [facility] for right sided flank pain and found to have a 1 cm renal stone in an upper pole infundibulum.  He was previously aware of this stone one year ago but has not had a urology evaluation.  Denies urinary symptoms, fevers, chills.             Assessment/Plan   56 year old male with large stone in solitary kidne  We discussed the surgical treatment options for renal stones including shock wave lithotripsy, ureteroscopy, and percutaneous nephrolithotomy.  We discussed the risks and benefits of each approach in the context of the patient's current stone burden.  After consideration of each the decision was made to proceed with ureteroscopy     We discussed the risks of bleeding, infection, incomplete stone removal, damage to adjacent organs, and need for staged procedures.        The following  distinct labs were reviewed    I personally reviewed all applicable laboratory data and went over findings with patient  Significant for:    CBC RESULTS:  Recent Labs   Lab Test 10/11/21  0839 11/14/17  0846 05/23/17  0813 04/21/14  1027   WBC 6.1 6.4 9.9 10.0   HGB 13.4 14.7 16.8 14.9    291 323 296        BMP RESULTS:  Recent Labs   Lab Test 10/11/21  0839 07/21/20  0832 04/18/19  1622 11/14/17  0846 05/23/17  0813 05/23/17  0813    140 139 138   < > 140    POTASSIUM 4.1 4.1 4.5 4.2   < > 4.2   CHLORIDE 107 108 105 105   < > 105   CO2 25 27 28 25   < > 23   ANIONGAP 7 5 6 8   < > 12   GLC 94 85 60* 92   < > 103*   BUN 14 14 18 20   < > 14   CR 0.85 0.97 0.92 0.84   < > 0.78   GFRESTIMATED >90 86 >90 >90   < > >90  Non  GFR Calc     GFRESTBLACK  --  >90 >90 >90  --  >90  African American GFR Calc     TODD 8.7 9.5 9.5 9.4   < > 9.4    < > = values in this interval not displayed.       UA RESULTS:   Recent Labs   Lab Test 10/11/21  0806 07/21/20  0832 05/23/17  0812   SG 1.025 >1.030 1.020   URINEPH 5.0 5.5 6.0   NITRITE Negative Negative Negative   RBCU 10-25*  --   --    WBCU 0-5  --   --        PSA RESULTS  PSA   Date Value Ref Range Status   04/18/2019 1.80 0 - 4 ug/L Final     Comment:     Assay Method:  Chemiluminescence using Siemens Vista analyzer   11/10/2016 1.86 0 - 4 ug/L Final   05/08/2013 2.48 0 - 4 ug/L Final         Recent Imaging Report    I personally reviewed all applicable imaging and went over the below findings with patient.    Results for orders placed or performed in visit on 10/11/21   CT Abdomen Pelvis w Contrast    Narrative    CT ABDOMEN/PELVIS WITH CONTRAST October 11, 2021 9:29 AM    CLINICAL HISTORY: Right lower quadrant and right flank pain.    TECHNIQUE: CT scan of the abdomen and pelvis was performed following  injection of IV contrast. Multiplanar reformats were obtained. Dose  reduction techniques were used.  CONTRAST: 89 mL Isovue-370.    COMPARISON: CT of the abdomen and pelvis performed 7/23/2020.    FINDINGS:   LOWER CHEST: The visualized lung bases are clear.    HEPATOBILIARY: A few tiny low-density liver lesions are too small to  characterize, and were not well seen on the prior noncontrast scan. No  hepatic masses are seen. The gallbladder is unremarkable.    PANCREAS: Normal.    SPLEEN: Normal.    ADRENAL GLANDS: Normal.    KIDNEYS/BLADDER: Solitary right kidney. There is a 0.9 cm stone within  the right renal  pelvis, causing mild calyceal dilatation, new since  the previous exam. 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 There are at least three smaller nonobstructing  stones in the right kidney, with the largest in the lower pole  measuring 0.4 cm. Overall stone burden in the right kidney has  increased slightly since the previous exam. No ureteral calculi or  hydronephrosis.    BOWEL: No bowel obstruction. No convincing evidence for colitis or  diverticulitis. Unremarkable appendix.    PELVIC ORGANS: Unremarkable.    LYMPH NODES: No enlarged lymph nodes are identified in the abdomen or  pelvis. A few mildly prominent periportal and gastrohepatic ligament  lymph nodes have increased slightly in prominence.    VASCULATURE: Unremarkable.    ADDITIONAL FINDINGS: None.    MUSCULOSKELETAL: Bilateral L5 spondylolysis is unchanged.      Impression    IMPRESSION:   1.  Solitary right kidney.  2.  A 0.9 cm stone in the right renal pelvis appears to cause  dilatation of the right intrarenal calyces.  3.  There are at least three smaller nonobstructing stones in the  right kidney. Overall stone burden in the right kidney has increased  slightly since the previous exam.  4.  A few mildly prominent periportal and gastrohepatic ligament lymph  nodes have increased slightly in prominence.    I discussed these findings with COREY Mota, at 10:06 AM on  10/11/2021.    CALLIE BAUGH MD         SYSTEM ID:  [id number]       Personal Review of Recent Imaging    I personally reviewed the above CT and based on my own interpretation I supsect the stone in not in the renal pelvis but rather an upper pole infundibulum           Past Medical History:     Past Medical History:   Diagnosis Date     Arthritis             Past Surgical History:   No past surgical history on file.         Medications     No current facility-administered medications for this visit.     No current outpatient medications on file.      Facility-Administered Medications Ordered in Other Visits   Medication     ceFAZolin (ANCEF) intermittent infusion 2 g in 100 mL dextrose PRE-MIX     ceFAZolin (ANCEF) intermittent infusion 2 g in 100 mL dextrose PRE-MIX     lactated ringers infusion     lactated ringers infusion     lidocaine (LMX4) cream     lidocaine (LMX4) cream     lidocaine 1 % 0.1-1 mL     lidocaine 1 % 0.1-1 mL     sodium chloride (PF) 0.9% PF flush 3 mL     sodium chloride (PF) 0.9% PF flush 3 mL     sodium chloride (PF) 0.9% PF flush 3 mL     sodium chloride (PF) 0.9% PF flush 3 mL            Family History:     Family History   Problem Relation Age of Onset     Diabetes Father      Heart Disease Father             Social History:     Social History     Socioeconomic History     Marital status: Single     Spouse name: Not on file     Number of children: Not on file     Years of education: Not on file     Highest education level: Not on file   Occupational History     Not on file   Tobacco Use     Smoking status: Never Smoker     Smokeless tobacco: Never Used   Vaping Use     Vaping Use: Never used   Substance and Sexual Activity     Alcohol use: No     Drug use: No     Sexual activity: Yes     Partners: Female   Other Topics Concern     Parent/sibling w/ CABG, MI or angioplasty before 65F 55M? Yes   Social History Narrative     Not on file     Social Determinants of Health     Financial Resource Strain:      Difficulty of Paying Living Expenses:    Food Insecurity:      Worried About Running Out of Food in the Last Year:      Ran Out of Food in the Last Year:    Transportation Needs:      Lack of Transportation (Medical):      Lack of Transportation (Non-Medical):    Physical Activity:      Days of Exercise per Week:      Minutes of Exercise per Session:    Stress:      Feeling of Stress :    Social Connections:      Frequency of Communication with Friends and Family:      Frequency of Social Gatherings with Friends and Family:       Attends Episcopal Services:      Active Member of Clubs or Organizations:      Attends Club or Organization Meetings:      Marital Status:    Intimate Partner Violence:      Fear of Current or Ex-Partner:      Emotionally Abused:      Physically Abused:      Sexually Abused:             Allergies:   Patient has no known allergies.         Review of Systems:  From intake questionnaire   Negative 14 system review except as noted on HPI, nurse's note.      CC:  Juan Antonio Pak    16 were spent on the above clinical visit during the encounter date.  9 minutes spent on phone          Distant Location (provider location):  [facility] [place]     Platform used for Video Visit: Alexander      Again, thank you for allowing me to participate in the care of your patient.      Sincerely,    Rj Monique MD

## 2021-10-12 NOTE — TELEPHONE ENCOUNTER
MEDICAL RECORDS REQUEST   [facility]  Urology Clinic  [address] [place] [address]  PHONE: [phone]  Fax: [phone]        FUTURE VISIT INFORMATION                                                   Giovanni Cedeno, : 1965 scheduled for future visit at [facility]    APPOINTMENT INFORMATION:    Date: 10/12/2021    Provider:  Rj Monique MD    Reason for Visit/Diagnosis: solitary kidney, symptomatic kidney stones    REFERRAL INFORMATION:    Referring provider:  Sacha Odom PA    Specialty: N/A    Referring providers clinic:  [facility]    Clinic contact number:  N/A    RECORDS REQUESTED FOR VISIT                                                     NOTES  STATUS/DETAILS   OFFICE NOTE from referring provider  yes, 10/11/2021 -- Sacha Odom PA   OFFICE NOTE from other specialist  no   DISCHARGE SUMMARY from hospital  no   DISCHARGE REPORT from the ER  no   OPERATIVE REPORT  no   MEDICATION LIST  yes   LABS     URINALYSIS (UA)  yes   URINE CYTOLOGY  no   KIDNEY STONE     CT STONE  no   IMAGING (IMAGES & REPORT)  yes, 10/11/2021 -- CT ABD and PELVIS   KUB  yes, 2020, XR KUB     PRE-VISIT CHECKLIST      Record collection complete Yes   Appointment appropriately scheduled           (right time/right provider) No   Joint diagnostic appointment coordinated correctly          (ensure right order & amount of time) Yes   MyChart activation No   Questionnaire complete Yes and If no, please explain Pending

## 2021-10-13 ENCOUNTER — TELEPHONE (OUTPATIENT)
Dept: UROLOGY | Facility: CLINIC | Age: 56
End: 2021-10-13

## 2021-10-13 ENCOUNTER — LAB (OUTPATIENT)
Dept: LAB | Facility: CLINIC | Age: 56
End: 2021-10-13
Attending: UROLOGY

## 2021-10-13 DIAGNOSIS — Z11.59 ENCOUNTER FOR SCREENING FOR OTHER VIRAL DISEASES: ICD-10-CM

## 2021-10-13 PROBLEM — N20.0 KIDNEY STONE: Status: ACTIVE | Noted: 2021-10-13

## 2021-10-13 PROCEDURE — U0003 INFECTIOUS AGENT DETECTION BY NUCLEIC ACID (DNA OR RNA); SEVERE ACUTE RESPIRATORY SYNDROME CORONAVIRUS 2 (SARS-COV-2) (CORONAVIRUS DISEASE [COVID-19]), AMPLIFIED PROBE TECHNIQUE, MAKING USE OF HIGH THROUGHPUT TECHNOLOGIES AS DESCRIBED BY CMS-2020-01-R: HCPCS | Performed by: UROLOGY

## 2021-10-14 LAB — SARS-COV-2 RNA RESP QL NAA+PROBE: NEGATIVE

## 2021-10-14 NOTE — CONFIDENTIAL NOTE
Patient is scheduled for surgery with Dr. Monique     Spoke with: Patient     Date of Surgery: Friday 10/15/2021    Location: [facility] OR     Informed patient they will need an adult  Yes    Pre op with Provider ashley    H&P: Scheduled with Dr. Monique will do H&P Day of surgery     Pre-procedure COVID-19 Test: Wednesday 10/13/21 [facility]    Additional imaging/appointments: ashley    Surgery packet: Patient to  packet at locked  box after his covid test at [facility] 10/13/21     Additional comments: ASHLEY

## 2021-10-15 ENCOUNTER — APPOINTMENT (OUTPATIENT)
Dept: GENERAL RADIOLOGY | Facility: CLINIC | Age: 56
End: 2021-10-15
Attending: UROLOGY
Payer: COMMERCIAL

## 2021-10-15 ENCOUNTER — ANESTHESIA EVENT (OUTPATIENT)
Dept: SURGERY | Facility: CLINIC | Age: 56
End: 2021-10-15
Payer: COMMERCIAL

## 2021-10-15 ENCOUNTER — HOSPITAL ENCOUNTER (OUTPATIENT)
Facility: CLINIC | Age: 56
Discharge: HOME | End: 2021-10-15
Attending: UROLOGY | Admitting: UROLOGY
Payer: COMMERCIAL

## 2021-10-15 ENCOUNTER — ANESTHESIA (OUTPATIENT)
Dept: SURGERY | Facility: CLINIC | Age: 56
End: 2021-10-15
Payer: COMMERCIAL

## 2021-10-15 VITALS
DIASTOLIC BLOOD PRESSURE: 81 MMHG | SYSTOLIC BLOOD PRESSURE: 136 MMHG | WEIGHT: 140.21 LBS | HEART RATE: 70 BPM | TEMPERATURE: 98 F | RESPIRATION RATE: 14 BRPM | OXYGEN SATURATION: 97 % | BODY MASS INDEX: 24.84 KG/M2 | HEIGHT: 63 IN

## 2021-10-15 DIAGNOSIS — N20.0 KIDNEY STONE: Primary | ICD-10-CM

## 2021-10-15 DIAGNOSIS — N20.0 KIDNEY STONE: ICD-10-CM

## 2021-10-15 LAB — GLUCOSE BLDC GLUCOMTR-MCNC: 92 MG/DL (ref 70–99)

## 2021-10-15 PROCEDURE — 999N000141 HC STATISTIC PRE-PROCEDURE NURSING ASSESSMENT: Performed by: UROLOGY

## 2021-10-15 PROCEDURE — 250N000011 HC RX IP 250 OP 636: Performed by: UROLOGY

## 2021-10-15 PROCEDURE — 250N000025 HC SEVOFLURANE, PER MIN: Performed by: UROLOGY

## 2021-10-15 PROCEDURE — 370N000017 HC ANESTHESIA TECHNICAL FEE, PER MIN: Performed by: UROLOGY

## 2021-10-15 PROCEDURE — 250N000013 HC RX MED GY IP 250 OP 250 PS 637: Performed by: ANESTHESIOLOGY

## 2021-10-15 PROCEDURE — 258N000003 HC RX IP 258 OP 636: Performed by: NURSE ANESTHETIST, CERTIFIED REGISTERED

## 2021-10-15 PROCEDURE — 255N000002 HC RX 255 OP 636: Performed by: UROLOGY

## 2021-10-15 PROCEDURE — C1894 INTRO/SHEATH, NON-LASER: HCPCS | Performed by: UROLOGY

## 2021-10-15 PROCEDURE — 250N000011 HC RX IP 250 OP 636: Performed by: NURSE ANESTHETIST, CERTIFIED REGISTERED

## 2021-10-15 PROCEDURE — 360N000083 HC SURGERY LEVEL 3 W/ FLUORO, PER MIN: Performed by: UROLOGY

## 2021-10-15 PROCEDURE — 999N000180 XR SURGERY CARM FLUORO LESS THAN 5 MIN: Mod: TC

## 2021-10-15 PROCEDURE — 82962 GLUCOSE BLOOD TEST: CPT

## 2021-10-15 PROCEDURE — C2617 STENT, NON-COR, TEM W/O DEL: HCPCS | Performed by: UROLOGY

## 2021-10-15 PROCEDURE — 52332 CYSTOSCOPY AND TREATMENT: CPT | Mod: RT | Performed by: UROLOGY

## 2021-10-15 PROCEDURE — 74420 UROGRAPHY RTRGR +-KUB: CPT | Mod: 26 | Performed by: UROLOGY

## 2021-10-15 PROCEDURE — C1769 GUIDE WIRE: HCPCS | Performed by: UROLOGY

## 2021-10-15 PROCEDURE — 710N000010 HC RECOVERY PHASE 1, LEVEL 2, PER MIN: Performed by: UROLOGY

## 2021-10-15 PROCEDURE — 250N000009 HC RX 250: Performed by: NURSE ANESTHETIST, CERTIFIED REGISTERED

## 2021-10-15 PROCEDURE — 272N000001 HC OR GENERAL SUPPLY STERILE: Performed by: UROLOGY

## 2021-10-15 PROCEDURE — 250N000011 HC RX IP 250 OP 636: Performed by: ANESTHESIOLOGY

## 2021-10-15 PROCEDURE — C1758 CATHETER, URETERAL: HCPCS | Performed by: UROLOGY

## 2021-10-15 PROCEDURE — 710N000012 HC RECOVERY PHASE 2, PER MINUTE: Performed by: UROLOGY

## 2021-10-15 DEVICE — URETERAL STENT
Type: IMPLANTABLE DEVICE | Site: URETER | Status: FUNCTIONAL
Brand: PERCUFLEX™ PLUS

## 2021-10-15 RX ORDER — ONDANSETRON 4 MG/1
4 TABLET, ORALLY DISINTEGRATING ORAL EVERY 30 MIN PRN
Status: DISCONTINUED | OUTPATIENT
Start: 2021-10-15 | End: 2021-10-15 | Stop reason: HOSPADM

## 2021-10-15 RX ORDER — LIDOCAINE HYDROCHLORIDE 20 MG/ML
INJECTION, SOLUTION INFILTRATION; PERINEURAL PRN
Status: DISCONTINUED | OUTPATIENT
Start: 2021-10-15 | End: 2021-10-15

## 2021-10-15 RX ORDER — ONDANSETRON 2 MG/ML
4 INJECTION INTRAMUSCULAR; INTRAVENOUS EVERY 30 MIN PRN
Status: DISCONTINUED | OUTPATIENT
Start: 2021-10-15 | End: 2021-10-15 | Stop reason: HOSPADM

## 2021-10-15 RX ORDER — LIDOCAINE 40 MG/G
CREAM TOPICAL
Status: DISCONTINUED | OUTPATIENT
Start: 2021-10-15 | End: 2021-10-15 | Stop reason: HOSPADM

## 2021-10-15 RX ORDER — SODIUM CHLORIDE, SODIUM LACTATE, POTASSIUM CHLORIDE, CALCIUM CHLORIDE 600; 310; 30; 20 MG/100ML; MG/100ML; MG/100ML; MG/100ML
INJECTION, SOLUTION INTRAVENOUS CONTINUOUS PRN
Status: DISCONTINUED | OUTPATIENT
Start: 2021-10-15 | End: 2021-10-15

## 2021-10-15 RX ORDER — CEFAZOLIN SODIUM 2 G/100ML
2 INJECTION, SOLUTION INTRAVENOUS SEE ADMIN INSTRUCTIONS
Status: DISCONTINUED | OUTPATIENT
Start: 2021-10-15 | End: 2021-10-15 | Stop reason: HOSPADM

## 2021-10-15 RX ORDER — MEPERIDINE HYDROCHLORIDE 25 MG/ML
12.5 INJECTION INTRAMUSCULAR; INTRAVENOUS; SUBCUTANEOUS
Status: DISCONTINUED | OUTPATIENT
Start: 2021-10-15 | End: 2021-10-15 | Stop reason: HOSPADM

## 2021-10-15 RX ORDER — CEFAZOLIN SODIUM 2 G/100ML
2 INJECTION, SOLUTION INTRAVENOUS
Status: COMPLETED | OUTPATIENT
Start: 2021-10-15 | End: 2021-10-15

## 2021-10-15 RX ORDER — FENTANYL CITRATE 50 UG/ML
25 INJECTION, SOLUTION INTRAMUSCULAR; INTRAVENOUS
Status: DISCONTINUED | OUTPATIENT
Start: 2021-10-15 | End: 2021-10-15 | Stop reason: HOSPADM

## 2021-10-15 RX ORDER — ONDANSETRON 2 MG/ML
INJECTION INTRAMUSCULAR; INTRAVENOUS PRN
Status: DISCONTINUED | OUTPATIENT
Start: 2021-10-15 | End: 2021-10-15

## 2021-10-15 RX ORDER — HYDROMORPHONE HCL IN WATER/PF 6 MG/30 ML
0.2 PATIENT CONTROLLED ANALGESIA SYRINGE INTRAVENOUS EVERY 5 MIN PRN
Status: DISCONTINUED | OUTPATIENT
Start: 2021-10-15 | End: 2021-10-15 | Stop reason: HOSPADM

## 2021-10-15 RX ORDER — ACETAMINOPHEN 325 MG/1
975 TABLET ORAL ONCE
Status: COMPLETED | OUTPATIENT
Start: 2021-10-15 | End: 2021-10-15

## 2021-10-15 RX ORDER — KETOROLAC TROMETHAMINE 30 MG/ML
30 INJECTION, SOLUTION INTRAMUSCULAR; INTRAVENOUS ONCE
Status: COMPLETED | OUTPATIENT
Start: 2021-10-15 | End: 2021-10-15

## 2021-10-15 RX ORDER — SODIUM CHLORIDE, SODIUM LACTATE, POTASSIUM CHLORIDE, CALCIUM CHLORIDE 600; 310; 30; 20 MG/100ML; MG/100ML; MG/100ML; MG/100ML
INJECTION, SOLUTION INTRAVENOUS CONTINUOUS
Status: DISCONTINUED | OUTPATIENT
Start: 2021-10-15 | End: 2021-10-15 | Stop reason: HOSPADM

## 2021-10-15 RX ORDER — OXYCODONE HYDROCHLORIDE 5 MG/1
5 TABLET ORAL EVERY 4 HOURS PRN
Status: DISCONTINUED | OUTPATIENT
Start: 2021-10-15 | End: 2021-10-15 | Stop reason: HOSPADM

## 2021-10-15 RX ORDER — PROPOFOL 10 MG/ML
INJECTION, EMULSION INTRAVENOUS PRN
Status: DISCONTINUED | OUTPATIENT
Start: 2021-10-15 | End: 2021-10-15

## 2021-10-15 RX ORDER — DEXAMETHASONE SODIUM PHOSPHATE 4 MG/ML
INJECTION, SOLUTION INTRA-ARTICULAR; INTRALESIONAL; INTRAMUSCULAR; INTRAVENOUS; SOFT TISSUE PRN
Status: DISCONTINUED | OUTPATIENT
Start: 2021-10-15 | End: 2021-10-15

## 2021-10-15 RX ORDER — FENTANYL CITRATE 50 UG/ML
25 INJECTION, SOLUTION INTRAMUSCULAR; INTRAVENOUS EVERY 5 MIN PRN
Status: DISCONTINUED | OUTPATIENT
Start: 2021-10-15 | End: 2021-10-15 | Stop reason: HOSPADM

## 2021-10-15 RX ORDER — OXYCODONE HYDROCHLORIDE 5 MG/1
5 TABLET ORAL EVERY 6 HOURS PRN
Qty: 12 TABLET | Refills: 0 | Status: SHIPPED | OUTPATIENT
Start: 2021-10-15 | End: 2021-10-18

## 2021-10-15 RX ORDER — FENTANYL CITRATE 50 UG/ML
INJECTION, SOLUTION INTRAMUSCULAR; INTRAVENOUS PRN
Status: DISCONTINUED | OUTPATIENT
Start: 2021-10-15 | End: 2021-10-15

## 2021-10-15 RX ORDER — PHENAZOPYRIDINE HYDROCHLORIDE 200 MG/1
200 TABLET, FILM COATED ORAL 3 TIMES DAILY PRN
Qty: 9 TABLET | Refills: 0 | Status: SHIPPED | OUTPATIENT
Start: 2021-10-15 | End: 2023-04-11

## 2021-10-15 RX ADMIN — SODIUM CHLORIDE, POTASSIUM CHLORIDE, SODIUM LACTATE AND CALCIUM CHLORIDE: 600; 310; 30; 20 INJECTION, SOLUTION INTRAVENOUS at 09:20

## 2021-10-15 RX ADMIN — FENTANYL CITRATE 25 MCG: 50 INJECTION, SOLUTION INTRAMUSCULAR; INTRAVENOUS at 09:58

## 2021-10-15 RX ADMIN — CEFAZOLIN 2 G: 10 INJECTION, POWDER, FOR SOLUTION INTRAVENOUS at 09:26

## 2021-10-15 RX ADMIN — ONDANSETRON 4 MG: 2 INJECTION INTRAMUSCULAR; INTRAVENOUS at 12:24

## 2021-10-15 RX ADMIN — ACETAMINOPHEN 975 MG: 325 TABLET, FILM COATED ORAL at 11:16

## 2021-10-15 RX ADMIN — FENTANYL CITRATE 25 MCG: 50 INJECTION INTRAMUSCULAR; INTRAVENOUS at 11:18

## 2021-10-15 RX ADMIN — HYDROMORPHONE HYDROCHLORIDE 0.2 MG: 0.2 INJECTION, SOLUTION INTRAMUSCULAR; INTRAVENOUS; SUBCUTANEOUS at 10:54

## 2021-10-15 RX ADMIN — FENTANYL CITRATE 25 MCG: 50 INJECTION INTRAMUSCULAR; INTRAVENOUS at 11:13

## 2021-10-15 RX ADMIN — ONDANSETRON 4 MG: 2 INJECTION INTRAMUSCULAR; INTRAVENOUS at 10:01

## 2021-10-15 RX ADMIN — HYDROMORPHONE HYDROCHLORIDE 0.2 MG: 0.2 INJECTION, SOLUTION INTRAMUSCULAR; INTRAVENOUS; SUBCUTANEOUS at 11:07

## 2021-10-15 RX ADMIN — OXYCODONE HYDROCHLORIDE 5 MG: 5 TABLET ORAL at 11:08

## 2021-10-15 RX ADMIN — FENTANYL CITRATE 50 MCG: 50 INJECTION, SOLUTION INTRAMUSCULAR; INTRAVENOUS at 09:20

## 2021-10-15 RX ADMIN — DEXAMETHASONE SODIUM PHOSPHATE 6 MG: 4 INJECTION, SOLUTION INTRAMUSCULAR; INTRAVENOUS at 09:41

## 2021-10-15 RX ADMIN — ROCURONIUM BROMIDE 50 MG: 50 INJECTION, SOLUTION INTRAVENOUS at 09:20

## 2021-10-15 RX ADMIN — FENTANYL CITRATE 25 MCG: 50 INJECTION INTRAMUSCULAR; INTRAVENOUS at 10:30

## 2021-10-15 RX ADMIN — SUGAMMADEX 120 MG: 100 INJECTION, SOLUTION INTRAVENOUS at 10:02

## 2021-10-15 RX ADMIN — HYDROMORPHONE HYDROCHLORIDE 0.2 MG: 0.2 INJECTION, SOLUTION INTRAMUSCULAR; INTRAVENOUS; SUBCUTANEOUS at 12:42

## 2021-10-15 RX ADMIN — KETOROLAC TROMETHAMINE 30 MG: 30 INJECTION, SOLUTION INTRAMUSCULAR; INTRAVENOUS at 11:29

## 2021-10-15 RX ADMIN — HYDROMORPHONE HYDROCHLORIDE 0.2 MG: 0.2 INJECTION, SOLUTION INTRAMUSCULAR; INTRAVENOUS; SUBCUTANEOUS at 10:35

## 2021-10-15 RX ADMIN — HYDROMORPHONE HYDROCHLORIDE 0.2 MG: 0.2 INJECTION, SOLUTION INTRAMUSCULAR; INTRAVENOUS; SUBCUTANEOUS at 11:59

## 2021-10-15 RX ADMIN — PROPOFOL 150 MG: 10 INJECTION, EMULSION INTRAVENOUS at 09:20

## 2021-10-15 RX ADMIN — HYDROMORPHONE HYDROCHLORIDE 0.2 MG: 0.2 INJECTION, SOLUTION INTRAMUSCULAR; INTRAVENOUS; SUBCUTANEOUS at 10:41

## 2021-10-15 RX ADMIN — LIDOCAINE HYDROCHLORIDE 80 MG: 20 INJECTION, SOLUTION INFILTRATION; PERINEURAL at 09:20

## 2021-10-15 ASSESSMENT — MIFFLIN-ST. JEOR: SCORE: 1351.13

## 2021-10-15 NOTE — DISCHARGE INSTRUCTIONS
Discharge Instructions: Following a Cystoscopy/Stent and/or Ureteroscopy    Drainage:    Urine may be slightly bloody but should taper off in a few days.    You may have some frequency and urgency for a few days.    Comfort:    A burning sensation when urinating is common. Drinking extra fluids helps decrease this burning feeling and also helps to clear the bloody urine.    Mild abdominal cramps may occur for a few days.    Baths:    Daily tub baths aide in passing urine.    Frequent use of bubble bath is discouraged since it encourages urinary tract infections.    Report to your doctor at once if you experience:    Inability to pass your urine    Continuous or heavy bleeding    Severe Pain    Elevated temperature > than 101.5 for 24 hours    Home Activity:    The day of surgery spend a quiet evening at home.    Increase activity as tolerated.    Rev. 5/12        Same-Day Surgery   Adult Discharge Orders & Instructions     For 24 hours after surgery:  1. Get plenty of rest.  A responsible adult must stay with you for at least 24 hours after you leave the hospital.   2. Pain medication can slow your reflexes. Do not drive or use heavy equipment.  If you have weakness or tingling, don't drive or use heavy equipment until this feeling goes away.  3. Mixing alcohol and pain medication can cause dizziness and slow your breathing. It can even be fatal. Do not drink alcohol while taking pain medication.  4. Avoid strenuous or risky activities.  Ask for help when climbing stairs.   5. You may feel lightheaded.  If so, sit for a few minutes before standing.  Have someone help you get up.   6. If you have nausea (feel sick to your stomach), drink only clear liquids such as apple juice, ginger ale, broth or 7-Up.  Rest may also help.  Be sure to drink enough fluids.  Move to a regular diet as you feel able. Take pain medications with a small amount of solid food, such as toast or crackers, to avoid nausea.   7. A slight fever  is normal. Call the doctor if your fever is over 100 F (37.7 C) (taken under the tongue) or lasts longer than 24 hours.  8. You may have a dry mouth, muscle aches, trouble sleeping or a sore throat.  These symptoms should go away after 24 hours.  9. Do not make important or legal decisions.   Pain Management:      1. Take pain medication (if prescribed) for pain as directed by your physician.        2. WARNING: If the pain medication you have been prescribed contains Tylenol  (acetaminophen), DO NOT take additional doses of Tylenol (acetaminophen).     Call your doctor for any of the followin.  Signs of infection (fever, growing tenderness at the surgery site, severe pain, a large amount of drainage or bleeding, foul-smelling drainage, redness, swelling).    2.  It has been over 8 to 10 hours since surgery and you are still not able to urinate (pee).    3.  Headache for over 24 hours.    4.  Numbness, tingling or weakness the day after surgery (if you had spinal anesthesia).   To contact a doctor, call Dr. Monique's Office [phone] or:     :    [phone] and ask for the Resident On Call for:         Urology (answered 24 hours a day)      Emergency Department:  [place] [facility]: [phone]  [place] [facility]: [phone]

## 2021-10-15 NOTE — ANESTHESIA CARE TRANSFER NOTE
Patient: Giovanni Cedeno    Procedure: Procedure(s):  CYSTOURETEROSCOPY, WITH RETROGRADE PYELOGRAM, STENT INSERTION RIGHT       Diagnosis: Kidney stone [N20.0]  Diagnosis Additional Information: No value filed.    Anesthesia Type:   General     Note:    Oropharynx: oropharynx clear of all foreign objects and spontaneously breathing  Level of Consciousness: drowsy  Oxygen Supplementation: face mask  Level of Supplemental Oxygen (L/min / FiO2): 8  Independent Airway: airway patency satisfactory and stable  Dentition: dentition unchanged  Vital Signs Stable: post-procedure vital signs reviewed and stable  Report to RN Given: handoff report given  Patient transferred to: PACU    Handoff Report: Identifed the Patient, Identified the Reponsible Provider, Reviewed the pertinent medical history, Discussed the surgical course, Reviewed Intra-OP anesthesia mangement and issues during anesthesia, Set expectations for post-procedure period and Allowed opportunity for questions and acknowledgement of understanding      Vitals:  Vitals Value Taken Time   /102 10/15/21 1018   Temp     Pulse 81 10/15/21 1019   Resp 10 10/15/21 1019   SpO2 100 % 10/15/21 1019   Vitals shown include unvalidated device data.    Electronically Signed By: CARMEL Marquez CRNA  October 15, 2021  10:20 AM

## 2021-10-15 NOTE — ANESTHESIA POSTPROCEDURE EVALUATION
Patient: Giovanni Cedeno    Procedure: Procedure(s):  CYSTOURETEROSCOPY, WITH RETROGRADE PYELOGRAM, STENT INSERTION RIGHT       Diagnosis:Kidney stone [N20.0]  Diagnosis Additional Information: No value filed.    Anesthesia Type:  General    Note:  Disposition: Outpatient   Postop Pain Control: Uneventful            Sign Out: Well controlled pain   PONV: No   Neuro/Psych: Uneventful            Sign Out: Acceptable/Baseline neuro status   Airway/Respiratory: Uneventful            Sign Out: Acceptable/Baseline resp. status   CV/Hemodynamics: Uneventful            Sign Out: Acceptable CV status; No obvious hypovolemia; No obvious fluid overload   Other NRE:    DID A NON-ROUTINE EVENT OCCUR?            Last vitals:  Vitals Value Taken Time   /81 10/15/21 1200   Temp 36.7  C (98  F) 10/15/21 1115   Pulse 70 10/15/21 1200   Resp 14 10/15/21 1140   SpO2 97 % 10/15/21 1200   Vitals shown include unvalidated device data.    Electronically Signed By: Areli Nieto MD  October 15, 2021  1:53 PM

## 2021-10-15 NOTE — ANESTHESIA PREPROCEDURE EVALUATION
Anesthesia Pre-Procedure Evaluation    Patient: Giovanni Cedeno   MRN: [id number] : 1965        Preoperative Diagnosis: Kidney stone [N20.0]    Procedure : Procedure(s):  CYSTOURETEROSCOPY, WITH RETROGRADE PYELOGRAM, HOLMIUM LASER LITHOTRIPSY, STENT INSERTION RIGHT          Past Medical History:   Diagnosis Date     Arthritis       History reviewed. No pertinent surgical history.   No Known Allergies   Social History     Tobacco Use     Smoking status: Never Smoker     Smokeless tobacco: Never Used   Substance Use Topics     Alcohol use: No      Wt Readings from Last 1 Encounters:   10/12/21 65.8 kg (145 lb)        Anesthesia Evaluation            ROS/MED HX  ENT/Pulmonary:     (+) asthma     Neurologic:       Cardiovascular:       METS/Exercise Tolerance:     Hematologic:       Musculoskeletal:       GI/Hepatic:       Renal/Genitourinary:     (+) renal disease,     Endo:       Psychiatric/Substance Use:       Infectious Disease:       Malignancy:       Other:            Physical Exam    Airway        Mallampati: II   TM distance: > 3 FB   Neck ROM: full   Mouth opening: > 3 cm    Respiratory Devices and Support         Dental  no notable dental history         Cardiovascular   cardiovascular exam normal          Pulmonary   pulmonary exam normal                OUTSIDE LABS:  CBC:   Lab Results   Component Value Date    WBC 6.1 10/11/2021    WBC 6.4 2017    HGB 13.4 10/11/2021    HGB 14.7 2017    HCT 42.0 10/11/2021    HCT 44.3 2017     10/11/2021     2017     BMP:   Lab Results   Component Value Date     10/11/2021     2020    POTASSIUM 4.1 10/11/2021    POTASSIUM 4.1 2020    CHLORIDE 107 10/11/2021    CHLORIDE 108 2020    CO2 25 10/11/2021    CO2 27 2020    BUN 14 10/11/2021    BUN 14 2020    CR 0.85 10/11/2021    CR 0.97 2020    GLC 94 10/11/2021    GLC 85 2020     COAGS: No results found for: PTT, INR,  FIBR  POC: No results found for: BGM, HCG, HCGS  HEPATIC:   Lab Results   Component Value Date    ALBUMIN 3.4 10/11/2021    PROTTOTAL 6.4 (L) 10/11/2021    ALT 37 10/11/2021    AST 21 10/11/2021    ALKPHOS 59 10/11/2021    BILITOTAL 0.3 10/11/2021     OTHER:   Lab Results   Component Value Date    A1C 5.4 07/09/2015    TODD 8.7 10/11/2021    LIPASE 133 10/11/2021    SED 6 05/23/2017       Anesthesia Plan    ASA Status:  2      Anesthesia Type: General.      Maintenance: Balanced.        Consents    Anesthesia Plan(s) and associated risks, benefits, and realistic alternatives discussed. Questions answered and patient/representative(s) expressed understanding.     - Discussed with:  Patient      - Extended Intubation/Ventilatory Support Discussed: No.      - Patient is DNR/DNI Status: No    Use of blood products discussed: No .     Postoperative Care    Pain management: Multi-modal analgesia.   PONV prophylaxis: Ondansetron (or other 5HT-3)     Comments:    GA with std monitors            Areli Nieto MD

## 2021-10-15 NOTE — OP NOTE
OPERATIVE REPORT    PREOPERATIVE DIAGNOSIS:  Right renal stones(s)    POSTOPERATIVE DIAGNOSIS: Same    PROCEDURES PERFORMED:   -Cystourethroscopy  -Right retrograde pyelogram with intraoperative interpretation of images  -Right ureteroscopy  -Right ureteral stent placement    STAFF SURGEON: Rj Monique MD    ANESTHESIA: General  ESTIMATED BLOOD LOSS: 1 ml  COMPLICATIONS: None.   SPECIMEN: None  URETERAL STENT(S):  - Right 7 x 26 cm double-J ureteral stent.  Reason for stenting: Ureteral stricture.      SIGNIFICANT FINDINGS:   -Known residual stone burden on the right  -Right RPG notable for narrow tortuous proximal segment with right hydronephrosis    BRIEF OPERATIVE INDICATIONS: Giovanni Cedeno is a(n) 56 year old male who presented with a right renal stone, flank pain an solitary kidney.  After a discussion of all risks, benefits, and alternatives, the patient elected to proceed with definitive stone management. The patient understands the potential need for more than one procedure to eliminate all stone burden.      DESCRIPTION OF PROCEDURE:  After informed consent was obtained, the patient was transported to the operating room & placed supine on the table. After adequate anesthesia was induced, the patient was placed in lithotomy and prepped and draped in the usual sterile fashion. A timeout was taken to confirm correct patient, procedure and laterality. Pre-operative IV antibiotics were administered.     A 22-Tunisian rigid cystoscope was inserted into a well-lubricated urethra. The urethra was unremarkable without stricture.     The right ureteral orifice was identified and cannulated with a sensor wire with the aid of a 5F open-ended catheter. The wire passed without resistance into the upper pole.  The 5F open-ended catheter was advanced, the wire was removed and a retrograde pyelogram was performed revealing moderate hydronephrosis.  The sensor wire was reintroduced and the 5F catheter was removed.      We gently calibrated the ureter with an 8/10 catheter.  The 8 slid easily without resistance, there was moderate resistance to the 10F.  We next passed the scope over the wire.  We were unable to advance beyond the mid ureter.  We performed direct ureteroscopy and the proximal ureteral was noted to be quite stenotic and low caliber.  This was confirmed on retrograde pyelography (See images).  As such, we elected to replaced the wire into the renal pelvis and passed a 7 x 26 double J stent which we confirmed had full renal and bladder curls    The patient tolerated the procedure well and there were no apparent complications. The patient  was transported to the postanesthesia care unit in stable condition.     POSTOP PLAN:  Return to OR for secondary procedure in 2 weeks

## 2021-10-19 ENCOUNTER — TELEPHONE (OUTPATIENT)
Dept: UROLOGY | Facility: CLINIC | Age: 56
End: 2021-10-19

## 2021-10-19 DIAGNOSIS — N20.0 KIDNEY STONE: Primary | ICD-10-CM

## 2021-10-19 DIAGNOSIS — Z11.59 ENCOUNTER FOR SCREENING FOR OTHER VIRAL DISEASES: ICD-10-CM

## 2021-10-19 RX ORDER — PHENAZOPYRIDINE HYDROCHLORIDE 100 MG/1
200 TABLET, FILM COATED ORAL 3 TIMES DAILY PRN
Qty: 15 TABLET | Refills: 0 | Status: SHIPPED | OUTPATIENT
Start: 2021-10-19 | End: 2023-04-11

## 2021-10-19 RX ORDER — OXYCODONE HYDROCHLORIDE 5 MG/1
5 TABLET ORAL EVERY 6 HOURS PRN
Qty: 12 TABLET | Refills: 0 | Status: SHIPPED | OUTPATIENT
Start: 2021-10-19 | End: 2021-10-22

## 2021-10-19 NOTE — TELEPHONE ENCOUNTER
Urology Postop Phone Note:    Mr. Giovanni Cedeno is a 56 year old male who underwent right ureteroscopy with stent placement on 10/15 with Dr. bennett for a history of stones.  His postoperative course was unremarkable with the exception of stone/stent discomfort and the patient was d/c to home on 10/15.    Fevers/chills: Patient denies any fever or chills.  Eating/drinking: Patient is able to eat and drink without any complaints.  Bowel habits: Patient reports having a normal bowel movement.  Urine output Volitional   Drains (NYASIA): n/a  Incisions: n/a  Pain:  Pt states pain waxes and wanes. He states he is experiencing stent pain while emptying his bladder, but also stone pain in his right kidney. Pt states he is taking ibuprofen, using a heating pad, pushing fluids, taking azo three times a day, but also using oxyCODONE as needed. Pt states he did take his last tablet this morning and is requesting a refill of both oxyCODONE and Pyridium. Pt encouraged to use the oxyCODONE when pain is unmanaged by other means. Medications pended for provider.   Narcotics: The patient has been taking oxyCODONE for pain and is requesting a refill.  Antibiotics: n/a    Follow up surgery scheduled two weeks from now. Pt has no additional questions at this time, however he does request a call back when refill shave been sent.

## 2021-10-19 NOTE — CONFIDENTIAL NOTE
Patient is scheduled for surgery with Dr. Monique    Spoke with: Patient     Date of Surgery: Wednesday 10/27/21    Location: [facility] OR     Informed patient they will need an adult  Yes    Pre op with Provider angelica    H&P: Scheduled with Patient had pre-op on 10/11/21    Pre-procedure COVID-19 Test: Monday 10/25/21 [facility]    Additional imaging/appointments: na    Surgery packet: patient declined a new packet be mailed     Additional comments: patient stated he needs a refill on 2 medications. Oxycodone and Phenazopyridine. States that he just took his last dose for both this morning. Would like refills sent to [facility] in [place]. High priority message sent to Ramandeep Monique's RNCC with this information.

## 2021-10-20 ENCOUNTER — TELEPHONE (OUTPATIENT)
Dept: FAMILY MEDICINE | Facility: CLINIC | Age: 56
End: 2021-10-20

## 2021-10-20 NOTE — TELEPHONE ENCOUNTER
Reason for Call:  Medication refill:    Name of the pharmacy and phone number for the current request: [facility]  [phone]    Name of the medication requested: methocarbamol (ROBAXIN) 500 MG tablet    Can we leave a detailed message on this number? YES    Phone number patient can be reached at: Home number on file [phone]     Best Time: any    Call taken on 10/20/2021 at 3:59 PM by Doretha Olvera

## 2021-10-22 NOTE — TELEPHONE ENCOUNTER
Central Prior Authorization Team   Phone: [phone]      REFILL REQUEST    Prior Authorization Not Needed    10/22/2021  Medication: methocarbamol (ROBAXIN) 500 MG tablet - NOT NEEDED  Insurance Company: [employer] [facility] - Phone [phone] Fax [phone]  Expected CoPay:      [id number] [address] [address]  Pharmacy Notified: Yes  Patient Notified: Yes (**Instructed pharmacy to notify patient when script is ready to /ship.**)    Verified with pharmacy no PA is needed - refill request.

## 2021-10-25 ENCOUNTER — LAB (OUTPATIENT)
Dept: LAB | Facility: CLINIC | Age: 56
End: 2021-10-25
Payer: COMMERCIAL

## 2021-10-25 DIAGNOSIS — Z11.59 ENCOUNTER FOR SCREENING FOR OTHER VIRAL DISEASES: ICD-10-CM

## 2021-10-25 PROCEDURE — U0005 INFEC AGEN DETEC AMPLI PROBE: HCPCS

## 2021-10-25 PROCEDURE — U0003 INFECTIOUS AGENT DETECTION BY NUCLEIC ACID (DNA OR RNA); SEVERE ACUTE RESPIRATORY SYNDROME CORONAVIRUS 2 (SARS-COV-2) (CORONAVIRUS DISEASE [COVID-19]), AMPLIFIED PROBE TECHNIQUE, MAKING USE OF HIGH THROUGHPUT TECHNOLOGIES AS DESCRIBED BY CMS-2020-01-R: HCPCS

## 2021-10-26 ENCOUNTER — ANESTHESIA EVENT (OUTPATIENT)
Dept: SURGERY | Facility: AMBULATORY SURGERY CENTER | Age: 56
End: 2021-10-26
Payer: COMMERCIAL

## 2021-10-26 LAB — SARS-COV-2 RNA RESP QL NAA+PROBE: NEGATIVE

## 2021-10-27 ENCOUNTER — ANCILLARY PROCEDURE (OUTPATIENT)
Dept: RADIOLOGY | Facility: AMBULATORY SURGERY CENTER | Age: 56
End: 2021-10-27
Attending: UROLOGY
Payer: COMMERCIAL

## 2021-10-27 ENCOUNTER — ANESTHESIA (OUTPATIENT)
Dept: SURGERY | Facility: AMBULATORY SURGERY CENTER | Age: 56
End: 2021-10-27
Payer: COMMERCIAL

## 2021-10-27 ENCOUNTER — HOSPITAL ENCOUNTER (OUTPATIENT)
Facility: AMBULATORY SURGERY CENTER | Age: 56
End: 2021-10-27
Attending: UROLOGY
Payer: COMMERCIAL

## 2021-10-27 VITALS
TEMPERATURE: 97.7 F | WEIGHT: 140 LBS | SYSTOLIC BLOOD PRESSURE: 121 MMHG | HEIGHT: 63 IN | HEART RATE: 85 BPM | OXYGEN SATURATION: 95 % | RESPIRATION RATE: 14 BRPM | DIASTOLIC BLOOD PRESSURE: 80 MMHG | BODY MASS INDEX: 24.8 KG/M2

## 2021-10-27 DIAGNOSIS — R39.89 URINARY PROBLEM: ICD-10-CM

## 2021-10-27 DIAGNOSIS — N20.0 KIDNEY STONE: Primary | ICD-10-CM

## 2021-10-27 PROCEDURE — 52356 CYSTO/URETERO W/LITHOTRIPSY: CPT | Mod: RT

## 2021-10-27 PROCEDURE — 52356 CYSTO/URETERO W/LITHOTRIPSY: CPT | Mod: RT | Performed by: UROLOGY

## 2021-10-27 PROCEDURE — 74420 UROGRAPHY RTRGR +-KUB: CPT | Mod: 26 | Performed by: UROLOGY

## 2021-10-27 PROCEDURE — 74420 UROGRAPHY RTRGR +-KUB: CPT | Mod: TC

## 2021-10-27 DEVICE — STENT URETERAL PERCUFLEX PLUS 7FRX26CM M0061752730: Type: IMPLANTABLE DEVICE | Site: ABDOMEN | Status: FUNCTIONAL

## 2021-10-27 RX ORDER — ALBUTEROL SULFATE 0.83 MG/ML
2.5 SOLUTION RESPIRATORY (INHALATION) EVERY 4 HOURS PRN
Status: DISCONTINUED | OUTPATIENT
Start: 2021-10-27 | End: 2021-10-27 | Stop reason: HOSPADM

## 2021-10-27 RX ORDER — FENTANYL CITRATE 50 UG/ML
INJECTION, SOLUTION INTRAMUSCULAR; INTRAVENOUS PRN
Status: DISCONTINUED | OUTPATIENT
Start: 2021-10-27 | End: 2021-10-27

## 2021-10-27 RX ORDER — LIDOCAINE HYDROCHLORIDE 20 MG/ML
INJECTION, SOLUTION INFILTRATION; PERINEURAL PRN
Status: DISCONTINUED | OUTPATIENT
Start: 2021-10-27 | End: 2021-10-27

## 2021-10-27 RX ORDER — LIDOCAINE 40 MG/G
CREAM TOPICAL
Status: DISCONTINUED | OUTPATIENT
Start: 2021-10-27 | End: 2021-10-27 | Stop reason: HOSPADM

## 2021-10-27 RX ORDER — GABAPENTIN 300 MG/1
300 CAPSULE ORAL
Status: DISCONTINUED | OUTPATIENT
Start: 2021-10-27 | End: 2021-10-27 | Stop reason: HOSPADM

## 2021-10-27 RX ORDER — ONDANSETRON 4 MG/1
4 TABLET, ORALLY DISINTEGRATING ORAL EVERY 30 MIN PRN
Status: DISCONTINUED | OUTPATIENT
Start: 2021-10-27 | End: 2021-10-28 | Stop reason: HOSPADM

## 2021-10-27 RX ORDER — CEFAZOLIN SODIUM 2 G/50ML
2 SOLUTION INTRAVENOUS
Status: COMPLETED | OUTPATIENT
Start: 2021-10-27 | End: 2021-10-27

## 2021-10-27 RX ORDER — CEPHALEXIN 500 MG/1
500 CAPSULE ORAL 2 TIMES DAILY
Qty: 6 CAPSULE | Refills: 0 | Status: SHIPPED | OUTPATIENT
Start: 2021-10-27 | End: 2021-10-30

## 2021-10-27 RX ORDER — FENTANYL CITRATE 50 UG/ML
50 INJECTION, SOLUTION INTRAMUSCULAR; INTRAVENOUS EVERY 5 MIN PRN
Status: DISCONTINUED | OUTPATIENT
Start: 2021-10-27 | End: 2021-10-27 | Stop reason: HOSPADM

## 2021-10-27 RX ORDER — GLYCOPYRROLATE 0.2 MG/ML
INJECTION, SOLUTION INTRAMUSCULAR; INTRAVENOUS PRN
Status: DISCONTINUED | OUTPATIENT
Start: 2021-10-27 | End: 2021-10-27

## 2021-10-27 RX ORDER — ONDANSETRON 2 MG/ML
INJECTION INTRAMUSCULAR; INTRAVENOUS PRN
Status: DISCONTINUED | OUTPATIENT
Start: 2021-10-27 | End: 2021-10-27

## 2021-10-27 RX ORDER — OXYCODONE HYDROCHLORIDE 5 MG/1
5 TABLET ORAL EVERY 4 HOURS PRN
Status: DISCONTINUED | OUTPATIENT
Start: 2021-10-27 | End: 2021-10-28 | Stop reason: HOSPADM

## 2021-10-27 RX ORDER — PROPOFOL 10 MG/ML
INJECTION, EMULSION INTRAVENOUS PRN
Status: DISCONTINUED | OUTPATIENT
Start: 2021-10-27 | End: 2021-10-27

## 2021-10-27 RX ORDER — ACETAMINOPHEN 325 MG/1
975 TABLET ORAL ONCE
Status: COMPLETED | OUTPATIENT
Start: 2021-10-27 | End: 2021-10-27

## 2021-10-27 RX ORDER — SODIUM CHLORIDE, SODIUM LACTATE, POTASSIUM CHLORIDE, CALCIUM CHLORIDE 600; 310; 30; 20 MG/100ML; MG/100ML; MG/100ML; MG/100ML
INJECTION, SOLUTION INTRAVENOUS CONTINUOUS
Status: DISCONTINUED | OUTPATIENT
Start: 2021-10-27 | End: 2021-10-28 | Stop reason: HOSPADM

## 2021-10-27 RX ORDER — PROPOFOL 10 MG/ML
INJECTION, EMULSION INTRAVENOUS CONTINUOUS PRN
Status: DISCONTINUED | OUTPATIENT
Start: 2021-10-27 | End: 2021-10-27

## 2021-10-27 RX ORDER — CEFAZOLIN SODIUM 2 G/50ML
2 SOLUTION INTRAVENOUS SEE ADMIN INSTRUCTIONS
Status: DISCONTINUED | OUTPATIENT
Start: 2021-10-27 | End: 2021-10-27 | Stop reason: HOSPADM

## 2021-10-27 RX ORDER — ONDANSETRON 2 MG/ML
4 INJECTION INTRAMUSCULAR; INTRAVENOUS EVERY 30 MIN PRN
Status: DISCONTINUED | OUTPATIENT
Start: 2021-10-27 | End: 2021-10-28 | Stop reason: HOSPADM

## 2021-10-27 RX ORDER — SODIUM CHLORIDE, SODIUM LACTATE, POTASSIUM CHLORIDE, CALCIUM CHLORIDE 600; 310; 30; 20 MG/100ML; MG/100ML; MG/100ML; MG/100ML
INJECTION, SOLUTION INTRAVENOUS CONTINUOUS
Status: DISCONTINUED | OUTPATIENT
Start: 2021-10-27 | End: 2021-10-27 | Stop reason: HOSPADM

## 2021-10-27 RX ORDER — OXYCODONE HYDROCHLORIDE 5 MG/1
5 TABLET ORAL EVERY 6 HOURS PRN
Qty: 15 TABLET | Refills: 0 | Status: SHIPPED | OUTPATIENT
Start: 2021-10-27 | End: 2021-12-13

## 2021-10-27 RX ORDER — HYDROMORPHONE HYDROCHLORIDE 1 MG/ML
0.2 INJECTION, SOLUTION INTRAMUSCULAR; INTRAVENOUS; SUBCUTANEOUS EVERY 5 MIN PRN
Status: DISCONTINUED | OUTPATIENT
Start: 2021-10-27 | End: 2021-10-27 | Stop reason: HOSPADM

## 2021-10-27 RX ADMIN — SODIUM CHLORIDE, SODIUM LACTATE, POTASSIUM CHLORIDE, CALCIUM CHLORIDE: 600; 310; 30; 20 INJECTION, SOLUTION INTRAVENOUS at 07:50

## 2021-10-27 RX ADMIN — PROPOFOL 50 MG: 10 INJECTION, EMULSION INTRAVENOUS at 09:46

## 2021-10-27 RX ADMIN — PROPOFOL 50 MCG/KG/MIN: 10 INJECTION, EMULSION INTRAVENOUS at 10:05

## 2021-10-27 RX ADMIN — Medication 100 MCG: at 09:11

## 2021-10-27 RX ADMIN — FENTANYL CITRATE 50 MCG: 50 INJECTION, SOLUTION INTRAMUSCULAR; INTRAVENOUS at 09:05

## 2021-10-27 RX ADMIN — PROPOFOL 50 MG: 10 INJECTION, EMULSION INTRAVENOUS at 09:33

## 2021-10-27 RX ADMIN — OXYCODONE HYDROCHLORIDE 5 MG: 5 TABLET ORAL at 11:04

## 2021-10-27 RX ADMIN — FENTANYL CITRATE 25 MCG: 50 INJECTION, SOLUTION INTRAMUSCULAR; INTRAVENOUS at 11:19

## 2021-10-27 RX ADMIN — FENTANYL CITRATE 50 MCG: 50 INJECTION, SOLUTION INTRAMUSCULAR; INTRAVENOUS at 10:53

## 2021-10-27 RX ADMIN — PROPOFOL 250 MG: 10 INJECTION, EMULSION INTRAVENOUS at 09:05

## 2021-10-27 RX ADMIN — CEFAZOLIN SODIUM 2 G: 2 SOLUTION INTRAVENOUS at 08:53

## 2021-10-27 RX ADMIN — GLYCOPYRROLATE 0.2 MG: 0.2 INJECTION, SOLUTION INTRAMUSCULAR; INTRAVENOUS at 09:52

## 2021-10-27 RX ADMIN — Medication 100 MCG: at 09:13

## 2021-10-27 RX ADMIN — FENTANYL CITRATE 50 MCG: 50 INJECTION, SOLUTION INTRAMUSCULAR; INTRAVENOUS at 09:33

## 2021-10-27 RX ADMIN — LIDOCAINE HYDROCHLORIDE 70 MG: 20 INJECTION, SOLUTION INFILTRATION; PERINEURAL at 09:05

## 2021-10-27 RX ADMIN — PROPOFOL 150 MCG/KG/MIN: 10 INJECTION, EMULSION INTRAVENOUS at 09:05

## 2021-10-27 RX ADMIN — ACETAMINOPHEN 975 MG: 325 TABLET ORAL at 07:48

## 2021-10-27 RX ADMIN — ONDANSETRON 4 MG: 2 INJECTION INTRAMUSCULAR; INTRAVENOUS at 08:53

## 2021-10-27 ASSESSMENT — MIFFLIN-ST. JEOR: SCORE: 1350.17

## 2021-10-27 NOTE — ANESTHESIA CARE TRANSFER NOTE
Patient: Giovanni Cedeno    Procedure: Procedure(s):  CYSTOURETEROSCOPY, WITH RETROGRADE PYELOGRAM, HOLMIUM LASER LITHOTRIPSY, STENT INSERTION RIGHT       Diagnosis: Kidney stone [N20.0]  Diagnosis Additional Information: No value filed.    Anesthesia Type:   General     Note:      Level of Consciousness: drowsy  Oxygen Supplementation: face mask  Level of Supplemental Oxygen (L/min / FiO2): 4  Independent Airway: airway patency satisfactory and stable  Dentition: dentition unchanged  Vital Signs Stable: post-procedure vital signs reviewed and stable  Report to RN Given: handoff report given  Patient transferred to: PACU  Comments: Uneventful transport   Report to RN  Exchanging well; color natl  Pt sleepy  IV patent  Lips/teeth/dentition as preop status  Questions answered      Handoff Report: Identifed the Patient, Identified the Reponsible Provider, Reviewed the pertinent medical history, Discussed the surgical course, Reviewed Intra-OP anesthesia mangement and issues during anesthesia, Set expectations for post-procedure period and Allowed opportunity for questions and acknowledgement of understanding      Vitals:  Vitals Value Taken Time   /101    Temp 97.1    Pulse 108 ST    Resp 23 10/27/21 1027   SpO2 97 % 10/27/21 1027   Vitals shown include unvalidated device data.    Electronically Signed By: CARMEL ALAS CRNA  October 27, 2021  10:29 AM

## 2021-10-27 NOTE — OP NOTE
OPERATIVE REPORT    PREOPERATIVE DIAGNOSIS:  Right renal stones(s)    POSTOPERATIVE DIAGNOSIS: Same    PROCEDURES PERFORMED:   Cystourethroscopy  Right ureteroscopy  Right holmium laser lithotripsy  Right retrograde pyelogram  Right ureteral stent placement    STAFF SURGEON: Rj Monique MD  RESIDENT(S): Deya Licea MD    ANESTHESIA: General  ESTIMATED BLOOD LOSS: 1 ml  COMPLICATIONS: None.   SPECIMEN: Stone for analysis   URETERAL STENT(S):  - Right 7 x 26 cm double-J ureteral stent.  Reason for stenting: Other (solitary kidney, large stone burden).      SIGNIFICANT FINDINGS:   -Stone free with no fragments > 2mm on the right  -Right RPG notable for mild hydronephrosis, sharp turn at UPJ    Target stone location:Kidney    Stone opacity on fluoroscopy: Radiopaque    Approximate target stone size: 5-10 mm    Laser used: Yes    Multiple stones treated: Yes      BRIEF OPERATIVE INDICATIONS: Giovanni Cedeno is a(n) 56 year old male who presented with right flank pain and was found to have a moderately sized stone in his solitary kidney. He had a ureteral stent placed 10/15 and returns today for definitive management.  After a discussion of all risks, benefits, and alternatives, the patient elected to proceed with definitive stone management. The patient understands the potential need for more than one procedure to eliminate all stone burden.      DESCRIPTION OF PROCEDURE:  After informed consent was obtained, the patient was transported to the operating room & placed supine on the table. After adequate anesthesia was induced, the patient was placed in lithotomy and prepped and draped in the usual sterile fashion. A timeout was taken to confirm correct patient, procedure and laterality. Pre-operative IV antibiotics were administered.     A high definition fluoroscopic image was taken to determine stone opacity. The stone was noted to be radiopaque.    A 22-Ecuadorean rigid cystoscope was inserted  into a well-lubricated urethra. The urethra was unremarkable without stricture.     The existing right indwelling ureteral stent was identified and removed with the flexible stent grasper to the level of the urethral meatus. A Sensor wire was advanced up to the renal pelvis under fluoroscopic guidance and the previous stent was removed.      We then advanced a dual lumen over our sensor wire. It passed up easily with no resistance. Retrograde pyelogram was performed and revealed tortuous proximal ureter and hydro consistent with prior imaging.        A 36 cm 11/13 ureteral access sheath was advanced over the working wire which was subsequently removed.  A flexible ureteroscope was then introduced into the sheath.  URS revealed a large lower pole stone and two small stones adherent to the mucosa. All stones were moved to the upper pole and patient was placed into trendelenberg.  WE had some difficulty navigating the scope up and down the tortuous proximal ureteral segment and elected to dust the stone rather than risking injury to the narrow caliber and tortuous ureter with multiple passes for extraction.     A 200 micron laser fiber was used at a setting of 0.3 J and 40 Hz and lithotripsy was performed.  Given the tortuous proximal ureter/concern for UPJ obstruction, we opted to dust the stone and took care to ensure there were no large fragments left behind. A tipless basket was used to remove a fragment for analysis    Pullback ureteroscopy showed no residual stone fragments or significant ureteral injury. There was a mild linear abrasion in the distal ureter but very superficial with no concern for transmural injury.     A 7 x 26 cm double-J stent was advanced over the Sensor wire, and a good proximal curl was seen in the renal pelvis on fluoroscopy and the distal curl was seen in the bladder. A string was not left attached to the stent.  The bladder was drained.          The patient tolerated the procedure well  and there were no apparent complications. The patient  was transported to the postanesthesia care unit in stable condition.        POSTOP PLAN:  3d keflex, pain control, follow up in 2 weeks for stent removal.  30-60d follow-up imaging: NATAN and KUB (HLL for radiopaque stone)      Disposable items prior to timeout:  Sensor wire      I, Rj Monique, was present and participatory for the entirety of the procedure

## 2021-10-27 NOTE — ANESTHESIA POSTPROCEDURE EVALUATION
Patient: Giovanni Cedeno    Procedure: Procedure(s):  CYSTOURETEROSCOPY, WITH RETROGRADE PYELOGRAM, HOLMIUM LASER LITHOTRIPSY, STENT INSERTION RIGHT       Diagnosis:Kidney stone [N20.0]  Diagnosis Additional Information: No value filed.    Anesthesia Type:  General    Note:  Disposition: Outpatient   Postop Pain Control: Uneventful            Sign Out: Well controlled pain   PONV: No   Neuro/Psych: Uneventful            Sign Out: Acceptable/Baseline neuro status   Airway/Respiratory: Uneventful            Sign Out: Acceptable/Baseline resp. status   CV/Hemodynamics: Uneventful            Sign Out: Acceptable CV status; No obvious hypovolemia; No obvious fluid overload   Other NRE: NONE   DID A NON-ROUTINE EVENT OCCUR? No           Last vitals:  Vitals Value Taken Time   /102 10/27/21 1126   Temp 36.3  C (97.4  F) 10/27/21 1125   Pulse 74 10/27/21 1126   Resp 16 10/27/21 1126   SpO2 97 % 10/27/21 1126   Vitals shown include unvalidated device data.    Electronically Signed By: Rajat Ley MD  October 27, 2021  3:27 PM

## 2021-10-27 NOTE — PROGRESS NOTES
Anesthesiologist paged about patient's ongoing bilateral lower abdominal pain.  See chart for Dsuvia order.

## 2021-10-27 NOTE — DISCHARGE INSTRUCTIONS
[facility]  Home Care Following Anesthesia  For 24 hours after surgery:  1. Get plenty of rest.  A responsible adult must stay with you for at least 24 hours after you leave the surgery center.  2. Do not drive or use heavy equipment.  If you have weakness or tingling, don't drive or use heavy equipment until this feeling goes away.   3. Do not drink alcohol.   4. Avoid strenuous or risky activities.  Ask for help when climbing stairs.  5. You may feel lightheaded.  IF so, sit for a few minutes before standing.  Have someone help you get up.   6. If you have nausea (feel sick to your stomach): Drink only clear liquids such as apple juice, ginger ale, broth or 7-Up.  Rest may also help.  Be sure to drink enough fluids.  Move to a regular diet as you feel able.   7. You may have a slight fever.  Call the doctor if your fever is over 100 F (37.7 C) (taken under the tongue) or lasts longer than 24 hours.  8. You may have a dry mouth, a sore throat, muscle aches or trouble sleeping. These should go away after 24 hours.  9. Do not make important or legal decisions.   10. It is recommended to avoid smoking.               Tips for taking pain medications  To get the best pain relief possible, remember these points:    Take pain medications as directed, before pain becomes severe.    Pain medication can upset your stomach: taking it with food may help.    Constipation is a common side effect of pain medication. Drink plenty of  fluids.    Eat foods high in fiber. Take a stool softener if recommended by your doctor or pharmacist.    Do not drink alcohol, drive or operate machinery while taking pain medications.    Ask about other ways to control pain, such as with heat, ice or relaxation.    Tylenol/Acetaminophen Consumption  To help encourage the safe use of acetaminophen, the makers of TYLENOL  have lowered the maximum daily dose for single-ingredient Extra Strength TYLENOL   (acetaminophen) products sold in the [place] from 8 pills per day (4,000 mg) to 6 pills per day (3,000 mg). The dosing interval has also changed from 2 pills every 4-6 hours to 2 pills every 6 hours.    If you feel your pain relief is insufficient, you may take Tylenol/Acetaminophen in addition to your narcotic pain medication.     Be careful not to exceed 3,000 mg of Tylenol/Acetaminophen in a 24 hour period from all sources.    If you are taking extra strength Tylenol/acetaminophen (500 mg), the maximum dose is 6 tablets in 24 hours.    If you are taking regular strength acetaminophen (325 mg), the maximum dose is 9 tablets in 24 hours.    Call a doctor for any of the followin. Signs of infection (fever, growing tenderness at the surgery site, a large amount of drainage or bleeding, severe pain, foul-smelling drainage, redness, swelling).  2. It has been over 8 to 10 hours since surgery and you are still not able to urinate (pass water).  3. Headache for over 24 hours.  4. Numbness, tingling or weakness the day after surgery (if you had spinal anesthesia).  5. Signs of Covid-19 infection (temperature over 100 degrees, shortness of breath, cough, loss of taste/smell, generalized body aches, persistent headache, chills, sore throat, nausea/vomiting/diarrhea)  Your doctor is:  Dr. Rj Monique, Prostate and Urology: [phone]                    Or dial [phone] and ask for the resident on call for:  Prostate Urology  For emergency care, call the:  [facility]:  [phone] (TTY for hearing impaired: [phone])

## 2021-10-27 NOTE — ANESTHESIA PREPROCEDURE EVALUATION
Anesthesia Pre-Procedure Evaluation    Patient: Giovanni Cedeno   MRN: [id number] : 1965        Preoperative Diagnosis: Kidney stone [N20.0]    Procedure : Procedure(s):  CYSTOURETEROSCOPY, WITH RETROGRADE PYELOGRAM, HOLMIUM LASER LITHOTRIPSY, STENT INSERTION RIGHT          Past Medical History:   Diagnosis Date     Arthritis       Past Surgical History:   Procedure Laterality Date     COMBINED CYSTOSCOPY, RETROGRADES, URETEROSCOPY, LASER HOLMIUM LITHOTRIPSY URETER(S), INSERT STENT Right 10/15/2021    Procedure: CYSTOURETEROSCOPY, WITH RETROGRADE PYELOGRAM, STENT INSERTION RIGHT;  Surgeon: Rj Monique MD;  Location: [facility]      No Known Allergies   Social History     Tobacco Use     Smoking status: Never Smoker     Smokeless tobacco: Never Used   Substance Use Topics     Alcohol use: No      Wt Readings from Last 1 Encounters:   10/27/21 63.5 kg (140 lb)        Anesthesia Evaluation   Pt has had prior anesthetic.     No history of anesthetic complications       ROS/MED HX  ENT/Pulmonary:     (+) Intermittent, asthma     Neurologic:       Cardiovascular:     (+) Dyslipidemia -----    METS/Exercise Tolerance:     Hematologic:       Musculoskeletal:   (+) arthritis,     GI/Hepatic:       Renal/Genitourinary:     (+) Nephrolithiasis ,     Endo:       Psychiatric/Substance Use:       Infectious Disease:       Malignancy:       Other:      (+) , H/O Chronic Pain,        Physical Exam    Airway        Mallampati: II   TM distance: > 3 FB   Neck ROM: full   Mouth opening: > 3 cm    Respiratory Devices and Support         Dental  no notable dental history         Cardiovascular             Pulmonary                   OUTSIDE LABS:  CBC:   Lab Results   Component Value Date    WBC 6.1 10/11/2021    WBC 6.4 2017    HGB 13.4 10/11/2021    HGB 14.7 2017    HCT 42.0 10/11/2021    HCT 44.3 2017     10/11/2021     2017     BMP:   Lab Results   Component Value Date      10/11/2021     07/21/2020    POTASSIUM 4.1 10/11/2021    POTASSIUM 4.1 07/21/2020    CHLORIDE 107 10/11/2021    CHLORIDE 108 07/21/2020    CO2 25 10/11/2021    CO2 27 07/21/2020    BUN 14 10/11/2021    BUN 14 07/21/2020    CR 0.85 10/11/2021    CR 0.97 07/21/2020    GLC 92 10/15/2021    GLC 94 10/11/2021     COAGS: No results found for: PTT, INR, FIBR  POC: No results found for: BGM, HCG, HCGS  HEPATIC:   Lab Results   Component Value Date    ALBUMIN 3.4 10/11/2021    PROTTOTAL 6.4 (L) 10/11/2021    ALT 37 10/11/2021    AST 21 10/11/2021    ALKPHOS 59 10/11/2021    BILITOTAL 0.3 10/11/2021     OTHER:   Lab Results   Component Value Date    A1C 5.4 07/09/2015    TODD 8.7 10/11/2021    LIPASE 133 10/11/2021    SED 6 05/23/2017       Anesthesia Plan    ASA Status:  2   NPO Status:  NPO Appropriate    Anesthesia Type: General.     - Airway: LMA   Induction: Intravenous, Propofol.   Maintenance: Balanced.        Consents    Anesthesia Plan(s) and associated risks, benefits, and realistic alternatives discussed. Questions answered and patient/representative(s) expressed understanding.     - Discussed with:  Patient      - Extended Intubation/Ventilatory Support Discussed: No.      - Patient is DNR/DNI Status: No    Use of blood products discussed: No .     Postoperative Care    Pain management: IV analgesics, Oral pain medications.   PONV prophylaxis: Ondansetron (or other 5HT-3), Dexamethasone or Solumedrol, Background Propofol Infusion     Comments:         H&P reviewed: Unable to attach H&P to encounter due to EHR limitations. H&P Update: appropriate H&P reviewed, patient examined. No interval changes since H&P (within 30 days).         Rajat Ley MD

## 2021-10-28 ENCOUNTER — TELEPHONE (OUTPATIENT)
Dept: UROLOGY | Facility: CLINIC | Age: 56
End: 2021-10-28

## 2021-10-28 NOTE — TELEPHONE ENCOUNTER
----- Message from LATANYA Gracia sent at 10/27/2021  9:36 PM CDT -----  Regarding: FW: stent removal 11/9 and follow up in 6 weeks  Patient scheduled on 11/9 at 330 for stent removal in clinic with Dr. Monique.    Please make sure patient is aware.     Thanks,   Ann   ----- Message -----  From: RAMANDEEP CUMMINGS  Sent: 10/27/2021  11:14 AM CDT  To: LATANYA Gracia  Subject: FW: stent removal 11/9 and follow up in 6 we#    Do you want this on for the end of the day, or when works? I will add him on there if you'd like :)  Ramandeep  ----- Message -----  From: Deya Licea MD  Sent: 10/27/2021  10:19 AM CDT  To: RAMANDEEP CUMMINGS  Subject: stent removal 11/9 and follow up in 6 weeks      Hi Ramandeep,    Can you please arrange a cysto with Dr HATHAWAY on 11/9 for stent removal? Mr Cedeno will also need follow up with [facility] in around 6 weeks after renal ultrasound and KUB (ordered.)    Thanks a lot!    Humaira

## 2021-10-28 NOTE — TELEPHONE ENCOUNTER
Pt phoned to schedule follow up visits. 11/9 and 12/10 are tentative dates, will wait for patient to return phone call to confirm as well as evaluate post operatively.

## 2021-10-30 LAB
APPEARANCE STONE: NORMAL
COMPN STONE: NORMAL
NUMBER STONE: 1
SIZE STONE: NORMAL MM
SPECIMEN WT: 5 MG

## 2021-11-01 ENCOUNTER — TELEPHONE (OUTPATIENT)
Dept: UROLOGY | Facility: CLINIC | Age: 56
End: 2021-11-01

## 2021-11-01 NOTE — TELEPHONE ENCOUNTER
[facility] Call Center    Phone Message    May a detailed message be left on voicemail: yes     Reason for Call: Symptoms or Concerns     If patient has red-flag symptoms, warm transfer to triage line    Current symptom or concern: Pt is having pain in his abdomen and back at a level of 9. He is feeling very miserable.    Symptoms have been present for:  2 days    Has patient previously been seen for this? No    Are there any new or worsening symptoms? Yes: New      Action Taken: Message routed to:  [facility] uro    Travel Screening: Not Applicable

## 2021-11-01 NOTE — TELEPHONE ENCOUNTER
Patient called and is in increased pain in his back  He is pushing fluids and feels like he has to urinate every hour  He is not on any bladder spasms medication as well as he is out of oxycodone and wants more of that.  He has has been taking tylenol around the clock but not IBU I encouraged he start that right away.  He has no fever but rates his pain at 9.  Surgery with sent from last week Areli Grimes LPN Staff Nurse

## 2021-11-01 NOTE — TELEPHONE ENCOUNTER
Pt phoned, he is feeling uncomfortable from the stent. Pt states he was just taking tylenol and oxy for pain, with little to no relief. Pt advised to take ibuprofen, and did so about 30 minutes ago, waiting for relief still. Pt is already taking flomax but advised to add dramamine to help settle things down. Pt is currently using heating pad and showers/warm baths on rotation for flank pain. Pt advised to start timed schedule of ibuprofen, find a comfortable position for sleeping, maybe the recliner instead, and is requesting a call back in the morning with symptom update.

## 2021-11-02 NOTE — TELEPHONE ENCOUNTER
Pt phoned back, he is feeling much better with timed dosages of ibuprofen and dramamine. He does state that he is out of his oxycodone and is requesting a refill. Note routed.

## 2021-11-05 DIAGNOSIS — J45.20 MILD INTERMITTENT ASTHMA, UNSPECIFIED WHETHER COMPLICATED: ICD-10-CM

## 2021-11-06 RX ORDER — ALBUTEROL SULFATE 90 UG/1
AEROSOL, METERED RESPIRATORY (INHALATION)
Qty: 18 G | Refills: 0 | Status: SHIPPED | OUTPATIENT
Start: 2021-11-06 | End: 2021-12-04

## 2021-11-09 ENCOUNTER — OFFICE VISIT (OUTPATIENT)
Dept: UROLOGY | Facility: CLINIC | Age: 56
End: 2021-11-09
Payer: COMMERCIAL

## 2021-11-09 VITALS — DIASTOLIC BLOOD PRESSURE: 82 MMHG | SYSTOLIC BLOOD PRESSURE: 127 MMHG | HEART RATE: 86 BPM

## 2021-11-09 DIAGNOSIS — N20.0 KIDNEY STONE: Primary | ICD-10-CM

## 2021-11-09 PROCEDURE — 52310 CYSTOSCOPY AND TREATMENT: CPT | Performed by: UROLOGY

## 2021-11-09 PROCEDURE — 87086 URINE CULTURE/COLONY COUNT: CPT | Performed by: UROLOGY

## 2021-11-09 PROCEDURE — 87186 SC STD MICRODIL/AGAR DIL: CPT | Performed by: UROLOGY

## 2021-11-09 RX ORDER — CIPROFLOXACIN 500 MG/1
500 TABLET, FILM COATED ORAL ONCE
Status: COMPLETED | OUTPATIENT
Start: 2021-11-09 | End: 2021-11-09

## 2021-11-09 RX ORDER — LIDOCAINE HYDROCHLORIDE 20 MG/ML
JELLY TOPICAL ONCE
Status: COMPLETED | OUTPATIENT
Start: 2021-11-09 | End: 2021-11-09

## 2021-11-09 RX ADMIN — CIPROFLOXACIN 500 MG: 500 TABLET, FILM COATED ORAL at 16:11

## 2021-11-09 RX ADMIN — LIDOCAINE HYDROCHLORIDE: 20 JELLY TOPICAL at 16:11

## 2021-11-09 ASSESSMENT — PAIN SCALES - GENERAL: PAINLEVEL: EXTREME PAIN (9)

## 2021-11-09 NOTE — NURSING NOTE
Chief Complaint   Patient presents with     Cystoscopy     stent removal       Blood pressure 127/82, pulse 86. There is no height or weight on file to calculate BMI.    Patient Active Problem List   Diagnosis     Restless leg syndrome     Knee bursitis     Pure hypercholesterolemia     Major depression in partial remission (H)     Pre-diabetes     Epicondylitis, lateral humeral     Immunization reaction     Chronic pain syndrome     Chronic right shoulder pain     Abdominal pain, left upper quadrant     Pain of toe of right foot     Acute midline low back pain without sciatica     Upper back pain     Mild intermittent asthma, unspecified whether complicated     Incomplete tear of right rotator cuff     Impingement syndrome of shoulder, right     Degeneration of lumbar or lumbosacral intervertebral disc     Adjustment disorder with depressed mood     Mixed hyperlipidemia     Inguinal hernia     Kidney stone       No Known Allergies    Current Outpatient Medications   Medication Sig Dispense Refill     carbidopa-levodopa (SINEMET)  MG tablet TAKE 1 TABLET BY MOUTH AT BEDTIME 90 tablet 5     celecoxib (CELEBREX) 200 MG capsule TAKE 1 CAPSULE(200 MG) BY MOUTH TWICE DAILY 60 capsule 11     diclofenac (VOLTAREN) 75 MG EC tablet TAKE 1 TABLET(75 MG) BY MOUTH TWICE DAILY 30 tablet 2     finasteride (PROSCAR) 5 MG tablet TAKE 1 TABLET(5 MG) BY MOUTH DAILY 90 tablet 1     FLUoxetine (PROZAC) 20 MG capsule Take 1 capsule (20 mg) by mouth daily 90 capsule 1     gabapentin (NEURONTIN) 300 MG capsule TAKE 2 CAPSULES BY MOUTH EVERY NIGHT AT BEDTIME 60 capsule 11     HYDROcodone-acetaminophen (NORCO) 7.5-325 MG per tablet Take 1 tablet by mouth every 4 hours as needed for pain maximum  4  tablet(s) per day 30 tablet 0     methocarbamol (ROBAXIN) 500 MG tablet TAKE 1 TO 2 TABLETS(500 TO 1000 MG) BY MOUTH TWICE DAILY AS NEEDED FOR MUSCLE SPASMS 60 tablet 0     mirtazapine (REMERON) 15 MG tablet Take 1 tablet (15 mg) by mouth  At Bedtime 90 tablet 3     Misc Natural Products (OSTEO BI-FLEX TRIPLE STRENGTH PO) Take 1 tablet by mouth daily       oxyCODONE (ROXICODONE) 5 MG tablet Take 1 tablet (5 mg) by mouth every 6 hours as needed for pain 15 tablet 0     phenazopyridine (PYRIDIUM) 100 MG tablet Take 2 tablets (200 mg) by mouth 3 times daily as needed for urinary tract discomfort 15 tablet 0     phenazopyridine (PYRIDIUM) 200 MG tablet Take 1 tablet (200 mg) by mouth 3 times daily as needed for irritation 9 tablet 0     senna-docusate (SENOKOT-S/PERICOLACE) 8.6-50 MG tablet Take 1-2 tablets by mouth 2 times daily as needed for constipation 100 tablet 11     sildenafil (VIAGRA) 100 MG tablet TAKE ONE TABLET BY MOUTH EVERY DAY AS NEEDED 10 tablet 0     simvastatin (ZOCOR) 20 MG tablet TAKE 1 TABLET BY MOUTH EVERY DAY AT BEDTIME 90 tablet 3     tamsulosin (FLOMAX) 0.4 MG capsule TAKE 1 CAPSULE(0.4 MG) BY MOUTH DAILY 90 capsule 3     VENTOLIN  (90 Base) MCG/ACT inhaler INHALE 2 PUFFS INTO THE LUNGS EVERY 6 HOURS AS NEEDED FOR SHORTNESS OF BREATH OR DIFFICULT BREATHING OR WHEEZING 18 g 0       Social History     Tobacco Use     Smoking status: Never Smoker     Smokeless tobacco: Never Used   Vaping Use     Vaping Use: Never used   Substance Use Topics     Alcohol use: No     Drug use: No       Invasive Procedure Safety Checklist:    Procedure: Cystoscopy    Action: Complete sections and checkboxes as appropriate.    Pre-procedure:  1. Patient ID Verified with 2 identifiers (Ioana and  or MRN) : YES    2. Procedure and site verified with patient/designee (when able) : YES    3. Accurate consent documentation in medical record : YES    4. H&P (or appropriate assessment) documented in medical record : N/A  H&P must be up to 30 days prior to procedure an updated within 24 hours of                 Procedure as applicable.     5. Relevant diagnostic and radiology test results appropriately labeled and displayed as applicable : YES    6.  Blood products, implants, devices, and/or special equipment available for the procedure as applicable : YES    7. Procedure site(s) marked with provider initials [Exclusions: none] : NO    8. Marking not required. Reason : Yes  Procedure does not require site marking    Time Out:     Time-Out performed immediately prior to starting procedure, including verbal and active participation of all team members addressing: YES    1. Correct patient identity.  2. Confirmed that the correct side and site are marked.  3. An accurate procedure to be done.  4. Agreement on the procedure to be done.  5. Correct patient position.  6. Relevant images and results are properly labeled and appropriately displayed.  7. The need to administer antibiotics or fluids for irrigation purposes during the procedure as applicable.  8. Safety precautions based on patient history or medication use.    During Procedure: Verification of correct person, site, and procedure occurs any time the responsibility for care of the patient is transferred to another member of the care team.    The following medication was given:     MEDICATION:  Lidocaine without epinephrine 2% jelly  ROUTE: urethral   SITE: urethral   DOSE: 10 mL  LOT #: [id number]  : [employer]  EXPIRATION DATE: 5/23  NDC#: [address]-1   Was there drug waste? No    The following medication was given:     MEDICATION:  Ciprofloxacin  ROUTE: PO  SITE: taken by mouth  DOSE: 500mg  LOT #: [id number]  : [employer]  EXPIRATION DATE: 11/2022  NDC#: 5004-4004-85   Was there drug waste? No      Prior to med admin, verified patient identity using patient's name and date of birth.  Due to med administration, patient instructed to remain in clinic for 15 minutes  afterwards, and to report any adverse reaction to me immediately.    Drug Amount Wasted:  None.  Vial/Syringe: Syringe      Tinomariza Dudley EMT-P  11/9/2021  4:13 PM

## 2021-11-09 NOTE — LETTER
11/9/2021       RE: Giovanni Cedeno  [address] [place] [address]     Dear Colleague,    Thank you for referring your patient, Giovanni Cedeno, to the [facility] at [facility]. Please see a copy of my visit note below.    CYSTOSCOPY PROCEDURE NOTE    Reason for cystoscopy: Right ureteral stone    Brief History: 55 yo M with right ureteral stent after URS/LL.  HAs solitary right kidney and some concern for UPJ obstruction.    CYSTOSCOPY  After obtaining informed consent, the patient was prepped and draped in the standard sterile fashion.  The 15 Polish flexible cystoscope was inserted through the urethral meatus.  The right ureteral stent was grasped and removed intact.    Assessment/Plan:  -CT and renal scan in 4 weeks    IRj saw and evaluated this patient and agree with the plan as stated above.  I personally performed all listed procedures.

## 2021-11-09 NOTE — PATIENT INSTRUCTIONS
"Please follow up in a month with a CT and renal scan beforehand.    It was a pleasure meeting with you today.  Thank you for allowing me and my team the privilege of caring for you today.  YOU are the reason we are here, and I truly hope we provided you with the excellent service you deserve.  Please let us know if there is anything else we can do for you so that we can be sure you are leaving completely satisfied with your care experience.          AFTER YOUR CYSTOSCOPY        You have just completed a cystoscopy, or \"cysto\", which allowed your physician to learn more about your bladder (or to remove a stent placed after surgery). We suggest that you continue to avoid caffeine, fruit juice, and alcohol for the next 24 hours, however, you are encouraged to return to your normal activities.         A few things that are considered normal after your cystoscopy:     * Small amount of bleeding (or spotting) that clears within the next 24 hours     * Slight burning sensation with urination     * Sensation to of needing to avoid more frequently     * The feeling of \"air\" in your urine     * Mild discomfort that is relieved with Tylenol        Please contact our office promptly if you:     * Develop a fever above 101 degrees     * Are unable to urinate     * Develop bright red blood that does not stop     * Severe pain or swelling         Please contact our office with any concerns or questions @[email or web address].  "

## 2021-11-11 ENCOUNTER — TELEPHONE (OUTPATIENT)
Dept: UROLOGY | Facility: CLINIC | Age: 56
End: 2021-11-11
Payer: COMMERCIAL

## 2021-11-11 DIAGNOSIS — N39.0 URINARY TRACT INFECTION: Primary | ICD-10-CM

## 2021-11-11 LAB — BACTERIA UR CULT: ABNORMAL

## 2021-11-11 RX ORDER — LEVOFLOXACIN 500 MG/1
500 TABLET, FILM COATED ORAL DAILY
Qty: 5 TABLET | Refills: 0 | Status: SHIPPED | OUTPATIENT
Start: 2021-11-11 | End: 2021-11-16

## 2021-11-11 NOTE — TELEPHONE ENCOUNTER
Per Dr. Monique, pt urine culture final results showed highly resistant bacteria. 5 days of levaquin 500 daily and have him repeat a ucx after complete. Pt phoned, no answer. Detailed voicemail left advising pt to  abx and start, follow up with one week urine sample.   Nurse will call pt again tomorrow to verify receipt of voicemail and understanfing

## 2021-11-12 ENCOUNTER — CARE COORDINATION (OUTPATIENT)
Dept: UROLOGY | Facility: CLINIC | Age: 56
End: 2021-11-12
Payer: COMMERCIAL

## 2021-11-12 DIAGNOSIS — N39.0 URINARY TRACT INFECTION: Primary | ICD-10-CM

## 2021-11-12 NOTE — PROGRESS NOTES
Pt phoned, notified of abx. Pt states he did have an episode of nocturnal enuresis last night and when he thinks about it-he did have an episode of cold sweats, otherwise he feels okay. Pt given instruction to start abx today and repeat UC next Friday. Pt agreeable to plan, will keep an eye out for result.

## 2021-11-15 ENCOUNTER — TELEPHONE (OUTPATIENT)
Dept: UROLOGY | Facility: CLINIC | Age: 56
End: 2021-11-15
Payer: COMMERCIAL

## 2021-11-15 NOTE — TELEPHONE ENCOUNTER
Patient called he was given levaquin but his culture stated intermediate  he is not feeling better  still having pain with urination and abdominal pain.  Areli Grimes LPN Staff Nurse

## 2021-11-17 ENCOUNTER — LAB (OUTPATIENT)
Dept: LAB | Facility: CLINIC | Age: 56
End: 2021-11-17
Payer: COMMERCIAL

## 2021-11-17 DIAGNOSIS — N39.0 URINARY TRACT INFECTION: ICD-10-CM

## 2021-11-17 LAB
ALBUMIN UR-MCNC: NEGATIVE MG/DL
APPEARANCE UR: CLEAR
BILIRUB UR QL STRIP: NEGATIVE
COLOR UR AUTO: YELLOW
GLUCOSE UR STRIP-MCNC: NEGATIVE MG/DL
HGB UR QL STRIP: ABNORMAL
KETONES UR STRIP-MCNC: NEGATIVE MG/DL
LEUKOCYTE ESTERASE UR QL STRIP: ABNORMAL
NITRATE UR QL: NEGATIVE
PH UR STRIP: 6 [PH] (ref 5–7)
RBC #/AREA URNS AUTO: NORMAL /HPF
SP GR UR STRIP: 1.01 (ref 1–1.03)
UROBILINOGEN UR STRIP-ACNC: 0.2 E.U./DL
WBC #/AREA URNS AUTO: NORMAL /HPF

## 2021-11-17 PROCEDURE — 87086 URINE CULTURE/COLONY COUNT: CPT

## 2021-11-17 PROCEDURE — 81001 URINALYSIS AUTO W/SCOPE: CPT

## 2021-11-19 LAB — BACTERIA UR CULT: NO GROWTH

## 2021-11-21 DIAGNOSIS — M62.838 MUSCLE SPASM OF RIGHT SHOULDER: ICD-10-CM

## 2021-11-21 DIAGNOSIS — Z98.890 S/P ROTATOR CUFF REPAIR: ICD-10-CM

## 2021-11-22 ENCOUNTER — PATIENT OUTREACH (OUTPATIENT)
Dept: UROLOGY | Facility: CLINIC | Age: 56
End: 2021-11-22
Payer: COMMERCIAL

## 2021-11-22 ENCOUNTER — TELEPHONE (OUTPATIENT)
Dept: UROLOGY | Facility: CLINIC | Age: 56
End: 2021-11-22
Payer: COMMERCIAL

## 2021-11-22 NOTE — TELEPHONE ENCOUNTER
----- Message from Ramandeep Bray RN sent at 11/22/2021  8:45 AM CST -----  Regarding: RE: Dr Monique 11/9 12/14 1130 virtual works for me    Scotty  ----- Message -----  From: Shelly Patel  Sent: 11/22/2021   8:00 AM CST  To: Ramandeep Bray RN  Subject: RE: Dr Monique 11/9                             Where to put him? Dr Monique cancelled 3 clinics in Dec?  ----- Message -----  From: Ramandeep Bray RN  Sent: 11/22/2021   7:45 AM CST  To: Shelly Patel  Subject: RE: Dr Monique 11/9                             Yes please. Pt will need imaging and a virtual follow up  ramandeep  ----- Message -----  From: Shelly Patel  Sent: 11/20/2021   8:45 AM CST  To: Ramandeep Bray RN  Subject: Dr Monique 11/9                                 I am going thru old check outs and see this patient is not scheduled. Is is still needed and I see you have been in touch with the patient. Thank you & let me know    Please follow up in a month with a CT and renal scan beforehand.

## 2021-11-22 NOTE — TELEPHONE ENCOUNTER
Spoke with patient and give him the number to radiology for the scans and the time and date for the video visit

## 2021-11-23 NOTE — TELEPHONE ENCOUNTER
Requested Prescriptions   Pending Prescriptions Disp Refills     methocarbamol (ROBAXIN) 500 MG tablet [Pharmacy Med Name: METHOCARBAMOL 500MG TABLETS] 60 tablet 0     Sig: TAKE 1 TO 2 TABLETS(500 TO 1000 MG) BY MOUTH TWICE DAILY AS NEEDED FOR MUSCLE SPASMS       There is no refill protocol information for this order        Routing refill request to provider for review/approval because:  Drug not on the [facility] refill protocol

## 2021-11-24 RX ORDER — METHOCARBAMOL 500 MG/1
TABLET, FILM COATED ORAL
Qty: 60 TABLET | Refills: 0 | Status: SHIPPED | OUTPATIENT
Start: 2021-11-24 | End: 2022-01-10

## 2021-12-04 DIAGNOSIS — J45.20 MILD INTERMITTENT ASTHMA, UNSPECIFIED WHETHER COMPLICATED: ICD-10-CM

## 2021-12-04 RX ORDER — ALBUTEROL SULFATE 90 UG/1
AEROSOL, METERED RESPIRATORY (INHALATION)
Qty: 18 G | Refills: 0 | Status: SHIPPED | OUTPATIENT
Start: 2021-12-04 | End: 2022-01-03

## 2021-12-10 ENCOUNTER — PRE VISIT (OUTPATIENT)
Dept: UROLOGY | Facility: CLINIC | Age: 56
End: 2021-12-10

## 2021-12-10 ENCOUNTER — ANCILLARY PROCEDURE (OUTPATIENT)
Dept: CT IMAGING | Facility: CLINIC | Age: 56
End: 2021-12-10
Attending: UROLOGY
Payer: COMMERCIAL

## 2021-12-10 ENCOUNTER — ANCILLARY PROCEDURE (OUTPATIENT)
Dept: NUCLEAR MEDICINE | Facility: CLINIC | Age: 56
End: 2021-12-10
Attending: UROLOGY
Payer: COMMERCIAL

## 2021-12-10 DIAGNOSIS — N20.0 KIDNEY STONE: ICD-10-CM

## 2021-12-10 DIAGNOSIS — N40.1 BENIGN PROSTATIC HYPERPLASIA WITH URINARY FREQUENCY: ICD-10-CM

## 2021-12-10 DIAGNOSIS — R35.0 BENIGN PROSTATIC HYPERPLASIA WITH URINARY FREQUENCY: ICD-10-CM

## 2021-12-10 DIAGNOSIS — F32.4 MAJOR DEPRESSIVE DISORDER WITH SINGLE EPISODE, IN PARTIAL REMISSION (H): Chronic | ICD-10-CM

## 2021-12-10 PROCEDURE — 78708 K FLOW/FUNCT IMAGE W/DRUG: CPT | Mod: GC | Performed by: STUDENT IN AN ORGANIZED HEALTH CARE EDUCATION/TRAINING PROGRAM

## 2021-12-10 PROCEDURE — 74176 CT ABD & PELVIS W/O CONTRAST: CPT | Performed by: RADIOLOGY

## 2021-12-10 PROCEDURE — A9562 TC99M MERTIATIDE: HCPCS | Performed by: STUDENT IN AN ORGANIZED HEALTH CARE EDUCATION/TRAINING PROGRAM

## 2021-12-10 RX ORDER — FUROSEMIDE 10 MG/ML
20 INJECTION INTRAMUSCULAR; INTRAVENOUS ONCE
Status: COMPLETED | OUTPATIENT
Start: 2021-12-10 | End: 2021-12-10

## 2021-12-10 RX ADMIN — FUROSEMIDE 20 MG: 10 INJECTION INTRAMUSCULAR; INTRAVENOUS at 09:31

## 2021-12-11 ENCOUNTER — OFFICE VISIT (OUTPATIENT)
Dept: URGENT CARE | Facility: URGENT CARE | Age: 56
End: 2021-12-11
Payer: COMMERCIAL

## 2021-12-11 VITALS
SYSTOLIC BLOOD PRESSURE: 127 MMHG | TEMPERATURE: 98.2 F | DIASTOLIC BLOOD PRESSURE: 6 MMHG | OXYGEN SATURATION: 97 % | HEART RATE: 95 BPM

## 2021-12-11 DIAGNOSIS — Z20.822 SUSPECTED 2019 NOVEL CORONAVIRUS INFECTION: ICD-10-CM

## 2021-12-11 DIAGNOSIS — R07.0 THROAT PAIN: Primary | ICD-10-CM

## 2021-12-11 LAB
DEPRECATED S PYO AG THROAT QL EIA: NEGATIVE
GROUP A STREP BY PCR: NOT DETECTED

## 2021-12-11 PROCEDURE — U0003 INFECTIOUS AGENT DETECTION BY NUCLEIC ACID (DNA OR RNA); SEVERE ACUTE RESPIRATORY SYNDROME CORONAVIRUS 2 (SARS-COV-2) (CORONAVIRUS DISEASE [COVID-19]), AMPLIFIED PROBE TECHNIQUE, MAKING USE OF HIGH THROUGHPUT TECHNOLOGIES AS DESCRIBED BY CMS-2020-01-R: HCPCS | Performed by: FAMILY MEDICINE

## 2021-12-11 PROCEDURE — 87651 STREP A DNA AMP PROBE: CPT | Performed by: FAMILY MEDICINE

## 2021-12-11 PROCEDURE — U0005 INFEC AGEN DETEC AMPLI PROBE: HCPCS | Performed by: FAMILY MEDICINE

## 2021-12-11 PROCEDURE — 99213 OFFICE O/P EST LOW 20 MIN: CPT | Performed by: FAMILY MEDICINE

## 2021-12-11 RX ORDER — PREDNISONE 20 MG/1
40 TABLET ORAL DAILY
Qty: 10 TABLET | Refills: 0 | Status: SHIPPED | OUTPATIENT
Start: 2021-12-11 | End: 2021-12-16

## 2021-12-11 NOTE — TELEPHONE ENCOUNTER
Routing refill request to provider for review/approval because:  Drug interaction warning  PHQ-9 score:    PHQ 6/8/2021   PHQ-9 Total Score 0   Q9: Thoughts of better off dead/self-harm past 2 weeks Not at all

## 2021-12-11 NOTE — PROGRESS NOTES
Chief complaint: sore throat    Coughing and congestion and sore throat   Been going on for 2 days  No fever  Shortness of breath with coughing   ill contacts - no known covid exposure  able to swallow liquids and solids -YES  other symptoms but hurts to swallow   Rash: No  Has tried over the counter medications no relief  because of persistence, patient came in to be seen.  No nausea vomiting or diarrhea       ROS:  denies any exertional chest pain or shortness of breath  denies any unusual rash or joint swelling  denies post-tussive emesis or pertussis like symptoms  Negative for constitutional, eye, ear, nose, throat, skin, respiratory, cardiac, and gastrointestinal other than those outlined in the HPI.    PMH: chart reviewed  FH: chart reviewed    SH: chart reviewed and as above   Physical Exam:   BP (!) 127/6   Pulse 95   Temp 98.2  F (36.8  C) (Tympanic)   SpO2 97%   General : Awake Alert not in any acute cardiorespiratory distress  Head:       Normocephalic Atraumatic  Eyes:    Pupils equally reactive to light and accomodation. Sclera not icteric.   ENT:   midline nasal septum, mild nasal congestion, sinuses non-tender  left ear: no tragal tenderness, no mastoid tenderness, normal EAC, normal TM  right ear: left ear: no tragal tenderness, no mastoid tenderness, normal EAC, normal TM  mouth moist buccal mucosa, Yes hyperemic posterior pharyngeal wall, no trismus  tonsils: bilateral tonsil abnormal with erythematous grade 1 no exudate  anterior cervical nodes: No tender  posterior cervical nodes: No  palpable  Heart:  Regular in rate and rhythm, no murmurs rubs or gallops  Lungs: Symmetrical Chest Expansion, no retractions, clear breath sounds  Abdomen: soft, no hepatosplenomegally  Psych: Appropriate mood and affect. Pleasant  Skin: patient undressed to level of his/her comfort. No visible concerning lesions.    Labs: Strep negative       ICD-10-CM    1. Throat pain  R07.0 Streptococcus A Rapid Screen  w/Reflex to PCR - Clinic Collect     Group A Streptococcus PCR Throat Swab     predniSONE (DELTASONE) 20 MG tablet   2. Suspected 2019 novel coronavirus infection  Z20.822 Symptomatic COVID-19 Virus (Coronavirus) by PCR Nasopharyngeal     Rule out covid  If with any symptoms of chest pain or shortness of breath, lightheadedness, palpitations, feeling like passing out or change and worsening in the quality of your symptoms, please proceed to ER. Recommend follow up with PCP in a few days for re-evaluation.   Prednisone to help with throat discomfort - suspect viral   supportive treatment: advised supportive treatment, Advised to come back in if with any worsening symptoms or if not better despite supportive measures. Especially if with any worsening sore throat, inability to eat or drink or swallow, or trismus. Symptoms of peritonsillar abscess discussed. Patient voiced understanding.  adverse reactions of medication discussed  OTC medications discussed  advised to come back in right away if with any worsening symptoms or if with no relief despite treatment plan  patient voiced understanding and had no further questions at this time.    Monika Avila M.D.

## 2021-12-12 LAB — SARS-COV-2 RNA RESP QL NAA+PROBE: NEGATIVE

## 2021-12-12 RX ORDER — FINASTERIDE 5 MG/1
TABLET, FILM COATED ORAL
Qty: 90 TABLET | Refills: 0 | Status: SHIPPED | OUTPATIENT
Start: 2021-12-12 | End: 2022-03-14

## 2021-12-12 NOTE — TELEPHONE ENCOUNTER
oswald is due for a recheck. Have him make an appt to see me.for a recheck of his depression and anxiety

## 2021-12-13 DIAGNOSIS — M75.101 ROTATOR CUFF SYNDROME, RIGHT: ICD-10-CM

## 2021-12-13 DIAGNOSIS — G89.4 CHRONIC PAIN SYNDROME: ICD-10-CM

## 2021-12-13 DIAGNOSIS — R37 SEXUAL DYSFUNCTION: ICD-10-CM

## 2021-12-13 NOTE — TELEPHONE ENCOUNTER
Neither med is on active med list.   Plan to call patient to clarify, perhaps is supposed to be hydrocodone and sildenafil?    Last visit was 10/11/21 with same day provider for abdominal pain.    Was seen in UC 12/11/21 for sore throat, strep and covid were negative.    Has a visit with PCP Juan Antonio Pak 1/10/22.    I called home/mobile number, roommate answered, patient came to the phone.      He clarified he is looking for sildenafil and his norco.    Richard'd, routed to refill pool to address the sildenafil in protocol.  Asia León, RN  [facility]

## 2021-12-13 NOTE — TELEPHONE ENCOUNTER
Patient would like to get his Hydrocortisone and Sutifel  Medications His  pharmarmacy is  [facility] [address]

## 2021-12-13 NOTE — TELEPHONE ENCOUNTER
"Requested Prescriptions   Pending Prescriptions Disp Refills     sildenafil (VIAGRA) 100 MG tablet 10 tablet 0     Sig: TAKE ONE TABLET BY MOUTH EVERY DAY AS NEEDED       Erectile Dysfuction Protocol Failed - 12/13/2021  3:13 PM        Failed - Absence of Alpha Blockers on Med list        Passed - Absence of nitrates on medication list        Passed - Recent (12 mo) or future (30 days) visit within the authorizing provider's specialty     Patient has had an office visit with the authorizing provider or a provider within the authorizing providers department within the previous 12 mos or has a future within next 30 days. See \"Patient Info\" tab in inbasket, or \"Choose Columns\" in Meds & Orders section of the refill encounter.              Passed - Medication is active on med list        Passed - Patient is age 18 or older           HYDROcodone-acetaminophen (NORCO) 7.5-325 MG per tablet 30 tablet 0     Sig: Take 1 tablet by mouth every 4 hours as needed for pain maximum  4  tablet(s) per day       There is no refill protocol information for this order        Routing refill request to provider for review/approval because:  Drug not on the [facility] refill protocol  (norco)  Sildenafil fails, has alpha blocker on list.    Asia León RN  [facility]            "

## 2021-12-14 ENCOUNTER — VIRTUAL VISIT (OUTPATIENT)
Dept: UROLOGY | Facility: CLINIC | Age: 56
End: 2021-12-14
Payer: COMMERCIAL

## 2021-12-14 VITALS — BODY MASS INDEX: 24.16 KG/M2 | WEIGHT: 145 LBS | HEIGHT: 65 IN

## 2021-12-14 DIAGNOSIS — N20.0 KIDNEY STONE: Primary | ICD-10-CM

## 2021-12-14 PROCEDURE — 99213 OFFICE O/P EST LOW 20 MIN: CPT | Mod: 95 | Performed by: UROLOGY

## 2021-12-14 RX ORDER — SILDENAFIL 100 MG/1
TABLET, FILM COATED ORAL
Qty: 10 TABLET | Refills: 0 | Status: SHIPPED | OUTPATIENT
Start: 2021-12-14 | End: 2022-05-06

## 2021-12-14 RX ORDER — HYDROCODONE BITARTRATE AND ACETAMINOPHEN 7.5; 325 MG/1; MG/1
1 TABLET ORAL EVERY 4 HOURS PRN
Qty: 30 TABLET | Refills: 0 | Status: SHIPPED | OUTPATIENT
Start: 2021-12-14 | End: 2022-01-10

## 2021-12-14 ASSESSMENT — MIFFLIN-ST. JEOR: SCORE: 1404.6

## 2021-12-14 ASSESSMENT — PAIN SCALES - GENERAL: PAINLEVEL: NO PAIN (0)

## 2021-12-14 NOTE — NURSING NOTE
Chief Complaint   Patient presents with     Kidney Stone Related     one month follow-up and updated imaging review, not currently symptomatic     - Ann HATHAWAY, EMT  [facility]

## 2021-12-14 NOTE — PROGRESS NOTES
Maverick is a 56 year old who is being evaluated via a billable video visit.          UROLOGY VIDEO FOLLOW UP NOTE           Chief Complaint:   Kidney Stone         Interval Update    Giovanni Cedeno is a very pleasant 57 yo M with a history of congenital solitary right kidney who had flank pain and a right renal stone. He underwent staged right ureteroscopy due to a narrow caliber ureter.  He had a UTI after stent removal treated with abx,.    Today notes: Feeling well.  He denies flank pain and feels great. No urinary symptoms.  Stone was mixed CaOx/Cap.  Serum Ca has been normal.      A CT was personally reviewed and shows two punctate calyceal stones with complete clearance of the renal pelvis stone.    A renogram is reviewed and shows no obstruciton.      Physical Exam:   General Appearance: Well groomed, hygenic  Eyes: No redness, discharge  Respiratory: No cough, no respiratory distress or labored breathing  Musculoskeletal:  grossly normal, full range of motion in upper extremities, no gross deficits  Skin: No discoloration or apparent rashes  Neurologic - No tremors  Psychiatric - Alert and oriented  The rest of a comprehensive physical examination is deferred due to public health emergency video visit restrictions      Labs and Pathology:    I personally reviewed all applicable laboratory data and went over findings with patient  Significant for:    CBC RESULTS:  Recent Labs   Lab Test 10/11/21  0839 11/14/17  0846 05/23/17  0813 04/21/14  1027   WBC 6.1 6.4 9.9 10.0   HGB 13.4 14.7 16.8 14.9    291 323 296        BMP RESULTS:  Recent Labs   Lab Test 10/15/21  0831 10/11/21  0839 07/21/20  0832 04/18/19  1622 11/14/17  0846 05/23/17  0813   NA  --  139 140 139 138 140   POTASSIUM  --  4.1 4.1 4.5 4.2 4.2   CHLORIDE  --  107 108 105 105 105   CO2  --  25 27 28 25 23   ANIONGAP  --  7 5 6 8 12   GLC 92 94 85 60* 92 103*   BUN  --  14 14 18 20 14   CR  --  0.85 0.97 0.92 0.84 0.78   GFRESTIMATED  --  >90  86 >90 >90 >90  Non African American GFR Calc     GFRESTBLACK  --   --  >90 >90 >90 >90  African American GFR Calc     TODD  --  8.7 9.5 9.5 9.4 9.4       UA RESULTS:   Recent Labs   Lab Test 11/17/21  1519 10/11/21  0806 07/21/20  0832   SG 1.015 1.025 >1.030   URINEPH 6.0 5.0 5.5   NITRITE Negative Negative Negative   RBCU 0-2 10-25*  --    WBCU 0-5 0-5  --        PSA RESULTS  PSA   Date Value Ref Range Status   04/18/2019 1.80 0 - 4 ug/L Final     Comment:     Assay Method:  Chemiluminescence using Siemens Vista analyzer   11/10/2016 1.86 0 - 4 ug/L Final   05/08/2013 2.48 0 - 4 ug/L Final           Imaging:    I personally reviewed all applicable imaging including the images themselves and report below and went over the below findings with patient.    Results for orders placed or performed in visit on 12/10/21   CT Abdomen Pelvis w/o Contrast    Narrative    CT abdomen and pelvis without contrast    indication: Flank pain, recurrent stone disease suspected    COMPARISON: 10/11/2021 CT scan. Correlation with renal scintigraphy  earlier today.    FINDINGS: No contrast. Mild mechanical fibrosis in the paravertebral  right lower lobe adjacent to prominent thoracic vertebral osteophyte.  In the abdomen and pelvis, probable accessory splenule unchanged  (series 3 image 89). Borderline hiatal hernia. Noncontrast appearance  of the liver and gallbladder appear unchanged with barely conspicuous  hepatic hypodensity in the right lobe (series 3 image 118). Singular  calcified splenic granuloma. No visible left kidney. No surgical  clips. Right adrenal normal. 2 mm right renal calculus in the  collecting system (series 3 image 154) and lower pole collecting  system 4 mm stone (series 5 image 193). Minimally prominent extrarenal  pelvis but no evidence of ureteral dilation. Urinary bladder  incompletely distended. No enlarged inguinal, iliac chain comment deep  pelvic comment retroperitoneal or mesenteric lymph nodes. There  is  abdominal aortic atherosclerosis without aneurysm. No significant  change in the prominence of the lower pole collecting system and  extrarenal pelvis caliber.  Bones show degenerative disc changes throughout the thoracic and  lumbar spine regions with mild retrolisthesis of L2 upon L3. Decreased  conspicuity of soft tissue density anterior to the distal descending  thoracic aorta in the celiac plexus region (series 3 image 94),  previously 9 mm now 7 mm.      Impression    IMPRESSION: Small stones in solitary right kidney with mild prominence  of lower pole collecting system especially, grossly similar to CT  10/11/2021. Unchanged liver cysts. Decreased celiac plexus soft tissue  nodule. Retrolisthesis of L2 upon L3.    SIOMARA WHITAKER MD         SYSTEM ID:  [id number]              Assessment/Plan   56 year old male with right solitary kidney and stones  -24 hour urine recommended  -F/U 2-3 months with Spring Gonzales NP for stone prevention  -Annual imaging for stones in solitary kidney               Past Medical History:     Past Medical History:   Diagnosis Date     Arthritis             Past Surgical History:     Past Surgical History:   Procedure Laterality Date     COMBINED CYSTOSCOPY, RETROGRADES, URETEROSCOPY, LASER HOLMIUM LITHOTRIPSY URETER(S), INSERT STENT Right 10/15/2021    Procedure: CYSTOURETEROSCOPY, WITH RETROGRADE PYELOGRAM, STENT INSERTION RIGHT;  Surgeon: Rj Monique MD;  Location: [facility]     COMBINED CYSTOSCOPY, RETROGRADES, URETEROSCOPY, LASER HOLMIUM LITHOTRIPSY URETER(S), INSERT STENT Right 10/27/2021    Procedure: CYSTOURETEROSCOPY, WITH RETROGRADE PYELOGRAM, HOLMIUM LASER LITHOTRIPSY, STENT INSERTION RIGHT;  Surgeon: Rj Monique MD;  Location: [facility] OR            Medications     Current Outpatient Medications   Medication     carbidopa-levodopa (SINEMET)  MG tablet     celecoxib (CELEBREX) 200 MG capsule     diclofenac (VOLTAREN) 75 MG EC tablet     finasteride  (PROSCAR) 5 MG tablet     FLUoxetine (PROZAC) 20 MG capsule     gabapentin (NEURONTIN) 300 MG capsule     HYDROcodone-acetaminophen (NORCO) 7.5-325 MG per tablet     methocarbamol (ROBAXIN) 500 MG tablet     mirtazapine (REMERON) 15 MG tablet     Misc Natural Products (OSTEO BI-FLEX TRIPLE STRENGTH PO)     phenazopyridine (PYRIDIUM) 100 MG tablet     predniSONE (DELTASONE) 20 MG tablet     senna-docusate (SENOKOT-S/PERICOLACE) 8.6-50 MG tablet     sildenafil (VIAGRA) 100 MG tablet     simvastatin (ZOCOR) 20 MG tablet     tamsulosin (FLOMAX) 0.4 MG capsule     VENTOLIN  (90 Base) MCG/ACT inhaler     phenazopyridine (PYRIDIUM) 200 MG tablet     Current Facility-Administered Medications   Medication     bupivacaine (MARCAINE) 0.25 % injection 3 mL     triamcinolone (KENALOG-40) injection 40 mg            Family History:     Family History   Problem Relation Age of Onset     Diabetes Father      Heart Disease Father             Social History:     Social History     Socioeconomic History     Marital status: Single     Spouse name: Not on file     Number of children: Not on file     Years of education: Not on file     Highest education level: Not on file   Occupational History     Not on file   Tobacco Use     Smoking status: Never Smoker     Smokeless tobacco: Never Used   Vaping Use     Vaping Use: Never used   Substance and Sexual Activity     Alcohol use: No     Drug use: No     Sexual activity: Yes     Partners: Female   Other Topics Concern     Parent/sibling w/ CABG, MI or angioplasty before 65F 55M? Yes   Social History Narrative     Not on file     Social Determinants of Health     Financial Resource Strain: Not on file   Food Insecurity: Not on file   Transportation Needs: Not on file   Physical Activity: Not on file   Stress: Not on file   Social Connections: Not on file   Intimate Partner Violence: Not on file   Housing Stability: Not on file            Allergies:   Patient has no known allergies.          Review of Systems:  From intake questionnaire   Negative 14 system review except as noted on HPI, nurse's note.        CC:  Juan Antonio Pak      How would you like to obtain your AVS? Mail a copy  If the video visit is dropped, the invitation should be resent by: Text to cell phone: [phone]  Will anyone else be joining your video visit? No      Video Start Time: 8:19  Video-Visit Details    Type of service:  Video Visit    Video End Time:8:27 AM    Originating Location (pt. Location): Home    Distant Location (provider location):  [facility] [place]     Platform used for Video Visit: [employer]

## 2021-12-14 NOTE — LETTER
12/14/2021       RE: Giovanni Cedeno  [address]  [place] [address]     Dear Colleague,    Thank you for referring your patient, Giovanni Cedeno, to the [facility] [place] at [facility]. Please see a copy of my visit note below.    Maverick is a 56 year old who is being evaluated via a billable video visit.          UROLOGY VIDEO FOLLOW UP NOTE           Chief Complaint:   Kidney Stone         Interval Update    Giovanni Cedeno is a very pleasant 55 yo M with a history of congenital solitary right kidney who had flank pain and a right renal stone. He underwent staged right ureteroscopy due to a narrow caliber ureter.  He had a UTI after stent removal treated with abx,.    Today notes: Feeling well.  He denies flank pain and feels great. No urinary symptoms.  Stone was mixed CaOx/Cap.  Serum Ca has been normal.      A CT was personally reviewed and shows two punctate calyceal stones with complete clearance of the renal pelvis stone.    A renogram is reviewed and shows no obstruciton.      Physical Exam:   General Appearance: Well groomed, hygenic  Eyes: No redness, discharge  Respiratory: No cough, no respiratory distress or labored breathing  Musculoskeletal:  grossly normal, full range of motion in upper extremities, no gross deficits  Skin: No discoloration or apparent rashes  Neurologic - No tremors  Psychiatric - Alert and oriented  The rest of a comprehensive physical examination is deferred due to public health emergency video visit restrictions      Labs and Pathology:    I personally reviewed all applicable laboratory data and went over findings with patient  Significant for:    CBC RESULTS:  Recent Labs   Lab Test 10/11/21  0839 11/14/17  0846 05/23/17  0813 04/21/14  1027   WBC 6.1 6.4 9.9 10.0   HGB 13.4 14.7 16.8 14.9    291 323 296        BMP RESULTS:  Recent Labs   Lab Test 10/15/21  0831 10/11/21  0839  07/21/20  0832 04/18/19  1622 11/14/17  0846 05/23/17  0813   NA  --  139 140 139 138 140   POTASSIUM  --  4.1 4.1 4.5 4.2 4.2   CHLORIDE  --  107 108 105 105 105   CO2  --  25 27 28 25 23   ANIONGAP  --  7 5 6 8 12   GLC 92 94 85 60* 92 103*   BUN  --  14 14 18 20 14   CR  --  0.85 0.97 0.92 0.84 0.78   GFRESTIMATED  --  >90 86 >90 >90 >90  Non African American GFR Calc     GFRESTBLACK  --   --  >90 >90 >90 >90  African American GFR Calc     TODD  --  8.7 9.5 9.5 9.4 9.4       UA RESULTS:   Recent Labs   Lab Test 11/17/21  1519 10/11/21  0806 07/21/20  0832   SG 1.015 1.025 >1.030   URINEPH 6.0 5.0 5.5   NITRITE Negative Negative Negative   RBCU 0-2 10-25*  --    WBCU 0-5 0-5  --        PSA RESULTS  PSA   Date Value Ref Range Status   04/18/2019 1.80 0 - 4 ug/L Final     Comment:     Assay Method:  Chemiluminescence using Siemens Vista analyzer   11/10/2016 1.86 0 - 4 ug/L Final   05/08/2013 2.48 0 - 4 ug/L Final           Imaging:    I personally reviewed all applicable imaging including the images themselves and report below and went over the below findings with patient.    Results for orders placed or performed in visit on 12/10/21   CT Abdomen Pelvis w/o Contrast    Narrative    CT abdomen and pelvis without contrast    indication: Flank pain, recurrent stone disease suspected    COMPARISON: 10/11/2021 CT scan. Correlation with renal scintigraphy  earlier today.    FINDINGS: No contrast. Mild mechanical fibrosis in the paravertebral  right lower lobe adjacent to prominent thoracic vertebral osteophyte.  In the abdomen and pelvis, probable accessory splenule unchanged  (series 3 image 89). Borderline hiatal hernia. Noncontrast appearance  of the liver and gallbladder appear unchanged with barely conspicuous  hepatic hypodensity in the right lobe (series 3 image 118). Singular  calcified splenic granuloma. No visible left kidney. No surgical  clips. Right adrenal normal. 2 mm right renal calculus in  the  collecting system (series 3 image 154) and lower pole collecting  system 4 mm stone (series 5 image 193). Minimally prominent extrarenal  pelvis but no evidence of ureteral dilation. Urinary bladder  incompletely distended. No enlarged inguinal, iliac chain comment deep  pelvic comment retroperitoneal or mesenteric lymph nodes. There is  abdominal aortic atherosclerosis without aneurysm. No significant  change in the prominence of the lower pole collecting system and  extrarenal pelvis caliber.  Bones show degenerative disc changes throughout the thoracic and  lumbar spine regions with mild retrolisthesis of L2 upon L3. Decreased  conspicuity of soft tissue density anterior to the distal descending  thoracic aorta in the celiac plexus region (series 3 image 94),  previously 9 mm now 7 mm.      Impression    IMPRESSION: Small stones in solitary right kidney with mild prominence  of lower pole collecting system especially, grossly similar to CT  10/11/2021. Unchanged liver cysts. Decreased celiac plexus soft tissue  nodule. Retrolisthesis of L2 upon L3.    SIMOARA WHITAKER MD         SYSTEM ID:  [id number]              Assessment/Plan   56 year old male with right solitary kidney and stones  -24 hour urine recommended  -F/U 2-3 months with Spring Gonzales NP for stone prevention  -Annual imaging for stones in solitary kidney               Past Medical History:     Past Medical History:   Diagnosis Date     Arthritis             Past Surgical History:     Past Surgical History:   Procedure Laterality Date     COMBINED CYSTOSCOPY, RETROGRADES, URETEROSCOPY, LASER HOLMIUM LITHOTRIPSY URETER(S), INSERT STENT Right 10/15/2021    Procedure: CYSTOURETEROSCOPY, WITH RETROGRADE PYELOGRAM, STENT INSERTION RIGHT;  Surgeon: Rj Monique MD;  [facility]     COMBINED CYSTOSCOPY, RETROGRADES, URETEROSCOPY, LASER HOLMIUM LITHOTRIPSY URETER(S), INSERT STENT Right 10/27/2021    Procedure: CYSTOURETEROSCOPY, WITH  RETROGRADE PYELOGRAM, HOLMIUM LASER LITHOTRIPSY, STENT INSERTION RIGHT;  Surgeon: Rj Monique MD;  Location: [facility] OR            Medications     Current Outpatient Medications   Medication     carbidopa-levodopa (SINEMET)  MG tablet     celecoxib (CELEBREX) 200 MG capsule     diclofenac (VOLTAREN) 75 MG EC tablet     finasteride (PROSCAR) 5 MG tablet     FLUoxetine (PROZAC) 20 MG capsule     gabapentin (NEURONTIN) 300 MG capsule     HYDROcodone-acetaminophen (NORCO) 7.5-325 MG per tablet     methocarbamol (ROBAXIN) 500 MG tablet     mirtazapine (REMERON) 15 MG tablet     Misc Natural Products (OSTEO BI-FLEX TRIPLE STRENGTH PO)     phenazopyridine (PYRIDIUM) 100 MG tablet     predniSONE (DELTASONE) 20 MG tablet     senna-docusate (SENOKOT-S/PERICOLACE) 8.6-50 MG tablet     sildenafil (VIAGRA) 100 MG tablet     simvastatin (ZOCOR) 20 MG tablet     tamsulosin (FLOMAX) 0.4 MG capsule     VENTOLIN  (90 Base) MCG/ACT inhaler     phenazopyridine (PYRIDIUM) 200 MG tablet     Current Facility-Administered Medications   Medication     bupivacaine (MARCAINE) 0.25 % injection 3 mL     triamcinolone (KENALOG-40) injection 40 mg            Family History:     Family History   Problem Relation Age of Onset     Diabetes Father      Heart Disease Father             Social History:     Social History     Socioeconomic History     Marital status: Single     Spouse name: Not on file     Number of children: Not on file     Years of education: Not on file     Highest education level: Not on file   Occupational History     Not on file   Tobacco Use     Smoking status: Never Smoker     Smokeless tobacco: Never Used   Vaping Use     Vaping Use: Never used   Substance and Sexual Activity     Alcohol use: No     Drug use: No     Sexual activity: Yes     Partners: Female   Other Topics Concern     Parent/sibling w/ CABG, MI or angioplasty before 65F 55M? Yes   Social History Narrative     Not on file     Social  Determinants of Health     Financial Resource Strain: Not on file   Food Insecurity: Not on file   Transportation Needs: Not on file   Physical Activity: Not on file   Stress: Not on file   Social Connections: Not on file   Intimate Partner Violence: Not on file   Housing Stability: Not on file            Allergies:   Patient has no known allergies.         Review of Systems:  From intake questionnaire   Negative 14 system review except as noted on HPI, nurse's note.        CC:  Juan Antonio Pak      How would you like to obtain your AVS? Mail a copy  If the video visit is dropped, the invitation should be resent by: Text to cell phone: [phone]  Will anyone else be joining your video visit? No      Video Start Time: 8:19  Video-Visit Details    Type of service:  Video Visit    Video End Time:8:27 AM    Originating Location (pt. Location): Home    Distant Location (provider location):  [facility]     Platform used for Video Visit: [employer]omeka[employer]    Again, thank you for allowing me to participate in the care of your patient.      Sincerely,    Rj Monique MD

## 2021-12-30 DIAGNOSIS — J45.20 MILD INTERMITTENT ASTHMA, UNSPECIFIED WHETHER COMPLICATED: ICD-10-CM

## 2021-12-31 NOTE — TELEPHONE ENCOUNTER
Routing refill request to provider for review/approval because:  Patient needs to be seen because:  Due for asthma follow up    Last Written Prescription Date:  12-4-21  Last Fill Quantity: 18 g,  # refills: 0   Last office visit: 10/27/2021 with prescribing provider:  Juan Antonio Pak   Future Office Visit:   Next 5 appointments (look out 90 days)    Prashanth 10, 2022  9:00 AM  (Arrive by 8:40 AM)  Provider Visit with Juan Antonio Pak PA-C  [facility] ([facility] ) [address] [address]  [phone]               ACT Total Scores 8/22/2019 7/8/2020 6/8/2021   ACT TOTAL SCORE (Goal Greater than or Equal to 20) 25 25 25   In the past 12 months, how many times did you visit the emergency room for your asthma without being admitted to the hospital? 0 0 0   In the past 12 months, how many times were you hospitalized overnight because of your asthma? 0 0 0

## 2022-01-03 RX ORDER — ALBUTEROL SULFATE 90 UG/1
AEROSOL, METERED RESPIRATORY (INHALATION)
Qty: 18 G | Refills: 0 | Status: SHIPPED | OUTPATIENT
Start: 2022-01-03

## 2022-01-07 NOTE — PROGRESS NOTES
Meli Temple is a 56 year old who presents for the following health issues    History of Present Illness       Mental Health Follow-up:  Patient presents to follow-up on Depression & Anxiety.Patient's depression since last visit has been:  Worse  The patient is not having other symptoms associated with depression.  Patient's anxiety since last visit has been:  Worse  The patient is not having other symptoms associated with anxiety.  Any significant life events: relationship concerns, job concerns, financial concerns and housing concerns  Patient is feeling anxious or having panic attacks.  Patient has no concerns about alcohol or drug use.     Social History  Tobacco Use    Smoking status: Never Smoker    Smokeless tobacco: Never Used  Vaping Use    Vaping Use: Never used  Alcohol use: No  Drug use: No      Today's PHQ-9         PHQ-9 Total Score:     (P) 16   PHQ-9 Q9 Thoughts of better off dead/self-harm past 2 weeks :   (P) Not at all   Thoughts of suicide or self harm:      Self-harm Plan:        Self-harm Action:          Safety concerns for self or others:           Heart Failure:  He presents for follow up of heart failure. He is experiencing shortness of breath with rest and activity, which is slightly worse. He is not experiencing any lower extremity edema.   He denies orthopenea and is not coughing at night. Patient is not checking weight daily. He has recently had a weight increase. He has no side effects from medications.  He has had no other medical visits for heart failure since the last visit.    Vascular Disease:  He presents for follow up of vascular disease.  He never takes nitroglycerin. He is not taking daily aspirin.    He eats 2-3 servings of fruits and vegetables daily.He consumes 4 sweetened beverage(s) daily.He exercises with enough effort to increase his heart rate 9 or less minutes per day.  He exercises with enough effort to increase his heart rate 3 or less days per week.   He  "is taking medications regularly.     Episode last week (5 days ago). Noted chest pressure and some n/v that lasted throughout the noc. Some chest pain/sob. Noted that his heart was beating more profoundly.  Some fhx of cad (father). Denies obvious heart burn.  Not exercising much at all.     Chest pain symptoms for the first noc. Then flu like symptoms with f/c, achy body and n/v/diarrhea for a few days after. Overall symptoms have improvement.     Review of Systems   Constitutional, HEENT, cardiovascular, pulmonary, GI, , musculoskeletal, neuro, skin, endocrine and psych systems are negative, except as otherwise noted.      Objective    /76   Pulse 102   Temp 98.2  F (36.8  C) (Tympanic)   Resp 16   Ht 1.651 m (5' 5\")   Wt 66.8 kg (147 lb 3.2 oz)   SpO2 97%   BMI 24.50 kg/m    Body mass index is 24.5 kg/m .  Physical Exam   Eye exam - right eye normal lid, conjunctiva, cornea, pupil and fundus, left eye normal lid, conjunctiva, cornea, pupil and fundus.  Thyroid not palpable, not enlarged, no nodules detected.  CHEST:chest clear to IPPA, no tachypnea, retractions or cyanosis and S1, S2 normal, no murmur, no gallop, rate regular.  No edema  Pulses normal    Giovanni was seen today for mental health problem and imm/inj.    Diagnoses and all orders for this visit:    Major depressive disorder with single episode, in partial remission (H)  -     FLUoxetine (PROZAC) 40 MG capsule; Take 1 capsule (40 mg) by mouth daily    High priority for 2019-nCoV vaccine    Atypical chest pain  -     Troponin I; Future  -     D dimer, quantitative; Future  -     NM MPI Treadmill; Future    Other orders  -     COVID-19,PF,PFIZER (12+ Yrs PURPLE LABEL)      work on lifestyle modification  Advised supportive and symptomatic treatment.  Follow up with Provider - if condition persists or worsens.   Recheck of depression in 4-6 wks via a virtual visit.   Answers for HPI/ROS submitted by the patient on 1/10/2022  If you checked " off any problems, how difficult have these problems made it for you to do your work, take care of things at home, or get along with other people?: Very difficult  PHQ9 TOTAL SCORE: 16  JEIMY 7 TOTAL SCORE: 17

## 2022-01-09 DIAGNOSIS — G25.81 RESTLESS LEGS SYNDROME (RLS): ICD-10-CM

## 2022-01-10 ENCOUNTER — OFFICE VISIT (OUTPATIENT)
Dept: FAMILY MEDICINE | Facility: CLINIC | Age: 57
End: 2022-01-10
Payer: COMMERCIAL

## 2022-01-10 VITALS
TEMPERATURE: 98.2 F | RESPIRATION RATE: 16 BRPM | OXYGEN SATURATION: 97 % | HEART RATE: 102 BPM | BODY MASS INDEX: 24.53 KG/M2 | HEIGHT: 65 IN | DIASTOLIC BLOOD PRESSURE: 76 MMHG | WEIGHT: 147.2 LBS | SYSTOLIC BLOOD PRESSURE: 108 MMHG

## 2022-01-10 DIAGNOSIS — G89.4 CHRONIC PAIN SYNDROME: ICD-10-CM

## 2022-01-10 DIAGNOSIS — Z98.890 S/P ROTATOR CUFF REPAIR: ICD-10-CM

## 2022-01-10 DIAGNOSIS — Z23 HIGH PRIORITY FOR 2019-NCOV VACCINE: ICD-10-CM

## 2022-01-10 DIAGNOSIS — R07.89 ATYPICAL CHEST PAIN: ICD-10-CM

## 2022-01-10 DIAGNOSIS — F32.4 MAJOR DEPRESSIVE DISORDER WITH SINGLE EPISODE, IN PARTIAL REMISSION (H): Primary | ICD-10-CM

## 2022-01-10 DIAGNOSIS — M75.101 ROTATOR CUFF SYNDROME, RIGHT: ICD-10-CM

## 2022-01-10 DIAGNOSIS — M62.838 MUSCLE SPASM OF RIGHT SHOULDER: ICD-10-CM

## 2022-01-10 LAB — D DIMER PPP FEU-MCNC: 2.38 UG/ML FEU (ref 0–0.5)

## 2022-01-10 PROCEDURE — 0004A COVID-19,PF,PFIZER (12+ YRS): CPT | Performed by: PHYSICIAN ASSISTANT

## 2022-01-10 PROCEDURE — 85379 FIBRIN DEGRADATION QUANT: CPT | Performed by: PHYSICIAN ASSISTANT

## 2022-01-10 PROCEDURE — 91300 COVID-19,PF,PFIZER (12+ YRS): CPT | Performed by: PHYSICIAN ASSISTANT

## 2022-01-10 PROCEDURE — 36415 COLL VENOUS BLD VENIPUNCTURE: CPT | Performed by: PHYSICIAN ASSISTANT

## 2022-01-10 PROCEDURE — 99214 OFFICE O/P EST MOD 30 MIN: CPT | Performed by: PHYSICIAN ASSISTANT

## 2022-01-10 PROCEDURE — 84484 ASSAY OF TROPONIN QUANT: CPT | Performed by: PHYSICIAN ASSISTANT

## 2022-01-10 RX ORDER — FLUOXETINE 40 MG/1
40 CAPSULE ORAL DAILY
Qty: 60 CAPSULE | Refills: 0 | Status: SHIPPED | OUTPATIENT
Start: 2022-01-10 | End: 2022-03-12

## 2022-01-10 RX ORDER — METHOCARBAMOL 500 MG/1
TABLET, FILM COATED ORAL
Qty: 60 TABLET | Refills: 1 | Status: SHIPPED | OUTPATIENT
Start: 2022-01-10 | End: 2022-02-18

## 2022-01-10 RX ORDER — HYDROCODONE BITARTRATE AND ACETAMINOPHEN 7.5; 325 MG/1; MG/1
1 TABLET ORAL EVERY 4 HOURS PRN
Qty: 30 TABLET | Refills: 0 | Status: SHIPPED | OUTPATIENT
Start: 2022-01-10 | End: 2022-02-18

## 2022-01-10 ASSESSMENT — ANXIETY QUESTIONNAIRES
GAD7 TOTAL SCORE: 17
7. FEELING AFRAID AS IF SOMETHING AWFUL MIGHT HAPPEN: MORE THAN HALF THE DAYS
1. FEELING NERVOUS, ANXIOUS, OR ON EDGE: MORE THAN HALF THE DAYS
7. FEELING AFRAID AS IF SOMETHING AWFUL MIGHT HAPPEN: MORE THAN HALF THE DAYS
GAD7 TOTAL SCORE: 17
6. BECOMING EASILY ANNOYED OR IRRITABLE: MORE THAN HALF THE DAYS
GAD7 TOTAL SCORE: 17
5. BEING SO RESTLESS THAT IT IS HARD TO SIT STILL: MORE THAN HALF THE DAYS
2. NOT BEING ABLE TO STOP OR CONTROL WORRYING: NEARLY EVERY DAY
3. WORRYING TOO MUCH ABOUT DIFFERENT THINGS: NEARLY EVERY DAY
4. TROUBLE RELAXING: NEARLY EVERY DAY

## 2022-01-10 ASSESSMENT — PATIENT HEALTH QUESTIONNAIRE - PHQ9
10. IF YOU CHECKED OFF ANY PROBLEMS, HOW DIFFICULT HAVE THESE PROBLEMS MADE IT FOR YOU TO DO YOUR WORK, TAKE CARE OF THINGS AT HOME, OR GET ALONG WITH OTHER PEOPLE: VERY DIFFICULT
SUM OF ALL RESPONSES TO PHQ QUESTIONS 1-9: 16
SUM OF ALL RESPONSES TO PHQ QUESTIONS 1-9: 16

## 2022-01-10 ASSESSMENT — MIFFLIN-ST. JEOR: SCORE: 1414.57

## 2022-01-10 ASSESSMENT — PAIN SCALES - GENERAL: PAINLEVEL: NO PAIN (0)

## 2022-01-10 NOTE — LETTER
My Asthma Action Plan    Name: Giovanni Cedeno   YOB: 1965  Date: 1/10/2022   My doctor: Juan Antonio Pak PA-C   My clinic: [facility]        My Rescue Medicine:   Albuterol inhaler (Proair/Ventolin/Proventil HFA)  2-4 puffs EVERY 4 HOURS as needed. Use a spacer if recommended by your provider.   My Asthma Severity:   Intermittent / Exercise Induced  Know your asthma triggers:              GREEN ZONE   Good Control    I feel good    No cough or wheeze    Can work, sleep and play without asthma symptoms       Take your asthma control medicine every day.     1. If exercise triggers your asthma, take your rescue medication    15 minutes before exercise or sports, and    During exercise if you have asthma symptoms  2. Spacer to use with inhaler: If you have a spacer, make sure to use it with your inhaler             YELLOW ZONE Getting Worse  I have ANY of these:    I do not feel good    Cough or wheeze    Chest feels tight    Wake up at night   1. Keep taking your Green Zone medications  2. Start taking your rescue medicine:    every 20 minutes for up to 1 hour. Then every 4 hours for 24-48 hours.  3. If you stay in the Yellow Zone for more than 12-24 hours, contact your doctor.  4. If you do not return to the Green Zone in 12-24 hours or you get worse, start taking your oral steroid medicine if prescribed by your provider.           RED ZONE Medical Alert - Get Help  I have ANY of these:    I feel awful    Medicine is not helping    Breathing getting harder    Trouble walking or talking    Nose opens wide to breathe       1. Take your rescue medicine NOW  2. If your provider has prescribed an oral steroid medicine, start taking it NOW  3. Call your doctor NOW  4. If you are still in the Red Zone after 20 minutes and you have not reached your doctor:    Take your rescue medicine again and    Call 911 or go to the emergency room right away    See your regular doctor within 2  weeks of an Emergency Room or Urgent Care visit for follow-up treatment.          Annual Reminders:  Meet with Asthma Educator,  Flu Shot in the Fall, consider Pneumonia Vaccination for patients with asthma (aged 19 and older).    Pharmacy:    [id number] - [place] - [phone] [facility] [address] [phone]    Electronically signed by Juan Antonio Pak PA-C   Date: 01/10/22                    Asthma Triggers  How To Control Things That Make Your Asthma Worse    Triggers are things that make your asthma worse.  Look at the list below to help you find your triggers and   what you can do about them. You can help prevent asthma flare-ups by staying away from your triggers.      Trigger                                                          What you can do   Cigarette Smoke  Tobacco smoke can make asthma worse. Do not allow smoking in your home, car or around you.  Be sure no one smokes at a child s day care or school.  If you smoke, ask your health care provider for ways to help you quit.  Ask family members to quit too.  Ask your health care provider for a referral to Quit Plan to help you quit smoking, or call 2-331-323-PLAN.     Colds, Flu, Bronchitis  These are common triggers of asthma. Wash your hands often.  Don t touch your eyes, nose or mouth.  Get a flu shot every year.     Dust Mites  These are tiny bugs that live in cloth or carpet. They are too small to see. Wash sheets and blankets in hot water every week.   Encase pillows and mattress in dust mite proof covers.  Avoid having carpet if you can. If you have carpet, vacuum weekly.   Use a dust mask and HEPA vacuum.   Pollen and Outdoor Mold  Some people are allergic to trees, grass, or weed pollen, or molds. Try to keep your windows closed.  Limit time out doors when pollen count is high.   Ask you health care provider about taking  medicine during allergy season.     Animal Dander  Some people are allergic to skin flakes, urine or saliva from pets with fur or feathers. Keep pets with fur or feathers out of your home.    If you can t keep the pet outdoors, then keep the pet out of your bedroom.  Keep the bedroom door closed.  Keep pets off cloth furniture and away from stuffed toys.     Mice, Rats, and Cockroaches  Some people are allergic to the waste from these pests.   Cover food and garbage.  Clean up spills and food crumbs.  Store grease in the refrigerator.   Keep food out of the bedroom.   Indoor Mold  This can be a trigger if your home has high moisture. Fix leaking faucets, pipes, or other sources of water.   Clean moldy surfaces.  Dehumidify basement if it is damp and smelly.   Smoke, Strong Odors, and Sprays  These can reduce air quality. Stay away from strong odors and sprays, such as perfume, powder, hair spray, paints, smoke incense, paint, cleaning products, candles and new carpet.   Exercise or Sports  Some people with asthma have this trigger. Be active!  Ask your doctor about taking medicine before sports or exercise to prevent symptoms.    Warm up for 5-10 minutes before and after sports or exercise.     Other Triggers of Asthma  Cold air:  Cover your nose and mouth with a scarf.  Sometimes laughing or crying can be a trigger.  Some medicines and food can trigger asthma.

## 2022-01-11 ENCOUNTER — TELEPHONE (OUTPATIENT)
Dept: FAMILY MEDICINE | Facility: CLINIC | Age: 57
End: 2022-01-11
Payer: COMMERCIAL

## 2022-01-11 ENCOUNTER — TELEPHONE (OUTPATIENT)
Dept: NURSING | Facility: CLINIC | Age: 57
End: 2022-01-11
Payer: COMMERCIAL

## 2022-01-11 LAB — TROPONIN I SERPL HS-MCNC: 4036 NG/L

## 2022-01-11 RX ORDER — CARBIDOPA AND LEVODOPA 25; 100 MG/1; MG/1
1 TABLET ORAL AT BEDTIME
Qty: 90 TABLET | Refills: 1 | Status: SHIPPED | OUTPATIENT
Start: 2022-01-11 | End: 2022-07-20

## 2022-01-11 ASSESSMENT — ANXIETY QUESTIONNAIRES: GAD7 TOTAL SCORE: 17

## 2022-01-11 ASSESSMENT — ASTHMA QUESTIONNAIRES: ACT_TOTALSCORE: 20

## 2022-01-11 ASSESSMENT — PATIENT HEALTH QUESTIONNAIRE - PHQ9: SUM OF ALL RESPONSES TO PHQ QUESTIONS 1-9: 16

## 2022-01-12 NOTE — TELEPHONE ENCOUNTER
PC from lab - critical troponin.  Review of chart shows critical troponin and d-dimer.      Last week in [place] - had symptoms of bad heartburn.  No current symptoms.      I recommended patient go to ED for evaluation based on these results.  He is reluctant but agreeable.    PC to [facility] ED with information relayed regarding labs.      NORIS

## 2022-01-14 DIAGNOSIS — G25.81 RESTLESS LEG SYNDROME: ICD-10-CM

## 2022-01-14 DIAGNOSIS — F51.01 PRIMARY INSOMNIA: ICD-10-CM

## 2022-01-16 RX ORDER — GABAPENTIN 300 MG/1
CAPSULE ORAL
Qty: 180 CAPSULE | Refills: 1 | Status: SHIPPED | OUTPATIENT
Start: 2022-01-16 | End: 2022-07-20

## 2022-01-16 RX ORDER — MIRTAZAPINE 15 MG/1
15 TABLET, FILM COATED ORAL AT BEDTIME
Qty: 90 TABLET | Refills: 1 | Status: SHIPPED | OUTPATIENT
Start: 2022-01-16 | End: 2022-07-10

## 2022-01-19 ENCOUNTER — TELEPHONE (OUTPATIENT)
Dept: FAMILY MEDICINE | Facility: CLINIC | Age: 57
End: 2022-01-19
Payer: COMMERCIAL

## 2022-01-19 NOTE — TELEPHONE ENCOUNTER
Maverick's friend Geraldo ( he is also his roommate) called to let Juan Antonio Pak know that Maverick  was admitted to [facility] after some testing earlier today.     I told Geraldo that I would let Juan Antonio know about Maverick's admission to the hospital.     See Cardiology note on 1/17/22.     Rhonda Landaverde RN BSN  [facility]

## 2022-02-04 ENCOUNTER — TRANSFERRED RECORDS (OUTPATIENT)
Dept: HEALTH INFORMATION MANAGEMENT | Facility: CLINIC | Age: 57
End: 2022-02-04
Payer: COMMERCIAL

## 2022-02-04 LAB — PHQ9 SCORE: 10

## 2022-02-16 ENCOUNTER — TELEPHONE (OUTPATIENT)
Dept: FAMILY MEDICINE | Facility: CLINIC | Age: 57
End: 2022-02-16
Payer: COMMERCIAL

## 2022-02-16 DIAGNOSIS — Z98.890 S/P ROTATOR CUFF REPAIR: ICD-10-CM

## 2022-02-16 DIAGNOSIS — M75.101 ROTATOR CUFF SYNDROME, RIGHT: ICD-10-CM

## 2022-02-16 DIAGNOSIS — M62.838 MUSCLE SPASM OF RIGHT SHOULDER: ICD-10-CM

## 2022-02-16 DIAGNOSIS — G89.4 CHRONIC PAIN SYNDROME: ICD-10-CM

## 2022-02-16 NOTE — TELEPHONE ENCOUNTER
Requested Prescriptions   Pending Prescriptions Disp Refills     methocarbamol (ROBAXIN) 500 MG tablet 60 tablet 1     Sig: TAKE 1 TO 2 TABLETS(500 TO 1000 MG) BY MOUTH TWICE DAILY AS NEEDED FOR MUSCLE SPASMS       There is no refill protocol information for this order        HYDROcodone-acetaminophen (NORCO) 7.5-325 MG per tablet 30 tablet 0     Sig: Take 1 tablet by mouth every 4 hours as needed for pain maximum  4  tablet(s) per day       There is no refill protocol information for this order        Routing refill request to provider for review/approval because:  Drug not on the [facility] refill protocol

## 2022-02-16 NOTE — TELEPHONE ENCOUNTER
Patient is calling for refills of:    methocarbamol (ROBAXIN) 500 MG tablet  HYDROcodone-acetaminophen (NORCO) 7.5-325 MG per tablet    Pharmacy pended.

## 2022-02-18 RX ORDER — METHOCARBAMOL 500 MG/1
TABLET, FILM COATED ORAL
Qty: 60 TABLET | Refills: 1 | Status: SHIPPED | OUTPATIENT
Start: 2022-02-18 | End: 2022-03-12

## 2022-02-18 RX ORDER — HYDROCODONE BITARTRATE AND ACETAMINOPHEN 7.5; 325 MG/1; MG/1
1 TABLET ORAL EVERY 4 HOURS PRN
Qty: 30 TABLET | Refills: 0 | Status: SHIPPED | OUTPATIENT
Start: 2022-02-18 | End: 2022-03-12

## 2022-03-01 ENCOUNTER — PRE VISIT (OUTPATIENT)
Dept: UROLOGY | Facility: CLINIC | Age: 57
End: 2022-03-01
Payer: COMMERCIAL

## 2022-03-01 NOTE — TELEPHONE ENCOUNTER
Reason for visit: stone prevention      Dx/Hx/Sx: solitary right kidney, kidney stones    Records/imaging/labs/orders: in [facility], [facility] recommended by Dr. Monique, not done    At Rooming: video visit

## 2022-03-04 NOTE — LETTER
"    12/11/2017         RE: Giovanni Cedeno  [address]  [place] [address]        Dear Colleague,    Thank you for referring your patient, Giovanni Cedeno, to the [facility]. Please see a copy of my visit note below.    chief complaint:   Chief Complaint   Patient presents with     Surgical Followup     Right shoulder scope - RCR (M), DCR, SAD, LD. DOS 11/28/17, 2 week PO. Patient states his shoulder is doing fabulous. Pain is tolerable. He has remained in the sling. He will have his first session of PT tomorrow with Juan Antonio.        SURGERY: right shoulder arthroscopy.  1. Right shoulder arthroscopic rotator cuff repair, medium repair, double row technique  2. Right shoulder arthroscopic distal clavical resection  3. Right shoulder arthroscopic labral debridement  4. Right shoulder arthroscopic subacromial decompression with partial acromioplasty.    DATE OF SURGERY: 11/28/2017.    HISTORY OF PRESENT ILLNESS:  Giovanni Cedeno is a 52 year old male seen for postoperative evaluation of a right shoulder arthroscopy and rotator cuff repair, medium repair, distal clavical resection, labral debridement, subacromial decompression with partial acromioplasty for right shoulder rotator cuff tear, impingement, acromialclavicular arthritis, type 1 SLAP tear. Surgery occurred 2 weeks ago. Returns today stating he is doing great. His shoulder is \"fabulous\". Pain is tolerable, moderate, rated a 4/10. He has remained in the sling since surgery. Will start physical therapy tomorrow. No problems with the surgical wounds. Denies fevers chills or night sweats. No associated numbness or tingling. Has been taking it easy and non-weightbearing since surgery as recommended.       Past medical history:   Past Medical History:   Diagnosis Date     Arthritis      Patient Active Problem List    Diagnosis Date Noted     Major depression in partial remission (H) 11/24/2013     Priority: High     " Pre-diabetes 11/24/2013     Priority: High     Abdominal pain, left upper quadrant 05/23/2017     Priority: Medium     Pain of toe of right foot 05/23/2017     Priority: Medium     Acute midline low back pain without sciatica 05/23/2017     Priority: Medium     Upper back pain 05/23/2017     Priority: Medium     Chronic right shoulder pain 11/10/2016     Priority: Medium     Chronic pain syndrome 07/09/2015     Priority: Medium     Patient is followed by ANIKA REYES for ongoing prescription of pain medication.  All refills should be approved by this provider, or covering partner.    Medication(s): Norco 7.5mg po three times daily 3.   Maximum quantity per month: 90  Clinic visit frequency required: Q 3 months     Controlled substance agreement on file: Yes       Date(s): 07/09/2015    Pain Clinic evaluation in the past: No       Location(s):  none     DIRE Total Score(s):    7/9/2015   Total Score 20       Last [facility] website verification:  11/17/17 no concerns    [email or web address]         Immunization reaction 01/15/2015     Priority: Medium     Epicondylitis, lateral humeral 11/26/2013     Priority: Medium     Restless leg syndrome 05/08/2013     Priority: Medium     Knee bursitis 05/08/2013     Priority: Medium      right posterior medial hamstring insertion site       Pure hypercholesterolemia 05/08/2013     Priority: Medium       Past surgical history: History reviewed. No pertinent surgical history.  Medications:   Current Outpatient Prescriptions:      senna-docusate (SENOKOT-S;PERICOLACE) 8.6-50 MG per tablet, Take 1-2 tablets by mouth, Disp: , Rfl:      cyclobenzaprine (FLEXERIL) 5 MG tablet, TAKE 1 TABLET(5 MG) BY MOUTH THREE TIMES DAILY AS NEEDED FOR MUSCLE SPASMS, Disp: 42 tablet, Rfl: 0     gabapentin (NEURONTIN) 300 MG capsule, TAKE 2 CAPSULES BY MOUTH EVERY NIGHT AT BEDTIME, Disp: 60 capsule, Rfl: 0     HYDROcodone-acetaminophen (NORCO) 7.5-325 MG per tablet, Take 1 tablet by  "mouth every 4 hours as needed for pain maximum  4  tablet(s) per day, Disp: 60 tablet, Rfl: 0     celecoxib (CELEBREX) 200 MG capsule, Take 1 capsule (200 mg) by mouth 2 times daily as needed for moderate pain, Disp: 60 capsule, Rfl: 1     carbidopa-levodopa (SINEMET)  MG per tablet, TAKE 1 TABLET BY MOUTH AT BEDTIME, Disp: 90 tablet, Rfl: 0     tamsulosin (FLOMAX) 0.4 MG capsule, Take 1 capsule (0.4 mg) by mouth daily, Disp: 90 capsule, Rfl: 3     finasteride (PROSCAR) 5 MG tablet, Take 1 tablet (5 mg) by mouth daily, Disp: 90 tablet, Rfl: 1     ketoconazole (NIZORAL) 2 % cream, Apply topically 2 times daily, Disp: 45 g, Rfl: 1     simvastatin (ZOCOR) 20 MG tablet, TAKE 1 TABLET BY MOUTH EVERY DAY AT BEDTIME, Disp: 30 tablet, Rfl: 11     mirtazapine (REMERON) 15 MG tablet, TAKE 1 TABLET BY MOUTH EVERY NIGHT AT BEDTIME, Disp: 30 tablet, Rfl: 4    Allergies: No Known Allergies    REVIEW OF SYSTEMS:   CONSTITUTIONAL:NEGATIVE for fever, chills, night sweats  INTEGUMENTARY/SKIN: NEGATIVE for worrisome wound problems or redness.  MUSCULOSKELETAL:See HPI above  NEURO: NEGATIVE for weakness, dizziness or paresthesias    PHYSICAL EXAM:  Resp 16  Ht 1.588 m (5' 2.5\")  Wt 58.2 kg (128 lb 4.8 oz)  BMI 23.09 kg/m2   GENERAL APPEARANCE: healthy, alert, no distress; accompanied by friend   SKIN: no suspicious lesions or rashes  NEURO: Normal strength and tone, mentation intact and speech normal  PSYCH:  mentation appears normal and affect normal, not anxious  RESPIRATORY: No increased work of breathing.    right SHOULDER:  Shoulder Inspection: minimal swelling, resolving ecchymosis  Incisions: sutures intact, healing well.  Tender: minimal over incision  Range of Motion: not assessed.  Strength: not assessed.      X-RAY: none indicated.      Impression: Giovanni Cedeno is a 52 year old male 2 weeks status post right shoulder arthroscopy and rotator cuff repair, medium repair, distal clavical resection, labral " debridement, subacromial decompression with partial acromioplasty, doing well.     Plan: routine postoperative shoulder arthroscopy medium protocol  * WB status: no lifting, pushing, pulling or reaching.  * Activity: non-weightbearing of right upper extremity.  * Immobilization: continue sling for another 4 weeks. Can discontinue sling at that time.  * Rest.  * Ice twice daily to three times daily.  * Physical Therapy for passive range of motion, per medium repair protocol.  * Tylenol as needed for pain  * return to clinic 4 weeks, sooner as needed.    The information in this document, created by a scribe for me, accurately reflects the services I personally performed and the decisions made by me. I have reviewed and approved this document for accuracy.        Jose G Blanchard M.D., M.S.  Dept. of Orthopaedic Surgery  [facility]      Again, thank you for allowing me to participate in the care of your patient.        Sincerely,        Jose G Blanchard MD     Consent 1/Introductory Paragraph: The rationale for Mohs was explained to the patient and consent was obtained. The risks, benefits and alternatives to therapy were discussed in detail. Specifically, the risks of infection, scarring, bleeding, prolonged wound healing, incomplete removal, allergy to anesthesia, nerve injury and recurrence were addressed. Prior to the procedure, the treatment site was clearly identified and confirmed by the patient. All components of Universal Protocol/PAUSE Rule completed.

## 2022-03-09 DIAGNOSIS — M62.838 MUSCLE SPASM OF RIGHT SHOULDER: ICD-10-CM

## 2022-03-09 DIAGNOSIS — F32.4 MAJOR DEPRESSIVE DISORDER WITH SINGLE EPISODE, IN PARTIAL REMISSION (H): ICD-10-CM

## 2022-03-09 DIAGNOSIS — M75.101 ROTATOR CUFF SYNDROME, RIGHT: ICD-10-CM

## 2022-03-09 DIAGNOSIS — G89.4 CHRONIC PAIN SYNDROME: ICD-10-CM

## 2022-03-09 DIAGNOSIS — Z98.890 S/P ROTATOR CUFF REPAIR: ICD-10-CM

## 2022-03-10 NOTE — TELEPHONE ENCOUNTER
Reason for Call:  Medication refill: 3 total     Name of the pharmacy and phone number for the current request:  [facility] [phone]    Name of the medication requested: HYDROcodone-acetaminophen (NORCO) 7.5-325 MG per tablet AND  methocarbamol (ROBAXIN) 500 MG tablet.     Can we leave a detailed message on this number? YES    Phone number patient can be reached at: Home number on file [phone]         Call taken on 3/10/2022 at 3:09 PM by Doretha Olvera

## 2022-03-11 DIAGNOSIS — R35.0 BENIGN PROSTATIC HYPERPLASIA WITH URINARY FREQUENCY: ICD-10-CM

## 2022-03-11 DIAGNOSIS — F32.4 MAJOR DEPRESSIVE DISORDER WITH SINGLE EPISODE, IN PARTIAL REMISSION (H): Chronic | ICD-10-CM

## 2022-03-11 DIAGNOSIS — N40.1 BENIGN PROSTATIC HYPERPLASIA WITH URINARY FREQUENCY: ICD-10-CM

## 2022-03-11 NOTE — TELEPHONE ENCOUNTER
Routing refill request to provider for review/approval because:  Drug not on the [facility] refill protocol Norco, methocarbamol  PHQ >4      PHQ 7/8/2020 6/8/2021 1/10/2022   PHQ-9 Total Score 11 0 16   Q9: Thoughts of better off dead/self-harm past 2 weeks Not at all Not at all Not at all       methocarbamol (ROBAXIN) 500 MG tablet 60 tablet 1 2/18/2022     HYDROcodone-acetaminophen (NORCO) 7.5-325 MG per tablet 30 tablet 0 2/18/2022     FLUoxetine (PROZAC) 40 MG capsule 60 capsule 0 1/10/2022     Last office visit: 1/10/2022 with prescribing provider:  Juan Antonio Pak   Future Office Visit:

## 2022-03-12 RX ORDER — METHOCARBAMOL 500 MG/1
TABLET, FILM COATED ORAL
Qty: 60 TABLET | Refills: 1 | Status: SHIPPED | OUTPATIENT
Start: 2022-03-12 | End: 2022-05-06

## 2022-03-12 RX ORDER — FLUOXETINE 40 MG/1
CAPSULE ORAL
Qty: 60 CAPSULE | Refills: 0 | Status: SHIPPED | OUTPATIENT
Start: 2022-03-12 | End: 2022-05-13

## 2022-03-12 RX ORDER — HYDROCODONE BITARTRATE AND ACETAMINOPHEN 7.5; 325 MG/1; MG/1
1 TABLET ORAL EVERY 4 HOURS PRN
Qty: 30 TABLET | Refills: 0 | Status: SHIPPED | OUTPATIENT
Start: 2022-03-12 | End: 2022-05-06

## 2022-03-12 NOTE — TELEPHONE ENCOUNTER
Maverick needs to see me in clinic to discuss and sign a controlled substance agreement. Call to schedule him for an appt.

## 2022-03-14 RX ORDER — FINASTERIDE 5 MG/1
TABLET, FILM COATED ORAL
Qty: 90 TABLET | Refills: 0 | Status: SHIPPED | OUTPATIENT
Start: 2022-03-14 | End: 2022-06-09

## 2022-03-22 ENCOUNTER — TELEPHONE (OUTPATIENT)
Dept: UROLOGY | Facility: CLINIC | Age: 57
End: 2022-03-22
Payer: COMMERCIAL

## 2022-03-22 NOTE — TELEPHONE ENCOUNTER
LVM for patient to schedule an appointment with [facility] in about 3 weeks (no sooner) for stone prevention.

## 2022-03-27 ENCOUNTER — OFFICE VISIT (OUTPATIENT)
Dept: URGENT CARE | Facility: URGENT CARE | Age: 57
End: 2022-03-27
Payer: COMMERCIAL

## 2022-03-27 VITALS
OXYGEN SATURATION: 96 % | DIASTOLIC BLOOD PRESSURE: 84 MMHG | TEMPERATURE: 98.4 F | BODY MASS INDEX: 24.46 KG/M2 | HEART RATE: 98 BPM | SYSTOLIC BLOOD PRESSURE: 134 MMHG | WEIGHT: 147 LBS

## 2022-03-27 DIAGNOSIS — R50.9 FEVER AND CHILLS: ICD-10-CM

## 2022-03-27 DIAGNOSIS — R51.9 ACUTE INTRACTABLE HEADACHE, UNSPECIFIED HEADACHE TYPE: Primary | ICD-10-CM

## 2022-03-27 DIAGNOSIS — T39.1X1A ACCIDENTAL ACETAMINOPHEN OVERDOSE, INITIAL ENCOUNTER: ICD-10-CM

## 2022-03-27 DIAGNOSIS — R10.31 RLQ ABDOMINAL PAIN: ICD-10-CM

## 2022-03-27 PROCEDURE — 96372 THER/PROPH/DIAG INJ SC/IM: CPT | Performed by: EMERGENCY MEDICINE

## 2022-03-27 PROCEDURE — 99215 OFFICE O/P EST HI 40 MIN: CPT | Mod: 25 | Performed by: EMERGENCY MEDICINE

## 2022-03-27 RX ORDER — KETOROLAC TROMETHAMINE 30 MG/ML
15 INJECTION, SOLUTION INTRAMUSCULAR; INTRAVENOUS ONCE
Status: COMPLETED | OUTPATIENT
Start: 2022-03-27 | End: 2022-03-27

## 2022-03-27 RX ADMIN — KETOROLAC TROMETHAMINE 15 MG: 30 INJECTION, SOLUTION INTRAMUSCULAR; INTRAVENOUS at 13:58

## 2022-03-27 NOTE — PROGRESS NOTES
"Assessment & Plan     Diagnosis:    (R51.9) Acute intractable headache, unspecified headache type  (primary encounter diagnosis)  Plan: ketorolac (TORADOL) injection 15 mg    (T39.1X1A) Accidental acetaminophen overdose, initial encounter    (R10.31) RLQ abdominal pain    (R50.9) Fever and chills      Medical Decision Making:  Giovanni Cedeno is a 56 year old male who presents for evaluation of fevers, headache. Broad differential for his symptoms considered including meningitis, SAH, encephalitis, migraine, tension headache, cluster headache, viral URI.  Patient also notes that he is been taking 1000 mg of Tylenol every 2 hours for the last 4 days and rest he had some right lower quadrant abdominal pain couple days ago, but none now.  Given concern for Tylenol overdose and \"second worst headache of my life\" in the setting of fevers I am concerned for meningitis or other nefarious intracranial process directed the patient to go to the ER now for further evaluation.  The patient has a normal neurologic exam here, is hemodynamically stable, has a benign abdominal exam I do not suspect intentional Tylenol overdose, but regardless he should be worked up in the ER for this as well.  Discussed transport and patient prefers to go by private vehicle, understands risks with this. All questions answered.     Eriberto Beckford PA-C  [facility]    Subjective     Giovanni Cedeno is a 56 year old male who presents to clinic today for the following health issues:  Chief Complaint   Patient presents with     Headache     * 3 days      Chills     really bad last night     Fever     4 days,  last night 102.5     Abdominal Pain     Right side       HPI  Patient states for the last 3-4 days he has had a really bad headache and fever that waxes and wanes in severity. He notes he has been taking Tylenol, 1000 mg \"every 2 hours\" for the last 4 days and has been able to control his fever, but not the headache.  He " notes the second worst headache he was ever had.  He notes he had some right lower quadrant abdominal pain a couple of days ago, but this is now gone.  Notes he is having some neck pain with this as well, but no neck stiffness.  He denies any chest pain, shortness of breath, nausea, vomiting, diarrhea, dark or bloody stools, intentional overdose or other substance use.    Review of Systems    See HPI    Objective      Vitals: /84 (BP Location: Right arm, Patient Position: Sitting, Cuff Size: Adult Regular)   Pulse 98   Temp 98.4  F (36.9  C) (Tympanic)   Wt 66.7 kg (147 lb)   SpO2 96%   BMI 24.46 kg/m    Resp: 16    Patient Vitals for the past 24 hrs:   BP Temp Temp src Pulse SpO2 Weight   03/27/22 1301 134/84 98.4  F (36.9  C) Tympanic 98 96 % 66.7 kg (147 lb)       Vital signs reviewed by: Eriberto Beckford PA-C    Physical Exam   Constitutional: Patient is alert and cooperative. Mild acute distress.  HENT:   Nose: Nose normal.    Mouth: Normal tongue and tonsil. Posterior oropharynx is clear.  Eyes: Conjunctivae, EOMI and lids are normal. PERRL. No nystagmus.  No scleral icterus.  Cardiovascular: Regular rate and rhythm  Pulmonary/Chest: Lungs are clear to auscultation throughout. Effort normal. No respiratory distress. No wheezes, rales or rhonchi.  GI: Mild RLQ abdominal tenderness to palpation.  No rebound or guarding.  Neurological: Alert and oriented x3. CN 3-7 and 9-12 intact. Symmetric strength and sensation in the bilateral lower and upper extremities. GCS 15. Gait is stable.  Speech is fluent and face is symmetric.  Musculoskeletal: Normal range of motion. Normal tone.  Skin: No rash noted on visualized skin.  No jaundice.  Psychiatric:The patient has a normal mood and affect.       Interventions:    Toradol 15mg IM    Eriberto Beckford PA-C, March 27, 2022

## 2022-04-11 ENCOUNTER — OFFICE VISIT (OUTPATIENT)
Dept: ORTHOPEDICS | Facility: CLINIC | Age: 57
End: 2022-04-11
Payer: COMMERCIAL

## 2022-04-11 VITALS
BODY MASS INDEX: 25.16 KG/M2 | HEIGHT: 65 IN | HEART RATE: 70 BPM | DIASTOLIC BLOOD PRESSURE: 60 MMHG | WEIGHT: 151 LBS | SYSTOLIC BLOOD PRESSURE: 92 MMHG

## 2022-04-11 DIAGNOSIS — M77.01 MEDIAL EPICONDYLITIS OF ELBOW, RIGHT: Primary | ICD-10-CM

## 2022-04-11 PROCEDURE — 20551 NJX 1 TENDON ORIGIN/INSJ: CPT | Mod: RT | Performed by: ORTHOPAEDIC SURGERY

## 2022-04-11 RX ORDER — TRIAMCINOLONE ACETONIDE 40 MG/ML
40 INJECTION, SUSPENSION INTRA-ARTICULAR; INTRAMUSCULAR
Status: DISCONTINUED | OUTPATIENT
Start: 2022-04-11 | End: 2024-01-18

## 2022-04-11 RX ADMIN — TRIAMCINOLONE ACETONIDE 40 MG: 40 INJECTION, SUSPENSION INTRA-ARTICULAR; INTRAMUSCULAR at 13:32

## 2022-04-11 ASSESSMENT — PAIN SCALES - GENERAL: PAINLEVEL: SEVERE PAIN (7)

## 2022-04-11 NOTE — PROGRESS NOTES
Chief Complaint:   Chief Complaint   Patient presents with     Right Elbow - Pain     Lateral epicondylitis. Last injection: 7/28/21. Injection worked for awhile. He has been dealing with other issues for a while so he couldn't get in. He would like another injection today.        HPI: Giovanni Cedeno is a 56 year old male , right -hand dominant, who presents for evaluation and management of a right elbow pain, without specific injury.  Pain with bending the elbow, leaning on the elbow . Locates pain to the inner aspect of the elbow. Treatment with ice. No ointments or over the counter medications. No bracing.  Last seen 7/28/2021 and received right elbow medial epicondyle injection which worked well.    He recently had cardiac episode so less active, probably leaning on his elbows sitting around more now he thinks. 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 He'd like repeat injection today.    Prior right elbow lateral epicondylitis treated with numerous injections, eventual surgery 5/2014.      He reports having mild pain/discomfort at the elbow. He denies numbness or tingling.   Pain severity: 7/10  Pain quality: aching  Frequency of symptoms: occasionally  Associated symptoms: denies  Aggravated by: bending the elbow, washing his face, lifting  Relieved by: ice, rest.    Prior history of related problems: previous right elbow lateral epicondylitis, left elbow lateral epicondylitis.    Usual level of work activity: heavy  - .    Past medical history:  has a past medical history of Arthritis.   Patient Active Problem List   Diagnosis     Restless leg syndrome     Knee bursitis     Pure hypercholesterolemia     Major depression in partial remission (H)     Pre-diabetes     Epicondylitis, lateral humeral     Immunization reaction     Chronic pain syndrome     Chronic right shoulder pain     Abdominal pain, left upper quadrant     Pain of toe of right foot     Acute  midline low back pain without sciatica     Upper back pain     Mild intermittent asthma, unspecified whether complicated     Incomplete tear of right rotator cuff     Impingement syndrome of shoulder, right     Degeneration of lumbar or lumbosacral intervertebral disc     Adjustment disorder with depressed mood     Mixed hyperlipidemia     Inguinal hernia     Kidney stone       Past surgical history:  has a past surgical history that includes Combined Cystoscopy, Retrogrades, Ureteroscopy, Laser Holmium Lithotripsy Ureter(S), Insert Stent (Right, 10/15/2021) and Combined Cystoscopy, Retrogrades, Ureteroscopy, Laser Holmium Lithotripsy Ureter(S), Insert Stent (Right, 10/27/2021).     Medications:   Current Outpatient Medications:      carbidopa-levodopa (SINEMET)  MG tablet, TAKE 1 TABLET BY MOUTH AT BEDTIME, Disp: 90 tablet, Rfl: 1     celecoxib (CELEBREX) 200 MG capsule, TAKE 1 CAPSULE(200 MG) BY MOUTH TWICE DAILY, Disp: 60 capsule, Rfl: 11     diclofenac (VOLTAREN) 75 MG EC tablet, TAKE 1 TABLET(75 MG) BY MOUTH TWICE DAILY, Disp: 30 tablet, Rfl: 2     finasteride (PROSCAR) 5 MG tablet, +++NEED RECHECK+++TAKE 1 TABLET BY MOUTH DAILY, Disp: 90 tablet, Rfl: 0     FLUoxetine (PROZAC) 40 MG capsule, TAKE 1 CAPSULE(40 MG) BY MOUTH DAILY, Disp: 60 capsule, Rfl: 0     gabapentin (NEURONTIN) 300 MG capsule, TAKE 2 CAPSULES BY MOUTH EVERY NIGHT AT BEDTIME, Disp: 180 capsule, Rfl: 1     HYDROcodone-acetaminophen (NORCO) 7.5-325 MG per tablet, Take 1 tablet by mouth every 4 hours as needed for pain maximum  4  tablet(s) per day, Disp: 30 tablet, Rfl: 0     methocarbamol (ROBAXIN) 500 MG tablet, TAKE 1 TO 2 TABLETS(500 TO 1000 MG) BY MOUTH TWICE DAILY AS NEEDED FOR MUSCLE SPASMS, Disp: 60 tablet, Rfl: 1     mirtazapine (REMERON) 15 MG tablet, Take 1 tablet (15 mg) by mouth At Bedtime, Disp: 90 tablet, Rfl: 1     Misc Natural Products (OSTEO BI-FLEX TRIPLE STRENGTH PO), Take 1 tablet by mouth daily, Disp: , Rfl:       "phenazopyridine (PYRIDIUM) 100 MG tablet, Take 2 tablets (200 mg) by mouth 3 times daily as needed for urinary tract discomfort, Disp: 15 tablet, Rfl: 0     phenazopyridine (PYRIDIUM) 200 MG tablet, Take 1 tablet (200 mg) by mouth 3 times daily as needed for irritation, Disp: 9 tablet, Rfl: 0     senna-docusate (SENOKOT-S/PERICOLACE) 8.6-50 MG tablet, Take 1-2 tablets by mouth 2 times daily as needed for constipation, Disp: 100 tablet, Rfl: 11     sildenafil (VIAGRA) 100 MG tablet, TAKE ONE TABLET BY MOUTH EVERY DAY AS NEEDED, Disp: 10 tablet, Rfl: 0     simvastatin (ZOCOR) 20 MG tablet, TAKE 1 TABLET BY MOUTH EVERY DAY AT BEDTIME, Disp: 90 tablet, Rfl: 3     tamsulosin (FLOMAX) 0.4 MG capsule, TAKE 1 CAPSULE(0.4 MG) BY MOUTH DAILY, Disp: 90 capsule, Rfl: 3     VENTOLIN  (90 Base) MCG/ACT inhaler, INHALE 2 PUFFS INTO THE LUNGS EVERY 6 HOURS AS NEEDED FOR SHORTNESS OF BREATH OR DIFFICULT BREATHING OR WHEEZING, Disp: 18 g, Rfl: 0     Allergies:   No Known Allergies     Family History: family history includes Diabetes in his father; Heart Disease in his father.     Social History: .  reports that he has never smoked. He has never used smokeless tobacco. He reports that he does not drink alcohol and does not use drugs.    Review of Systems:     Denies numbness, tingling, parasthesias.   Denies headaches.   Denies fevers, chills, night sweats   Denies chest pain.   Denies shortness of breath.   Denies any skin problems, abrasions, rashes, irritation.      Physical Exam  GENERAL APPEARANCE: healthy, alert, no distress.   SKIN: no suspicious lesions or rashes  RESPIRATORY: No increased work of breathing.  NEURO: Normal strength and tone, mentation intact and speech normal  PSYCH:  mentation appears normal and affect normal. Not anxious.  Hands: no clubbing, nail pitting or nodes.    BP 92/60   Pulse 70   Ht 1.651 m (5' 5\")   Wt 68.5 kg (151 lb)   BMI 25.13 kg/m         right UPPER " "EXTREMITY:    Sensation intact to light touch in median, radial, ulnar and axillary nerve distributions  Palpable 2+ radial pulse, brisk capillary refill to all fingers, wwp  Intact epl fpl fdp edc wrist flexion/extension biceps triceps deltoid    ELBOW:  Skin intact. No open wounds. No skin maceration. Lateral based incision from previous surgery.  Inspection: no swelling, no ecchymosis, no olecranon bursa swelling, no distal bicep tendon defect  Tender: medial epicondyle and common flexor tendon  Non-tender: lateral epicondyle, common extensor tendon, extensor muscle of forearm, flexor muscle of forearm, supracondylar notch, olecranon bursa, distal bicep tendon and radial head/neck  Range of Motion: all normal  Strength: elbow strength full  Special tests: no pain with resisted wrist extension, no pain with resisted middle finger extension, no pain with resisted wrist flexion:   There is no gross deformity in the area.          X-rays: no new images today.  3 views right elbow from 7/28/2021 -- Normal joint spacing. No evidence of fracture or effusion. Nonunited moderate olecranon spur. Small ossicles adjacent to the lateral epicondyles.      Assessment: 56 year old male , right -hand dominant, with chronic medial elbow pain, medial epicondylitis.    Plan:   * discussed with patient history and clinical exam consistent with \"golfer's\" elbow, or medial epicondylitis, which is tendonitis at the medial or inner aspect of the elbow. This is where the flexor tendons originate for the wrist.    Treatment includes:  * rest  * activity modification - avoid aggravating activities and reduce strenuous activities for 6-8 weeks  * ice, ice massage  * Physical therapy, modalities as indicated including ultrasound, massage, iontophoresis  * counterforce elbow brace/strap, available OTC  * NDAIDS regularly three times daily x2-3 weeks  * cortisone injection discussed, placed at point of maximal tenderness . Patient elects " today.  * return to clinic as needed.  .    Hand / Upper Extremity Injection/Arthrocentesis: R elbow    Date/Time: 2022 1:32 PM  Performed by: Jose G Blanchard MD  Authorized by: Jose G Blanchard MD     Indications:  Pain  Needle Size:  25 G  Guidance: landmark    Approach:  Medial  Condition: epicondylitis, lateral      Site:  R elbow  Medications:  40 mg triamcinolone 40 MG/ML  Outcome:  Tolerated well, no immediate complications  Procedure discussed: discussed risks, benefits, and alternatives    Timeout: timeout called immediately prior to procedure    Prep: patient was prepped and draped in usual sterile fashion     1 CC of 0.25% Bupivacaine - NDC 07005-934-31, LOT [id number],  May 2023          Jose G Blanchard M.D., M.S.  Dept. of Orthopaedic Surgery  [facility]

## 2022-04-11 NOTE — LETTER
4/11/2022         RE: Giovanni Cedeno  [address]  [place] [address]        Dear Colleague,    Thank you for referring your patient, Giovanni Cedeno, to the [facility] [place]. Please see a copy of my visit note below.    Chief Complaint:   Chief Complaint   Patient presents with     Right Elbow - Pain     Lateral epicondylitis. Last injection: 7/28/21. Injection worked for awhile. He has been dealing with other issues for a while so he couldn't get in. He would like another injection today.        HPI: Giovanni Cedeno is a 56 year old male , right -hand dominant, who presents for evaluation and management of a right elbow pain, without specific injury.  Pain with bending the elbow, leaning on the elbow . Locates pain to the inner aspect of the elbow. Treatment with ice. No ointments or over the counter medications. No bracing.  Last seen 7/28/2021 and received right elbow medial epicondyle injection which worked well.    He recently had cardiac episode so less active, probably leaning on his elbows sitting around more now he thinks. 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 He'd like repeat injection today.    Prior right elbow lateral epicondylitis treated with numerous injections, eventual surgery 5/2014.      He reports having mild pain/discomfort at the elbow. He denies numbness or tingling.   Pain severity: 7/10  Pain quality: aching  Frequency of symptoms: occasionally  Associated symptoms: denies  Aggravated by: bending the elbow, washing his face, lifting  Relieved by: ice, rest.    Prior history of related problems: previous right elbow lateral epicondylitis, left elbow lateral epicondylitis.    Usual level of work activity: heavy  - .    Past medical history:  has a past medical history of Arthritis.   Patient Active Problem List   Diagnosis     Restless leg syndrome     Knee bursitis     Pure  hypercholesterolemia     Major depression in partial remission (H)     Pre-diabetes     Epicondylitis, lateral humeral     Immunization reaction     Chronic pain syndrome     Chronic right shoulder pain     Abdominal pain, left upper quadrant     Pain of toe of right foot     Acute midline low back pain without sciatica     Upper back pain     Mild intermittent asthma, unspecified whether complicated     Incomplete tear of right rotator cuff     Impingement syndrome of shoulder, right     Degeneration of lumbar or lumbosacral intervertebral disc     Adjustment disorder with depressed mood     Mixed hyperlipidemia     Inguinal hernia     Kidney stone       Past surgical history:  has a past surgical history that includes Combined Cystoscopy, Retrogrades, Ureteroscopy, Laser Holmium Lithotripsy Ureter(S), Insert Stent (Right, 10/15/2021) and Combined Cystoscopy, Retrogrades, Ureteroscopy, Laser Holmium Lithotripsy Ureter(S), Insert Stent (Right, 10/27/2021).     Medications:   Current Outpatient Medications:      carbidopa-levodopa (SINEMET)  MG tablet, TAKE 1 TABLET BY MOUTH AT BEDTIME, Disp: 90 tablet, Rfl: 1     celecoxib (CELEBREX) 200 MG capsule, TAKE 1 CAPSULE(200 MG) BY MOUTH TWICE DAILY, Disp: 60 capsule, Rfl: 11     diclofenac (VOLTAREN) 75 MG EC tablet, TAKE 1 TABLET(75 MG) BY MOUTH TWICE DAILY, Disp: 30 tablet, Rfl: 2     finasteride (PROSCAR) 5 MG tablet, +++NEED RECHECK+++TAKE 1 TABLET BY MOUTH DAILY, Disp: 90 tablet, Rfl: 0     FLUoxetine (PROZAC) 40 MG capsule, TAKE 1 CAPSULE(40 MG) BY MOUTH DAILY, Disp: 60 capsule, Rfl: 0     gabapentin (NEURONTIN) 300 MG capsule, TAKE 2 CAPSULES BY MOUTH EVERY NIGHT AT BEDTIME, Disp: 180 capsule, Rfl: 1     HYDROcodone-acetaminophen (NORCO) 7.5-325 MG per tablet, Take 1 tablet by mouth every 4 hours as needed for pain maximum  4  tablet(s) per day, Disp: 30 tablet, Rfl: 0     methocarbamol (ROBAXIN) 500 MG tablet, TAKE 1 TO 2 TABLETS(500 TO 1000 MG) BY MOUTH  TWICE DAILY AS NEEDED FOR MUSCLE SPASMS, Disp: 60 tablet, Rfl: 1     mirtazapine (REMERON) 15 MG tablet, Take 1 tablet (15 mg) by mouth At Bedtime, Disp: 90 tablet, Rfl: 1     Misc Natural Products (OSTEO BI-FLEX TRIPLE STRENGTH PO), Take 1 tablet by mouth daily, Disp: , Rfl:      phenazopyridine (PYRIDIUM) 100 MG tablet, Take 2 tablets (200 mg) by mouth 3 times daily as needed for urinary tract discomfort, Disp: 15 tablet, Rfl: 0     phenazopyridine (PYRIDIUM) 200 MG tablet, Take 1 tablet (200 mg) by mouth 3 times daily as needed for irritation, Disp: 9 tablet, Rfl: 0     senna-docusate (SENOKOT-S/PERICOLACE) 8.6-50 MG tablet, Take 1-2 tablets by mouth 2 times daily as needed for constipation, Disp: 100 tablet, Rfl: 11     sildenafil (VIAGRA) 100 MG tablet, TAKE ONE TABLET BY MOUTH EVERY DAY AS NEEDED, Disp: 10 tablet, Rfl: 0     simvastatin (ZOCOR) 20 MG tablet, TAKE 1 TABLET BY MOUTH EVERY DAY AT BEDTIME, Disp: 90 tablet, Rfl: 3     tamsulosin (FLOMAX) 0.4 MG capsule, TAKE 1 CAPSULE(0.4 MG) BY MOUTH DAILY, Disp: 90 capsule, Rfl: 3     VENTOLIN  (90 Base) MCG/ACT inhaler, INHALE 2 PUFFS INTO THE LUNGS EVERY 6 HOURS AS NEEDED FOR SHORTNESS OF BREATH OR DIFFICULT BREATHING OR WHEEZING, Disp: 18 g, Rfl: 0     Allergies:   No Known Allergies     Family History: family history includes Diabetes in his father; Heart Disease in his father.     Social History: .  reports that he has never smoked. He has never used smokeless tobacco. He reports that he does not drink alcohol and does not use drugs.    Review of Systems:     Denies numbness, tingling, parasthesias.   Denies headaches.   Denies fevers, chills, night sweats   Denies chest pain.   Denies shortness of breath.   Denies any skin problems, abrasions, rashes, irritation.      Physical Exam  GENERAL APPEARANCE: healthy, alert, no distress.   SKIN: no suspicious lesions or rashes  RESPIRATORY: No increased work of breathing.  NEURO:  "Normal strength and tone, mentation intact and speech normal  PSYCH:  mentation appears normal and affect normal. Not anxious.  Hands: no clubbing, nail pitting or nodes.    BP 92/60   Pulse 70   Ht 1.651 m (5' 5\")   Wt 68.5 kg (151 lb)   BMI 25.13 kg/m         right UPPER EXTREMITY:    Sensation intact to light touch in median, radial, ulnar and axillary nerve distributions  Palpable 2+ radial pulse, brisk capillary refill to all fingers, wwp  Intact epl fpl fdp edc wrist flexion/extension biceps triceps deltoid    ELBOW:  Skin intact. No open wounds. No skin maceration. Lateral based incision from previous surgery.  Inspection: no swelling, no ecchymosis, no olecranon bursa swelling, no distal bicep tendon defect  Tender: medial epicondyle and common flexor tendon  Non-tender: lateral epicondyle, common extensor tendon, extensor muscle of forearm, flexor muscle of forearm, supracondylar notch, olecranon bursa, distal bicep tendon and radial head/neck  Range of Motion: all normal  Strength: elbow strength full  Special tests: no pain with resisted wrist extension, no pain with resisted middle finger extension, no pain with resisted wrist flexion:   There is no gross deformity in the area.          X-rays: no new images today.  3 views right elbow from 7/28/2021 -- Normal joint spacing. No evidence of fracture or effusion. Nonunited moderate olecranon spur. Small ossicles adjacent to the lateral epicondyles.      Assessment: 56 year old male , right -hand dominant, with chronic medial elbow pain, medial epicondylitis.    Plan:   * discussed with patient history and clinical exam consistent with \"golfer's\" elbow, or medial epicondylitis, which is tendonitis at the medial or inner aspect of the elbow. This is where the flexor tendons originate for the wrist.    Treatment includes:  * rest  * activity modification - avoid aggravating activities and reduce strenuous activities for 6-8 weeks  * ice, ice massage  * " Physical therapy, modalities as indicated including ultrasound, massage, iontophoresis  * counterforce elbow brace/strap, available OTC  * NDAIDS regularly three times daily x2-3 weeks  * cortisone injection discussed, placed at point of maximal tenderness . Patient elects today.  * return to clinic as needed.  .    Hand / Upper Extremity Injection/Arthrocentesis: R elbow    Date/Time: 2022 1:32 PM  Performed by: Jose G Blanchard MD  Authorized by: Jose G Blanchard MD     Indications:  Pain  Needle Size:  25 G  Guidance: landmark    Approach:  Medial  Condition: epicondylitis, lateral      Site:  R elbow  Medications:  40 mg triamcinolone 40 MG/ML  Outcome:  Tolerated well, no immediate complications  Procedure discussed: discussed risks, benefits, and alternatives    Timeout: timeout called immediately prior to procedure    Prep: patient was prepped and draped in usual sterile fashion     1 CC of 0.25% Bupivacaine - NDC 36540-405-44, LOT [id number],  May 2023          Jose G Blanchard M.D., M.S.  Dept. of Orthopaedic Surgery  [facility]      Again, thank you for allowing me to participate in the care of your patient.        Sincerely,        Jose G Blanchard MD

## 2022-05-03 DIAGNOSIS — G89.4 CHRONIC PAIN SYNDROME: ICD-10-CM

## 2022-05-03 DIAGNOSIS — M75.101 ROTATOR CUFF SYNDROME, RIGHT: ICD-10-CM

## 2022-05-03 DIAGNOSIS — R37 SEXUAL DYSFUNCTION: ICD-10-CM

## 2022-05-03 DIAGNOSIS — Z98.890 S/P ROTATOR CUFF REPAIR: ICD-10-CM

## 2022-05-03 DIAGNOSIS — M62.838 MUSCLE SPASM OF RIGHT SHOULDER: ICD-10-CM

## 2022-05-06 NOTE — TELEPHONE ENCOUNTER
"  Routing refill request to provider for review/approval because:  Drug not on the [facility] refill protocol   Pt is on alpha blockers    HYDROcodone-acetaminophen (NORCO) 7.5-325 MG per tablet  Last Written Prescription Date:  3/12/22  Last Fill Quantity: 30,  # refills: 0   Last office visit: 1/10/2022 with prescribing provider:  darin Pak Office Visit:      methocarbamol (ROBAXIN) 500 MG tablet  Last Written Prescription Date:  3/12/22  Last Fill Quantity: 60,  # refills: 1   Last office visit: 1/10/2022 with prescribing provider:  darin Pak Office Visit:        sildenafil (VIAGRA) 100 MG tablet 10 tablet 0     Sig: TAKE ONE TABLET BY MOUTH EVERY DAY AS NEEDED       Erectile Dysfuction Protocol Failed - 5/3/2022 10:40 AM        Failed - Absence of Alpha Blockers on Med list        Passed - Absence of nitrates on medication list        Passed - Recent (12 mo) or future (30 days) visit within the authorizing provider's specialty     Patient has had an office visit with the authorizing provider or a provider within the authorizing providers department within the previous 12 mos or has a future within next 30 days. See \"Patient Info\" tab in inbasket, or \"Choose Columns\" in Meds & Orders section of the refill encounter.              Passed - Medication is active on med list        Passed - Patient is age 18 or older                   "

## 2022-05-08 RX ORDER — SILDENAFIL 100 MG/1
TABLET, FILM COATED ORAL
Qty: 10 TABLET | Refills: 0 | Status: SHIPPED | OUTPATIENT
Start: 2022-05-08 | End: 2022-07-12

## 2022-05-08 RX ORDER — METHOCARBAMOL 500 MG/1
TABLET, FILM COATED ORAL
Qty: 60 TABLET | Refills: 0 | Status: SHIPPED | OUTPATIENT
Start: 2022-05-08 | End: 2022-07-12

## 2022-05-08 RX ORDER — HYDROCODONE BITARTRATE AND ACETAMINOPHEN 7.5; 325 MG/1; MG/1
1 TABLET ORAL EVERY 4 HOURS PRN
Qty: 30 TABLET | Refills: 0 | Status: SHIPPED | OUTPATIENT
Start: 2022-05-08 | End: 2022-05-31

## 2022-05-16 NOTE — TELEPHONE ENCOUNTER
Next Appt  With Family Practice (Juan Antonio Pak PA-C)  05/31/2022 at 7:20 AM      Erma Whitmore on 5/16/2022 at 11:07 AM

## 2022-05-19 ENCOUNTER — TRANSFERRED RECORDS (OUTPATIENT)
Dept: FAMILY MEDICINE | Facility: CLINIC | Age: 57
End: 2022-05-19

## 2022-05-26 NOTE — PROGRESS NOTES
"Meli Temple is a 57 year old who presents for the following health issues     History of Present Illness       Reason for visit:  Testicles    He eats 2-3 servings of fruits and vegetables daily.He consumes 3 sweetened beverage(s) daily.He exercises with enough effort to increase his heart rate 20 to 29 minutes per day.  He exercises with enough effort to increase his heart rate 6 days per week.   He is taking medications regularly.    Today's PHQ-9         PHQ-9 Total Score: 6    PHQ-9 Q9 Thoughts of better off dead/self-harm past 2 weeks :   Not at all    How difficult have these problems made it for you to do your work, take care of things at home, or get along with other people: Very difficult  right testicular mass. Non tender. No profound irritative/obst voiding symptoms   No hemoptysis or cough.   Recheck of cad. Two stents placed earlier this winter.  Denies chest pain/palps.  Some occasional sob with exertion.   Recheck of hfref. Most recent lvef was 35 %.  No pnd or orthopnea. No edema.      Review of Systems   Constitutional, HEENT, cardiovascular, pulmonary, GI, , musculoskeletal, neuro, skin, endocrine and psych systems are negative, except as otherwise noted.      Objective    /70   Pulse 71   Temp 97.5  F (36.4  C) (Tympanic)   Resp 20   Ht 1.651 m (5' 5\")   Wt 66.2 kg (146 lb)   SpO2 95%   BMI 24.30 kg/m    Body mass index is 24.3 kg/m .  Physical Exam   Eye exam - right eye normal lid, conjunctiva, cornea, pupil and fundus, left eye normal lid, conjunctiva, cornea, pupil and fundus.  Thyroid not palpable, not enlarged, no nodules detected.  CHEST:chest clear to IPPA, no tachypnea, retractions or cyanosis and S1, S2 normal, no murmur, no gallop, rate regular  1.5 cm cystic round non tender mass adjacent to right testicle.    Giovanni was seen today for recheck medication.    Diagnoses and all orders for this visit:    Coronary artery disease involving native coronary artery " of native heart without angina pectoris    Encounter for long-term (current) use of medications    Rotator cuff syndrome, right  -     HYDROcodone-acetaminophen (NORCO) 7.5-325 MG per tablet; Take 1 tablet by mouth every 4 hours as needed for pain maximum  4  tablet(s) per day    Chronic pain syndrome  -     [id number] - Urine Drug Confirmation Panel (Comprehensive); Future  -     HYDROcodone-acetaminophen (NORCO) 7.5-325 MG per tablet; Take 1 tablet by mouth every 4 hours as needed for pain maximum  4  tablet(s) per day    HFrEF (heart failure with reduced ejection fraction) (H)    Mass of right testicle  -     UA Macro with Reflex to Micro and Culture - lab collect; Future  -     US Testicular & Scrotum w Doppler Ltd; Future    Other orders  -     TDAP VACCINE (Adacel, Boostrix)  -     REVIEW OF HEALTH MAINTENANCE PROTOCOL ORDERS  -     COVID-19,PF,PFIZER (12+ Yrs GRAY LABEL)      Continue all current meds.   work on lifestyle modification    .    Answers for HPI/ROS submitted by the patient on 5/31/2022  If you checked off any problems, how difficult have these problems made it for you to do your work, take care of things at home, or get along with other people?: Very difficult  PHQ9 TOTAL SCORE: 6

## 2022-05-31 ENCOUNTER — OFFICE VISIT (OUTPATIENT)
Dept: FAMILY MEDICINE | Facility: CLINIC | Age: 57
End: 2022-05-31
Payer: COMMERCIAL

## 2022-05-31 VITALS
RESPIRATION RATE: 20 BRPM | BODY MASS INDEX: 24.32 KG/M2 | HEART RATE: 71 BPM | HEIGHT: 65 IN | OXYGEN SATURATION: 95 % | DIASTOLIC BLOOD PRESSURE: 70 MMHG | WEIGHT: 146 LBS | TEMPERATURE: 97.5 F | SYSTOLIC BLOOD PRESSURE: 103 MMHG

## 2022-05-31 DIAGNOSIS — I25.10 CORONARY ARTERY DISEASE INVOLVING NATIVE CORONARY ARTERY OF NATIVE HEART WITHOUT ANGINA PECTORIS: Primary | ICD-10-CM

## 2022-05-31 DIAGNOSIS — Z79.899 ENCOUNTER FOR LONG-TERM (CURRENT) USE OF MEDICATIONS: ICD-10-CM

## 2022-05-31 DIAGNOSIS — N30.00 ACUTE CYSTITIS WITHOUT HEMATURIA: ICD-10-CM

## 2022-05-31 DIAGNOSIS — M75.101 ROTATOR CUFF SYNDROME, RIGHT: ICD-10-CM

## 2022-05-31 DIAGNOSIS — I50.20 HFREF (HEART FAILURE WITH REDUCED EJECTION FRACTION) (H): ICD-10-CM

## 2022-05-31 DIAGNOSIS — N50.89 MASS OF RIGHT TESTICLE: ICD-10-CM

## 2022-05-31 DIAGNOSIS — G89.4 CHRONIC PAIN SYNDROME: ICD-10-CM

## 2022-05-31 LAB
ALBUMIN UR-MCNC: NEGATIVE MG/DL
APPEARANCE UR: ABNORMAL
BACTERIA #/AREA URNS HPF: ABNORMAL /HPF
BILIRUB UR QL STRIP: NEGATIVE
COLOR UR AUTO: YELLOW
CREAT UR-MCNC: 49 MG/DL
GLUCOSE UR STRIP-MCNC: NEGATIVE MG/DL
HGB UR QL STRIP: ABNORMAL
KETONES UR STRIP-MCNC: NEGATIVE MG/DL
LEUKOCYTE ESTERASE UR QL STRIP: ABNORMAL
NITRATE UR QL: NEGATIVE
PH UR STRIP: 5 [PH] (ref 5–7)
RBC #/AREA URNS AUTO: ABNORMAL /HPF
SP GR UR STRIP: 1.01 (ref 1–1.03)
SQUAMOUS #/AREA URNS AUTO: ABNORMAL /LPF
UROBILINOGEN UR STRIP-ACNC: 0.2 E.U./DL
WBC #/AREA URNS AUTO: ABNORMAL /HPF
WBC CLUMPS #/AREA URNS HPF: PRESENT /HPF

## 2022-05-31 PROCEDURE — 0054A COVID-19,PF,PFIZER (12+ YRS): CPT | Performed by: PHYSICIAN ASSISTANT

## 2022-05-31 PROCEDURE — 90471 IMMUNIZATION ADMIN: CPT | Performed by: PHYSICIAN ASSISTANT

## 2022-05-31 PROCEDURE — 80307 DRUG TEST PRSMV CHEM ANLYZR: CPT | Performed by: PHYSICIAN ASSISTANT

## 2022-05-31 PROCEDURE — 99214 OFFICE O/P EST MOD 30 MIN: CPT | Mod: 25 | Performed by: PHYSICIAN ASSISTANT

## 2022-05-31 PROCEDURE — 87086 URINE CULTURE/COLONY COUNT: CPT | Performed by: PHYSICIAN ASSISTANT

## 2022-05-31 PROCEDURE — 91305 COVID-19,PF,PFIZER (12+ YRS): CPT | Performed by: PHYSICIAN ASSISTANT

## 2022-05-31 PROCEDURE — 90715 TDAP VACCINE 7 YRS/> IM: CPT | Performed by: PHYSICIAN ASSISTANT

## 2022-05-31 PROCEDURE — 87088 URINE BACTERIA CULTURE: CPT | Performed by: PHYSICIAN ASSISTANT

## 2022-05-31 PROCEDURE — 81001 URINALYSIS AUTO W/SCOPE: CPT | Performed by: PHYSICIAN ASSISTANT

## 2022-05-31 PROCEDURE — 87186 SC STD MICRODIL/AGAR DIL: CPT | Performed by: PHYSICIAN ASSISTANT

## 2022-05-31 RX ORDER — HYDROCODONE BITARTRATE AND ACETAMINOPHEN 7.5; 325 MG/1; MG/1
1 TABLET ORAL EVERY 4 HOURS PRN
Qty: 30 TABLET | Refills: 0 | Status: SHIPPED | OUTPATIENT
Start: 2022-05-31 | End: 2022-07-12

## 2022-05-31 ASSESSMENT — PAIN SCALES - GENERAL: PAINLEVEL: NO PAIN (0)

## 2022-05-31 ASSESSMENT — PATIENT HEALTH QUESTIONNAIRE - PHQ9
SUM OF ALL RESPONSES TO PHQ QUESTIONS 1-9: 6
10. IF YOU CHECKED OFF ANY PROBLEMS, HOW DIFFICULT HAVE THESE PROBLEMS MADE IT FOR YOU TO DO YOUR WORK, TAKE CARE OF THINGS AT HOME, OR GET ALONG WITH OTHER PEOPLE: VERY DIFFICULT
SUM OF ALL RESPONSES TO PHQ QUESTIONS 1-9: 6

## 2022-06-02 LAB — BACTERIA UR CULT: ABNORMAL

## 2022-06-02 RX ORDER — NITROFURANTOIN 25; 75 MG/1; MG/1
100 CAPSULE ORAL 2 TIMES DAILY
Qty: 20 CAPSULE | Refills: 0 | Status: SHIPPED | OUTPATIENT
Start: 2022-06-02 | End: 2022-06-12

## 2022-06-03 LAB
AMPHET UR CFM-MCNC: 2800 NG/ML
AMPHET/CREAT UR: 5714 NG/MG {CREAT}
DHC UR CFM-MCNC: 550 NG/ML
DHC/CREAT UR: 1122 NG/MG {CREAT}
GABAPENTIN UR QL CFM: PRESENT
HYDROCODONE UR CFM-MCNC: 1140 NG/ML
HYDROCODONE/CREAT UR: 2327 NG/MG {CREAT}

## 2022-06-09 DIAGNOSIS — N40.1 BENIGN PROSTATIC HYPERPLASIA WITH URINARY FREQUENCY: ICD-10-CM

## 2022-06-09 DIAGNOSIS — R35.0 BENIGN PROSTATIC HYPERPLASIA WITH URINARY FREQUENCY: ICD-10-CM

## 2022-06-09 RX ORDER — FINASTERIDE 5 MG/1
TABLET, FILM COATED ORAL
Qty: 90 TABLET | Refills: 0 | Status: SHIPPED | OUTPATIENT
Start: 2022-06-09 | End: 2022-09-19

## 2022-06-09 NOTE — TELEPHONE ENCOUNTER
"Requested Prescriptions   Signed Prescriptions Disp Refills    finasteride (PROSCAR) 5 MG tablet 90 tablet 0     Sig: TAKE 1 TABLET BY MOUTH EVERY DAY       BPH Agents Passed - 6/9/2022  3:23 AM        Passed - Recent (12 mo) or future (30 days) visit within the authorizing provider's department     Patient has had an office visit with the authorizing provider or a provider within the authorizing providers department within the previous 12 mos or has a future within next 30 days. See \"Patient Info\" tab in inbasket, or \"Choose Columns\" in Meds & Orders section of the refill encounter.              Passed - Medication is active on med list        Passed - Patient is 18 years of age or older           Neela Trujillo RN  [facility]     "

## 2022-06-11 DIAGNOSIS — F32.4 MAJOR DEPRESSIVE DISORDER WITH SINGLE EPISODE, IN PARTIAL REMISSION (H): ICD-10-CM

## 2022-06-12 NOTE — TELEPHONE ENCOUNTER
Routing refill request to provider for review/approval because:  Drug not on the [facility] refill protocol   PHQ-9 score:    PHQ 5/31/2022   PHQ-9 Total Score 6   Q9: Thoughts of better off dead/self-harm past 2 weeks Not at all

## 2022-06-13 RX ORDER — FLUOXETINE 40 MG/1
CAPSULE ORAL
Qty: 90 CAPSULE | Refills: 0 | Status: SHIPPED | OUTPATIENT
Start: 2022-06-13 | End: 2022-09-14

## 2022-06-27 ENCOUNTER — ANCILLARY PROCEDURE (OUTPATIENT)
Dept: ULTRASOUND IMAGING | Facility: CLINIC | Age: 57
End: 2022-06-27
Attending: PHYSICIAN ASSISTANT
Payer: COMMERCIAL

## 2022-06-27 DIAGNOSIS — N50.89 MASS OF RIGHT TESTICLE: ICD-10-CM

## 2022-06-27 PROCEDURE — 76870 US EXAM SCROTUM: CPT | Mod: TC | Performed by: RADIOLOGY

## 2022-06-27 PROCEDURE — 93976 VASCULAR STUDY: CPT | Mod: TC | Performed by: RADIOLOGY

## 2022-06-28 ENCOUNTER — TRANSFERRED RECORDS (OUTPATIENT)
Dept: FAMILY MEDICINE | Facility: CLINIC | Age: 57
End: 2022-06-28

## 2022-07-19 DIAGNOSIS — G25.81 RESTLESS LEGS SYNDROME (RLS): ICD-10-CM

## 2022-07-20 RX ORDER — CARBIDOPA AND LEVODOPA 25; 100 MG/1; MG/1
1 TABLET ORAL AT BEDTIME
Qty: 90 TABLET | Refills: 1 | Status: SHIPPED | OUTPATIENT
Start: 2022-07-20 | End: 2023-01-24

## 2022-07-20 NOTE — TELEPHONE ENCOUNTER
Last Written Prescription Date:  11/11/22  Last Fill Quantity: 90,  # refills: 1   Last office visit: 5/31/2022 with prescribing provider:  Juan Antonio Pak PA-C   Future Office Visit: none    Prescription approved per [facility] Refill Protocol.    Kathe Barbosa RN, BSN  [facility]

## 2022-08-18 DIAGNOSIS — N20.0 NEPHROLITHIASIS: ICD-10-CM

## 2022-08-18 DIAGNOSIS — R10.9 FLANK PAIN: ICD-10-CM

## 2022-08-18 DIAGNOSIS — R30.0 DYSURIA: ICD-10-CM

## 2022-08-18 NOTE — TELEPHONE ENCOUNTER
Routing refill request to provider for review/approval because:  Failed protocol.      Gunjan Lorenzana RN

## 2022-08-19 RX ORDER — TAMSULOSIN HYDROCHLORIDE 0.4 MG/1
CAPSULE ORAL
Qty: 90 CAPSULE | Refills: 3 | Status: SHIPPED | OUTPATIENT
Start: 2022-08-19 | End: 2023-08-30

## 2022-08-21 ENCOUNTER — OFFICE VISIT (OUTPATIENT)
Dept: URGENT CARE | Facility: URGENT CARE | Age: 57
End: 2022-08-21
Payer: COMMERCIAL

## 2022-08-21 VITALS
SYSTOLIC BLOOD PRESSURE: 109 MMHG | DIASTOLIC BLOOD PRESSURE: 72 MMHG | WEIGHT: 146.4 LBS | RESPIRATION RATE: 12 BRPM | HEART RATE: 70 BPM | TEMPERATURE: 97.6 F | BODY MASS INDEX: 24.36 KG/M2 | OXYGEN SATURATION: 97 %

## 2022-08-21 DIAGNOSIS — R30.0 DYSURIA: Primary | ICD-10-CM

## 2022-08-21 DIAGNOSIS — Z79.01 ANTICOAGULATED: ICD-10-CM

## 2022-08-21 LAB
ALBUMIN UR-MCNC: NEGATIVE MG/DL
APPEARANCE UR: CLEAR
BACTERIA #/AREA URNS HPF: ABNORMAL /HPF
BILIRUB UR QL STRIP: NEGATIVE
COLOR UR AUTO: YELLOW
GLUCOSE UR STRIP-MCNC: NEGATIVE MG/DL
HGB UR QL STRIP: NEGATIVE
KETONES UR STRIP-MCNC: NEGATIVE MG/DL
LEUKOCYTE ESTERASE UR QL STRIP: ABNORMAL
NITRATE UR QL: NEGATIVE
PH UR STRIP: 5.5 [PH] (ref 5–7)
RBC #/AREA URNS AUTO: ABNORMAL /HPF
SP GR UR STRIP: 1.01 (ref 1–1.03)
SQUAMOUS #/AREA URNS AUTO: ABNORMAL /LPF
UROBILINOGEN UR STRIP-ACNC: 0.2 E.U./DL
WBC #/AREA URNS AUTO: ABNORMAL /HPF

## 2022-08-21 PROCEDURE — 81001 URINALYSIS AUTO W/SCOPE: CPT | Performed by: NURSE PRACTITIONER

## 2022-08-21 PROCEDURE — 87086 URINE CULTURE/COLONY COUNT: CPT | Performed by: PHYSICIAN ASSISTANT

## 2022-08-21 PROCEDURE — 87186 SC STD MICRODIL/AGAR DIL: CPT | Performed by: PHYSICIAN ASSISTANT

## 2022-08-21 PROCEDURE — 87088 URINE BACTERIA CULTURE: CPT | Performed by: PHYSICIAN ASSISTANT

## 2022-08-21 PROCEDURE — 99213 OFFICE O/P EST LOW 20 MIN: CPT | Performed by: NURSE PRACTITIONER

## 2022-08-21 RX ORDER — CEFDINIR 300 MG/1
300 CAPSULE ORAL 2 TIMES DAILY
Qty: 14 CAPSULE | Refills: 0 | Status: CANCELLED | OUTPATIENT
Start: 2022-08-21 | End: 2022-08-28

## 2022-08-21 NOTE — PATIENT INSTRUCTIONS
We will culture your urine and if bacteria shows urinary tract infection, we will call you to start an antibiotic    Increase water intake  Decrease caffeine intake which is a bladder irritant

## 2022-08-21 NOTE — PROGRESS NOTES
Assessment & Plan     Dysuria    - UA Macro with Reflex to Micro and Culture - lab collect  - Urine Microscopic  - Urine Culture Aerobic Bacterial - lab collect    Anticoagulated       Reviewed UA during visit not showing obvious UTI, but with small amount of leukocytes present will culture. Discussed option for starting treatment vs waiting for urine culture and patient does not have preference. Will wait for urine culture and treat if indicated. Patient is anticoagulated which will guide abx selection. Increase fluid intake, decrease caffeine which is a bladder irritant.     Follow-up with PCP if symptoms persist for 3 days, and sooner if symptoms worsen or new symptoms develop.     Discussed red flag symptoms which warrant immediate visit in emergency room    All questions were answered and patient verbalized understanding. AVS reviewed with patient.     Janeth Silverman, GEORGE, APRN, CNP 8/21/2022 3:13 PM  [facility] URG[facility] C[facility] [place]          Meli Temple is a 57 year old male who presents to clinic today for the following health issues:  Chief Complaint   Patient presents with     UTI     Trouble urinating and pain x1 week     UTI    Onset of symptoms was 1 week(s).  Course of illness is same  Severity moderate  Current and associated symptoms  Dysuria, urinary frequency, hesitancy   Denies hematuria, abdominal pain, flank pain, nausea, emesis. He had fever and chills yesterday which resolved  Treatment and measures tried None  He is anticoagulated on warfarin.   He has a history of UTI in May, 2022 when he was treated with macrobid.   No history of kidney disease or diabetes.  He has a history of kidney stones back in November.   He drinks quite a bit of water daily and 3 cups caffeine daily  No concern for STD.     Problem list, Medication list, Allergies, and Medical history reviewed in EPIC.    ROS:  Review of systems negative except for noted above        Objective    /72   Pulse 70    Temp 97.6  F (36.4  C) (Tympanic)   Resp 12   Wt 66.4 kg (146 lb 6.4 oz)   SpO2 97%   BMI 24.36 kg/m    Physical Exam  Constitutional:       General: He is not in acute distress.     Appearance: He is not toxic-appearing or diaphoretic.   Abdominal:      General: Bowel sounds are normal. There is no distension.      Palpations: Abdomen is soft.      Tenderness: There is no abdominal tenderness. There is no right CVA tenderness, left CVA tenderness, guarding or rebound.   Lymphadenopathy:      Cervical: No cervical adenopathy.   Skin:     General: Skin is warm and dry.   Neurological:      Mental Status: He is alert.          Labs:  Results for orders placed or performed in visit on 08/21/22   UA Macro with Reflex to Micro and Culture - lab collect     Status: Abnormal    Specimen: Urine, Midstream   Result Value Ref Range    Color Urine Yellow Colorless, Straw, Light Yellow, Yellow    Appearance Urine Clear Clear    Glucose Urine Negative Negative mg/dL    Bilirubin Urine Negative Negative    Ketones Urine Negative Negative mg/dL    Specific Gravity Urine 1.010 1.003 - 1.035    Blood Urine Negative Negative    pH Urine 5.5 5.0 - 7.0    Protein Albumin Urine Negative Negative mg/dL    Urobilinogen Urine 0.2 0.2, 1.0 E.U./dL    Nitrite Urine Negative Negative    Leukocyte Esterase Urine Small (A) Negative   Urine Microscopic     Status: Abnormal   Result Value Ref Range    Bacteria Urine Few (A) None Seen /HPF    RBC Urine 0-2 0-2 /HPF /HPF    WBC Urine 0-5 0-5 /HPF /HPF    Squamous Epithelials Urine Few (A) None Seen /LPF    Narrative    Urine Culture not indicated

## 2022-08-24 DIAGNOSIS — R31.9 URINARY TRACT INFECTION WITH HEMATURIA, SITE UNSPECIFIED: Primary | ICD-10-CM

## 2022-08-24 DIAGNOSIS — N39.0 URINARY TRACT INFECTION WITH HEMATURIA, SITE UNSPECIFIED: Primary | ICD-10-CM

## 2022-08-24 LAB — BACTERIA UR CULT: ABNORMAL

## 2022-08-24 RX ORDER — NITROFURANTOIN 25; 75 MG/1; MG/1
100 CAPSULE ORAL 2 TIMES DAILY
Qty: 14 CAPSULE | Refills: 0 | Status: SHIPPED | OUTPATIENT
Start: 2022-08-24 | End: 2022-09-13

## 2022-08-25 DIAGNOSIS — G89.4 CHRONIC PAIN SYNDROME: ICD-10-CM

## 2022-08-25 DIAGNOSIS — M75.101 ROTATOR CUFF SYNDROME, RIGHT: ICD-10-CM

## 2022-08-26 RX ORDER — HYDROCODONE BITARTRATE AND ACETAMINOPHEN 7.5; 325 MG/1; MG/1
1 TABLET ORAL EVERY 4 HOURS PRN
Qty: 30 TABLET | Refills: 0 | Status: SHIPPED | OUTPATIENT
Start: 2022-08-26 | End: 2022-09-19

## 2022-09-13 DIAGNOSIS — F32.4 MAJOR DEPRESSIVE DISORDER WITH SINGLE EPISODE, IN PARTIAL REMISSION (H): ICD-10-CM

## 2022-09-14 RX ORDER — FLUOXETINE 40 MG/1
CAPSULE ORAL
Qty: 90 CAPSULE | Refills: 0 | Status: SHIPPED | OUTPATIENT
Start: 2022-09-14 | End: 2022-12-21

## 2022-10-27 ENCOUNTER — OFFICE VISIT (OUTPATIENT)
Dept: URGENT CARE | Facility: URGENT CARE | Age: 57
End: 2022-10-27
Payer: COMMERCIAL

## 2022-10-27 VITALS
SYSTOLIC BLOOD PRESSURE: 118 MMHG | BODY MASS INDEX: 24.13 KG/M2 | RESPIRATION RATE: 20 BRPM | OXYGEN SATURATION: 98 % | HEART RATE: 92 BPM | TEMPERATURE: 98.1 F | DIASTOLIC BLOOD PRESSURE: 82 MMHG | WEIGHT: 145 LBS

## 2022-10-27 DIAGNOSIS — R68.83 CHILLS: ICD-10-CM

## 2022-10-27 DIAGNOSIS — A08.4 VIRAL GASTROENTERITIS: Primary | ICD-10-CM

## 2022-10-27 LAB
FLUAV AG SPEC QL IA: NEGATIVE
FLUBV AG SPEC QL IA: NEGATIVE

## 2022-10-27 PROCEDURE — U0005 INFEC AGEN DETEC AMPLI PROBE: HCPCS | Performed by: NURSE PRACTITIONER

## 2022-10-27 PROCEDURE — 99213 OFFICE O/P EST LOW 20 MIN: CPT | Mod: CS | Performed by: NURSE PRACTITIONER

## 2022-10-27 PROCEDURE — U0003 INFECTIOUS AGENT DETECTION BY NUCLEIC ACID (DNA OR RNA); SEVERE ACUTE RESPIRATORY SYNDROME CORONAVIRUS 2 (SARS-COV-2) (CORONAVIRUS DISEASE [COVID-19]), AMPLIFIED PROBE TECHNIQUE, MAKING USE OF HIGH THROUGHPUT TECHNOLOGIES AS DESCRIBED BY CMS-2020-01-R: HCPCS | Performed by: NURSE PRACTITIONER

## 2022-10-27 PROCEDURE — 87804 INFLUENZA ASSAY W/OPTIC: CPT | Performed by: NURSE PRACTITIONER

## 2022-10-27 NOTE — PROGRESS NOTES
Assessment & Plan     Viral gastroenteritis    Chills    - Influenza A & B Antigen - Clinic Collect  - Symptomatic; Unknown COVID-19 Virus (Coronavirus) by PCR Nose     Discussed symptoms likely viral and antibiotic not indicated currently. Recommended symptomatic treatment with rest, fluids, tylenol as needed. Anti-diarrhea medication not recommended as may prolong symptoms. Abdomen not acute, no signs of dehydration currently. Discussed symptoms typically last 3-7 days on average. Frequent hand washing recommended, contagiousness discussed. Influenza and COVID testing in process, will notify if positive.     Follow-up with PCP if symptoms persist for 7 days, and sooner if symptoms worsen or new symptoms develop.     Discussed red flag symptoms which warrant immediate visit in emergency room    All questions were answered and patient verbalized understanding. AVS reviewed with patient.     Janeth Silverman, DNP, APRN, CNP 10/27/2022 6:39 PM  [facility]          Meli Temple is a 57 year old male who presents to clinic today with his roommate for the following health issues:  Chief Complaint   Patient presents with     URI     Suspected Flu  Since Monday night, Fever, chills, headache  Pt denies doing a home covid test , sinus, cough     Diarrhea     4+ day s of diarrhea     Patient presents for evaluation of chills for 3 days. Associated symptoms: feeling feverish, headache, diarrhea, nausea, emesis. Last threw up this morning, has thrown up 3 times today.  Temperature not taken. He his having several loose stools per day. Denies any blood in stool. Denies cough, nasal congestion, shortness of breath, chest pain, dizziness. He has been taking pepto bismal and juwan seltzer which hasn't helped. No recent abx, hospitalization, travel. He has been drinking fluids and voiding. No contacts with similar symptoms.     Problem list, Medication list, Allergies, and Medical history reviewed in  EPIC.    ROS:  Review of systems negative except for noted above        Objective    /82   Pulse 92   Temp 98.1  F (36.7  C) (Tympanic)   Resp 20   Wt 65.8 kg (145 lb)   SpO2 98%   BMI 24.13 kg/m    Physical Exam  Constitutional:       General: He is not in acute distress.     Appearance: He is not toxic-appearing or diaphoretic.   HENT:      Head: Normocephalic and atraumatic.      Nose: Nose normal.      Mouth/Throat:      Mouth: Mucous membranes are moist.      Pharynx: Oropharynx is clear. No oropharyngeal exudate or posterior oropharyngeal erythema.   Cardiovascular:      Rate and Rhythm: Normal rate and regular rhythm.      Heart sounds: Normal heart sounds.   Pulmonary:      Effort: Pulmonary effort is normal. No respiratory distress.      Breath sounds: Normal breath sounds. No wheezing, rhonchi or rales.   Abdominal:      General: Bowel sounds are normal. There is no distension.      Palpations: Abdomen is soft.      Tenderness: There is no abdominal tenderness. There is no guarding or rebound.   Skin:     General: Skin is warm and dry.   Neurological:      General: No focal deficit present.      Mental Status: He is alert and oriented to person, place, and time.      Cranial Nerves: No cranial nerve deficit.      Sensory: No sensory deficit.      Motor: No weakness.      Coordination: Coordination normal.      Gait: Gait normal.          Labs:  Results for orders placed or performed in visit on 10/27/22   Influenza A & B Antigen - Clinic Collect     Status: Normal    Specimen: Nose; Swab   Result Value Ref Range    Influenza A antigen Negative Negative    Influenza B antigen Negative Negative    Narrative    Test results must be correlated with clinical data. If necessary, results should be confirmed by a molecular assay or viral culture.

## 2022-10-28 LAB — SARS-COV-2 RNA RESP QL NAA+PROBE: NEGATIVE

## 2022-11-01 ENCOUNTER — OFFICE VISIT (OUTPATIENT)
Dept: FAMILY MEDICINE | Facility: CLINIC | Age: 57
End: 2022-11-01
Payer: COMMERCIAL

## 2022-11-01 ENCOUNTER — ANCILLARY PROCEDURE (OUTPATIENT)
Dept: GENERAL RADIOLOGY | Facility: CLINIC | Age: 57
End: 2022-11-01
Attending: PHYSICIAN ASSISTANT
Payer: COMMERCIAL

## 2022-11-01 VITALS
SYSTOLIC BLOOD PRESSURE: 93 MMHG | WEIGHT: 146.8 LBS | HEART RATE: 100 BPM | OXYGEN SATURATION: 96 % | TEMPERATURE: 100.1 F | DIASTOLIC BLOOD PRESSURE: 68 MMHG | BODY MASS INDEX: 24.43 KG/M2 | RESPIRATION RATE: 20 BRPM

## 2022-11-01 DIAGNOSIS — M75.101 ROTATOR CUFF SYNDROME, RIGHT: ICD-10-CM

## 2022-11-01 DIAGNOSIS — M62.838 MUSCLE SPASM OF RIGHT SHOULDER: ICD-10-CM

## 2022-11-01 DIAGNOSIS — R50.9 FEVER, UNSPECIFIED FEVER CAUSE: ICD-10-CM

## 2022-11-01 DIAGNOSIS — Z98.890 S/P ROTATOR CUFF REPAIR: ICD-10-CM

## 2022-11-01 DIAGNOSIS — N10 PYELONEPHRITIS, ACUTE: ICD-10-CM

## 2022-11-01 DIAGNOSIS — R19.7 DIARRHEA, UNSPECIFIED TYPE: ICD-10-CM

## 2022-11-01 DIAGNOSIS — J06.9 UPPER RESPIRATORY TRACT INFECTION, UNSPECIFIED TYPE: ICD-10-CM

## 2022-11-01 DIAGNOSIS — G89.4 CHRONIC PAIN SYNDROME: ICD-10-CM

## 2022-11-01 DIAGNOSIS — R50.9 FEVER, UNSPECIFIED FEVER CAUSE: Primary | ICD-10-CM

## 2022-11-01 DIAGNOSIS — Z12.11 SCREEN FOR COLON CANCER: ICD-10-CM

## 2022-11-01 DIAGNOSIS — R35.0 URINARY FREQUENCY: ICD-10-CM

## 2022-11-01 DIAGNOSIS — R53.81 MALAISE: ICD-10-CM

## 2022-11-01 DIAGNOSIS — R37 SEXUAL DYSFUNCTION: ICD-10-CM

## 2022-11-01 LAB
ALBUMIN UR-MCNC: NEGATIVE MG/DL
APPEARANCE UR: ABNORMAL
BACTERIA #/AREA URNS HPF: ABNORMAL /HPF
BASOPHILS # BLD AUTO: 0.1 10E3/UL (ref 0–0.2)
BASOPHILS NFR BLD AUTO: 1 %
BILIRUB UR QL STRIP: NEGATIVE
COLOR UR AUTO: YELLOW
EOSINOPHIL # BLD AUTO: 0.2 10E3/UL (ref 0–0.7)
EOSINOPHIL NFR BLD AUTO: 2 %
ERYTHROCYTE [DISTWIDTH] IN BLOOD BY AUTOMATED COUNT: 15 % (ref 10–15)
GLUCOSE UR STRIP-MCNC: NEGATIVE MG/DL
HCT VFR BLD AUTO: 36 % (ref 40–53)
HGB BLD-MCNC: 11.7 G/DL (ref 13.3–17.7)
HGB UR QL STRIP: ABNORMAL
IMM GRANULOCYTES # BLD: 0.3 10E3/UL
IMM GRANULOCYTES NFR BLD: 3 %
KETONES UR STRIP-MCNC: NEGATIVE MG/DL
LEUKOCYTE ESTERASE UR QL STRIP: ABNORMAL
LYMPHOCYTES # BLD AUTO: 1.4 10E3/UL (ref 0.8–5.3)
LYMPHOCYTES NFR BLD AUTO: 14 %
MCH RBC QN AUTO: 30.1 PG (ref 26.5–33)
MCHC RBC AUTO-ENTMCNC: 32.5 G/DL (ref 31.5–36.5)
MCV RBC AUTO: 93 FL (ref 78–100)
MONOCYTES # BLD AUTO: 0.9 10E3/UL (ref 0–1.3)
MONOCYTES NFR BLD AUTO: 9 %
NEUTROPHILS # BLD AUTO: 7.4 10E3/UL (ref 1.6–8.3)
NEUTROPHILS NFR BLD AUTO: 71 %
NITRATE UR QL: NEGATIVE
NRBC # BLD AUTO: 0 10E3/UL
NRBC BLD AUTO-RTO: 0 /100
PH UR STRIP: 6 [PH] (ref 5–7)
PLATELET # BLD AUTO: 571 10E3/UL (ref 150–450)
RBC # BLD AUTO: 3.89 10E6/UL (ref 4.4–5.9)
RBC #/AREA URNS AUTO: ABNORMAL /HPF
SP GR UR STRIP: 1.01 (ref 1–1.03)
SQUAMOUS #/AREA URNS AUTO: ABNORMAL /LPF
TROPONIN I SERPL HS-MCNC: 9 NG/L
UROBILINOGEN UR STRIP-ACNC: 0.2 E.U./DL
WBC # BLD AUTO: 10.2 10E3/UL (ref 4–11)
WBC #/AREA URNS AUTO: ABNORMAL /HPF
WBC CLUMPS #/AREA URNS HPF: PRESENT /HPF

## 2022-11-01 PROCEDURE — 84484 ASSAY OF TROPONIN QUANT: CPT | Performed by: PHYSICIAN ASSISTANT

## 2022-11-01 PROCEDURE — 85025 COMPLETE CBC W/AUTO DIFF WBC: CPT | Performed by: PHYSICIAN ASSISTANT

## 2022-11-01 PROCEDURE — 71046 X-RAY EXAM CHEST 2 VIEWS: CPT | Mod: TC | Performed by: RADIOLOGY

## 2022-11-01 PROCEDURE — 87088 URINE BACTERIA CULTURE: CPT | Performed by: PHYSICIAN ASSISTANT

## 2022-11-01 PROCEDURE — 36415 COLL VENOUS BLD VENIPUNCTURE: CPT | Performed by: PHYSICIAN ASSISTANT

## 2022-11-01 PROCEDURE — 87186 SC STD MICRODIL/AGAR DIL: CPT | Performed by: PHYSICIAN ASSISTANT

## 2022-11-01 PROCEDURE — 99214 OFFICE O/P EST MOD 30 MIN: CPT | Performed by: PHYSICIAN ASSISTANT

## 2022-11-01 PROCEDURE — 87086 URINE CULTURE/COLONY COUNT: CPT | Performed by: PHYSICIAN ASSISTANT

## 2022-11-01 PROCEDURE — 81001 URINALYSIS AUTO W/SCOPE: CPT | Performed by: PHYSICIAN ASSISTANT

## 2022-11-01 PROCEDURE — 80053 COMPREHEN METABOLIC PANEL: CPT | Performed by: PHYSICIAN ASSISTANT

## 2022-11-01 RX ORDER — METHOCARBAMOL 500 MG/1
TABLET, FILM COATED ORAL
Qty: 60 TABLET | Refills: 0 | Status: SHIPPED | OUTPATIENT
Start: 2022-11-01 | End: 2022-11-28

## 2022-11-01 RX ORDER — SILDENAFIL 100 MG/1
TABLET, FILM COATED ORAL
Qty: 10 TABLET | Refills: 0 | Status: SHIPPED | OUTPATIENT
Start: 2022-11-01 | End: 2022-11-28

## 2022-11-01 RX ORDER — LEVOFLOXACIN 500 MG/1
500 TABLET, FILM COATED ORAL DAILY
Qty: 10 TABLET | Refills: 0 | Status: SHIPPED | OUTPATIENT
Start: 2022-11-01 | End: 2023-05-08

## 2022-11-01 RX ORDER — HYDROCODONE BITARTRATE AND ACETAMINOPHEN 7.5; 325 MG/1; MG/1
1 TABLET ORAL EVERY 4 HOURS PRN
Qty: 30 TABLET | Refills: 0 | Status: SHIPPED | OUTPATIENT
Start: 2022-11-01 | End: 2022-12-04

## 2022-11-01 ASSESSMENT — ASTHMA QUESTIONNAIRES
QUESTION_2 LAST FOUR WEEKS HOW OFTEN HAVE YOU HAD SHORTNESS OF BREATH: MORE THAN ONCE A DAY
QUESTION_1 LAST FOUR WEEKS HOW MUCH OF THE TIME DID YOUR ASTHMA KEEP YOU FROM GETTING AS MUCH DONE AT WORK, SCHOOL OR AT HOME: NONE OF THE TIME
ACT_TOTALSCORE: 21
QUESTION_4 LAST FOUR WEEKS HOW OFTEN HAVE YOU USED YOUR RESCUE INHALER OR NEBULIZER MEDICATION (SUCH AS ALBUTEROL): NOT AT ALL
QUESTION_5 LAST FOUR WEEKS HOW WOULD YOU RATE YOUR ASTHMA CONTROL: COMPLETELY CONTROLLED
ACT_TOTALSCORE: 21
QUESTION_3 LAST FOUR WEEKS HOW OFTEN DID YOUR ASTHMA SYMPTOMS (WHEEZING, COUGHING, SHORTNESS OF BREATH, CHEST TIGHTNESS OR PAIN) WAKE YOU UP AT NIGHT OR EARLIER THAN USUAL IN THE MORNING: NOT AT ALL

## 2022-11-01 ASSESSMENT — PAIN SCALES - GENERAL: PAINLEVEL: SEVERE PAIN (6)

## 2022-11-01 ASSESSMENT — PATIENT HEALTH QUESTIONNAIRE - PHQ9
SUM OF ALL RESPONSES TO PHQ QUESTIONS 1-9: 23
SUM OF ALL RESPONSES TO PHQ QUESTIONS 1-9: 23
10. IF YOU CHECKED OFF ANY PROBLEMS, HOW DIFFICULT HAVE THESE PROBLEMS MADE IT FOR YOU TO DO YOUR WORK, TAKE CARE OF THINGS AT HOME, OR GET ALONG WITH OTHER PEOPLE: EXTREMELY DIFFICULT

## 2022-11-01 NOTE — PROGRESS NOTES
"    Meli Temple is a 57 year old, presenting for the following health issues:  Urinary Problem (Slight burn x 1wk) and Chills (Chills and sweats x 1 week)      History of Present Illness       Back Pain:  He presents for follow up of back pain. Patient's back pain is a recurring problem.  Location of back pain:  Right lower back  Description of back pain: dull ache  Back pain spreads: nowhere    Since patient first noticed back pain, pain is: always present, but gets better and worse  Does back pain interfere with his job:  Yes      Headaches:   Since the patient's last clinic visit, headaches are: worsened  The patient is getting headaches:  All time  He is not able to do normal daily activities when he has a migraine.  The patient is taking the following rescue/relief medications:  Tylenol   Patient states \"I get some relief\" from the rescue/relief medications.   The patient is taking the following medications to prevent migraines:  No medications to prevent migraines  In the past 4 weeks, the patient has gone to an Urgent Care or Emergency Room 0 times times due to headaches.    Reason for visit:  Chills  Symptom onset:  1-2 weeks ago  Symptoms include:  Chills  Symptom intensity:  Moderate  Symptom progression:  Staying the same  Had these symptoms before:  Yes  Has tried/received treatment for these symptoms:  Yes  Previous treatment was successful:  Yes  Prior treatment description:  Aniboics  What makes it worse:  No  What makes it better:  Heat    He eats 2-3 servings of fruits and vegetables daily.He consumes 2 sweetened beverage(s) daily.He exercises with enough effort to increase his heart rate 10 to 19 minutes per day.  He exercises with enough effort to increase his heart rate 3 or less days per week.   He is taking medications regularly.    Today's PHQ-9         PHQ-9 Total Score: 23    PHQ-9 Q9 Thoughts of better off dead/self-harm past 2 weeks :   Several days  Thoughts of suicide or self harm: " (P) No  Self-harm Plan:     Self-harm Action:       Safety concerns for self or others: (P) No    How difficult have these problems made it for you to do your work, take care of things at home, or get along with other people: Extremely difficult       Mild dysuria. Fever. Some polyuria/polydipsia. Symptoms x 10 days . Noting malaise. No chest pain/sob. No palpitations. Some dizziness. Right flank pain.  No leg edema. Mild congestion and subtle cough. No sore throat. Some diarrhea and vomiting. The n/v has resolved . The diarrhea is back again. Feels achy and tired.   Review of Systems   Constitutional, HEENT, cardiovascular, pulmonary, GI, , musculoskeletal, neuro, skin, endocrine and psych systems are negative, except as otherwise noted.      Objective    BP 93/68   Pulse 100   Temp 100.1  F (37.8  C) (Tympanic)   Resp 20   Wt 66.6 kg (146 lb 12.8 oz)   SpO2 96%   BMI 24.43 kg/m    Body mass index is 24.43 kg/m .  Physical Exam     Eye exam - right eye normal lid, conjunctiva, cornea, pupil and fundus, left eye normal lid, conjunctiva, cornea, pupil and fundus.  Thyroid not palpable, not enlarged, no nodules detected.  ENT exam reveals - bilateral TM normal without fluid or infection, neck without nodes, throat normal without erythema or exudate, post nasal drip noted, nasal mucosa congested and nasal mucosa pale and congested.  CHEST:chest clear to IPPA, no tachypnea, retractions or cyanosis and S1, S2 normal, no murmur, no gallop, rate regular.  Mild right sided flank pain  No edema.     Giovanni was seen today for urinary problem and chills.    Diagnoses and all orders for this visit:    Fever, unspecified fever cause  -     CBC with platelets and differential; Future  -     XR Chest 2 Views; Future  -     CBC with platelets and differential    Screen for colon cancer    Malaise  -     Comprehensive metabolic panel (BMP + Alb, Alk Phos, ALT, AST, Total. Bili, TP); Future  -     Troponin I; Future  -      Troponin I  -     Comprehensive metabolic panel (BMP + Alb, Alk Phos, ALT, AST, Total. Bili, TP)    Urinary frequency  -     UA Macro with Reflex to Micro and Culture - lab collect; Future  -     UA Macro with Reflex to Micro and Culture - lab collect  -     Urine Microscopic  -     Urine Culture    Diarrhea, unspecified type  -     Enteric Bacteria and Virus Panel by VINITA Stool; Future  -     C. difficile Toxin B PCR with reflex to C. difficile Antigen and Toxins A/B EIA; Future  -     C. difficile Toxin B PCR with reflex to C. difficile Antigen and Toxins A/B EIA  -     Enteric Bacteria and Virus Panel by VINITA Stool    Upper respiratory tract infection, unspecified type    Rotator cuff syndrome, right  -     HYDROcodone-acetaminophen (NORCO) 7.5-325 MG per tablet; Take 1 tablet by mouth every 4 hours as needed for pain maximum  4  tablet(s) per day    Chronic pain syndrome  -     HYDROcodone-acetaminophen (NORCO) 7.5-325 MG per tablet; Take 1 tablet by mouth every 4 hours as needed for pain maximum  4  tablet(s) per day    Sexual dysfunction  -     sildenafil (VIAGRA) 100 MG tablet; TAKE ONE TABLET BY MOUTH EVERY DAY AS NEEDED    S/P rotator cuff repair  -     methocarbamol (ROBAXIN) 500 MG tablet; TAKE 1 TO 2 TABLETS(500 TO 1000 MG) BY MOUTH TWICE DAILY AS NEEDED FOR MUSCLE SPASMS    Muscle spasm of right shoulder  -     methocarbamol (ROBAXIN) 500 MG tablet; TAKE 1 TO 2 TABLETS(500 TO 1000 MG) BY MOUTH TWICE DAILY AS NEEDED FOR MUSCLE SPASMS    Pyelonephritis, acute  -     levofloxacin (LEVAQUIN) 500 MG tablet; Take 1 tablet (500 mg) by mouth daily      Advised supportive and symptomatic treatment.  Follow up with Provider - if condition persists or worsens.

## 2022-11-02 LAB
ALBUMIN SERPL-MCNC: 2.3 G/DL (ref 3.4–5)
ALP SERPL-CCNC: 61 U/L (ref 40–150)
ALT SERPL W P-5'-P-CCNC: 52 U/L (ref 0–70)
ANION GAP SERPL CALCULATED.3IONS-SCNC: 9 MMOL/L (ref 3–14)
AST SERPL W P-5'-P-CCNC: 26 U/L (ref 0–45)
BILIRUB SERPL-MCNC: 0.3 MG/DL (ref 0.2–1.3)
BUN SERPL-MCNC: 14 MG/DL (ref 7–30)
CALCIUM SERPL-MCNC: 8.4 MG/DL (ref 8.5–10.1)
CHLORIDE BLD-SCNC: 97 MMOL/L (ref 94–109)
CO2 SERPL-SCNC: 29 MMOL/L (ref 20–32)
CREAT SERPL-MCNC: 1.37 MG/DL (ref 0.66–1.25)
GFR SERPL CREATININE-BSD FRML MDRD: 59 ML/MIN/1.73M2
GLUCOSE BLD-MCNC: 93 MG/DL (ref 70–99)
POTASSIUM BLD-SCNC: 3.8 MMOL/L (ref 3.4–5.3)
PROT SERPL-MCNC: 6.4 G/DL (ref 6.8–8.8)
SODIUM SERPL-SCNC: 135 MMOL/L (ref 133–144)

## 2022-11-04 LAB
BACTERIA UR CULT: ABNORMAL
BACTERIA UR CULT: ABNORMAL
C DIFF TOX B STL QL: NEGATIVE

## 2022-11-04 PROCEDURE — 87506 IADNA-DNA/RNA PROBE TQ 6-11: CPT | Performed by: PHYSICIAN ASSISTANT

## 2022-11-04 PROCEDURE — 87493 C DIFF AMPLIFIED PROBE: CPT | Mod: 59 | Performed by: PHYSICIAN ASSISTANT

## 2022-11-07 ENCOUNTER — TELEPHONE (OUTPATIENT)
Dept: FAMILY MEDICINE | Facility: CLINIC | Age: 57
End: 2022-11-07

## 2022-11-07 NOTE — TELEPHONE ENCOUNTER
----- Message from Darin Pak PA-C sent at 11/7/2022  1:07 PM CST -----  Maverick's  urine cx came back positive for klebsiella pneumoniae. Unfortunately its resistant to most oral antibiotics including the levofloxacin I started him on. As such, he'll require iv antibiotic's . Find out how he's feeling. If still febrile, I want him seen at an area ED for reevaluation and iv antibiotic administration.    darin

## 2022-11-07 NOTE — TELEPHONE ENCOUNTER
I called and spoke to patient, he says he is not having fever, is feeling a little better but still having some side pain and pain with urination.    I advised that he does not need to go to ER at this time; not sure what the plan is from her (home infusion?).    Routed to PCP to address plan.    Asia León RN  [facility]

## 2022-11-08 ENCOUNTER — OFFICE VISIT (OUTPATIENT)
Dept: FAMILY MEDICINE | Facility: CLINIC | Age: 57
End: 2022-11-08
Payer: COMMERCIAL

## 2022-11-08 DIAGNOSIS — N10 PYELONEPHRITIS, ACUTE: ICD-10-CM

## 2022-11-08 DIAGNOSIS — N10 PYELONEPHRITIS, ACUTE: Primary | ICD-10-CM

## 2022-11-08 LAB
ALBUMIN UR-MCNC: NEGATIVE MG/DL
ANION GAP SERPL CALCULATED.3IONS-SCNC: 3 MMOL/L (ref 3–14)
APPEARANCE UR: CLEAR
BILIRUB UR QL STRIP: NEGATIVE
BUN SERPL-MCNC: 16 MG/DL (ref 7–30)
CALCIUM SERPL-MCNC: 8.5 MG/DL (ref 8.5–10.1)
CHLORIDE BLD-SCNC: 108 MMOL/L (ref 94–109)
CO2 SERPL-SCNC: 29 MMOL/L (ref 20–32)
COLOR UR AUTO: YELLOW
CREAT SERPL-MCNC: 1.38 MG/DL (ref 0.66–1.25)
GFR SERPL CREATININE-BSD FRML MDRD: 59 ML/MIN/1.73M2
GLUCOSE BLD-MCNC: 92 MG/DL (ref 70–99)
GLUCOSE UR STRIP-MCNC: NEGATIVE MG/DL
HGB UR QL STRIP: NEGATIVE
KETONES UR STRIP-MCNC: NEGATIVE MG/DL
LEUKOCYTE ESTERASE UR QL STRIP: NEGATIVE
NITRATE UR QL: NEGATIVE
PH UR STRIP: 5.5 [PH] (ref 5–7)
POTASSIUM BLD-SCNC: 4.3 MMOL/L (ref 3.4–5.3)
SODIUM SERPL-SCNC: 140 MMOL/L (ref 133–144)
SP GR UR STRIP: 1.02 (ref 1–1.03)
UROBILINOGEN UR STRIP-ACNC: 0.2 E.U./DL

## 2022-11-08 PROCEDURE — 36415 COLL VENOUS BLD VENIPUNCTURE: CPT | Performed by: PHYSICIAN ASSISTANT

## 2022-11-08 PROCEDURE — 87086 URINE CULTURE/COLONY COUNT: CPT | Performed by: PHYSICIAN ASSISTANT

## 2022-11-08 PROCEDURE — 99213 OFFICE O/P EST LOW 20 MIN: CPT | Performed by: PHYSICIAN ASSISTANT

## 2022-11-08 PROCEDURE — 81003 URINALYSIS AUTO W/O SCOPE: CPT | Performed by: PHYSICIAN ASSISTANT

## 2022-11-08 PROCEDURE — 80048 BASIC METABOLIC PNL TOTAL CA: CPT | Performed by: PHYSICIAN ASSISTANT

## 2022-11-08 RX ORDER — SULFAMETHOXAZOLE/TRIMETHOPRIM 800-160 MG
1 TABLET ORAL 2 TIMES DAILY
Qty: 20 TABLET | Refills: 0 | Status: SHIPPED | OUTPATIENT
Start: 2022-11-08 | End: 2022-11-18

## 2022-11-08 NOTE — TELEPHONE ENCOUNTER
Patient is reportedly in the lobby.  I will talk to him now and discuss with provider.    Asia León RN  [facility]

## 2022-11-08 NOTE — PROGRESS NOTES
Meli Temple is a 57 year old, presenting for the following health issues:  Results      HPI     Review Lab Results       Pyelonephritis due to klebsiella pneumonia.  Resistance to most oral antibiotics noted via cx. Patient reports feeling a lot better after a course of levaquin. No irritative voiding symptoms. No fever. No body aches. No n/v/d/c.      Review of Systems   Constitutional, HEENT, cardiovascular, pulmonary, GI, , musculoskeletal, neuro, skin, endocrine and psych systems are negative, except as otherwise noted.      Objective    There were no vitals taken for this visit.  There is no height or weight on file to calculate BMI.  Physical Exam   Eye exam - right eye normal lid, conjunctiva, cornea, pupil and fundus, left eye normal lid, conjunctiva, cornea, pupil and fundus.    Chest is clear, no wheezing or rales. Normal symmetric air entry throughout both lung fields. No chest wall deformities or tenderness.  The abdomen is soft without tenderness, guarding, mass, rebound or organomegaly. Bowel sounds are normal. No CVA tenderness or inguinal adenopathy noted.  No flank pain.    Giovanni was seen today for results.    Diagnoses and all orders for this visit:    Pyelonephritis, acute  -     Basic metabolic panel  (Ca, Cl, CO2, Creat, Gluc, K, Na, BUN)  -     UA Macro with Reflex to Micro and Culture - lab collect  -     Urine Culture Aerobic Bacterial - lab collect  -     sulfamethoxazole-trimethoprim (BACTRIM DS) 800-160 MG tablet; Take 1 tablet by mouth 2 times daily for 10 days      Advised supportive and symptomatic treatment.  Follow up with Provider - if condition persists or worsens.   If positive culture again will have to go to hospital for iv antibiotics

## 2022-11-10 LAB — BACTERIA UR CULT: NO GROWTH

## 2022-11-11 NOTE — TELEPHONE ENCOUNTER
Spoke with patient on 11/11/2022 3:38 PM.  Urine culture(s) and medication history reviewed.  Initially started on levaquin. States that back pain has resolved or urinary symptoms has resolved.  Symptoms started to improve after bactrim and urine results were submitted after patient started Bactrim.  Advised to continue Bactrim course.  Hold off on IV abx.  Will close encounter.

## 2022-11-11 NOTE — TELEPHONE ENCOUNTER
"Patient was seen by PCP.    I see repeat urine culture shows \"no growth\".    Routed to PCP/provider pool to advise on results.    Asia León RN  [facility]      "

## 2022-11-16 ENCOUNTER — TELEPHONE (OUTPATIENT)
Dept: FAMILY MEDICINE | Facility: CLINIC | Age: 57
End: 2022-11-16

## 2022-11-16 NOTE — LETTER
November 16, 2022      Giovanni Cedeno  [address] [address] [address]        Dear Giovanni,       In order to ensure we are providing the best quality care, we have reviewed your chart and see that you are due for the following.    1. Your next office visit is due for an annual preventive exam  2. Vaccines - Influenza, COVID  3. Colon Cancer screen    We greatly appreciate the opportunity to serve you.  Thank you for trusting us with your health care.    Sincerely,    [facility]

## 2022-11-16 NOTE — TELEPHONE ENCOUNTER
Patient Quality Outreach    Patient is due for the following:   Colon Cancer Screening  Physical Preventive Adult Physical      Topic Date Due     Hepatitis B Vaccine (1 of 3 - 3-dose series) Never done     COVID-19 Vaccine (5 - Booster for Pfizer series) 07/26/2022     Flu Vaccine (1) 09/01/2022       Next Steps:   Schedule a Adult Preventative    Type of outreach:    Sent letter.      Questions for provider review:    None     Paloma Ambrose

## 2022-11-26 DIAGNOSIS — M62.838 MUSCLE SPASM OF RIGHT SHOULDER: ICD-10-CM

## 2022-11-26 DIAGNOSIS — Z98.890 S/P ROTATOR CUFF REPAIR: ICD-10-CM

## 2022-11-26 DIAGNOSIS — R37 SEXUAL DYSFUNCTION: ICD-10-CM

## 2022-11-28 RX ORDER — METHOCARBAMOL 500 MG/1
TABLET, FILM COATED ORAL
Qty: 60 TABLET | Refills: 0 | Status: SHIPPED | OUTPATIENT
Start: 2022-11-28 | End: 2023-01-11

## 2022-11-28 RX ORDER — SILDENAFIL 100 MG/1
TABLET, FILM COATED ORAL
Qty: 10 TABLET | Refills: 0 | Status: SHIPPED | OUTPATIENT
Start: 2022-11-28 | End: 2023-01-12

## 2022-11-29 RX ORDER — METHOCARBAMOL 500 MG/1
TABLET, FILM COATED ORAL
Qty: 60 TABLET | Refills: 0 | Status: CANCELLED | OUTPATIENT
Start: 2022-11-29

## 2022-11-29 RX ORDER — SILDENAFIL 100 MG/1
TABLET, FILM COATED ORAL
Qty: 10 TABLET | Refills: 0 | Status: CANCELLED | OUTPATIENT
Start: 2022-11-29

## 2023-01-04 DIAGNOSIS — F51.01 PRIMARY INSOMNIA: ICD-10-CM

## 2023-01-04 RX ORDER — MIRTAZAPINE 15 MG/1
TABLET, FILM COATED ORAL
Qty: 90 TABLET | Refills: 1 | Status: SHIPPED | OUTPATIENT
Start: 2023-01-04 | End: 2023-08-28

## 2023-01-11 DIAGNOSIS — M75.101 ROTATOR CUFF SYNDROME, RIGHT: ICD-10-CM

## 2023-01-11 DIAGNOSIS — R37 SEXUAL DYSFUNCTION: ICD-10-CM

## 2023-01-11 DIAGNOSIS — M62.838 MUSCLE SPASM OF RIGHT SHOULDER: ICD-10-CM

## 2023-01-11 DIAGNOSIS — G89.4 CHRONIC PAIN SYNDROME: ICD-10-CM

## 2023-01-11 DIAGNOSIS — Z98.890 S/P ROTATOR CUFF REPAIR: ICD-10-CM

## 2023-01-12 RX ORDER — METHOCARBAMOL 500 MG/1
TABLET, FILM COATED ORAL
Qty: 60 TABLET | Refills: 0 | Status: SHIPPED | OUTPATIENT
Start: 2023-01-12 | End: 2023-01-26

## 2023-01-12 RX ORDER — HYDROCODONE BITARTRATE AND ACETAMINOPHEN 7.5; 325 MG/1; MG/1
1 TABLET ORAL EVERY 4 HOURS PRN
Qty: 30 TABLET | Refills: 0 | Status: SHIPPED | OUTPATIENT
Start: 2023-01-12 | End: 2023-01-26

## 2023-01-12 RX ORDER — SILDENAFIL 100 MG/1
TABLET, FILM COATED ORAL
Qty: 10 TABLET | Refills: 0 | Status: SHIPPED | OUTPATIENT
Start: 2023-01-12 | End: 2023-03-03

## 2023-01-25 ENCOUNTER — TELEPHONE (OUTPATIENT)
Dept: FAMILY MEDICINE | Facility: CLINIC | Age: 58
End: 2023-01-25
Payer: COMMERCIAL

## 2023-01-25 NOTE — LETTER
January 25, 2023      Giovanni Cedeno  [address] [address] [facility] [address] [address]        Dear Giovanni,     In order to ensure we are providing the best quality care, we have reviewed your chart and see that you are due for the following.    1. Your next office visit is due for Annual Physical Exam  2. Colon Cancer Screen  3. Vaccine Update - Hepatitis B, COVID, Influenza  4. PHQ9 - Questionnaire, see attached - complete and return please  5. Asthma Action Plan - see attached    We greatly appreciate the opportunity to serve you.  Thank you for trusting us with your health care.    Sincerely,    [facility]       Sincerely,        Juan Antonio Pak PA-C

## 2023-01-25 NOTE — LETTER
My Asthma Action Plan    Name: Giovanni Cedeno   YOB: 1965  Date: 1/25/2023   My doctor: Juan Antonio Pak PA-C   My clinic: [facility]        My Rescue Medicine:   Albuterol inhaler (Proair/Ventolin/Proventil HFA)  2-4 puffs EVERY 4 HOURS as needed. Use a spacer if recommended by your provider.   My Asthma Severity:   Intermittent / Exercise Induced  Know your asthma triggers:              GREEN ZONE   Good Control    I feel good    No cough or wheeze    Can work, sleep and play without asthma symptoms       Take your asthma control medicine every day.     1. If exercise triggers your asthma, take your rescue medication    15 minutes before exercise or sports, and    During exercise if you have asthma symptoms  2. Spacer to use with inhaler: If you have a spacer, make sure to use it with your inhaler             YELLOW ZONE Getting Worse  I have ANY of these:    I do not feel good    Cough or wheeze    Chest feels tight    Wake up at night   1. Keep taking your Green Zone medications  2. Start taking your rescue medicine:    every 20 minutes for up to 1 hour. Then every 4 hours for 24-48 hours.  3. If you stay in the Yellow Zone for more than 12-24 hours, contact your doctor.  4. If you do not return to the Green Zone in 12-24 hours or you get worse, start taking your oral steroid medicine if prescribed by your provider.           RED ZONE Medical Alert - Get Help  I have ANY of these:    I feel awful    Medicine is not helping    Breathing getting harder    Trouble walking or talking    Nose opens wide to breathe       1. Take your rescue medicine NOW  2. If your provider has prescribed an oral steroid medicine, start taking it NOW  3. Call your doctor NOW  4. If you are still in the Red Zone after 20 minutes and you have not reached your doctor:    Take your rescue medicine again and    Call 911 or go to the emergency room right away    See your regular doctor within 2  weeks of an Emergency Room or Urgent Care visit for follow-up treatment.          Annual Reminders:  Meet with Asthma Educator,  Flu Shot in the Fall, consider Pneumonia Vaccination for patients with asthma (aged 19 and older).    Pharmacy:    [id number] - [place] - [address] [facility] [address]    Electronically signed by Juan Antonio Pak PA-C   Date: 01/25/23                    Asthma Triggers  How To Control Things That Make Your Asthma Worse    Triggers are things that make your asthma worse.  Look at the list below to help you find your triggers and   what you can do about them. You can help prevent asthma flare-ups by staying away from your triggers.      Trigger                                                          What you can do   Cigarette Smoke  Tobacco smoke can make asthma worse. Do not allow smoking in your home, car or around you.  Be sure no one smokes at a child s day care or school.  If you smoke, ask your health care provider for ways to help you quit.  Ask family members to quit too.  Ask your health care provider for a referral to Quit Plan to help you quit smoking, or call 0-582-425-PLAN.     Colds, Flu, Bronchitis  These are common triggers of asthma. Wash your hands often.  Don t touch your eyes, nose or mouth.  Get a flu shot every year.     Dust Mites  These are tiny bugs that live in cloth or carpet. They are too small to see. Wash sheets and blankets in hot water every week.   Encase pillows and mattress in dust mite proof covers.  Avoid having carpet if you can. If you have carpet, vacuum weekly.   Use a dust mask and HEPA vacuum.   Pollen and Outdoor Mold  Some people are allergic to trees, grass, or weed pollen, or molds. Try to keep your windows closed.  Limit time out doors when pollen count is high.   Ask you health care provider about taking  medicine during allergy season.     Animal Dander  Some people are allergic to skin flakes, urine or saliva from pets with fur or feathers. Keep pets with fur or feathers out of your home.    If you can t keep the pet outdoors, then keep the pet out of your bedroom.  Keep the bedroom door closed.  Keep pets off cloth furniture and away from stuffed toys.     Mice, Rats, and Cockroaches  Some people are allergic to the waste from these pests.   Cover food and garbage.  Clean up spills and food crumbs.  Store grease in the refrigerator.   Keep food out of the bedroom.   Indoor Mold  This can be a trigger if your home has high moisture. Fix leaking faucets, pipes, or other sources of water.   Clean moldy surfaces.  Dehumidify basement if it is damp and smelly.   Smoke, Strong Odors, and Sprays  These can reduce air quality. Stay away from strong odors and sprays, such as perfume, powder, hair spray, paints, smoke incense, paint, cleaning products, candles and new carpet.   Exercise or Sports  Some people with asthma have this trigger. Be active!  Ask your doctor about taking medicine before sports or exercise to prevent symptoms.    Warm up for 5-10 minutes before and after sports or exercise.     Other Triggers of Asthma  Cold air:  Cover your nose and mouth with a scarf.  Sometimes laughing or crying can be a trigger.  Some medicines and food can trigger asthma.

## 2023-01-25 NOTE — TELEPHONE ENCOUNTER
Patient Quality Outreach    Patient is due for the following:   Asthma  -  AAP  Colon Cancer Screening  Physical Preventive Adult Physical      Topic Date Due     Hepatitis B Vaccine (1 of 3 - 3-dose series) Never done     COVID-19 Vaccine (5 - Booster for Pfizer series) 07/26/2022     Flu Vaccine (1) 09/01/2022       Next Steps:   Schedule a Adult Preventative    Type of outreach:    Sent letter.      Questions for provider review:    None     Paloma Ambrose

## 2023-01-26 ENCOUNTER — TELEPHONE (OUTPATIENT)
Dept: FAMILY MEDICINE | Facility: CLINIC | Age: 58
End: 2023-01-26
Payer: COMMERCIAL

## 2023-01-26 DIAGNOSIS — M62.838 MUSCLE SPASM OF RIGHT SHOULDER: ICD-10-CM

## 2023-01-26 DIAGNOSIS — M75.101 ROTATOR CUFF SYNDROME, RIGHT: ICD-10-CM

## 2023-01-26 DIAGNOSIS — Z98.890 S/P ROTATOR CUFF REPAIR: ICD-10-CM

## 2023-01-26 DIAGNOSIS — G89.4 CHRONIC PAIN SYNDROME: ICD-10-CM

## 2023-01-26 RX ORDER — HYDROCODONE BITARTRATE AND ACETAMINOPHEN 7.5; 325 MG/1; MG/1
1 TABLET ORAL EVERY 4 HOURS PRN
Qty: 30 TABLET | Refills: 0 | Status: SHIPPED | OUTPATIENT
Start: 2023-01-26 | End: 2023-03-03

## 2023-01-26 RX ORDER — METHOCARBAMOL 500 MG/1
TABLET, FILM COATED ORAL
Qty: 60 TABLET | Refills: 0 | Status: SHIPPED | OUTPATIENT
Start: 2023-01-26 | End: 2023-03-03

## 2023-01-26 ASSESSMENT — PATIENT HEALTH QUESTIONNAIRE - PHQ9
5. POOR APPETITE OR OVEREATING: SEVERAL DAYS
SUM OF ALL RESPONSES TO PHQ QUESTIONS 1-9: 14

## 2023-01-26 ASSESSMENT — ANXIETY QUESTIONNAIRES
6. BECOMING EASILY ANNOYED OR IRRITABLE: SEVERAL DAYS
GAD7 TOTAL SCORE: 6
IF YOU CHECKED OFF ANY PROBLEMS ON THIS QUESTIONNAIRE, HOW DIFFICULT HAVE THESE PROBLEMS MADE IT FOR YOU TO DO YOUR WORK, TAKE CARE OF THINGS AT HOME, OR GET ALONG WITH OTHER PEOPLE: SOMEWHAT DIFFICULT
2. NOT BEING ABLE TO STOP OR CONTROL WORRYING: SEVERAL DAYS
GAD7 TOTAL SCORE: 6
1. FEELING NERVOUS, ANXIOUS, OR ON EDGE: SEVERAL DAYS
7. FEELING AFRAID AS IF SOMETHING AWFUL MIGHT HAPPEN: NOT AT ALL
3. WORRYING TOO MUCH ABOUT DIFFERENT THINGS: SEVERAL DAYS
5. BEING SO RESTLESS THAT IT IS HARD TO SIT STILL: SEVERAL DAYS

## 2023-01-26 NOTE — LETTER
Opioid / Opioid Plus Controlled Substance Agreement    This is an agreement between you and your provider about the safe and appropriate use of controlled substance/opioids prescribed by your care team. Controlled substances are medicines that can cause physical and mental dependence (abuse).    There are strict laws about having and using these medicines. We here at [facility] are committing to working with you in your efforts to get better. To support you in this work, we ll help you schedule regular office appointments for medicine refills. If we must cancel or change your appointment for any reason, we ll make sure you have enough medicine to last until your next appointment.     As a Provider, I will:    Listen carefully to your concerns and treat you with respect.     Recommend a treatment plan that I believe is in your best interest. This plan may involve therapies other than opioid pain medication.     Talk with you often about the possible benefits, and the risk of harm of any medicine that we prescribe for you.     Provide a plan on how to taper (discontinue or go off) using this medicine if the decision is made to stop its use.    As a Patient, I understand that opioid(s):     Are a controlled substance prescribed by my care team to help me function or work and manage my condition(s).     Are strong medicines and can cause serious side effects such as:    Drowsiness, which can seriously affect my driving ability    A lower breathing rate, enough to cause death    Harm to my thinking ability     Depression     Abuse of and addiction to this medicine    Need to be taken exactly as prescribed. Combining opioids with certain medicines or chemicals (such as illegal drugs, sedatives, sleeping pills, and benzodiazepines) can be dangerous or even fatal. If I stop opioids suddenly, I may have severe withdrawal symptoms.    Do not work for all types of pain nor for all patients. If they re not helpful, I may  be asked to stop them.    I am also being prescribed a benzodiazepine (tranquilizer) controlled substance: I understand this type of medicine is sedating and can increase the risk of death when taken together with opioids. I have talked to my care team about having prescription Narcan available for reversing the opioid medicine and have been instructed in its use. I will be very careful to take my medicines only as directed  I am also being prescribed a stimulant controlled substance to help manage symptoms of attention deficit, appetite suppression, and/or fatigue.    The risks, benefits and side effects of these medicine(s) were explained to me. I agree that:  1. I will take part in other treatments as advised by my care team. This may be psychiatry or counseling, physical therapy, behavioral therapy, group treatment or a referral to a specialist.     2. I will keep all my appointments. I understand that this is part of the monitoring of opioids. My care team may require an office visit for EVERY opioid/controlled substance refill. If I miss appointments or don t follow instructions, my care team may stop my medicine.    3. I will take my medicines as prescribed. I will not change the dose or schedule unless my care team tells me to. There will be no refills if I run out early.     4. I may be asked to come to the clinic and complete a urine drug test or complete a pill count at any time. If I don t give a urine sample or participate in a pill count, the care team may stop my medicine.    5. I will only receive prescriptions from this clinic for chronic pain. If I am treated by another provider for acute pain issues, I will tell them that I am taking opioid pain medication for chronic pain and that I have a treatment agreement with this provider. I will inform my [facility] care team within one business day if I am given a prescription for any pain medication by another healthcare provider. My [facility] care team can contact other providers and pharmacists about my use of any medicines.    6. It is up to me to make sure that I don t run out of my medicines on weekends or holidays. If my care team is willing to refill my opioid prescription without a visit, I must request refills only during office hours. Refills may take up to 3 business days to process. I will use one pharmacy to fill all my opioid and other controlled substance prescriptions. I will notify the clinic about any changes to my insurance or medication availability.    7. I am responsible for my prescriptions. If the medicine/prescription is lost, stolen or destroyed, it will not be replaced. I also agree not to share controlled substance medicines with anyone.    8. I am aware I should not use any illegal or recreational drugs. I agree not to drink alcohol unless my care team says I can.       9. If I enroll in the [place] [facility] Cannabis program, I will tell my care team prior to my next refill.     10. I will tell my care team right away if I become pregnant, have a new medical problem treated outside of my regular clinic, or have a change in my medications.    11. I understand that this medicine can affect my thinking, judgment and reaction time. Alcohol and drugs affect the brain and body, which can affect the safety of my driving. Being under the influence of alcohol or drugs can affect my decision-making, behaviors, personal safety, and the safety of others. Driving while impaired (DWI) can occur if a person is driving, operating, or in physical control of a car, motorcycle, boat, snowmobile, ATV, motorbike, off-road vehicle, or any other motor vehicle ([place] Statute 169A.20). I understand the risk if I choose to drive or operate any vehicle or machinery.    I understand that if I do not follow any of the conditions above, my prescriptions or treatment may be stopped or changed.          Opioids  What You Need to Know    What are  opioids?   Opioids are pain medicines that must be prescribed by a doctor. They are also known as narcotics.     Examples are:   1. morphine (MS Contin, Pat)  2. oxycodone (Oxycontin)  3. oxycodone and acetaminophen (Percocet)  4. hydrocodone and acetaminophen (Vicodin, Norco)   5. fentanyl patch (Duragesic)   6. hydromorphone (Dilaudid)   7. methadone  8. codeine (Tylenol #3)     What do opioids do well?   Opioids are best for severe short-term pain such as after a surgery or injury. They may work well for cancer pain. They may help some people with long-lasting (chronic) pain.     What do opioids NOT do well?   Opioids never get rid of pain entirely, and they don t work well for most patients with chronic pain. Opioids don t reduce swelling, one of the causes of pain.                                    Other ways to manage chronic pain and improve function include:       Treat the health problem that may be causing pain    Anti-inflammation medicines, which reduce swelling and tenderness, such as ibuprofen (Advil, Motrin) or naproxen (Aleve)    Acetaminophen (Tylenol)    Antidepressants and anti-seizure medicines, especially for nerve pain    Topical treatments such as patches or creams    Injections or nerve blocks    Chiropractic or osteopathic treatment    Acupuncture, massage, deep breathing, meditation, visual imagery, aromatherapy    Use heat or ice at the pain site    Physical therapy     Exercise    Stop smoking    Take part in therapy       Risks and side effects     Talk to your doctor before you start or decide to keep taking opioids. Possible side effects include:      Lowering your breathing rate enough to cause death    Overdose, including death, especially if taking higher than prescribed doses    Worse depression symptoms; less pleasure in things you usually enjoy    Feeling tired or sluggish    Slower thoughts or cloudy thinking    Being more sensitive to pain over time; pain is harder to  control    Trouble sleeping or restless sleep    Changes in hormone levels (for example, less testosterone)    Changes in sex drive or ability to have sex    Constipation    Unsafe driving    Itching and sweating    Dizziness    Nausea, throwing up and dry mouth    What else should I know about opioids?    Opioids may lead to dependence, tolerance, or addiction.      Dependence means that if you stop or reduce the medicine too quickly, you will have withdrawal symptoms. These include loose poop (diarrhea), jitters, flu-like symptoms, nervousness and tremors. Dependence is not the same as addiction.                       Tolerance means needing higher doses over time to get the same effect. This may increase the chance of serious side effects.      Addiction is when people improperly use a substance that harms their body, their mind or their relations with others. Use of opiates can cause a relapse of addiction if you have a history of drug or alcohol abuse.      People who have used opioids for a long time may have a lower quality of life, worse depression, higher levels of pain and more visits to doctors.    You can overdose on opioids. Take these steps to lower your risk of overdose:    1. Recognize the signs:  Signs of overdose include decrease or loss of consciousness (blackout), slowed breathing, trouble waking up and blue lips. If someone is worried about overdose, they should call 911.    2. Talk to your doctor about Narcan (naloxone).   If you are at risk for overdose, you may be given a prescription for Narcan. This medicine very quickly reverses the effects of opioids.   If you overdose, a friend or family member can give you Narcan while waiting for the ambulance. They need to know the signs of overdose and how to give Narcan.     3. Don't use alcohol or street drugs.   Taking them with opioids can cause death.    4. Do not take any of these medicines unless your doctor says it s OK. Taking these with  opioids can cause death:    Benzodiazepines, such as lorazepam (Ativan), alprazolam (Xanax) or diazepam (Valium)    Muscle relaxers, such as cyclobenzaprine (Flexeril)    Sleeping pills like zolpidem (Ambien)     Other opioids      How to keep you and other people safe while taking opioids:    1. Never share your opioids with others.  Opioid medicines are regulated by the Drug Enforcement Agency (EVA). Selling or sharing medications is a criminal act.    2. Be sure to store opioids in a secure place, locked up if possible. Young children can easily swallow them and overdose.    3. When you are traveling with your medicines, keep them in the original bottles. If you use a pill box, be sure you also carry a copy of your medicine list from your clinic or pharmacy.    4. Safe disposal of opioids    Most pharmacies have places to get rid of medicine, called disposal kiosks. Medicine disposal options are also available in every [facility]. Search your county and  medication disposal  to find more options. You can find more details at:  [email or web address]://[email or web address]     I agree that my provider, clinic care team, and pharmacy may work with any city, state or federal law enforcement agency that investigates the misuse, sale, or other diversion of my controlled medicine. I will allow my provider to discuss my care with, or share a copy of, this agreement with any other treating provider, pharmacy or emergency room where I receive care.    I have read this agreement and have asked questions about anything I did not understand.    _______________________________________________________  Patient Signature - Giovanni Cedeno _____________________                   Date     _______________________________________________________  Provider Signature - Juan Antonio Pak PA-C   _____________________                   Date      _______________________________________________________  Witness Signature (required if provider not present while patient signing)   _____________________                   Date

## 2023-02-28 NOTE — PROGRESS NOTES
Chief Complaint:   Chief Complaint   Patient presents with     Right Elbow - Pain     Right medial elbow pain. DOI: 4/11/22. This worked well up until 3 weeks ago.     Elbow Pain        HPI: Maverick Cedeno is a 57 year old male , right -hand dominant, who presents for evaluation and management of a right elbow pain, without specific injury. Last injection 4/2022. Returns today stating it worked well up until 3 weeks ago.    Pain with bending the elbow, leaning on the elbow . Locates pain to the inner aspect of the elbow. Treatment with ice. No ointments or over the counter medications. No bracing.    Prior right elbow lateral epicondylitis treated with numerous injections, eventual surgery 5/2014.    He is a .      He reports having mild pain/discomfort at the elbow. He denies numbness or tingling.   Pain severity: 5/10  Pain quality: aching  Frequency of symptoms: occasionally  Associated symptoms: denies  Aggravated by: bending the elbow, washing his face, lifting  Relieved by: ice, rest.    Prior history of related problems: previous right elbow lateral epicondylitis, left elbow lateral epicondylitis.    Usual level of work activity: heavy  - .    Past medical history:  has a past medical history of Arthritis.   Patient Active Problem List   Diagnosis     Restless leg syndrome     Knee bursitis     Pure hypercholesterolemia     Major depression in partial remission (H)     Pre-diabetes     Epicondylitis, lateral humeral     Immunization reaction     Chronic pain syndrome     Chronic right shoulder pain     Abdominal pain, left upper quadrant     Pain of toe of right foot     Acute midline low back pain without sciatica     Upper back pain     Mild intermittent asthma, unspecified whether complicated     Incomplete tear of right rotator cuff     Impingement syndrome of shoulder, right     Degeneration of lumbar or lumbosacral intervertebral disc     Adjustment disorder  with depressed mood     Mixed hyperlipidemia     Inguinal hernia     Kidney stone     HFrEF (heart failure with reduced ejection fraction) (H)       Past surgical history:  has a past surgical history that includes Combined Cystoscopy, Retrogrades, Ureteroscopy, Laser Holmium Lithotripsy Ureter(S), Insert Stent (Right, 10/15/2021) and Combined Cystoscopy, Retrogrades, Ureteroscopy, Laser Holmium Lithotripsy Ureter(S), Insert Stent (Right, 10/27/2021).     Medications:   Current Outpatient Medications:      carbidopa-levodopa (SINEMET)  MG tablet, TAKE 1 TABLET BY MOUTH AT BEDTIME, Disp: 90 tablet, Rfl: 0     celecoxib (CELEBREX) 200 MG capsule, TAKE 1 CAPSULE(200 MG) BY MOUTH TWICE DAILY, Disp: 60 capsule, Rfl: 11     diclofenac (VOLTAREN) 75 MG EC tablet, TAKE 1 TABLET(75 MG) BY MOUTH TWICE DAILY, Disp: 30 tablet, Rfl: 2     ezetimibe (ZETIA) 10 MG tablet, Take 1 tablet (10 mg) by mouth daily, Disp: 90 tablet, Rfl: 0     finasteride (PROSCAR) 5 MG tablet, TAKE 1 TABLET BY MOUTH EVERY DAY, Disp: 90 tablet, Rfl: 0     FLUoxetine (PROZAC) 40 MG capsule, TAKE 1 CAPSULE(40 MG) BY MOUTH DAILY, Disp: 90 capsule, Rfl: 0     gabapentin (NEURONTIN) 300 MG capsule, TAKE 2 CAPSULES BY MOUTH EVERY NIGHT AT BEDTIME, Disp: 180 capsule, Rfl: 0     HYDROcodone-acetaminophen (NORCO) 7.5-325 MG per tablet, Take 1 tablet by mouth every 4 hours as needed for pain maximum  4  tablet(s) per day, Disp: 30 tablet, Rfl: 0     levofloxacin (LEVAQUIN) 500 MG tablet, Take 1 tablet (500 mg) by mouth daily, Disp: 10 tablet, Rfl: 0     methocarbamol (ROBAXIN) 500 MG tablet, TAKE 1 TO 2 TABLETS(500 TO 1000 MG) BY MOUTH TWICE DAILY AS NEEDED FOR MUSCLE SPASMS, Disp: 60 tablet, Rfl: 0     mirtazapine (REMERON) 15 MG tablet, TAKE 1 TABLET(15 MG) BY MOUTH AT BEDTIME, Disp: 90 tablet, Rfl: 1     Misc Natural Products (OSTEO BI-FLEX TRIPLE STRENGTH PO), Take 1 tablet by mouth daily, Disp: , Rfl:      nitroFURantoin macrocrystal-monohydrate  (MACROBID) 100 MG capsule, TAKE 1 CAPSULE(100 MG) BY MOUTH TWICE DAILY FOR 7 DAYS (Patient not taking: Reported on 10/27/2022), Disp: 14 capsule, Rfl: 0     phenazopyridine (PYRIDIUM) 100 MG tablet, Take 2 tablets (200 mg) by mouth 3 times daily as needed for urinary tract discomfort (Patient not taking: Reported on 5/31/2022), Disp: 15 tablet, Rfl: 0     phenazopyridine (PYRIDIUM) 200 MG tablet, Take 1 tablet (200 mg) by mouth 3 times daily as needed for irritation (Patient not taking: Reported on 8/21/2022), Disp: 9 tablet, Rfl: 0     senna-docusate (SENOKOT-S/PERICOLACE) 8.6-50 MG tablet, Take 1-2 tablets by mouth 2 times daily as needed for constipation (Patient not taking: Reported on 5/31/2022), Disp: 100 tablet, Rfl: 11     sildenafil (VIAGRA) 100 MG tablet, TAKE 1 TABLET BY MOUTH EVERY DAY AS NEEDED, Disp: 10 tablet, Rfl: 0     simvastatin (ZOCOR) 20 MG tablet, TAKE 1 TABLET BY MOUTH EVERY DAY AT BEDTIME, Disp: 90 tablet, Rfl: 3     tamsulosin (FLOMAX) 0.4 MG capsule, TAKE 1 CAPSULE(0.4 MG) BY MOUTH DAILY, Disp: 90 capsule, Rfl: 3     VENTOLIN  (90 Base) MCG/ACT inhaler, INHALE 2 PUFFS INTO THE LUNGS EVERY 6 HOURS AS NEEDED FOR SHORTNESS OF BREATH OR DIFFICULT BREATHING OR WHEEZING, Disp: 18 g, Rfl: 0    Current Facility-Administered Medications:      triamcinolone (KENALOG-40) injection 40 mg, 40 mg, , , Jose G Blanchard MD, 40 mg at 04/11/22 1332     Allergies:   No Known Allergies     Family History: family history includes Diabetes in his father; Heart Disease in his father.     Social History: .  reports that he has never smoked. He has never used smokeless tobacco. He reports that he does not drink alcohol and does not use drugs.    Review of Systems:     Denies numbness, tingling, parasthesias.   Denies headaches.   Denies fevers, chills, night sweats   Denies chest pain.   Denies shortness of breath.   Denies any skin problems, abrasions, rashes,  "irritation.      Physical Exam  GENERAL APPEARANCE: healthy, alert, no distress.   SKIN: no suspicious lesions or rashes  RESPIRATORY: No increased work of breathing.  NEURO: Normal strength and tone, mentation intact and speech normal  PSYCH:  mentation appears normal and affect normal. Not anxious.    Ht 1.651 m (5' 5\")   Wt 66.2 kg (146 lb)   BMI 24.30 kg/m         right UPPER EXTREMITY:    Sensation intact to light touch in median, radial, ulnar and axillary nerve distributions  Palpable 2+ radial pulse, brisk capillary refill to all fingers, wwp  Intact epl fpl fdp edc wrist flexion/extension biceps triceps deltoid    ELBOW:  Skin intact. No open wounds. No skin maceration. Lateral based incision from previous surgery.  Inspection: no swelling, no ecchymosis, no olecranon bursa swelling, no distal bicep tendon defect  Tender: medial epicondyle and common flexor tendon  Non-tender: lateral epicondyle, common extensor tendon, extensor muscle of forearm, flexor muscle of forearm, supracondylar notch, olecranon bursa, distal bicep tendon and radial head/neck  Range of Motion: all normal  Strength: elbow strength full  Special tests: no pain with resisted wrist extension, no pain with resisted middle finger extension, medial elbow pain with resisted wrist flexion, supination and pronation   There is no gross deformity in the area.          X-rays: no new images today.  3 views right elbow from 7/28/2021 -- Normal joint spacing. No evidence of fracture or effusion. Nonunited moderate olecranon spur. Small ossicles adjacent to the lateral epicondyles.      Assessment: 57 year old male , right -hand dominant, with chronic medial elbow pain, medial epicondylitis.    Plan:   * discussed with patient history and clinical exam consistent with \"golfer's\" elbow, or medial epicondylitis, which is tendonitis at the medial or inner aspect of the elbow. This is where the flexor tendons originate for the wrist.    Treatment " includes:  * rest  * activity modification - avoid aggravating activities and reduce strenuous activities for 6-8 weeks  * ice, ice massage  * Physical therapy, modalities as indicated including ultrasound, massage, iontophoresis  * counterforce elbow brace/strap, available OTC  * NDAIDS regularly three times daily x2-3 weeks  * cortisone injection discussed, placed at point of maximal tenderness . Patient elects today.  * return to clinic as needed.  .    Hand / Upper Extremity Injection/Arthrocentesis: R elbow    Date/Time: 3/1/2023 3:11 PM  Performed by: Ammon Daniel PA  Authorized by: Jose G Blanchard MD     Indications:  Pain and tendon swelling  Needle Size:  22 G  Guidance: landmark    Approach:  Medial  Condition: epicondylitis, medial      Site:  R elbow  Medications:  40 mg triamcinolone 40 MG/ML; 1 mL lidocaine 1 %  Outcome:  Tolerated well, no immediate complications  Procedure discussed: discussed risks, benefits, and alternatives    Timeout: timeout called immediately prior to procedure    Prep: patient was prepped and draped in usual sterile fashion            Jose G Blanchard M.D., M.S.  Dept. of Orthopaedic Surgery  [facility]

## 2023-03-01 ENCOUNTER — OFFICE VISIT (OUTPATIENT)
Dept: ORTHOPEDICS | Facility: CLINIC | Age: 58
End: 2023-03-01
Payer: COMMERCIAL

## 2023-03-01 VITALS — WEIGHT: 146 LBS | HEIGHT: 65 IN | BODY MASS INDEX: 24.32 KG/M2

## 2023-03-01 DIAGNOSIS — M77.01 MEDIAL EPICONDYLITIS, RIGHT ELBOW: Primary | ICD-10-CM

## 2023-03-01 PROCEDURE — 20551 NJX 1 TENDON ORIGIN/INSJ: CPT | Mod: RT | Performed by: ORTHOPAEDIC SURGERY

## 2023-03-01 RX ORDER — LIDOCAINE HYDROCHLORIDE 10 MG/ML
1 INJECTION, SOLUTION INFILTRATION; PERINEURAL
Status: DISCONTINUED | OUTPATIENT
Start: 2023-03-01 | End: 2024-01-18

## 2023-03-01 RX ORDER — TRIAMCINOLONE ACETONIDE 40 MG/ML
40 INJECTION, SUSPENSION INTRA-ARTICULAR; INTRAMUSCULAR
Status: DISCONTINUED | OUTPATIENT
Start: 2023-03-01 | End: 2024-01-18

## 2023-03-01 RX ADMIN — TRIAMCINOLONE ACETONIDE 40 MG: 40 INJECTION, SUSPENSION INTRA-ARTICULAR; INTRAMUSCULAR at 15:11

## 2023-03-01 RX ADMIN — LIDOCAINE HYDROCHLORIDE 1 ML: 10 INJECTION, SOLUTION INFILTRATION; PERINEURAL at 15:11

## 2023-03-01 ASSESSMENT — PAIN SCALES - GENERAL: PAINLEVEL: MODERATE PAIN (5)

## 2023-03-01 NOTE — LETTER
3/1/2023         RE: Giovanni Cedeno  [address]  [place] [address]        Dear Colleague,    Thank you for referring your patient, Giovanni Cedeno, to the [facility] [place]. Please see a copy of my visit note below.    Chief Complaint:   Chief Complaint   Patient presents with     Right Elbow - Pain     Right medial elbow pain. DOI: 4/11/22. This worked well up until 3 weeks ago.     Elbow Pain        HPI: Maverick Cedeno is a 57 year old male , right -hand dominant, who presents for evaluation and management of a right elbow pain, without specific injury. Last injection 4/2022. Returns today stating it worked well up until 3 weeks ago.    Pain with bending the elbow, leaning on the elbow . Locates pain to the inner aspect of the elbow. Treatment with ice. No ointments or over the counter medications. No bracing.    Prior right elbow lateral epicondylitis treated with numerous injections, eventual surgery 5/2014.    He is a .      He reports having mild pain/discomfort at the elbow. He denies numbness or tingling.   Pain severity: 5/10  Pain quality: aching  Frequency of symptoms: occasionally  Associated symptoms: denies  Aggravated by: bending the elbow, washing his face, lifting  Relieved by: ice, rest.    Prior history of related problems: previous right elbow lateral epicondylitis, left elbow lateral epicondylitis.    Usual level of work activity: heavy  - .    Past medical history:  has a past medical history of Arthritis.   Patient Active Problem List   Diagnosis     Restless leg syndrome     Knee bursitis     Pure hypercholesterolemia     Major depression in partial remission (H)     Pre-diabetes     Epicondylitis, lateral humeral     Immunization reaction     Chronic pain syndrome     Chronic right shoulder pain     Abdominal pain, left upper quadrant     Pain of toe of right foot     Acute midline low back pain  without sciatica     Upper back pain     Mild intermittent asthma, unspecified whether complicated     Incomplete tear of right rotator cuff     Impingement syndrome of shoulder, right     Degeneration of lumbar or lumbosacral intervertebral disc     Adjustment disorder with depressed mood     Mixed hyperlipidemia     Inguinal hernia     Kidney stone     HFrEF (heart failure with reduced ejection fraction) (H)       Past surgical history:  has a past surgical history that includes Combined Cystoscopy, Retrogrades, Ureteroscopy, Laser Holmium Lithotripsy Ureter(S), Insert Stent (Right, 10/15/2021) and Combined Cystoscopy, Retrogrades, Ureteroscopy, Laser Holmium Lithotripsy Ureter(S), Insert Stent (Right, 10/27/2021).     Medications:   Current Outpatient Medications:      carbidopa-levodopa (SINEMET)  MG tablet, TAKE 1 TABLET BY MOUTH AT BEDTIME, Disp: 90 tablet, Rfl: 0     celecoxib (CELEBREX) 200 MG capsule, TAKE 1 CAPSULE(200 MG) BY MOUTH TWICE DAILY, Disp: 60 capsule, Rfl: 11     diclofenac (VOLTAREN) 75 MG EC tablet, TAKE 1 TABLET(75 MG) BY MOUTH TWICE DAILY, Disp: 30 tablet, Rfl: 2     ezetimibe (ZETIA) 10 MG tablet, Take 1 tablet (10 mg) by mouth daily, Disp: 90 tablet, Rfl: 0     finasteride (PROSCAR) 5 MG tablet, TAKE 1 TABLET BY MOUTH EVERY DAY, Disp: 90 tablet, Rfl: 0     FLUoxetine (PROZAC) 40 MG capsule, TAKE 1 CAPSULE(40 MG) BY MOUTH DAILY, Disp: 90 capsule, Rfl: 0     gabapentin (NEURONTIN) 300 MG capsule, TAKE 2 CAPSULES BY MOUTH EVERY NIGHT AT BEDTIME, Disp: 180 capsule, Rfl: 0     HYDROcodone-acetaminophen (NORCO) 7.5-325 MG per tablet, Take 1 tablet by mouth every 4 hours as needed for pain maximum  4  tablet(s) per day, Disp: 30 tablet, Rfl: 0     levofloxacin (LEVAQUIN) 500 MG tablet, Take 1 tablet (500 mg) by mouth daily, Disp: 10 tablet, Rfl: 0     methocarbamol (ROBAXIN) 500 MG tablet, TAKE 1 TO 2 TABLETS(500 TO 1000 MG) BY MOUTH TWICE DAILY AS NEEDED FOR MUSCLE SPASMS, Disp: 60 tablet,  Rfl: 0     mirtazapine (REMERON) 15 MG tablet, TAKE 1 TABLET(15 MG) BY MOUTH AT BEDTIME, Disp: 90 tablet, Rfl: 1     Misc Natural Products (OSTEO BI-FLEX TRIPLE STRENGTH PO), Take 1 tablet by mouth daily, Disp: , Rfl:      nitroFURantoin macrocrystal-monohydrate (MACROBID) 100 MG capsule, TAKE 1 CAPSULE(100 MG) BY MOUTH TWICE DAILY FOR 7 DAYS (Patient not taking: Reported on 10/27/2022), Disp: 14 capsule, Rfl: 0     phenazopyridine (PYRIDIUM) 100 MG tablet, Take 2 tablets (200 mg) by mouth 3 times daily as needed for urinary tract discomfort (Patient not taking: Reported on 5/31/2022), Disp: 15 tablet, Rfl: 0     phenazopyridine (PYRIDIUM) 200 MG tablet, Take 1 tablet (200 mg) by mouth 3 times daily as needed for irritation (Patient not taking: Reported on 8/21/2022), Disp: 9 tablet, Rfl: 0     senna-docusate (SENOKOT-S/PERICOLACE) 8.6-50 MG tablet, Take 1-2 tablets by mouth 2 times daily as needed for constipation (Patient not taking: Reported on 5/31/2022), Disp: 100 tablet, Rfl: 11     sildenafil (VIAGRA) 100 MG tablet, TAKE 1 TABLET BY MOUTH EVERY DAY AS NEEDED, Disp: 10 tablet, Rfl: 0     simvastatin (ZOCOR) 20 MG tablet, TAKE 1 TABLET BY MOUTH EVERY DAY AT BEDTIME, Disp: 90 tablet, Rfl: 3     tamsulosin (FLOMAX) 0.4 MG capsule, TAKE 1 CAPSULE(0.4 MG) BY MOUTH DAILY, Disp: 90 capsule, Rfl: 3     VENTOLIN  (90 Base) MCG/ACT inhaler, INHALE 2 PUFFS INTO THE LUNGS EVERY 6 HOURS AS NEEDED FOR SHORTNESS OF BREATH OR DIFFICULT BREATHING OR WHEEZING, Disp: 18 g, Rfl: 0    Current Facility-Administered Medications:      triamcinolone (KENALOG-40) injection 40 mg, 40 mg, , , Jose G Blanchard MD, 40 mg at 04/11/22 1332     Allergies:   No Known Allergies     Family History: family history includes Diabetes in his father; Heart Disease in his father.     Social History: .  reports that he has never smoked. He has never used smokeless tobacco. He reports that he does not drink alcohol and  "does not use drugs.    Review of Systems:     Denies numbness, tingling, parasthesias.   Denies headaches.   Denies fevers, chills, night sweats   Denies chest pain.   Denies shortness of breath.   Denies any skin problems, abrasions, rashes, irritation.      Physical Exam  GENERAL APPEARANCE: healthy, alert, no distress.   SKIN: no suspicious lesions or rashes  RESPIRATORY: No increased work of breathing.  NEURO: Normal strength and tone, mentation intact and speech normal  PSYCH:  mentation appears normal and affect normal. Not anxious.    Ht 1.651 m (5' 5\")   Wt 66.2 kg (146 lb)   BMI 24.30 kg/m         right UPPER EXTREMITY:    Sensation intact to light touch in median, radial, ulnar and axillary nerve distributions  Palpable 2+ radial pulse, brisk capillary refill to all fingers, wwp  Intact epl fpl fdp edc wrist flexion/extension biceps triceps deltoid    ELBOW:  Skin intact. No open wounds. No skin maceration. Lateral based incision from previous surgery.  Inspection: no swelling, no ecchymosis, no olecranon bursa swelling, no distal bicep tendon defect  Tender: medial epicondyle and common flexor tendon  Non-tender: lateral epicondyle, common extensor tendon, extensor muscle of forearm, flexor muscle of forearm, supracondylar notch, olecranon bursa, distal bicep tendon and radial head/neck  Range of Motion: all normal  Strength: elbow strength full  Special tests: no pain with resisted wrist extension, no pain with resisted middle finger extension, medial elbow pain with resisted wrist flexion, supination and pronation   There is no gross deformity in the area.          X-rays: no new images today.  3 views right elbow from 7/28/2021 -- Normal joint spacing. No evidence of fracture or effusion. Nonunited moderate olecranon spur. Small ossicles adjacent to the lateral epicondyles.      Assessment: 57 year old male , right -hand dominant, with chronic medial elbow pain, medial epicondylitis.    Plan:   * " "discussed with patient history and clinical exam consistent with \"golfer's\" elbow, or medial epicondylitis, which is tendonitis at the medial or inner aspect of the elbow. This is where the flexor tendons originate for the wrist.    Treatment includes:  * rest  * activity modification - avoid aggravating activities and reduce strenuous activities for 6-8 weeks  * ice, ice massage  * Physical therapy, modalities as indicated including ultrasound, massage, iontophoresis  * counterforce elbow brace/strap, available OTC  * NDAIDS regularly three times daily x2-3 weeks  * cortisone injection discussed, placed at point of maximal tenderness . Patient elects today.  * return to clinic as needed.  .    Hand / Upper Extremity Injection/Arthrocentesis: R elbow    Date/Time: 3/1/2023 3:11 PM  Performed by: Ammon Daniel PA  Authorized by: Jose G Blanchard MD     Indications:  Pain and tendon swelling  Needle Size:  22 G  Guidance: landmark    Approach:  Medial  Condition: epicondylitis, medial      Site:  R elbow  Medications:  40 mg triamcinolone 40 MG/ML; 1 mL lidocaine 1 %  Outcome:  Tolerated well, no immediate complications  Procedure discussed: discussed risks, benefits, and alternatives    Timeout: timeout called immediately prior to procedure    Prep: patient was prepped and draped in usual sterile fashion            Jose G Blanchard M.D., M.S.  Dept. of Orthopaedic Surgery  [facility]      Again, thank you for allowing me to participate in the care of your patient.        Sincerely,        Jose G Blanchard MD    "

## 2023-03-02 DIAGNOSIS — M62.838 MUSCLE SPASM OF RIGHT SHOULDER: ICD-10-CM

## 2023-03-02 DIAGNOSIS — R37 SEXUAL DYSFUNCTION: ICD-10-CM

## 2023-03-02 DIAGNOSIS — E78.00 PURE HYPERCHOLESTEROLEMIA: ICD-10-CM

## 2023-03-02 DIAGNOSIS — G89.4 CHRONIC PAIN SYNDROME: ICD-10-CM

## 2023-03-02 DIAGNOSIS — E78.5 HYPERLIPIDEMIA LDL GOAL <100: Chronic | ICD-10-CM

## 2023-03-02 DIAGNOSIS — M75.101 ROTATOR CUFF SYNDROME, RIGHT: ICD-10-CM

## 2023-03-02 DIAGNOSIS — Z98.890 S/P ROTATOR CUFF REPAIR: ICD-10-CM

## 2023-03-02 DIAGNOSIS — G25.81 RESTLESS LEG SYNDROME: ICD-10-CM

## 2023-03-03 RX ORDER — SILDENAFIL 100 MG/1
TABLET, FILM COATED ORAL
Qty: 10 TABLET | Refills: 0 | Status: SHIPPED | OUTPATIENT
Start: 2023-03-03 | End: 2023-04-11

## 2023-03-03 RX ORDER — HYDROCODONE BITARTRATE AND ACETAMINOPHEN 7.5; 325 MG/1; MG/1
1 TABLET ORAL EVERY 4 HOURS PRN
Qty: 30 TABLET | Refills: 0 | Status: SHIPPED | OUTPATIENT
Start: 2023-03-03 | End: 2023-04-11

## 2023-03-03 RX ORDER — GABAPENTIN 300 MG/1
CAPSULE ORAL
Qty: 180 CAPSULE | Refills: 0 | Status: SHIPPED | OUTPATIENT
Start: 2023-03-03 | End: 2023-07-08

## 2023-03-03 RX ORDER — SIMVASTATIN 20 MG
TABLET ORAL
Qty: 90 TABLET | Refills: 0 | Status: SHIPPED | OUTPATIENT
Start: 2023-03-03 | End: 2023-06-08

## 2023-03-03 RX ORDER — METHOCARBAMOL 500 MG/1
TABLET, FILM COATED ORAL
Qty: 60 TABLET | Refills: 0 | Status: SHIPPED | OUTPATIENT
Start: 2023-03-03 | End: 2023-04-11

## 2023-04-11 ENCOUNTER — VIRTUAL VISIT (OUTPATIENT)
Dept: FAMILY MEDICINE | Facility: CLINIC | Age: 58
End: 2023-04-11
Payer: COMMERCIAL

## 2023-04-11 DIAGNOSIS — N52.8 OTHER MALE ERECTILE DYSFUNCTION: Primary | ICD-10-CM

## 2023-04-11 DIAGNOSIS — M75.101 ROTATOR CUFF SYNDROME, RIGHT: ICD-10-CM

## 2023-04-11 DIAGNOSIS — M62.838 MUSCLE SPASM OF RIGHT SHOULDER: ICD-10-CM

## 2023-04-11 DIAGNOSIS — G89.4 CHRONIC PAIN SYNDROME: ICD-10-CM

## 2023-04-11 DIAGNOSIS — F32.4 MAJOR DEPRESSIVE DISORDER WITH SINGLE EPISODE, IN PARTIAL REMISSION (H): ICD-10-CM

## 2023-04-11 PROCEDURE — 96127 BRIEF EMOTIONAL/BEHAV ASSMT: CPT | Performed by: PHYSICIAN ASSISTANT

## 2023-04-11 PROCEDURE — 99214 OFFICE O/P EST MOD 30 MIN: CPT | Mod: 93 | Performed by: PHYSICIAN ASSISTANT

## 2023-04-11 RX ORDER — FLUOXETINE 40 MG/1
80 CAPSULE ORAL DAILY
Qty: 180 CAPSULE | Refills: 0 | Status: SHIPPED | OUTPATIENT
Start: 2023-04-11 | End: 2023-10-27

## 2023-04-11 RX ORDER — TADALAFIL 20 MG/1
20 TABLET ORAL DAILY PRN
Qty: 10 TABLET | Refills: 3 | Status: SHIPPED | OUTPATIENT
Start: 2023-04-11 | End: 2023-08-02

## 2023-04-11 RX ORDER — CYCLOBENZAPRINE HCL 5 MG
5 TABLET ORAL 3 TIMES DAILY PRN
Qty: 30 TABLET | Refills: 1 | Status: SHIPPED | OUTPATIENT
Start: 2023-04-11 | End: 2023-08-02

## 2023-04-11 RX ORDER — HYDROCODONE BITARTRATE AND ACETAMINOPHEN 7.5; 325 MG/1; MG/1
1 TABLET ORAL EVERY 4 HOURS PRN
Qty: 30 TABLET | Refills: 0 | Status: SHIPPED | OUTPATIENT
Start: 2023-04-11 | End: 2023-05-08

## 2023-04-11 ASSESSMENT — PATIENT HEALTH QUESTIONNAIRE - PHQ9: SUM OF ALL RESPONSES TO PHQ QUESTIONS 1-9: 15

## 2023-04-11 NOTE — PROGRESS NOTES
Maverick is a 57 year old who is being evaluated via a billable telephone visit.      What phone number would you like to be contacted at? [phone]  How would you like to obtain your AVS? Esme    Distant Location (provider location):  Off-site        Subjective   Maverick is a 57 year old, presenting for the following health issues:  Mental Health Problem        4/11/2023     1:57 PM   Additional Questions   Roomed by Paloma Ambrose CMA   Accompanied by None         4/11/2023     1:57 PM   Patient Reported Additional Medications   Patient reports taking the following new medications None     HPI     Depression Followup    How are you doing with your depression since your last visit? Worsened     Are you having other symptoms that might be associated with depression? No    Have you had a significant life event?  No     Are you feeling anxious or having panic attacks?   No    Do you have any concerns with your use of alcohol or other drugs? No    Social History     Tobacco Use     Smoking status: Never     Smokeless tobacco: Never   Vaping Use     Vaping status: Never Used   Substance Use Topics     Alcohol use: No     Drug use: No         5/31/2022     6:58 AM 11/1/2022     9:30 AM 1/26/2023    12:38 PM   PHQ   PHQ-9 Total Score 6 23 14   Q9: Thoughts of better off dead/self-harm past 2 weeks Not at all Several days Not at all   F/U: Thoughts of suicide or self-harm  No    F/U: Safety concerns  No          6/8/2021     1:40 PM 1/10/2022     8:59 AM 1/26/2023    12:38 PM   JEIMY-7 SCORE   Total Score  17 (severe anxiety)    Total Score 0 17 6         4/11/2023     2:02 PM   Last PHQ-9   1.  Little interest or pleasure in doing things 3   2.  Feeling down, depressed, or hopeless 3   3.  Trouble falling or staying asleep, or sleeping too much 0   4.  Feeling tired or having little energy 3   5.  Poor appetite or overeating 3   6.  Feeling bad about yourself 0   7.  Trouble concentrating 3   8.  Moving slowly or restless 0    Q9: Thoughts of better off dead/self-harm past 2 weeks 0   PHQ-9 Total Score 15   Difficulty at work, home, or with people Somewhat difficult         1/26/2023    12:38 PM   JEIMY-7    1. Feeling nervous, anxious, or on edge 1   2. Not being able to stop or control worrying 1   3. Worrying too much about different things 1   4. Trouble relaxing 1   5. Being so restless that it is hard to sit still 1   6. Becoming easily annoyed or irritable 1   7. Feeling afraid, as if something awful might happen 0   JEIMY-7 Total Score 6   If you checked any problems, how difficult have they made it for you to do your work, take care of things at home, or get along with other people? Somewhat difficult       Suicide Assessment Five-step Evaluation and Treatment (SAFE-T)    Was working fairly well. Ran out of it and now is doing worse. Anxiety stable.          Review of Systems   Constitutional, HEENT, cardiovascular, pulmonary, GI, , musculoskeletal, neuro, skin, endocrine and psych systems are negative, except as otherwise noted.      Objective           Vitals:  No vitals were obtained today due to virtual visit.    Physical Exam   healthy, alert and no distress  PSYCH: Alert and oriented times 3; coherent speech, normal   rate and volume, able to articulate logical thoughts, able   to abstract reason, no tangential thoughts, no hallucinations   or delusions  His affect is normal  RESP: No cough, no audible wheezing, able to talk in full sentences  Remainder of exam unable to be completed due to telephone visits    Giovanni was seen today for mental health problem.    Diagnoses and all orders for this visit:    Other male erectile dysfunction  -     tadalafil (CIALIS) 20 MG tablet; Take 1 tablet (20 mg) by mouth daily as needed    Major depressive disorder with single episode, in partial remission (H)  -     FLUoxetine (PROZAC) 40 MG capsule; Take 2 capsules (80 mg) by mouth daily    Muscle spasm of right shoulder  -      cyclobenzaprine (FLEXERIL) 5 MG tablet; Take 1 tablet (5 mg) by mouth 3 times daily as needed for muscle spasms    Rotator cuff syndrome, right  -     HYDROcodone-acetaminophen (NORCO) 7.5-325 MG per tablet; Take 1 tablet by mouth every 4 hours as needed for pain maximum  4  tablet(s) per day    Chronic pain syndrome  -     HYDROcodone-acetaminophen (NORCO) 7.5-325 MG per tablet; Take 1 tablet by mouth every 4 hours as needed for pain maximum  4  tablet(s) per day      Recheck in 6 wks.  Restart prozac and increase to 80 mg after 7-10 days.   Discontinue  methocarbamol and start cyclobenzaprine.  Trial of cialis for ED.          Phone call duration: 13 minutes

## 2023-05-08 ENCOUNTER — OFFICE VISIT (OUTPATIENT)
Dept: FAMILY MEDICINE | Facility: CLINIC | Age: 58
End: 2023-05-08
Payer: COMMERCIAL

## 2023-05-08 VITALS
RESPIRATION RATE: 20 BRPM | TEMPERATURE: 97.4 F | DIASTOLIC BLOOD PRESSURE: 56 MMHG | OXYGEN SATURATION: 97 % | HEART RATE: 81 BPM | SYSTOLIC BLOOD PRESSURE: 98 MMHG | WEIGHT: 138.8 LBS | BODY MASS INDEX: 24.59 KG/M2 | HEIGHT: 63 IN

## 2023-05-08 DIAGNOSIS — M75.101 ROTATOR CUFF SYNDROME, RIGHT: ICD-10-CM

## 2023-05-08 DIAGNOSIS — I25.10 CORONARY ARTERY DISEASE INVOLVING NATIVE CORONARY ARTERY OF NATIVE HEART WITHOUT ANGINA PECTORIS: ICD-10-CM

## 2023-05-08 DIAGNOSIS — G89.4 CHRONIC PAIN SYNDROME: ICD-10-CM

## 2023-05-08 DIAGNOSIS — Z12.11 SCREEN FOR COLON CANCER: ICD-10-CM

## 2023-05-08 DIAGNOSIS — M79.18 BILATERAL BUTTOCK PAIN: ICD-10-CM

## 2023-05-08 DIAGNOSIS — S76.019A STRAIN OF GLUTEUS MEDIUS, UNSPECIFIED LATERALITY, INITIAL ENCOUNTER: Primary | ICD-10-CM

## 2023-05-08 PROCEDURE — 99214 OFFICE O/P EST MOD 30 MIN: CPT | Mod: 25 | Performed by: PHYSICIAN ASSISTANT

## 2023-05-08 PROCEDURE — 90471 IMMUNIZATION ADMIN: CPT | Performed by: PHYSICIAN ASSISTANT

## 2023-05-08 PROCEDURE — G0480 DRUG TEST DEF 1-7 CLASSES: HCPCS | Performed by: PHYSICIAN ASSISTANT

## 2023-05-08 PROCEDURE — 2894A URINE DRUG CONFIRMATION PANEL: CPT | Performed by: PHYSICIAN ASSISTANT

## 2023-05-08 PROCEDURE — 90677 PCV20 VACCINE IM: CPT | Performed by: PHYSICIAN ASSISTANT

## 2023-05-08 RX ORDER — HYDROCODONE BITARTRATE AND ACETAMINOPHEN 7.5; 325 MG/1; MG/1
1 TABLET ORAL EVERY 4 HOURS PRN
Qty: 30 TABLET | Refills: 0 | Status: SHIPPED | OUTPATIENT
Start: 2023-05-08 | End: 2023-06-06

## 2023-05-08 RX ORDER — PREDNISONE 10 MG/1
TABLET ORAL
Qty: 42 TABLET | Refills: 0 | Status: SHIPPED | OUTPATIENT
Start: 2023-05-08 | End: 2024-01-25

## 2023-05-08 ASSESSMENT — ASTHMA QUESTIONNAIRES
QUESTION_2 LAST FOUR WEEKS HOW OFTEN HAVE YOU HAD SHORTNESS OF BREATH: ONCE OR TWICE A WEEK
QUESTION_3 LAST FOUR WEEKS HOW OFTEN DID YOUR ASTHMA SYMPTOMS (WHEEZING, COUGHING, SHORTNESS OF BREATH, CHEST TIGHTNESS OR PAIN) WAKE YOU UP AT NIGHT OR EARLIER THAN USUAL IN THE MORNING: NOT AT ALL
QUESTION_4 LAST FOUR WEEKS HOW OFTEN HAVE YOU USED YOUR RESCUE INHALER OR NEBULIZER MEDICATION (SUCH AS ALBUTEROL): NOT AT ALL
QUESTION_1 LAST FOUR WEEKS HOW MUCH OF THE TIME DID YOUR ASTHMA KEEP YOU FROM GETTING AS MUCH DONE AT WORK, SCHOOL OR AT HOME: NONE OF THE TIME
ACT_TOTALSCORE: 24
ACT_TOTALSCORE: 24
QUESTION_5 LAST FOUR WEEKS HOW WOULD YOU RATE YOUR ASTHMA CONTROL: COMPLETELY CONTROLLED

## 2023-05-08 ASSESSMENT — PAIN SCALES - GENERAL: PAINLEVEL: SEVERE PAIN (6)

## 2023-05-08 NOTE — PROGRESS NOTES
"  Meli Temple is a 58 year old, presenting for the following health issues:  Hip Pain (Bilateral hip pain /1 week)        5/8/2023    11:20 AM   Additional Questions   Roomed by Paloma Ambrose CMA   Accompanied by None         5/8/2023    11:20 AM   Patient Reported Additional Medications   Patient reports taking the following new medications none     History of Present Illness       Reason for visit:  Hip pain  Symptom onset:  3-7 days ago    He eats 2-3 servings of fruits and vegetables daily.He consumes 4 sweetened beverage(s) daily.He exercises with enough effort to increase his heart rate 10 to 19 minutes per day.  He exercises with enough effort to increase his heart rate 4 days per week.   He is taking medications regularly.       No injury. Bilateral lateral hip pain.  Some discomfort at rest and when sleeping. Pain not exacerbated by physical activity. No profound low back pain currently.          Review of Systems   Constitutional, HEENT, cardiovascular, pulmonary, GI, , musculoskeletal, neuro, skin, endocrine and psych systems are negative, except as otherwise noted.      Objective    BP 98/56   Pulse 81   Temp 97.4  F (36.3  C) (Temporal)   Resp 20   Ht 1.594 m (5' 2.75\")   Wt 63 kg (138 lb 12.8 oz)   SpO2 97%   BMI 24.78 kg/m    Body mass index is 24.78 kg/m .  Physical Exam     Lumbosacral spine area reveals no local tenderness or mass.  Full and painless lumbosacral range of motion is noted. Straight leg raise is negative at 90 degrees on both sides. DTR's, motor strength and sensation normal, including heel and toe gait.  Peripheral pulses are palpable. Hips and knees have full range of motion without pain. No abdominal tenderness, mass or organomegaly.  Pain with palpation of upper and lateral glutes bilaterally. Mild pain with abduction with resistance and crossing legs. Negative cory's test.     Giovanni was seen today for hip pain.    Diagnoses and all orders for this " visit:    Strain of gluteus medius, unspecified laterality, initial encounter  -     predniSONE (DELTASONE) 10 MG tablet; Take 60 mg days 1 and 2, 50 mg days 3 and 4, 40 mg days 5 and 6, 30 mg days 7 and 8, 20 mg days 9 and 10, 10 mg days 11 and 12  -     Physical Therapy Referral; Future    Screen for colon cancer  -     Fecal colorectal cancer screen FIT - Future (S+30); Future  -     Fecal colorectal cancer screen FIT - Future (S+30)    Coronary artery disease involving native coronary artery of native heart without angina pectoris  -     aspirin (ASA) 81 MG EC tablet; Take 1 tablet (81 mg) by mouth daily    Chronic pain syndrome  -     [id number] - Urine Drug Confirmation Panel (Comprehensive); Future  -     HYDROcodone-acetaminophen (NORCO) 7.5-325 MG per tablet; Take 1 tablet by mouth every 4 hours as needed for pain maximum  4  tablet(s) per day  -     [id number] - Urine Drug Confirmation Panel (Comprehensive)    Bilateral buttock pain    Rotator cuff syndrome, right  -     HYDROcodone-acetaminophen (NORCO) 7.5-325 MG per tablet; Take 1 tablet by mouth every 4 hours as needed for pain maximum  4  tablet(s) per day    Other orders  -     Pneumococcal 20 Valent Conjugate (Prevnar 20)  -     REVIEW OF HEALTH MAINTENANCE PROTOCOL ORDERS      work on lifestyle modification  Advised supportive and symptomatic treatment.  Follow up with Provider - if condition persists or worsens.

## 2023-05-09 LAB — CREAT UR-MCNC: 117 MG/DL

## 2023-05-10 LAB
DHC UR CFM-MCNC: 2320 NG/ML
DHC/CREAT UR: 1983 NG/MG {CREAT}
GABAPENTIN UR QL CFM: PRESENT
HYDROCODONE UR CFM-MCNC: 2360 NG/ML
HYDROCODONE/CREAT UR: 2017 NG/MG {CREAT}
HYDROMORPHONE UR CFM-MCNC: 129 NG/ML
HYDROMORPHONE/CREAT UR: 110 NG/MG {CREAT}

## 2023-06-06 DIAGNOSIS — M75.101 ROTATOR CUFF SYNDROME, RIGHT: ICD-10-CM

## 2023-06-06 DIAGNOSIS — G89.4 CHRONIC PAIN SYNDROME: ICD-10-CM

## 2023-06-06 RX ORDER — HYDROCODONE BITARTRATE AND ACETAMINOPHEN 7.5; 325 MG/1; MG/1
1 TABLET ORAL EVERY 4 HOURS PRN
Qty: 30 TABLET | Refills: 0 | Status: SHIPPED | OUTPATIENT
Start: 2023-06-06 | End: 2023-07-07

## 2023-07-06 DIAGNOSIS — G89.4 CHRONIC PAIN SYNDROME: ICD-10-CM

## 2023-07-06 DIAGNOSIS — M75.101 ROTATOR CUFF SYNDROME, RIGHT: ICD-10-CM

## 2023-07-06 NOTE — TELEPHONE ENCOUNTER
Medication Question or Refill    Contacts       Type Contact Phone/Fax    07/06/2023 02:52 PM CDT Phone (Incoming) Maverick Cedeno (Self) [phone] (M)      What medication are you calling about (include dose and sig)?: HYDROcodone-acetaminophen (NORCO) 7.5-325 MG per tablet    Preferred Pharmacy:   [id number] - [facility] [address] [address]  Phone: [phone] Fax: [phone]  Controlled Substance Agreement on file:   CSA -- Patient Level:     [Media Unavailable] Controlled Substance Agreement - Opioid - Scan on 1/26/2023 12:38 PM   [Media Unavailable] Controlled Substance Agreement - Opioid - Scan on 8/22/2019  1:20 PM       Who prescribed the medication?: Juan Antonio Pak  Do you need a refill? Yes  Do you have any questions or concerns?  Yes: patient is out of medications and needs this sent over ASA as he is leaving out of town early Saturday 7/8/2023    Okay to leave a detailed message?: Yes at Cell number on file:    Telephone Information:   Mobile [phone]

## 2023-07-07 RX ORDER — HYDROCODONE BITARTRATE AND ACETAMINOPHEN 7.5; 325 MG/1; MG/1
1 TABLET ORAL EVERY 4 HOURS PRN
Qty: 30 TABLET | Refills: 0 | Status: SHIPPED | OUTPATIENT
Start: 2023-07-07 | End: 2023-08-02

## 2023-08-02 ENCOUNTER — VIRTUAL VISIT (OUTPATIENT)
Dept: FAMILY MEDICINE | Facility: CLINIC | Age: 58
End: 2023-08-02
Payer: COMMERCIAL

## 2023-08-02 DIAGNOSIS — I50.20 HFREF (HEART FAILURE WITH REDUCED EJECTION FRACTION) (H): ICD-10-CM

## 2023-08-02 DIAGNOSIS — M75.101 ROTATOR CUFF SYNDROME, RIGHT: ICD-10-CM

## 2023-08-02 DIAGNOSIS — N40.1 BENIGN PROSTATIC HYPERPLASIA WITH URINARY FREQUENCY: ICD-10-CM

## 2023-08-02 DIAGNOSIS — G89.4 CHRONIC PAIN SYNDROME: Primary | ICD-10-CM

## 2023-08-02 DIAGNOSIS — R35.0 BENIGN PROSTATIC HYPERPLASIA WITH URINARY FREQUENCY: ICD-10-CM

## 2023-08-02 DIAGNOSIS — E78.5 HYPERLIPIDEMIA LDL GOAL <70: ICD-10-CM

## 2023-08-02 DIAGNOSIS — M62.838 MUSCLE SPASM OF RIGHT SHOULDER: ICD-10-CM

## 2023-08-02 DIAGNOSIS — I25.10 CORONARY ARTERY DISEASE INVOLVING NATIVE CORONARY ARTERY OF NATIVE HEART WITHOUT ANGINA PECTORIS: ICD-10-CM

## 2023-08-02 DIAGNOSIS — N52.8 OTHER MALE ERECTILE DYSFUNCTION: ICD-10-CM

## 2023-08-02 PROCEDURE — 99214 OFFICE O/P EST MOD 30 MIN: CPT | Mod: 93 | Performed by: PHYSICIAN ASSISTANT

## 2023-08-02 RX ORDER — EZETIMIBE 10 MG/1
10 TABLET ORAL DAILY
Qty: 90 TABLET | Refills: 0 | Status: SHIPPED | OUTPATIENT
Start: 2023-08-02

## 2023-08-02 RX ORDER — CYCLOBENZAPRINE HCL 5 MG
5 TABLET ORAL 3 TIMES DAILY PRN
Qty: 30 TABLET | Refills: 1 | Status: SHIPPED | OUTPATIENT
Start: 2023-08-02 | End: 2023-11-07

## 2023-08-02 RX ORDER — HYDROCODONE BITARTRATE AND ACETAMINOPHEN 7.5; 325 MG/1; MG/1
1 TABLET ORAL EVERY 4 HOURS PRN
Qty: 30 TABLET | Refills: 0 | Status: SHIPPED | OUTPATIENT
Start: 2023-08-02 | End: 2023-10-11

## 2023-08-02 RX ORDER — CLOPIDOGREL BISULFATE 75 MG/1
75 TABLET ORAL
COMMUNITY
Start: 2023-01-26 | End: 2023-08-02

## 2023-08-02 RX ORDER — CLOPIDOGREL BISULFATE 75 MG/1
75 TABLET ORAL DAILY
Qty: 90 TABLET | Refills: 1 | Status: SHIPPED | OUTPATIENT
Start: 2023-08-02 | End: 2024-01-25

## 2023-08-02 RX ORDER — TADALAFIL 20 MG/1
20 TABLET ORAL DAILY PRN
Qty: 10 TABLET | Refills: 3 | Status: SHIPPED | OUTPATIENT
Start: 2023-08-02 | End: 2023-11-07

## 2023-08-02 NOTE — PROGRESS NOTES
Maverick is a 58 year old who is being evaluated via a billable telephone visit.      What phone number would you like to be contacted at? [phone]  How would you like to obtain your AVS? Esme    Distant Location (provider location):  On-site      Subjective   Maverick is a 58 year old, presenting for the following health issues:  Recheck Medication        8/2/2023     7:21 AM   Additional Questions   Roomed by Paloma Ambrose CMA   Accompanied by None         8/2/2023     7:21 AM   Patient Reported Additional Medications   Patient reports taking the following new medications none       HPI   Recheck medication, needing refills  - tadalafil  - clopidogrel  - ezetimibe  - cyclobenzaprine  - hydrocodone acetaminophen    No chest pain . Some sob with moderate physical activity.   Recheck of his chf. No orthopnea. No edema.   No ha's. No palpitations.  No obstructive voiding symptoms.  No bruising or bleeding.   Anxiety and mood have been stable.    Review of Systems   Constitutional, HEENT, cardiovascular, pulmonary, GI, , musculoskeletal, neuro, skin, endocrine and psych systems are negative, except as otherwise noted.      Objective           Vitals:  No vitals were obtained today due to virtual visit.    Physical Exam   healthy, alert, and no distress  PSYCH: Alert and oriented times 3; coherent speech, normal   rate and volume, able to articulate logical thoughts, able   to abstract reason, no tangential thoughts, no hallucinations   or delusions  His affect is normal  RESP: No cough, no audible wheezing, able to talk in full sentences  Remainder of exam unable to be completed due to telephone visits  Giovanni was seen today for recheck medication.    Diagnoses and all orders for this visit:    Chronic pain syndrome  -     HYDROcodone-acetaminophen (NORCO) 7.5-325 MG per tablet; Take 1 tablet by mouth every 4 hours as needed for pain maximum  4  tablet(s) per day    Rotator cuff syndrome, right  -      HYDROcodone-acetaminophen (NORCO) 7.5-325 MG per tablet; Take 1 tablet by mouth every 4 hours as needed for pain maximum  4  tablet(s) per day    Muscle spasm of right shoulder  -     cyclobenzaprine (FLEXERIL) 5 MG tablet; Take 1 tablet (5 mg) by mouth 3 times daily as needed for muscle spasms    Hyperlipidemia LDL goal <70  -     ezetimibe (ZETIA) 10 MG tablet; Take 1 tablet (10 mg) by mouth daily    Other male erectile dysfunction  -     tadalafil (CIALIS) 20 MG tablet; Take 1 tablet (20 mg) by mouth daily as needed    Benign prostatic hyperplasia with urinary frequency    HFrEF (heart failure with reduced ejection fraction) (H)    Coronary artery disease involving native coronary artery of native heart without angina pectoris  -     clopidogrel (PLAVIX) 75 MG tablet; Take 1 tablet (75 mg) by mouth daily      work on lifestyle modification  Recheck in 6 mos         Phone call duration: 12 minutes

## 2023-08-12 ENCOUNTER — OFFICE VISIT (OUTPATIENT)
Dept: URGENT CARE | Facility: URGENT CARE | Age: 58
End: 2023-08-12
Payer: COMMERCIAL

## 2023-08-12 VITALS
HEART RATE: 116 BPM | SYSTOLIC BLOOD PRESSURE: 124 MMHG | OXYGEN SATURATION: 97 % | WEIGHT: 146 LBS | RESPIRATION RATE: 18 BRPM | BODY MASS INDEX: 26.07 KG/M2 | TEMPERATURE: 98.3 F | DIASTOLIC BLOOD PRESSURE: 80 MMHG

## 2023-08-12 DIAGNOSIS — R51.9 ACUTE NONINTRACTABLE HEADACHE, UNSPECIFIED HEADACHE TYPE: Primary | ICD-10-CM

## 2023-08-12 DIAGNOSIS — N10 PYELONEPHRITIS, ACUTE: ICD-10-CM

## 2023-08-12 PROCEDURE — 99207 PR NO CHARGE LOS: CPT | Performed by: FAMILY MEDICINE

## 2023-08-12 NOTE — PROGRESS NOTES
58-year-old male presented with new onset persistent headache which he has been experiencing for last 4 days or so, associated with nosebleeds.  On long-term anticoagulation with warfarin and also taking baby aspirin.  Differentials discussed in detail including intracranial bleed.  Recommended to go ER for further evaluation and management.  All questions answered.    Dr Mariscal

## 2023-08-18 ENCOUNTER — LAB (OUTPATIENT)
Dept: LAB | Facility: CLINIC | Age: 58
End: 2023-08-18
Payer: COMMERCIAL

## 2023-08-18 DIAGNOSIS — Z12.11 SCREEN FOR COLON CANCER: ICD-10-CM

## 2023-08-18 PROCEDURE — 82274 ASSAY TEST FOR BLOOD FECAL: CPT

## 2023-08-24 DIAGNOSIS — Z12.11 SCREEN FOR COLON CANCER: Primary | ICD-10-CM

## 2023-08-24 LAB — HEMOCCULT STL QL IA: POSITIVE

## 2023-08-27 DIAGNOSIS — F51.01 PRIMARY INSOMNIA: ICD-10-CM

## 2023-08-27 DIAGNOSIS — G25.81 RESTLESS LEGS SYNDROME (RLS): ICD-10-CM

## 2023-08-28 ENCOUNTER — TELEPHONE (OUTPATIENT)
Dept: FAMILY MEDICINE | Facility: CLINIC | Age: 58
End: 2023-08-28
Payer: COMMERCIAL

## 2023-08-28 DIAGNOSIS — Z12.11 SCREEN FOR COLON CANCER: Primary | ICD-10-CM

## 2023-08-28 RX ORDER — MIRTAZAPINE 15 MG/1
TABLET, FILM COATED ORAL
Qty: 90 TABLET | Refills: 1 | Status: SHIPPED | OUTPATIENT
Start: 2023-08-28 | End: 2024-01-25

## 2023-08-28 RX ORDER — CARBIDOPA AND LEVODOPA 25; 100 MG/1; MG/1
1 TABLET ORAL AT BEDTIME
Qty: 90 TABLET | Refills: 0 | Status: SHIPPED | OUTPATIENT
Start: 2023-08-28 | End: 2024-02-27

## 2023-08-28 NOTE — TELEPHONE ENCOUNTER
----- Message from Juan Antonio Pak PA-C sent at 8/27/2023  7:41 AM CDT -----  Your fit test was positive. As such, I'd recommend a colonoscopy to continue screening for colon cancer.

## 2023-08-29 NOTE — TELEPHONE ENCOUNTER
RN updated. He verbalized understanding.     Does not appear Juan Antonio placed colonoscopy referral. RN routing to Juan Antonio to matias.     Kenia Ayon RN on 8/29/2023 at 2:33 PM

## 2023-10-03 ENCOUNTER — TELEPHONE (OUTPATIENT)
Dept: GASTROENTEROLOGY | Facility: CLINIC | Age: 58
End: 2023-10-03
Payer: COMMERCIAL

## 2023-10-03 NOTE — TELEPHONE ENCOUNTER
"Patient wants to schedule at [facility]    Endoscopy Scheduling Screen    Have you had a positive Covid test in the last 14 days?  No    Are you active on MyChart?   Yes    What insurance is in the chart?  Other:  [employer]    Ordering/Referring Provider: Pasha   (If ordering provider performs procedure, schedule with ordering provider unless otherwise instructed. )    BMI: Estimated body mass index is 26.07 kg/m  as calculated from the following:    Height as of 5/8/23: 1.594 m (5' 2.75\").    Weight as of 8/12/23: 66.2 kg (146 lb).     Sedation Ordered  moderate sedation.   If patient BMI > 50 do not schedule in [facility].    If patient BMI > 45 do not schedule at [facility].    Are you taking methadone or Suboxone?  No    Are you taking any prescription medications for pain 3 or more times per week?   No-but does take Norco PRN    Do you have a history of malignant hyperthermia or adverse reaction to anesthesia?  No    (Females) Are you currently pregnant?   No     Have you been diagnosed or told you have pulmonary hypertension?   No    Do you have an LVAD?  No    Have you been told you have moderate to severe sleep apnea?  No    Have you been told you have COPD, asthma, or any other lung disease?  Yes     What breathing problems do you have?  Asthma     Do you use home oxygen?  No    Have your breathing problems required an ED visit or hospitalization in the last year?  No    Do you have any heart conditions?  Yes     In the past 6 months, have you had any hospitalizations for heart related issues including cardiomyopathy, heart attack, or stent placement?  No    Do you have any implantable devices in your body (pacemaker, ICD)?  No    Do you take nitroglycerine?  No    Have you ever had an organ transplant?   No    Have you ever had or are you awaiting a heart or lung transplant?   No    Have you had a stroke or transient ischemic attack (TIA aka \"mini stroke\" in the last 6 months?   No    Have you been diagnosed with or been " "told you have cirrhosis of the liver?   No    Are you currently on dialysis?   No    Do you need assistance transferring?   No    BMI: Estimated body mass index is 26.07 kg/m  as calculated from the following:    Height as of 5/8/23: 1.594 m (5' 2.75\").    Weight as of 8/12/23: 66.2 kg (146 lb).     Is patients BMI > 40 and scheduling location [facility]?  No    Do you take an injectable medication for weight loss or diabetes (excluding insulin)?  No    Do you take the medication Naltrexone?  No    Do you take blood thinners?  Yes-warfarin    Are you taking Effient/Prasugrel?  No, you must contact your prescribing provider for direction on holding or bridging with a different medication.       Prep   Are you currently on dialysis or do you have chronic kidney disease?  No-but only has 1 kidney    Do you have a diagnosis of diabetes?  No    Do you have a diagnosis of cystic fibrosis (CF)?  No    On a regular basis do you go 3 -5 days between bowel movements?  No    BMI > 40?  No    Preferred Pharmacy:    [id number] [id number] [facility]  [address] [place] [address]  [facility] [address]  Phone: [phone] Fax: [phone]    Final Scheduling Details   Colonoscopy prep sent?  Standard Golytely-for 1 kidney    Procedure scheduled  Colonoscopy    Surgeon:       Date of procedure:       Pre-OP / PAC:       Location       Sedation          Patient Reminders:   You will receive a call from a Nurse to review instructions and health history.  This assessment must be completed prior to your procedure.  Failure to complete the Nurse assessment may result in the procedure being cancelled.      On the day of your procedure, please designate an adult(s) who can drive you home stay with you for the next 24 hours. The medicines used in the exam will make you sleepy. You will not be able to drive.      You cannot take public transportation, ride share services, or " non-medical taxi service without a responsible caregiver.  Medical transport services are allowed with the requirement that a responsible caregiver will receive you at your destination.  We require that drivers and caregivers are confirmed prior to your procedure.

## 2023-10-11 DIAGNOSIS — M75.101 ROTATOR CUFF SYNDROME, RIGHT: ICD-10-CM

## 2023-10-11 DIAGNOSIS — G89.4 CHRONIC PAIN SYNDROME: ICD-10-CM

## 2023-10-12 RX ORDER — HYDROCODONE BITARTRATE AND ACETAMINOPHEN 7.5; 325 MG/1; MG/1
1 TABLET ORAL EVERY 4 HOURS PRN
Qty: 30 TABLET | Refills: 0 | Status: SHIPPED | OUTPATIENT
Start: 2023-10-12 | End: 2023-11-07

## 2023-10-19 NOTE — PATIENT INSTRUCTIONS
(R37) Sexual dysfunction  (primary encounter diagnosis)    Comment:      Plan: sildenafil (VIAGRA) 100 MG tablet           STOP TAMSULOSIN  AND FINASTERIDE     SIDE EFFECTS BENEFITS AND RISKS DISCUSSED      TREATMENT PROGNOSIS BENEFITS AND RISKS DISCUSSED     MEDICATION RISKS SIDE EFFECTS BENEFITS AND RISKS DISCUSSED     ANIKA REYES JR., MD         VATS left upper lung wedge resection no previous reaction

## 2023-11-07 DIAGNOSIS — M75.101 ROTATOR CUFF SYNDROME, RIGHT: ICD-10-CM

## 2023-11-07 DIAGNOSIS — G89.4 CHRONIC PAIN SYNDROME: ICD-10-CM

## 2023-11-07 DIAGNOSIS — M62.838 MUSCLE SPASM OF RIGHT SHOULDER: ICD-10-CM

## 2023-11-07 DIAGNOSIS — N52.8 OTHER MALE ERECTILE DYSFUNCTION: ICD-10-CM

## 2023-11-07 NOTE — TELEPHONE ENCOUNTER
"Per providers notes from virtual visit on 8/2/23.      \"Recheck in 6 mos\"      Flora Arteaga RN on 11/7/2023 at 12:11 PM         "
intact

## 2023-11-11 DIAGNOSIS — N40.1 BENIGN PROSTATIC HYPERPLASIA WITH URINARY FREQUENCY: ICD-10-CM

## 2023-11-11 DIAGNOSIS — R35.0 BENIGN PROSTATIC HYPERPLASIA WITH URINARY FREQUENCY: ICD-10-CM

## 2023-11-13 RX ORDER — FINASTERIDE 5 MG/1
TABLET, FILM COATED ORAL
Qty: 90 TABLET | Refills: 0 | Status: SHIPPED | OUTPATIENT
Start: 2023-11-13 | End: 2024-02-12

## 2023-11-14 RX ORDER — HYDROCODONE BITARTRATE AND ACETAMINOPHEN 7.5; 325 MG/1; MG/1
1 TABLET ORAL EVERY 4 HOURS PRN
Qty: 30 TABLET | Refills: 0 | Status: SHIPPED | OUTPATIENT
Start: 2023-11-14 | End: 2024-01-18

## 2023-11-14 RX ORDER — TADALAFIL 20 MG/1
20 TABLET ORAL DAILY PRN
Qty: 10 TABLET | Refills: 3 | Status: SHIPPED | OUTPATIENT
Start: 2023-11-14 | End: 2024-01-25

## 2023-11-14 RX ORDER — CYCLOBENZAPRINE HCL 5 MG
5 TABLET ORAL 3 TIMES DAILY PRN
Qty: 30 TABLET | Refills: 1 | Status: SHIPPED | OUTPATIENT
Start: 2023-11-14 | End: 2024-01-18

## 2024-01-08 NOTE — PATIENT INSTRUCTIONS
(N40.1) Benign prostatic hyperplasia with lower urinary tract symptoms, unspecified morphology  (primary encounter diagnosis)    Comment:      Plan: tamsulosin (FLOMAX) 0.4 MG capsule, finasteride          (PROSCAR) 5 MG tablet, DISCONTINUED: tamsulosin          (FLOMAX) 0.4 MG capsule                Sony Velez Jr., MD       
yes

## 2024-01-15 NOTE — PROGRESS NOTES
Chief Complaint:   Chief Complaint   Patient presents with    Left Elbow - Pain     Last injection: 12/5/19. Pain started about 1-2 months ago. It is a new pain. NKI. Pain just came on and has gotten worse. Pain is medial elbow. No tx. He has been using heat and ice.         HPI: Maverick Cedeno is a 58 year old male , right -hand dominant, who presents for evaluation and management of left elbow pain,. Last injection 12/5/2019, worked well but started with pain returning 1-2 months, without incident. Getting worse. Pain along the inner (medial) elbow, aggravated with activities, work. Treatment with ice, heat.    We've followed in the past for both elbows, medial and lateral epicondylitis. Last right elbow medial epicondyle injection 3/1/2023.      Prior right elbow lateral epicondylitis treated with numerous injections, eventual surgery 5/2014.    He is a .      He reports having moderate  pain/discomfort at the elbow. He denies numbness or tingling.   Pain severity: 7/10  Pain quality: aching,sharp  Frequency of symptoms: frequently  Associated symptoms: denies  Aggravated by: bending the elbow, washing his face, lifting  Relieved by: ice, rest.    Prior history of related problems: previous right elbow lateral and medial epicondylitis, left elbow lateral and medial epicondylitis.    Usual level of work activity: heavy  - .    Past medical history:  has a past medical history of Arthritis.   Patient Active Problem List   Diagnosis    Restless leg syndrome    Knee bursitis    Pure hypercholesterolemia    Major depression in partial remission (H24)    Pre-diabetes    Epicondylitis, lateral humeral    Immunization reaction    Chronic pain syndrome    Chronic right shoulder pain    Abdominal pain, left upper quadrant    Pain of toe of right foot    Acute midline low back pain without sciatica    Upper back pain    Mild intermittent asthma, unspecified whether complicated     Incomplete tear of right rotator cuff    Impingement syndrome of shoulder, right    Degeneration of lumbar or lumbosacral intervertebral disc    Adjustment disorder with depressed mood    Mixed hyperlipidemia    Inguinal hernia    Kidney stone    HFrEF (heart failure with reduced ejection fraction) (H)       Past surgical history:  has a past surgical history that includes Combined Cystoscopy, Retrogrades, Ureteroscopy, Laser Holmium Lithotripsy Ureter(S), Insert Stent (Right, 10/15/2021) and Combined Cystoscopy, Retrogrades, Ureteroscopy, Laser Holmium Lithotripsy Ureter(S), Insert Stent (Right, 10/27/2021).     Medications:   Current Outpatient Medications:     aspirin (ASA) 81 MG EC tablet, Take 1 tablet (81 mg) by mouth daily, Disp: 90 tablet, Rfl: 3    carbidopa-levodopa (SINEMET)  MG tablet, TAKE 1 TABLET BY MOUTH AT BEDTIME, Disp: 90 tablet, Rfl: 0    celecoxib (CELEBREX) 200 MG capsule, TAKE 1 CAPSULE(200 MG) BY MOUTH TWICE DAILY, Disp: 60 capsule, Rfl: 11    clopidogrel (PLAVIX) 75 MG tablet, Take 1 tablet (75 mg) by mouth daily, Disp: 90 tablet, Rfl: 1    diclofenac (VOLTAREN) 75 MG EC tablet, TAKE 1 TABLET(75 MG) BY MOUTH TWICE DAILY, Disp: 30 tablet, Rfl: 2    ezetimibe (ZETIA) 10 MG tablet, Take 1 tablet (10 mg) by mouth daily, Disp: 90 tablet, Rfl: 0    finasteride (PROSCAR) 5 MG tablet, TAKE 1 TABLET BY MOUTH EVERY DAY, Disp: 90 tablet, Rfl: 0    FLUoxetine (PROZAC) 40 MG capsule, TAKE 2 CAPSULES(80 MG) BY MOUTH DAILY, Disp: 180 capsule, Rfl: 0    furosemide (LASIX) 20 MG tablet, TAKE 1 TABLET(20 MG) BY MOUTH EVERY MORNING, Disp: 90 tablet, Rfl: 0    gabapentin (NEURONTIN) 300 MG capsule, TAKE 2 CAPSULES BY MOUTH EVERY NIGHT AT BEDTIME, Disp: 180 capsule, Rfl: 0    mirtazapine (REMERON) 15 MG tablet, TAKE 1 TABLET(15 MG) BY MOUTH AT BEDTIME, Disp: 90 tablet, Rfl: 1    Misc Natural Products (OSTEO BI-FLEX TRIPLE STRENGTH PO), Take 1 tablet by mouth daily, Disp: , Rfl:     simvastatin (ZOCOR) 20  MG tablet, TAKE 1 TABLET BY MOUTH EVERY DAY AT BEDTIME, Disp: 90 tablet, Rfl: 0    tadalafil (CIALIS) 20 MG tablet, Take 1 tablet (20 mg) by mouth daily as needed, Disp: 10 tablet, Rfl: 3    tamsulosin (FLOMAX) 0.4 MG capsule, TAKE 1 CAPSULE(0.4 MG) BY MOUTH DAILY, Disp: 90 capsule, Rfl: 0    VENTOLIN  (90 Base) MCG/ACT inhaler, INHALE 2 PUFFS INTO THE LUNGS EVERY 6 HOURS AS NEEDED FOR SHORTNESS OF BREATH OR DIFFICULT BREATHING OR WHEEZING, Disp: 18 g, Rfl: 0    cyclobenzaprine (FLEXERIL) 5 MG tablet, Take 1 tablet (5 mg) by mouth 3 times daily as needed for muscle spasms, Disp: 30 tablet, Rfl: 1    HYDROcodone-acetaminophen (NORCO) 7.5-325 MG per tablet, Take 1 tablet by mouth every 4 hours as needed for pain maximum  4  tablet(s) per day, Disp: 30 tablet, Rfl: 0    nitroFURantoin macrocrystal-monohydrate (MACROBID) 100 MG capsule, TAKE 1 CAPSULE(100 MG) BY MOUTH TWICE DAILY FOR 7 DAYS (Patient not taking: Reported on 5/8/2023), Disp: 14 capsule, Rfl: 0    predniSONE (DELTASONE) 10 MG tablet, Take 60 mg days 1 and 2, 50 mg days 3 and 4, 40 mg days 5 and 6, 30 mg days 7 and 8, 20 mg days 9 and 10, 10 mg days 11 and 12 (Patient not taking: Reported on 1/18/2024), Disp: 42 tablet, Rfl: 0     Allergies:   No Known Allergies     Family History: family history includes Diabetes in his father; Heart Disease in his father.     Social History: .  reports that he has never smoked. He has never used smokeless tobacco. He reports that he does not drink alcohol and does not use drugs.    Review of Systems:     Denies numbness, tingling, parasthesias.   Denies headaches.   Denies fevers, chills, night sweats   Denies chest pain.   Denies shortness of breath.   Denies any skin problems, abrasions, rashes, irritation.      Physical Exam  GENERAL APPEARANCE: healthy, alert, no distress. Accompanied by his friend, Geraldo.  SKIN: no suspicious lesions or rashes  RESPIRATORY: No increased work of  "breathing.  NEURO: Normal strength and tone, mentation intact and speech normal  PSYCH:  mentation appears normal and affect normal. Not anxious.    /77   Pulse 67   Ht 1.594 m (5' 2.75\")   Wt 69.8 kg (153 lb 12.8 oz)   BMI 27.46 kg/m         LEFT UPPER EXTREMITY:    Sensation intact to light touch in median, radial, ulnar and axillary nerve distributions  Palpable 2+ radial pulse, brisk capillary refill to all fingers, wwp  Intact epl fpl fdp edc wrist flexion/extension biceps triceps deltoid    ELBOW:  Skin intact. No open wounds. No skin maceration. Lateral based incision from previous surgery.  Inspection: no swelling, no ecchymosis, no olecranon bursa swelling, no distal bicep tendon defect  Tender: medial epicondyle and common flexor tendon  Non-tender: lateral epicondyle, common extensor tendon, extensor muscle of forearm, flexor muscle of forearm, supracondylar notch, olecranon bursa, distal bicep tendon and radial head/neck  Range of Motion: all normal  Strength: elbow strength full  Special tests: no pain with resisted wrist extension, no pain with resisted middle finger extension, medial elbow pain with resisted wrist flexion, supination and pronation   There is no gross deformity in the area.          X-rays: no new images today.      Assessment: 58 year old male , right -hand dominant, with chronic left medial elbow pain, medial epicondylitis.    Plan:   * discussed with patient history and clinical exam consistent with \"golfer's\" elbow, or medial epicondylitis, which is tendonitis at the medial or inner aspect of the elbow. This is where the flexor tendons originate for the wrist.    Treatment includes:  * rest  * activity modification - avoid aggravating activities and reduce strenuous activities for 6-8 weeks  * ice, ice massage  * Physical therapy, modalities as indicated including ultrasound, massage, iontophoresis  * NDAIDS regularly three times daily x2-3 weeks  * cortisone injection " discussed, placed at point of maximal tenderness . Patient elects today.  * return to clinic as needed.  .    Hand / Upper Extremity Injection/Arthrocentesis: L elbow    Date/Time: 1/18/2024 3:52 PM    Performed by: Ammon Daniel PA  Authorized by: Jose G Blanchard MD    Indications:  Pain and tendon swelling  Needle Size:  25 G  Guidance: landmark    Approach:  Medial  Condition: epicondylitis, medial      Site:  L elbow  Medications:  40 mg triamcinolone 40 MG/ML; 1 mL BUPivacaine 0.25 %  Outcome:  Tolerated well, no immediate complications  Procedure discussed: discussed risks, benefits, and alternatives    Timeout: timeout called immediately prior to procedure    Prep: patient was prepped and draped in usual sterile fashion          Jose G Blanchard M.D., M.S.  Dept. of Orthopaedic Surgery  [facility]

## 2024-01-18 ENCOUNTER — OFFICE VISIT (OUTPATIENT)
Dept: ORTHOPEDICS | Facility: CLINIC | Age: 59
End: 2024-01-18
Payer: COMMERCIAL

## 2024-01-18 VITALS
BODY MASS INDEX: 27.25 KG/M2 | WEIGHT: 153.8 LBS | DIASTOLIC BLOOD PRESSURE: 77 MMHG | HEART RATE: 67 BPM | SYSTOLIC BLOOD PRESSURE: 111 MMHG | HEIGHT: 63 IN

## 2024-01-18 DIAGNOSIS — M77.02 MEDIAL EPICONDYLITIS OF ELBOW, LEFT: Primary | ICD-10-CM

## 2024-01-18 PROCEDURE — 99213 OFFICE O/P EST LOW 20 MIN: CPT | Mod: 25 | Performed by: ORTHOPAEDIC SURGERY

## 2024-01-18 PROCEDURE — 20550 NJX 1 TENDON SHEATH/LIGAMENT: CPT | Mod: LT | Performed by: ORTHOPAEDIC SURGERY

## 2024-01-18 RX ADMIN — BUPIVACAINE HYDROCHLORIDE 1 ML: 2.5 INJECTION, SOLUTION INFILTRATION; PERINEURAL at 15:52

## 2024-01-18 RX ADMIN — TRIAMCINOLONE ACETONIDE 40 MG: 40 INJECTION, SUSPENSION INTRA-ARTICULAR; INTRAMUSCULAR at 15:52

## 2024-01-18 ASSESSMENT — PAIN SCALES - GENERAL: PAINLEVEL: SEVERE PAIN (7)

## 2024-01-18 NOTE — LETTER
1/18/2024         RE: Giovanni Cedeno  [address]  [place] [address]        Dear Colleague,    Thank you for referring your patient, Giovanni Cedeno, to the [facility] [place]. Please see a copy of my visit note below.    Chief Complaint:   Chief Complaint   Patient presents with     Left Elbow - Pain     Last injection: 12/5/19. Pain started about 1-2 months ago. It is a new pain. NKI. Pain just came on and has gotten worse. Pain is medial elbow. No tx. He has been using heat and ice.         HPI: Maverick Cedeno is a 58 year old male , right -hand dominant, who presents for evaluation and management of left elbow pain,. Last injection 12/5/2019, worked well but started with pain returning 1-2 months, without incident. Getting worse. Pain along the inner (medial) elbow, aggravated with activities, work. Treatment with ice, heat.    We've followed in the past for both elbows, medial and lateral epicondylitis. Last right elbow medial epicondyle injection 3/1/2023.      Prior right elbow lateral epicondylitis treated with numerous injections, eventual surgery 5/2014.    He is a .      He reports having moderate  pain/discomfort at the elbow. He denies numbness or tingling.   Pain severity: 7/10  Pain quality: aching,sharp  Frequency of symptoms: frequently  Associated symptoms: denies  Aggravated by: bending the elbow, washing his face, lifting  Relieved by: ice, rest.    Prior history of related problems: previous right elbow lateral and medial epicondylitis, left elbow lateral and medial epicondylitis.    Usual level of work activity: heavy  - .    Past medical history:  has a past medical history of Arthritis.   Patient Active Problem List   Diagnosis     Restless leg syndrome     Knee bursitis     Pure hypercholesterolemia     Major depression in partial remission (H24)     Pre-diabetes     Epicondylitis, lateral humeral      Immunization reaction     Chronic pain syndrome     Chronic right shoulder pain     Abdominal pain, left upper quadrant     Pain of toe of right foot     Acute midline low back pain without sciatica     Upper back pain     Mild intermittent asthma, unspecified whether complicated     Incomplete tear of right rotator cuff     Impingement syndrome of shoulder, right     Degeneration of lumbar or lumbosacral intervertebral disc     Adjustment disorder with depressed mood     Mixed hyperlipidemia     Inguinal hernia     Kidney stone     HFrEF (heart failure with reduced ejection fraction) (H)       Past surgical history:  has a past surgical history that includes Combined Cystoscopy, Retrogrades, Ureteroscopy, Laser Holmium Lithotripsy Ureter(S), Insert Stent (Right, 10/15/2021) and Combined Cystoscopy, Retrogrades, Ureteroscopy, Laser Holmium Lithotripsy Ureter(S), Insert Stent (Right, 10/27/2021).     Medications:   Current Outpatient Medications:      aspirin (ASA) 81 MG EC tablet, Take 1 tablet (81 mg) by mouth daily, Disp: 90 tablet, Rfl: 3     carbidopa-levodopa (SINEMET)  MG tablet, TAKE 1 TABLET BY MOUTH AT BEDTIME, Disp: 90 tablet, Rfl: 0     celecoxib (CELEBREX) 200 MG capsule, TAKE 1 CAPSULE(200 MG) BY MOUTH TWICE DAILY, Disp: 60 capsule, Rfl: 11     clopidogrel (PLAVIX) 75 MG tablet, Take 1 tablet (75 mg) by mouth daily, Disp: 90 tablet, Rfl: 1     diclofenac (VOLTAREN) 75 MG EC tablet, TAKE 1 TABLET(75 MG) BY MOUTH TWICE DAILY, Disp: 30 tablet, Rfl: 2     ezetimibe (ZETIA) 10 MG tablet, Take 1 tablet (10 mg) by mouth daily, Disp: 90 tablet, Rfl: 0     finasteride (PROSCAR) 5 MG tablet, TAKE 1 TABLET BY MOUTH EVERY DAY, Disp: 90 tablet, Rfl: 0     FLUoxetine (PROZAC) 40 MG capsule, TAKE 2 CAPSULES(80 MG) BY MOUTH DAILY, Disp: 180 capsule, Rfl: 0     furosemide (LASIX) 20 MG tablet, TAKE 1 TABLET(20 MG) BY MOUTH EVERY MORNING, Disp: 90 tablet, Rfl: 0     gabapentin (NEURONTIN) 300 MG capsule, TAKE 2  CAPSULES BY MOUTH EVERY NIGHT AT BEDTIME, Disp: 180 capsule, Rfl: 0     mirtazapine (REMERON) 15 MG tablet, TAKE 1 TABLET(15 MG) BY MOUTH AT BEDTIME, Disp: 90 tablet, Rfl: 1     Misc Natural Products (OSTEO BI-FLEX TRIPLE STRENGTH PO), Take 1 tablet by mouth daily, Disp: , Rfl:      simvastatin (ZOCOR) 20 MG tablet, TAKE 1 TABLET BY MOUTH EVERY DAY AT BEDTIME, Disp: 90 tablet, Rfl: 0     tadalafil (CIALIS) 20 MG tablet, Take 1 tablet (20 mg) by mouth daily as needed, Disp: 10 tablet, Rfl: 3     tamsulosin (FLOMAX) 0.4 MG capsule, TAKE 1 CAPSULE(0.4 MG) BY MOUTH DAILY, Disp: 90 capsule, Rfl: 0     VENTOLIN  (90 Base) MCG/ACT inhaler, INHALE 2 PUFFS INTO THE LUNGS EVERY 6 HOURS AS NEEDED FOR SHORTNESS OF BREATH OR DIFFICULT BREATHING OR WHEEZING, Disp: 18 g, Rfl: 0     cyclobenzaprine (FLEXERIL) 5 MG tablet, Take 1 tablet (5 mg) by mouth 3 times daily as needed for muscle spasms, Disp: 30 tablet, Rfl: 1     HYDROcodone-acetaminophen (NORCO) 7.5-325 MG per tablet, Take 1 tablet by mouth every 4 hours as needed for pain maximum  4  tablet(s) per day, Disp: 30 tablet, Rfl: 0     nitroFURantoin macrocrystal-monohydrate (MACROBID) 100 MG capsule, TAKE 1 CAPSULE(100 MG) BY MOUTH TWICE DAILY FOR 7 DAYS (Patient not taking: Reported on 5/8/2023), Disp: 14 capsule, Rfl: 0     predniSONE (DELTASONE) 10 MG tablet, Take 60 mg days 1 and 2, 50 mg days 3 and 4, 40 mg days 5 and 6, 30 mg days 7 and 8, 20 mg days 9 and 10, 10 mg days 11 and 12 (Patient not taking: Reported on 1/18/2024), Disp: 42 tablet, Rfl: 0     Allergies:   No Known Allergies     Family History: family history includes Diabetes in his father; Heart Disease in his father.     Social History: .  reports that he has never smoked. He has never used smokeless tobacco. He reports that he does not drink alcohol and does not use drugs.    Review of Systems:     Denies numbness, tingling, parasthesias.   Denies headaches.   Denies fevers,  "chills, night sweats   Denies chest pain.   Denies shortness of breath.   Denies any skin problems, abrasions, rashes, irritation.      Physical Exam  GENERAL APPEARANCE: healthy, alert, no distress. Accompanied by his friend, Geraldo.  SKIN: no suspicious lesions or rashes  RESPIRATORY: No increased work of breathing.  NEURO: Normal strength and tone, mentation intact and speech normal  PSYCH:  mentation appears normal and affect normal. Not anxious.    /77   Pulse 67   Ht 1.594 m (5' 2.75\")   Wt 69.8 kg (153 lb 12.8 oz)   BMI 27.46 kg/m         LEFT UPPER EXTREMITY:    Sensation intact to light touch in median, radial, ulnar and axillary nerve distributions  Palpable 2+ radial pulse, brisk capillary refill to all fingers, wwp  Intact epl fpl fdp edc wrist flexion/extension biceps triceps deltoid    ELBOW:  Skin intact. No open wounds. No skin maceration. Lateral based incision from previous surgery.  Inspection: no swelling, no ecchymosis, no olecranon bursa swelling, no distal bicep tendon defect  Tender: medial epicondyle and common flexor tendon  Non-tender: lateral epicondyle, common extensor tendon, extensor muscle of forearm, flexor muscle of forearm, supracondylar notch, olecranon bursa, distal bicep tendon and radial head/neck  Range of Motion: all normal  Strength: elbow strength full  Special tests: no pain with resisted wrist extension, no pain with resisted middle finger extension, medial elbow pain with resisted wrist flexion, supination and pronation   There is no gross deformity in the area.          X-rays: no new images today.      Assessment: 58 year old male , right -hand dominant, with chronic left medial elbow pain, medial epicondylitis.    Plan:   * discussed with patient history and clinical exam consistent with \"golfer's\" elbow, or medial epicondylitis, which is tendonitis at the medial or inner aspect of the elbow. This is where the flexor tendons originate for the " wrist.    Treatment includes:  * rest  * activity modification - avoid aggravating activities and reduce strenuous activities for 6-8 weeks  * ice, ice massage  * Physical therapy, modalities as indicated including ultrasound, massage, iontophoresis  * NDAIDS regularly three times daily x2-3 weeks  * cortisone injection discussed, placed at point of maximal tenderness . Patient elects today.  * return to clinic as needed.  .    Hand / Upper Extremity Injection/Arthrocentesis: L elbow    Date/Time: 1/18/2024 3:52 PM    Performed by: Ammon Daniel PA  Authorized by: Jose G Blanchard MD    Indications:  Pain and tendon swelling  Needle Size:  25 G  Guidance: landmark    Approach:  Medial  Condition: epicondylitis, medial      Site:  L elbow  Medications:  40 mg triamcinolone 40 MG/ML; 1 mL BUPivacaine 0.25 %  Outcome:  Tolerated well, no immediate complications  Procedure discussed: discussed risks, benefits, and alternatives    Timeout: timeout called immediately prior to procedure    Prep: patient was prepped and draped in usual sterile fashion          Jose G Blanchard M.D., M.S.  Dept. of Orthopaedic Surgery  [facility]      Again, thank you for allowing me to participate in the care of your patient.        Sincerely,        Jose G Blanchard MD

## 2024-01-19 RX ORDER — TRIAMCINOLONE ACETONIDE 40 MG/ML
40 INJECTION, SUSPENSION INTRA-ARTICULAR; INTRAMUSCULAR
Status: SHIPPED | OUTPATIENT
Start: 2024-01-18

## 2024-01-19 RX ORDER — BUPIVACAINE HYDROCHLORIDE 2.5 MG/ML
1 INJECTION, SOLUTION INFILTRATION; PERINEURAL
Status: DISCONTINUED | OUTPATIENT
Start: 2024-01-18 | End: 2024-06-03

## 2024-01-25 ENCOUNTER — OFFICE VISIT (OUTPATIENT)
Dept: FAMILY MEDICINE | Facility: CLINIC | Age: 59
End: 2024-01-25
Payer: COMMERCIAL

## 2024-01-25 VITALS
WEIGHT: 152.8 LBS | BODY MASS INDEX: 27.07 KG/M2 | OXYGEN SATURATION: 96 % | HEIGHT: 63 IN | TEMPERATURE: 96.9 F | RESPIRATION RATE: 20 BRPM | SYSTOLIC BLOOD PRESSURE: 126 MMHG | DIASTOLIC BLOOD PRESSURE: 89 MMHG | HEART RATE: 76 BPM

## 2024-01-25 DIAGNOSIS — N52.8 OTHER MALE ERECTILE DYSFUNCTION: ICD-10-CM

## 2024-01-25 DIAGNOSIS — I50.20 HFREF (HEART FAILURE WITH REDUCED EJECTION FRACTION) (H): ICD-10-CM

## 2024-01-25 DIAGNOSIS — Z12.11 SCREEN FOR COLON CANCER: ICD-10-CM

## 2024-01-25 DIAGNOSIS — R35.0 BENIGN PROSTATIC HYPERPLASIA WITH URINARY FREQUENCY: ICD-10-CM

## 2024-01-25 DIAGNOSIS — F32.4 MAJOR DEPRESSIVE DISORDER WITH SINGLE EPISODE, IN PARTIAL REMISSION (H): ICD-10-CM

## 2024-01-25 DIAGNOSIS — I25.10 CORONARY ARTERY DISEASE INVOLVING NATIVE CORONARY ARTERY OF NATIVE HEART WITHOUT ANGINA PECTORIS: ICD-10-CM

## 2024-01-25 DIAGNOSIS — R21 GROIN RASH: ICD-10-CM

## 2024-01-25 DIAGNOSIS — E78.2 MIXED HYPERLIPIDEMIA: ICD-10-CM

## 2024-01-25 DIAGNOSIS — I10 BENIGN ESSENTIAL HYPERTENSION: Primary | ICD-10-CM

## 2024-01-25 DIAGNOSIS — E78.5 HYPERLIPIDEMIA LDL GOAL <70: ICD-10-CM

## 2024-01-25 DIAGNOSIS — N40.1 BENIGN PROSTATIC HYPERPLASIA WITH URINARY FREQUENCY: ICD-10-CM

## 2024-01-25 PROCEDURE — 99214 OFFICE O/P EST MOD 30 MIN: CPT | Performed by: PHYSICIAN ASSISTANT

## 2024-01-25 RX ORDER — CARVEDILOL 3.12 MG/1
3.12 TABLET ORAL 2 TIMES DAILY WITH MEALS
COMMUNITY
End: 2024-01-25

## 2024-01-25 RX ORDER — WARFARIN SODIUM 5 MG/1
TABLET ORAL
COMMUNITY
Start: 2024-01-25

## 2024-01-25 RX ORDER — SACUBITRIL AND VALSARTAN 24; 26 MG/1; MG/1
2 TABLET, FILM COATED ORAL 2 TIMES DAILY
COMMUNITY

## 2024-01-25 RX ORDER — ROSUVASTATIN CALCIUM 40 MG/1
40 TABLET, COATED ORAL DAILY
COMMUNITY
Start: 2023-09-20

## 2024-01-25 RX ORDER — NITROGLYCERIN 0.4 MG/1
0.4 TABLET SUBLINGUAL
COMMUNITY
Start: 2023-09-20 | End: 2024-08-26

## 2024-01-25 RX ORDER — METHOCARBAMOL 500 MG/1
TABLET, FILM COATED ORAL
COMMUNITY
End: 2024-01-25

## 2024-01-25 RX ORDER — ATORVASTATIN CALCIUM 40 MG/1
TABLET, FILM COATED ORAL
COMMUNITY
End: 2024-01-25

## 2024-01-25 RX ORDER — CLOTRIMAZOLE AND BETAMETHASONE DIPROPIONATE 10; .64 MG/G; MG/G
CREAM TOPICAL 2 TIMES DAILY
Qty: 45 G | Refills: 1 | Status: SHIPPED | OUTPATIENT
Start: 2024-01-25

## 2024-01-25 RX ORDER — TADALAFIL 20 MG/1
20 TABLET ORAL DAILY PRN
Qty: 10 TABLET | Refills: 3 | Status: SHIPPED | OUTPATIENT
Start: 2024-01-25

## 2024-01-25 RX ORDER — CARVEDILOL 6.25 MG/1
6.25 TABLET ORAL 2 TIMES DAILY WITH MEALS
Qty: 180 TABLET | Refills: 1 | Status: SHIPPED | OUTPATIENT
Start: 2024-01-25

## 2024-01-25 ASSESSMENT — PATIENT HEALTH QUESTIONNAIRE - PHQ9
SUM OF ALL RESPONSES TO PHQ QUESTIONS 1-9: 8
SUM OF ALL RESPONSES TO PHQ QUESTIONS 1-9: 8
10. IF YOU CHECKED OFF ANY PROBLEMS, HOW DIFFICULT HAVE THESE PROBLEMS MADE IT FOR YOU TO DO YOUR WORK, TAKE CARE OF THINGS AT HOME, OR GET ALONG WITH OTHER PEOPLE: NOT DIFFICULT AT ALL

## 2024-01-25 ASSESSMENT — ASTHMA QUESTIONNAIRES
QUESTION_1 LAST FOUR WEEKS HOW MUCH OF THE TIME DID YOUR ASTHMA KEEP YOU FROM GETTING AS MUCH DONE AT WORK, SCHOOL OR AT HOME: NONE OF THE TIME
QUESTION_4 LAST FOUR WEEKS HOW OFTEN HAVE YOU USED YOUR RESCUE INHALER OR NEBULIZER MEDICATION (SUCH AS ALBUTEROL): NOT AT ALL
ACT_TOTALSCORE: 24
QUESTION_5 LAST FOUR WEEKS HOW WOULD YOU RATE YOUR ASTHMA CONTROL: COMPLETELY CONTROLLED
QUESTION_2 LAST FOUR WEEKS HOW OFTEN HAVE YOU HAD SHORTNESS OF BREATH: ONCE OR TWICE A WEEK
QUESTION_3 LAST FOUR WEEKS HOW OFTEN DID YOUR ASTHMA SYMPTOMS (WHEEZING, COUGHING, SHORTNESS OF BREATH, CHEST TIGHTNESS OR PAIN) WAKE YOU UP AT NIGHT OR EARLIER THAN USUAL IN THE MORNING: NOT AT ALL
ACT_TOTALSCORE: 24

## 2024-01-25 ASSESSMENT — PAIN SCALES - GENERAL: PAINLEVEL: MILD PAIN (3)

## 2024-01-25 NOTE — PROGRESS NOTES
"    Subjective   Maverick is a 58 year old, presenting for the following health issues:  Recheck Medication and Health Maintenance (Patient is fasting)        1/25/2024     8:59 AM   Additional Questions   Roomed by Paloma Ambrose CMA   Accompanied by None         1/25/2024     8:59 AM   Patient Reported Additional Medications   Patient reports taking the following new medications Taking coumadin     History of Present Illness       Reason for visit:  Med chek    He eats 2-3 servings of fruits and vegetables daily.He consumes 2 sweetened beverage(s) daily.He exercises with enough effort to increase his heart rate 9 or less minutes per day.  He exercises with enough effort to increase his heart rate 3 or less days per week.   He is taking medications regularly.     Recheck of chf. Stable. No LEE . No orthopnea  No edema  Depression stable . No anhedonia. No profound anxiety  No chest pain/sob/palpitations/dizziness/ha's  Watching his diet some .  No profound irritative/obstructive voiding symptoms.        Review of Systems  Constitutional, neuro, ENT, endocrine, pulmonary, cardiac, gastrointestinal, genitourinary, musculoskeletal, integument and psychiatric systems are negative, except as otherwise noted.      Objective    /89   Pulse 76   Temp 96.9  F (36.1  C) (Temporal)   Resp 20   Ht 1.594 m (5' 2.75\")   Wt 69.3 kg (152 lb 12.8 oz)   SpO2 96%   BMI 27.28 kg/m    Body mass index is 27.28 kg/m .  Physical Exam   Eye exam - right eye normal lid, conjunctiva, cornea, pupil and fundus, left eye normal lid, conjunctiva, cornea, pupil and fundus.  Thyroid not palpable, not enlarged, no nodules detected.  CHEST:chest clear to IPPA, no tachypnea, retractions or cyanosis, and S1, S2 normal, no murmur, no gallop, rate regular.  No edema    Maverick was seen today for recheck medication and health maintenance.    Diagnoses and all orders for this visit:    Benign essential hypertension  -     carvedilol (COREG) 6.25 MG " tablet; Take 1 tablet (6.25 mg) by mouth 2 times daily (with meals)    Mixed hyperlipidemia    Screen for colon cancer  -     Colonoscopy Screening  Referral; Future    HFrEF (heart failure with reduced ejection fraction) (H)    Major depressive disorder with single episode, in partial remission (H24)    Groin rash  -     clotrimazole-betamethasone (LOTRISONE) 1-0.05 % external cream; Apply topically 2 times daily    Hyperlipidemia LDL goal <70    Benign prostatic hyperplasia with urinary frequency    Coronary artery disease involving native coronary artery of native heart without angina pectoris    Other male erectile dysfunction  -     tadalafil (CIALIS) 20 MG tablet; Take 1 tablet (20 mg) by mouth daily as needed      Updated med list  Inc carvedilol to 6.25 mg bid to help lower his diastolic blood pressure   Exercise  Lower salt /fat diet.     Recheck in 3 mos     Signed Electronically by: Juan Antonio Pak PA-C

## 2024-02-02 ENCOUNTER — HOSPITAL ENCOUNTER (OUTPATIENT)
Facility: AMBULATORY SURGERY CENTER | Age: 59
End: 2024-02-02
Attending: INTERNAL MEDICINE | Admitting: STUDENT IN AN ORGANIZED HEALTH CARE EDUCATION/TRAINING PROGRAM
Payer: COMMERCIAL

## 2024-02-02 ENCOUNTER — TELEPHONE (OUTPATIENT)
Dept: GASTROENTEROLOGY | Facility: CLINIC | Age: 59
End: 2024-02-02
Payer: COMMERCIAL

## 2024-02-02 DIAGNOSIS — Z12.11 SPECIAL SCREENING FOR MALIGNANT NEOPLASMS, COLON: Primary | ICD-10-CM

## 2024-02-02 NOTE — TELEPHONE ENCOUNTER
"Endoscopy Scheduling Screen    Have you had a positive Covid test in the last 14 days?  No    Are you active on MyChart?   No    What insurance is in the chart?  Other:  [facility]    Ordering/Referring Provider: CHER CUI   (If ordering provider performs procedure, schedule with ordering provider unless otherwise instructed. )    BMI: Estimated body mass index is 27.28 kg/m  as calculated from the following:    Height as of 1/25/24: 1.594 m (5' 2.75\").    Weight as of 1/25/24: 69.3 kg (152 lb 12.8 oz).     Sedation Ordered  moderate sedation.   If patient BMI > 50 do not schedule in [facility].    If patient BMI > 45 do not schedule at [facility].    Are you taking methadone or Suboxone?  No    Have you had difficulties, pain, or discomfort during past endoscopy procedures?  No    Are you taking any prescription medications for pain 3 or more times per week?   NO - No RN review required.    Do you have a history of malignant hyperthermia or adverse reaction to anesthesia?  No    (Females) Are you currently pregnant?   No     Have you been diagnosed or told you have pulmonary hypertension?   No    Do you have an LVAD?  No    Have you been told you have moderate to severe sleep apnea?  No    Have you been told you have COPD, asthma, or any other lung disease?  Yes     What breathing problems do you have?  Asthma     Do you use home oxygen?  No    Have your breathing problems required an ED visit or hospitalization in the last year?  No    Do you have any heart conditions?  Yes     In the past year, have you had any hospitalizations for heart related issues including cardiomyopathy, heart attack, or stent placement?  No    Do you have any implantable devices in your body (pacemaker, ICD)?  No    Do you take nitroglycerine?  No    Have you ever had an organ transplant?   No    Have you ever had or are you awaiting a heart or lung transplant?   No    Have you had a stroke or transient ischemic attack (TIA aka \"mini stroke\" in the last 6 " "months?   No    Have you been diagnosed with or been told you have cirrhosis of the liver?   No    Are you currently on dialysis?   No    Do you need assistance transferring?   No    BMI: Estimated body mass index is 27.28 kg/m  as calculated from the following:    Height as of 1/25/24: 1.594 m (5' 2.75\").    Weight as of 1/25/24: 69.3 kg (152 lb 12.8 oz).     Is patients BMI > 40 and scheduling location UP?  No    Do you take an injectable medication for weight loss or diabetes (excluding insulin)?  No    Do you take the medication Naltrexone?  No    Do you take blood thinners?  Yes     Are you taking Effient/Prasugrel?  No, you must contact your prescribing provider for direction on holding or bridging with a different medication.       Prep   Are you currently on dialysis or do you have chronic kidney disease?  No    Do you have a diagnosis of diabetes?  No    Do you have a diagnosis of cystic fibrosis (CF)?  No    On a regular basis do you go 3 -5 days between bowel movements?  No    BMI > 40?  No    Preferred Pharmacy:    [id number] - [place] [id number] [facility]  [id number] [place] [address]  [facility] [address]  Phone: [phone] Fax: [phone]      Final Scheduling Details   Colonoscopy prep sent?  N/A    Procedure scheduled  Colonoscopy    Surgeon:  ALYSON     Date of procedure:  4/1/24     Pre-OP / PAC:   No - Not required for this site.    Location  [facility] - [facility] - Per order.    Sedation   Moderate Sedation - Per order.      Patient Reminders:   You will receive a call from a Nurse to review instructions and health history.  This assessment must be completed prior to your procedure.  Failure to complete the Nurse assessment may result in the procedure being cancelled.      On the day of your procedure, please designate an adult(s) who can drive you home stay with you for the next 24 hours. The medicines used in the exam will make you sleepy. " You will not be able to drive.      You cannot take public transportation, ride share services, or non-medical taxi service without a responsible caregiver.  Medical transport services are allowed with the requirement that a responsible caregiver will receive you at your destination.  We require that drivers and caregivers are confirmed prior to your procedure.

## 2024-02-02 NOTE — LETTER
April 22, 2024      Giovanni Cedeno  [address] [place] [address]              Dear Giovanni,  Upon further review of your chart your prep has been changed to the following:  Golytely Extended Colonoscopy Prep  Prep instructions for colonoscopy   Pre-Assessment Phone Number: [facility]; [phone] option 4  Bowel prep has been sent to    [facility] #[id number] - [place], [place] - [address] [facility]    Please read these instructions carefully at least 7 days prior to your colonoscopy procedure. Be sure to follow all directions completely. The inside of your colon must be clean to allow for a complete examination for the presence of any growths, polyps, and/or abnormalities, as well as their biopsy or removal. A number of tips are included in order to make this part of the procedure as comfortable as possible.    Immediately:  A nurse will call you to go over instructions and your health history.  It's important to complete the nurse assessment before your procedure. If you don't, your procedure might have to be canceled.  You must arrange for an adult to drive you home after your exam. Your colonoscopy cannot be done unless you have a ride. If you need to use public transportation, someone must ride with you and stay with you for a minimum of 6-24 hours.  Check with your insurance company to be sure they will cover this exam.Arrange for a responsible adult to drive you home on the day of the exam. This cannot be a taxi or a bus as you will need someone with you after the procedure.    7 days prior:  Talk to your prescribing provider: If you take blood thinners (such as Coumadin, Plavix, Xarelto, Eliquis, Lovenox, or others), these medications may need to be stopped temporarily before your procedure. Your prescribing provider will tell you what to do.   Talk to your prescribing provider: If you take prescription NSAIDS (such as  Sulindac, Celebrex, Mobic, Relafen). Your prescribing provider will tell you what to do.   If you have diabetes, you should request an early morning appointment.  Stop taking fiber supplements and multivitamins containing iron, or any other medications containing iron.  Fill your prescription for 2 containers of Golytely and 4 Dulcolax tablets at the pharmacy.  It is very important that you stay well hydrated during the colonoscopy prep. The Golytely bowel prep is designed to clean out your colon, but it will not provide hydration. While you are taking the prescribed prep, you should also drink 64 oz. of Gatorade or similar sports drink product to drink for staying hydrated. (Avoid red and purple colors)  Stop eating corn, popcorn, nuts and foods with seeds.   Begin a restricted or low fiber diet (see list below).    2 days prior:  Drink fluids to be well hydrated, this is important. Drink at least 4 large glasses of water, Gatorade, or other similar sports drinks.  It is a good idea to use Vaseline on the skin around your anus after each bowel movement to prevent irritation. Wet wipes also help to reduce irritation.   You can have a light, low-fiber breakfast. You may also have a light, low-fiber lunch.  No solid foods after 1pm. Begin a clear liquid diet. (see list below).  At 4pm, take 2 Dulcolax (bisacodyl) tablets.  At 5pm, mix and drink half of a jug of Golytely bowel prep. Drink an 8 oz. Glass of Golytely every 10-15 minutes until half of the jug is gone. Place the remainder of the Golytely in the refrigerator. Stay close to the bathroom.     1 day prior:  Continue clear liquid diet only (see examples below). Do not eat solid food this day.  Do not drink any red or purple colored drinks.  Stop taking NSAID pain relievers, such as Advil, Ibuprofen, Motrin, etc.  You may take Tylenol.  At 4 pm, take 2 Dulcolax (bisacodyl) tablets.  At 5 pm, drink the 2nd half of a jug of Golytely bowel prep. Drink an 8 oz.  glass of Golytely every 10-15 minutes until the 1st jug of Golytely is gone.   The Golytely bowel prep will not keep you hydrated. You should drink 8-10 glasses of clear liquids throughout the day.  Before you go to bed, mix the 2nd container of Golytely and place in the refrigerator for the morning.      Procedure day:  6 hours before your check-in time, drink an 8 oz. Glass of Golytely every 10-15 minutes until half of the 2nd jug of Golytely is gone. You WILL NOT drink the entire 2nd jug of Golytely.   You may take your necessary morning medications with sips of water  Do not take diabetes medicine by mouth until after your exam.  You may drink clear liquids only up until 2 hours before your arrival time.  Do not smoke, chew tobacco, or swallow anything, including water or gum for at least 2 hours before your arrival time. This is a safety issue. Your procedure could be cancelled if you do not follow directions.  Please do not wear jewelry (i.e. earrings, rings, necklaces, watches, etc) . Leave your purse, billfold, credit cards, and other valuables at home.   Please arrive with a responsible adult who can take you home after the test and stay with you for a minimum of 24 hours: The medicine used will make you sleepy and forgetful. If you do not have someone to take you home, we will cancel your procedure. If using public transportation you must have someone to ride with you.  Please perform your nebulizer treatments and airway clearance therapy in the morning prior to the procedure (if applicable).  If you have asthma, bring your inhalers.    CLEAR LIQUID DIET   You may have:  Water, tea, coffee (no milk or cream)  Soda pop, Gatorade (not red or purple)  Jell-O, Popsicles (no milk or fruit pieces - not red or purple)  Fat-free soup broth or bouillon  Plain hard candy, such as clear life savers (not red or purple)  Clear juices and fruit-flavored drinks, such as apple juice, white grape juice, Hi-C, and Pascual-Aid  (not red or purple)   Do not have:  Milk or milk products such as ice cream, malts or shakes, or coffee creamer  Red or purple drinks of any kind such as cranberry juice or grape juice. Avoid red or purple Jell-O, Popsicles, Pascual-Aid, sorbet, sherbet and candy  Juices with pulp such as orange, grapefruit, pineapple or tomato juice  Cream soups of any kind  Alcohol and beer  Protein drinks or protein powder     LOW FIBER DIET   You may have:    Starches: White bread, rolls, biscuits, croissants, [place] toast, white flour tortillas, waffles, pancakes, [place] toast; white rice, noodles, pasta, macaroni; cooked and peeled potatoes; plain crackers, saltines; cooked farina or cream of rice; puffed rice, corn flakes, Rice Krispies, Special K   Vegetables: tender cooked and canned, vegetable broths  Fruits and fruit juices: Strained fruit juice, canned fruit without seeds or skin (not pineapple), applesauce, pear sauce, ripe bananas, melons (not watermelon)   Milk products: Milk (plain or flavored), cheese, cottage cheese, yogurt (no berries), custard, ice cream    Proteins: Tender, well-cooked ground beef, lamb, veal, ham, pork, chicken, turkey, fish or organ meats; eggs; creamy peanut butter   Fats and condiments:  Margarine, butter, oils, mayonnaise, sour cream, salad dressing, plain gravy; spices, cooked herbs; sugar, clear jelly, honey, syrup   Snacks, sweets and drinks: Pretzels, hard candy; plain cakes and cookies (no nuts or seeds); gelatin, plain pudding, sherbet, Popsicles; coffee, tea, carbonated ( fizzy ) drinks Do not have:    Starches: Breads or rolls that contain nuts, seeds or fruit; whole wheat or whole grain breads that contain more than 1 gram of fiber per slice; cornbread; corn or whole wheat tortillas; potatoes with skin; brown rice, wild rice, kasha (buckwheat), and oatmeal   Vegetables: Any raw or steamed vegetables; vegetables with seeds; corn in any form   Fruits and fruit juices: Prunes, prune  juice, raisins and other dried fruits, berries and other fruits with seeds, canned pineapple juices with pulp such as orange, grapefruit, pineapple or tomato juice  Milk products: Any yogurt with nuts, seeds or berries   Proteins: Tough, fibrous meats with gristle; cooked dried beans, peas or lentils; crunchy peanut butter  Fats and condiments: Pickles, olives, relish, horseradish; jam, marmalade, preserves   Snacks, sweets and drinks: Popcorn, nuts, seeds, granola, coconut, candies made with nuts or seeds; all desserts that contain nuts, seeds, raisins and other dried fruits, coconut, whole grains or bran.      FAQ:    How do you know if your colon is cleaned out?   After completing the bowel prep, your bowel movements should be all liquid and yellow. Your bowel movements will look similar to urine in the toilet. If there are pieces of stool (poop) in the toilet, or if you can't see to the bottom of the toilet, please call our office for advice. Call [phone] and ask to speak with a nurse.   Why do you need a responsible  to take you home and stay with you?  We require a responsible adult to take you home for your safety. The sedation medicines used to relax you during the procedure can impair your judgement and reaction time, make you forgetful and possible a little unsteady. Do not drive, make any important decisions, or sign any legal documents for 24 hours after your procedure.   It is normal to feel bloated and gassy after your procedure. Walking will help move the air through your colon. You can take non-aspirin pain relievers that contain acetaminophen (Tylenol).   When can you eat after your procedure?  You may resume your normal diet when you feel ready, unless advised otherwise by the doctor performing your procedure. Do not drink alcohol for 24 hours after your procedure.   You many resume normal activities (work, exercise, etc.) after 24 hours.   When will you get test results?  You should  have your procedure results and any lab results (if applicable) by letter, MyChart message, or phone call within 2 weeks. If you have any questions, please call the doctor that referred you for the procedure.       Thank you for choosing [facility] for your procedure. If you are sent a survey regarding your care, please take the time to complete the questionnaire. We value your feedback!

## 2024-02-02 NOTE — LETTER
April 17, 2024      Giovanni ERLINDA Cedeno  [address] [facility] [place] [address]              Standard MiraLAX Bowel Prep  Prep Instructions for your Colonoscopy  Pre-Assessment Phone Number: [facility]; [phone] option 4    Please read these instructions carefully at least 7 days prior to your colonoscopy procedure. Be sure to follow all directions completely. The inside of your colon must be clean to allow for a complete examination for the presence of any growths, polyps, and/or abnormalities, as well as their biopsy or removal. A number of tips are included in order to make this part of the procedure as comfortable as possible.    Getting ready:   A nurse will call you to go over instructions and your health history.  It's important to complete the nurse assessment before your procedure. If you don't, your procedure might have to be canceled.  You must arrange for an adult to drive you home after your exam. Your colonoscopy cannot be done unless you have a ride. If you need to use public transportation, someone must ride with you and stay with you for a minimum of 24 hours.  Check with your insurance company to be sure they will cover this exam.  Go to the drug store and buy:  Four (4) - Dulcolax (Bisacodyl) 5mg tablets   8.3 ounce bottle of Miralax powder  64 ounces of Gatorade (not red or purple)     10 ounce bottle of clear magnesium citrate    7 days before the exam:  Talk to your prescribing provider: If you take blood thinners (such as Coumadin, Plavix, Xarelto, Eliquis, Lovenox, or others), these medications may need to be stopped temporarily before your procedure. Your prescribing provider will tell you what to do.   Talk to your prescribing provider: If you take prescription NSAIDS (such as Sulindac, Celebrex, Mobic, Relafen). Your prescribing provider will tell you what to do.   Stop taking fiber and iron supplements   Stop eating corn, popcorn, nuts and foods that  contain seeds. These can stay in the colon for many days and they can clog up the colonoscope.     3 days before the exam:  Begin a low-fiber diet (see below).   Drink at least 4 to 6 large glasses of water or sports drink (not red or purple) each day.     One day before the exam:  Only clear liquid diet is allowed (see below). No solid food should be eaten.   Drink at least 8 to 10 full glasses of clear liquids during the day.   Stop taking NSAID pain relievers, such as Advil, Ibuprofen, Motrin, etc.  You may take Tylenol.  Note: You will start drinking the colonoscopy prep solution at 5 PM. The timing of second step depends on your appointment arrival time. See Steps 1-2 below.    Step One:  At 4 PM, take 2 Dulcolax (Bisacodyl) tablets.   At 4 PM, mix the entire bottle of Miralax with 64 ounces of Gatorade in a pitcher and stir to dissolve the powder. Chill if desired, but do not add ice.   At 5 PM, start drinking an 8-ounce glass of the Miralax and Gatorade mixture every 15 minutes until the pitcher is HALF empty (about 4 glasses).  Store the rest in the refrigerator.   Bowel movements usually begin about one hour after your finish this first dose of Miralax. The stool is likely to be brown at this stage.   After you start drinking the solution, stay near a toilet. You may have watery stools (diarrhea), mild cramping, bloating , and nausea.   You may want to use Vaseline on the skin around your anus after each bowel movement to prevent irritation. You can also use wet wipes to prevent irritation.  Continue to drink clear liquids.     At 10 PM, take 2 Dulcolax (Bisacodyl) tablets  At 10 PM start drinking an 8-ounce glass of Miralax and Gatorade mixture every 15 minutes until the pitcher is empty (about 4 glasses).       Step Two:   If you arrive for your procedure before 11 AM:    6 hours prior to your scheduled arrival to the endoscopy unit drink 10 ounces of clear Magnesium Citrate    If you arrive for your  procedure after 11 AM:     At 6 AM on the day of the exam: drink 10 ounces of clear Magnesium Citrate        Day of exam:  You may drink clear liquids only up until 2 hours before your arrival time.  You may take your necessary morning medications with sips of water  Do not take diabetes medicine by mouth until after your exam.  Do not smoke, chew tobacco, or swallow anything, including water or gum for at least 2 hours before your arrival time. This is a safety issue. Your procedure could be cancelled if you do not follow directions.  Please do not wear jewelry (i.e. earrings, rings, necklaces, watches, etc) . Leave your purse, billfold, credit cards, and other valuables at home.    Please arrive with a responsible adult who can take you home after the test and stay with you for a minimum of 24 hours: The medicine used will make you sleepy and forgetful. If you do not have someone to take you home, we will cancel your procedure. If using public transportation you must have someone to ride with you.  Please perform your nebulizer treatments and airway clearance therapy in the morning prior to the procedure (if applicable).  If you have asthma, bring your inhalers.       CLEAR LIQUID DIET   You may have:    Water, tea, coffee (no milk or cream)  Soda pop, Gatorade (not red or purple)  Jell-O, Popsicles (no milk or fruit pieces - not red or purple)  Fat-free soup broth or bouillon  Plain hard candy, such as clear life savers (not red or purple)  Clear juices and fruit-flavored drinks, such as apple juice, white grape juice, Hi-C, and Pascual-Aid (not red or purple)   Do not have:    Milk or milk products such as ice cream, malts or shakes, or coffee creamer  Red or purple drinks of any kind such as cranberry juice or grape juice. Avoid red or purple Jell-O, Popsicles, Pascual-Aid, sorbet, sherbet and candy  Juices with pulp such as orange, grapefruit, pineapple or tomato juice  Cream soups of any kind  Alcohol and  beer  Protein drinks or protein powder     LOW FIBER DIET   You may have:    Starches: White bread, rolls, biscuits, croissants, Jess toast, white flour tortillas, waffles, pancakes, Italian toast; white rice, noodles, pasta, macaroni; cooked and peeled potatoes; plain crackers, saltines; cooked farina or cream of rice; puffed rice, corn flakes, Rice Krispies, Special K   Vegetables: tender cooked and canned, vegetable broths  Fruits and fruit juices: Strained fruit juice, canned fruit without seeds or skin (not pineapple), applesauce, pear sauce, ripe bananas, melons (not watermelon)   Milk products: Milk (plain or flavored), cheese, cottage cheese, yogurt (no berries), custard, ice cream    Proteins: Tender, well-cooked ground beef, lamb, veal, ham, pork, chicken, turkey, fish or organ meats; eggs; creamy peanut butter   Fats and condiments:  Margarine, butter, oils, mayonnaise, sour cream, salad dressing, plain gravy; spices, cooked herbs; sugar, clear jelly, honey, syrup   Snacks, sweets and drinks: Pretzels, hard candy; plain cakes and cookies (no nuts or seeds); gelatin, plain pudding, sherbet, Popsicles; coffee, tea, carbonated ( fizzy ) drinks Do not have:    Starches: Breads or rolls that contain nuts, seeds or fruit; whole wheat or whole grain breads that contain more than 1 gram of fiber per slice; cornbread; corn or whole wheat tortillas; potatoes with skin; brown rice, wild rice, kasha (buckwheat), and oatmeal   Vegetables: Any raw or steamed vegetables; vegetables with seeds; corn in any form   Fruits and fruit juices: Prunes, prune juice, raisins and other dried fruits, berries and other fruits with seeds, canned pineapple juices with pulp such as orange, grapefruit, pineapple or tomato juice  Milk products: Any yogurt with nuts, seeds or berries   Proteins: Tough, fibrous meats with gristle; cooked dried beans, peas or lentils; crunchy peanut butter  Fats and condiments: Pickles, olives, relish,  horseradish; jam, marmalade, preserves   Snacks, sweets and drinks: Popcorn, nuts, seeds, granola, coconut, candies made with nuts or seeds; all desserts that contain nuts, seeds, raisins and other dried fruits, coconut, whole grains or bran.      FAQ:    Why should Miralax be mixed with Gatorade or another clear sports drink?   It is important that your body does not develop an imbalance of electrolytes with the large volume of fluid in this prep. Gatorade/sports drinks contains those electrolytes.   Does Miralax have to be mixed with Gatorade?  Gatorade, Powerade, or any of the other sports drinks are acceptable. If you are diabetic, it is acceptable to use the low sugar options, such as Gatorade Zero, Powerade Zero, G2, etc.  Can I put ice in the Gatorade/Miralax mixture?  We prefer that you mix it and put it in the refrigerator to chill instead of using ice. The ice will melt and dilute the laxative.    Why should the Miralax prep be taken in several steps?   The stool is flushed out by a large wave of fluid going through the colon. Just sipping a large volume of the solution will not achieve the desired result. Studies have shown that two smaller waves (or more in some cases) are better than one large one.    Why are the second Miralax dose and Magnesium Citrate so close to the exam?   The intestine continues to produce mucus and waste. Longer intervals between the prep and the exam can lead to less than desired results. However, the stomach must be empty at the time of the exam in order to allow safe sedation. Therefore, there should be nothing by mouth 2 hours before the exam is started.   How do you know if your colon is cleaned out?   After completing the bowel prep, your bowel movements should be all liquid and yellow. Your bowel movements will look similar to urine in the toilet. If there are pieces of stool (poop) in the toilet, or if you can't see to the bottom of the toilet, please call our office for  advice. Call [phone] and ask to speak with a nurse.   Why do you need a responsible  to take you home and stay with you?  We require a responsible adult to take you home for your safety. The sedation medicines used to relax you during the procedure can impair your judgement and reaction time, and make you forgetful and possibly a little unsteady. Do not drive, make any important decisions, or sign any legal documents for 24 hours after your procedure.   It is normal to feel bloated and gassy after your procedure. Walking will help move the air through your colon. You can take non-aspirin pain relievers that contain acetaminophen (Tylenol).     When can you eat after your procedure?  You may resume your normal diet when you feel ready, unless advised otherwise by the doctor performing your procedure. Do not drink alcohol for 24 hours after your procedure.   You many resume normal activities (work, exercise, etc.) after 24 hours.     When will you get test results?  You should have your procedure results and any lab results (if applicable) by letter, MyChart message, or phone call within 2 weeks. If you have any questions, please call the doctor that referred you for the procedure.         Updated: 06/22/2022

## 2024-02-12 DIAGNOSIS — N40.1 BENIGN PROSTATIC HYPERPLASIA WITH URINARY FREQUENCY: ICD-10-CM

## 2024-02-12 DIAGNOSIS — R35.0 BENIGN PROSTATIC HYPERPLASIA WITH URINARY FREQUENCY: ICD-10-CM

## 2024-02-12 RX ORDER — FINASTERIDE 5 MG/1
TABLET, FILM COATED ORAL
Qty: 90 TABLET | Refills: 2 | Status: SHIPPED | OUTPATIENT
Start: 2024-02-12

## 2024-02-26 DIAGNOSIS — G25.81 RESTLESS LEGS SYNDROME (RLS): ICD-10-CM

## 2024-02-27 RX ORDER — CARBIDOPA AND LEVODOPA 25; 100 MG/1; MG/1
1 TABLET ORAL AT BEDTIME
Qty: 90 TABLET | Refills: 0 | Status: SHIPPED | OUTPATIENT
Start: 2024-02-27

## 2024-03-11 ENCOUNTER — TELEPHONE (OUTPATIENT)
Dept: GASTROENTEROLOGY | Facility: CLINIC | Age: 59
End: 2024-03-11
Payer: COMMERCIAL

## 2024-03-11 NOTE — TELEPHONE ENCOUNTER
Caller: Maverick    Reason for Reschedule/Cancellation   (please be detailed, any staff messages or encounters to note?): Schedule conflict      Prior to reschedule please review:  Ordering Provider: Juan Antonio Pak  Sedation Determined: Moderate  Does patient have any ASC Exclusions, please identify?: N      Notes on Cancelled Procedure:  Procedure: Lower Endoscopy [Colonoscopy]   Date: 4/1/24  Location: [facility]; [address], [place] [address]   Surgeon: Dania      Rescheduled: Yes,   Procedure: Lower Endoscopy [Colonoscopy]    Date: 5/17/24   Location: [facility]; [address], [place] [address]    Surgeon: José Antonio   Sedation Level Scheduled  Moderate ,  Reason for Sedation Level Order   Instructions updated and sent: Letter     Does patient need PAC or Pre -Op Rescheduled? : No       Did you cancel or rescheduled an EUS procedure? No.

## 2024-04-22 RX ORDER — BISACODYL 5 MG/1
TABLET, DELAYED RELEASE ORAL
Qty: 4 TABLET | Refills: 0 | Status: SHIPPED | OUTPATIENT
Start: 2024-04-22 | End: 2024-08-26

## 2024-04-22 NOTE — TELEPHONE ENCOUNTER
Norco use: Extended Golytely Bowel Prep . Instructions were sent via letter. Bowel prep was sent 4/22/2024 to    [id number] - [place] - [address] [facility] AT

## 2024-05-08 ENCOUNTER — OFFICE VISIT (OUTPATIENT)
Dept: URGENT CARE | Facility: URGENT CARE | Age: 59
End: 2024-05-08
Payer: COMMERCIAL

## 2024-05-08 ENCOUNTER — TELEPHONE (OUTPATIENT)
Dept: GASTROENTEROLOGY | Facility: CLINIC | Age: 59
End: 2024-05-08

## 2024-05-08 VITALS
RESPIRATION RATE: 16 BRPM | SYSTOLIC BLOOD PRESSURE: 108 MMHG | DIASTOLIC BLOOD PRESSURE: 74 MMHG | TEMPERATURE: 97.9 F | BODY MASS INDEX: 25.18 KG/M2 | WEIGHT: 141 LBS | HEART RATE: 67 BPM | OXYGEN SATURATION: 96 %

## 2024-05-08 DIAGNOSIS — R68.83 CHILLS: ICD-10-CM

## 2024-05-08 DIAGNOSIS — R21 GROIN RASH: Primary | ICD-10-CM

## 2024-05-08 PROCEDURE — 93000 ELECTROCARDIOGRAM COMPLETE: CPT | Performed by: EMERGENCY MEDICINE

## 2024-05-08 PROCEDURE — 99214 OFFICE O/P EST MOD 30 MIN: CPT | Performed by: EMERGENCY MEDICINE

## 2024-05-08 RX ORDER — KETOCONAZOLE 20 MG/G
CREAM TOPICAL DAILY
Qty: 60 G | Refills: 0 | Status: SHIPPED | OUTPATIENT
Start: 2024-05-08 | End: 2024-06-05

## 2024-05-08 RX ORDER — TRIAMCINOLONE ACETONIDE 1 MG/G
CREAM TOPICAL 2 TIMES DAILY
Qty: 30 G | Refills: 1 | Status: SHIPPED | OUTPATIENT
Start: 2024-05-08 | End: 2024-05-15

## 2024-05-08 NOTE — TELEPHONE ENCOUNTER
Caller: LVM natalia Temple    Reason for Reschedule/Cancellation   (please be detailed, any staff messages or encounters to note?): Patient needs mac per inbasket message due to low cardiac output.       Prior to reschedule please review:  Ordering Provider:     Juan Antonio Pak PA-C in [facility]     Sedation Determined: mac  Does patient have any ASC Exclusions, please identify?: n      Notes on Cancelled Procedure:  Procedure: Lower Endoscopy [Colonoscopy]   Date: 05/17/2024  Location: [facility]; [address] [place] [address] [address], [place] [address]   Surgeon: José Antonio      Rescheduled: No,         Did you cancel or rescheduled an EUS procedure? No.

## 2024-05-08 NOTE — PROGRESS NOTES
Assessment & Plan     Diagnosis:    ICD-10-CM    1. Groin rash  R21 triamcinolone (KENALOG) 0.1 % external cream     ketoconazole (NIZORAL) 2 % external cream      2. Chills  R68.83 EKG 12-lead complete w/read - Clinics          Medical Decision Making:  Giovanni Cedeno is a 59 year old male presents today with concern of a rash.  The rash is blanching and non-petechial, is pruritic.  The patient is nontoxic and well-appearing. He notes this has been a problem for awhile for him and he responds well to Triamcinolone.  Does not appear candidal but is in the groin area. Will have him trial the triamcinolone but if not improving he can use the ketoconazole.  Symptomatic care is recommended as needed per discharge instructions.  Close follow-up with the patient's PMD was recommended.  I also recommended going to the ER if any worsening, high fever, sores in the mouth, difficulty breathing or any worsening.      Patient also has chills and sweats, is concerned about a heart problem has had similar symptoms in the past when he had a heart attack.  EKG here without acute ischemic changes.  He has no chest pain, shortness of breath or other cardiopulmonary symptoms.  Unclear significance of his chills and sweats, he is afebrile here, this does not appear to be cellulitis, he has no other symptoms other than the rash.  Will monitor symptoms, go to the ER if chest pain or shortness of breath develop.    32 minutes spent by me on the date of the encounter doing chart review, review of outside records, review of test results, interpretation of tests, patient visit, and documentation       Eriberto Beckford PA-C  [facility]    Subjective     Giovanni Cedeno is a 59 year old male who presents to clinic today for the following health issues:  Chief Complaint   Patient presents with    Rash     Rash on the groin for about a week. Itching, redness. Pt has been seen before, topical cream was working but pt ran  out.     Fever     Chills/sweats for 3 days.        HPI  Patient reports rash on his groin for about 1 week; has had this in the past and was put on Triamcinolone cream which helped. He notes that it is itchy.  No new exposures, penile discharge, testicle pains, new medications or new rash.    Moreover, patient also complains of chills, sweats, this has been going on for about three days. No other associated symptoms with it but he is concerned as he had similar symptoms when he had a heart attack in the past.  He is currently not having any chest pain or shortness of breath, abdominal pain, lightheadedness, dizziness.  He notes that he gets goosebumps whenever he stands up.  No measured fevers at home.  Denies any joint pains, lethargy or other symptoms.      Review of Systems    See HPI    Objective      Vitals: /74 (BP Location: Left arm, Cuff Size: Adult Small)   Pulse 67   Temp 97.9  F (36.6  C) (Tympanic)   Resp 16   Wt 64 kg (141 lb)   SpO2 96%   BMI 25.18 kg/m      Patient Vitals for the past 24 hrs:   BP Temp Temp src Pulse Resp SpO2 Weight   05/08/24 1347 108/74 97.9  F (36.6  C) Tympanic 67 16 96 % 64 kg (141 lb)       Vital signs reviewed by: Eriberto Beckford PA-C    Physical Exam   Constitutional: Patient is alert and cooperative. No acute distress.  Mouth: Mucous membranes are moist. Normal tongue and tonsil. Posterior oropharynx is clear.  Cardiovascular: Regular rate and rhythm. No murmurs, rubs or gallops.  Pulmonary/Chest: Lungs are clear to auscultation throughout. Effort normal. No respiratory distress. No wheezes, rales or rhonchi.  Neurological: Alert and oriented x3.   Skin: Erythematous patchy macular rash in the groin area, spares the scrotum and genitalia.  No vesicles, petechiae, chancres, fluctuance or areas of pointing.  No inguinal lymphadenopathy.  Psychiatric:The patient has a normal mood and affect.     EKG - Reviewed and interpreted by: Eriberto Beckford PA-C  Rate: 62  bpm   NM: 132 ms  QTc: 405 ms  Normal sinus rhythm. Normal intervals. No acute ST/T changes c/w ischemia, no LVH by voltage criteria.      Eriberto Beckford PA-C, May 8, 2024

## 2024-05-08 NOTE — PATIENT INSTRUCTIONS
7 days max with the steroid cream (Triamcinolone). If not working or getting worse you can use The Ketoconazole cream.     If mild rash you can try over the counter steroid cream (hydrocortisone). Do not place steroid creams on the genitals.

## 2024-05-09 ENCOUNTER — TELEPHONE (OUTPATIENT)
Dept: FAMILY MEDICINE | Facility: CLINIC | Age: 59
End: 2024-05-09
Payer: COMMERCIAL

## 2024-05-10 NOTE — TELEPHONE ENCOUNTER
Spoke with patient and he requested a call back Monday to reschedule. He is aware of the procedure being cancelled.

## 2024-06-03 ENCOUNTER — OFFICE VISIT (OUTPATIENT)
Dept: ORTHOPEDICS | Facility: CLINIC | Age: 59
End: 2024-06-03
Payer: COMMERCIAL

## 2024-06-03 VITALS — BODY MASS INDEX: 24.98 KG/M2 | WEIGHT: 141 LBS | HEIGHT: 63 IN

## 2024-06-03 DIAGNOSIS — M77.02 MEDIAL EPICONDYLITIS OF LEFT ELBOW: Primary | ICD-10-CM

## 2024-06-03 PROCEDURE — 20551 NJX 1 TENDON ORIGIN/INSJ: CPT | Mod: LT | Performed by: ORTHOPAEDIC SURGERY

## 2024-06-03 RX ORDER — TRIAMCINOLONE ACETONIDE 40 MG/ML
40 INJECTION, SUSPENSION INTRA-ARTICULAR; INTRAMUSCULAR
Status: SHIPPED | OUTPATIENT
Start: 2024-06-03

## 2024-06-03 RX ORDER — BUPIVACAINE HYDROCHLORIDE 2.5 MG/ML
1 INJECTION, SOLUTION INFILTRATION; PERINEURAL
Status: SHIPPED | OUTPATIENT
Start: 2024-06-03

## 2024-06-03 RX ADMIN — TRIAMCINOLONE ACETONIDE 40 MG: 40 INJECTION, SUSPENSION INTRA-ARTICULAR; INTRAMUSCULAR at 15:11

## 2024-06-03 RX ADMIN — BUPIVACAINE HYDROCHLORIDE 1 ML: 2.5 INJECTION, SOLUTION INFILTRATION; PERINEURAL at 15:11

## 2024-06-03 ASSESSMENT — PAIN SCALES - GENERAL: PAINLEVEL: EXTREME PAIN (9)

## 2024-06-03 NOTE — LETTER
6/3/2024         RE: Giovanni Cedeno  [id number]  [place] [address]        Dear Colleague,    Thank you for referring your patient, Giovanni Cedeno, to the [facility] [place]. Please see a copy of my visit note below.    Chief Complaint:   Chief Complaint   Patient presents with     Left Elbow - Pain     Medial left elbow pain. Last injection 1/18/2024 worked well, up until 2 weeks ago.         HPI: Maverick Cedeno is a 59 year old male , right -hand dominant, who presents for evaluation and management of left elbow pain. Last left elbow injection 1/18/2024 for medial epicondylitis, prior to then was 12/5/2019,. Pain along the inner (medial) elbow, aggravated with activities, work. Treatment with ice, heat. Last injection worked until 2 weeks ago, then pain returned. He had to take off work the past week. He'd like repeat left elbow injection today.    We've followed in the past for both elbows, medial and lateral epicondylitis.     Last right elbow injection 3/1/2023 medial epicondyle.    Prior right elbow lateral epicondylitis treated with numerous injections, eventual surgery 5/2014.    He is a .      He reports having moderate-severe  pain/discomfort at the elbow. He denies numbness or tingling.   Pain severity: 9/10 with activities. Not as bad at rest.  Pain quality: aching,sharp  Frequency of symptoms: frequently  Associated symptoms: denies  Aggravated by: bending the elbow, washing his face, lifting  Relieved by: ice, rest.    Prior history of related problems: previous right elbow lateral and medial epicondylitis, left elbow lateral and medial epicondylitis.    Usual level of work activity: heavy  - .    Past medical history:  has a past medical history of Arthritis.   Patient Active Problem List   Diagnosis     Restless leg syndrome     Knee bursitis     Pure hypercholesterolemia     Major depression in partial  remission (H24)     Pre-diabetes     Epicondylitis, lateral humeral     Immunization reaction     Chronic pain syndrome     Chronic right shoulder pain     Abdominal pain, left upper quadrant     Pain of toe of right foot     Acute midline low back pain without sciatica     Upper back pain     Mild intermittent asthma, unspecified whether complicated     Incomplete tear of right rotator cuff     Impingement syndrome of shoulder, right     Degeneration of lumbar or lumbosacral intervertebral disc     Adjustment disorder with depressed mood     Mixed hyperlipidemia     Inguinal hernia     Kidney stone     HFrEF (heart failure with reduced ejection fraction) (H)       Past surgical history:  has a past surgical history that includes Combined Cystoscopy, Retrogrades, Ureteroscopy, Laser Holmium Lithotripsy Ureter(S), Insert Stent (Right, 10/15/2021) and Combined Cystoscopy, Retrogrades, Ureteroscopy, Laser Holmium Lithotripsy Ureter(S), Insert Stent (Right, 10/27/2021).     Medications:   Current Outpatient Medications:      ASPIRIN LOW DOSE 81 MG EC tablet, TAKE 1 TABLET(81 MG) BY MOUTH DAILY, Disp: 90 tablet, Rfl: 1     bisacodyl (DULCOLAX) 5 MG EC tablet, Two days prior to exam take two (2) tablets at 4pm. One day prior to exam take two (2) tablets at 4pm (Patient not taking: Reported on 5/8/2024), Disp: 4 tablet, Rfl: 0     carbidopa-levodopa (SINEMET)  MG tablet, TAKE 1 TABLET BY MOUTH AT BEDTIME, Disp: 90 tablet, Rfl: 0     carvedilol (COREG) 6.25 MG tablet, Take 1 tablet (6.25 mg) by mouth 2 times daily (with meals), Disp: 180 tablet, Rfl: 1     clotrimazole-betamethasone (LOTRISONE) 1-0.05 % external cream, Apply topically 2 times daily (Patient not taking: Reported on 5/8/2024), Disp: 45 g, Rfl: 1     cyclobenzaprine (FLEXERIL) 5 MG tablet, Take 1 tablet (5 mg) by mouth 3 times daily as needed for muscle spasms, Disp: 30 tablet, Rfl: 1     ENTRESTO 24-26 MG per tablet, Take 2 tablets by mouth 2 times  daily, Disp: , Rfl:      ezetimibe (ZETIA) 10 MG tablet, Take 1 tablet (10 mg) by mouth daily, Disp: 90 tablet, Rfl: 0     finasteride (PROSCAR) 5 MG tablet, TAKE 1 TABLET BY MOUTH EVERY DAY, Disp: 90 tablet, Rfl: 2     FLUoxetine (PROZAC) 40 MG capsule, TAKE 2 CAPSULES(80 MG) BY MOUTH DAILY, Disp: 60 capsule, Rfl: 0     furosemide (LASIX) 20 MG tablet, TAKE 1 TABLET(20 MG) BY MOUTH EVERY MORNING, Disp: 90 tablet, Rfl: 0     gabapentin (NEURONTIN) 300 MG capsule, TAKE 2 CAPSULES BY MOUTH EVERY NIGHT AT BEDTIME, Disp: 180 capsule, Rfl: 0     HYDROcodone-acetaminophen (NORCO) 7.5-325 MG per tablet, Take 1 tablet by mouth every 4 hours as needed for pain maximum  4  tablet(s) per day, Disp: 30 tablet, Rfl: 0     ketoconazole (NIZORAL) 2 % external cream, Apply topically daily for 28 days, Disp: 60 g, Rfl: 0     Misc Natural Products (OSTEO BI-FLEX TRIPLE STRENGTH PO), Take 1 tablet by mouth daily (Patient not taking: Reported on 5/8/2024), Disp: , Rfl:      nitroGLYcerin (NITROSTAT) 0.4 MG sublingual tablet, Place 0.4 mg under the tongue (Patient not taking: Reported on 5/8/2024), Disp: , Rfl:      polyethylene glycol (GOLYTELY) 236 g suspension, Take as directed. Two days before your exam fill the first container with water. Cover and shake until mixed well. At 5:00pm drink one 8oz glass every 10-15 minutes until half (1/2) of the first container is empty. Store the remainder in the refrigerator. One day before your exam at 5:00pm drink the second half of the first container until it is gone. Before you go to bed mix the second container with water and put in refrigerator. Six hours before your check in time drink one 8oz glass every 10-15 minutes until half of container is empty. Discard the remainder of solution. (Patient not taking: Reported on 5/8/2024), Disp: 8000 mL, Rfl: 0     rosuvastatin (CRESTOR) 40 MG tablet, Take 40 mg by mouth daily, Disp: , Rfl:      tadalafil (CIALIS) 20 MG tablet, Take 1 tablet (20 mg)  "by mouth daily as needed, Disp: 10 tablet, Rfl: 3     tamsulosin (FLOMAX) 0.4 MG capsule, TAKE 1 CAPSULE(0.4 MG) BY MOUTH DAILY, Disp: 30 capsule, Rfl: 0     VENTOLIN  (90 Base) MCG/ACT inhaler, INHALE 2 PUFFS INTO THE LUNGS EVERY 6 HOURS AS NEEDED FOR SHORTNESS OF BREATH OR DIFFICULT BREATHING OR WHEEZING, Disp: 18 g, Rfl: 0     warfarin ANTICOAGULANT (COUMADIN) 5 MG tablet, Take 5 mg three days a week and 2.5 mg all other days, Disp: , Rfl:     Current Facility-Administered Medications:      BUPivacaine (MARCAINE) 0.25 % injection 1 mL, 1 mL, , , Jose G Blanchard MD, 1 mL at 01/18/24 1552     triamcinolone (KENALOG-40) injection 40 mg, 40 mg, , , Jose G Blanchard MD, 40 mg at 01/18/24 1552     Allergies:   No Known Allergies     Family History: family history includes Diabetes in his father; Heart Disease in his father.     Social History: .  reports that he has never smoked. He has never used smokeless tobacco. He reports that he does not drink alcohol and does not use drugs.    Review of Systems:     Denies numbness, tingling, parasthesias.   Denies headaches.   Denies fevers, chills, night sweats   Denies chest pain.   Denies shortness of breath.   Denies any skin problems, abrasions, rashes, irritation.      Physical Exam  GENERAL APPEARANCE: healthy, alert, no distress. Accompanied by his friend, Geraldo.  SKIN: no suspicious lesions or rashes  RESPIRATORY: No increased work of breathing.  NEURO: Normal strength and tone, mentation intact and speech normal  PSYCH:  mentation appears normal and affect normal. Not anxious.    Ht 1.594 m (5' 2.75\")   Wt 64 kg (141 lb)   BMI 25.18 kg/m         LEFT UPPER EXTREMITY:    Sensation intact to light touch in median, radial, ulnar and axillary nerve distributions  Palpable 2+ radial pulse, brisk capillary refill to all fingers, wwp  Intact epl fpl fdp edc wrist flexion/extension biceps triceps deltoid    ELBOW:  Skin intact. No open " "wounds. No skin maceration. Lateral based incision from previous surgery.  Inspection: no swelling, no ecchymosis, no olecranon bursa swelling, no distal bicep tendon defect  Tender: medial epicondyle and common flexor tendon  Non-tender: lateral epicondyle, common extensor tendon, extensor muscle of forearm, flexor muscle of forearm, supracondylar notch, olecranon bursa, distal bicep tendon and radial head/neck  Range of Motion: all normal  Strength: elbow strength full  Special tests: no pain with resisted wrist extension, no pain with resisted middle finger extension, medial elbow pain with resisted wrist flexion, supination and pronation   There is no gross deformity in the area.          X-rays: no new images today.      Assessment: 59 year old male , right -hand dominant, with chronic left medial elbow pain, medial epicondylitis.    Plan:   * discussed with patient history and clinical exam consistent with \"golfer's\" elbow, or medial epicondylitis, which is tendonitis at the medial or inner aspect of the elbow. This is where the flexor tendons originate for the wrist.    Treatment includes:  * rest  * activity modification - avoid aggravating activities and reduce strenuous activities for 6-8 weeks  * ice, ice massage  * Physical therapy, modalities as indicated including ultrasound, massage, iontophoresis  * NDAIDS regularly three times daily x2-3 weeks  * cortisone injection discussed, placed at point of maximal tenderness . Patient elects today.  * return to clinic as needed.  .    Hand / Upper Extremity Injection/Arthrocentesis: L elbow    Date/Time: 6/3/2024 3:11 PM    Performed by: Ammon Daniel PA  Authorized by: Jose G Blanchard MD    Indications:  Pain and tendon swelling  Needle Size:  25 G  Approach:  Lateral  Condition: epicondylitis, medial      Site:  L elbow  Medications:  40 mg triamcinolone 40 MG/ML; 1 mL BUPivacaine 0.25 %  Outcome:  Tolerated well, no immediate " complications  Procedure discussed: discussed risks, benefits, and alternatives    Timeout: timeout called immediately prior to procedure    Prep: patient was prepped and draped in usual sterile fashion          Jose G Blanchard M.D., M.S.  Dept. of Orthopaedic Surgery  [facility]      Again, thank you for allowing me to participate in the care of your patient.        Sincerely,        Jose G Blanchard MD

## 2024-06-03 NOTE — PROGRESS NOTES
Chief Complaint:   Chief Complaint   Patient presents with    Left Elbow - Pain     Medial left elbow pain. Last injection 1/18/2024 worked well, up until 2 weeks ago.         HPI: Maverick Cedeno is a 59 year old male , right -hand dominant, who presents for evaluation and management of left elbow pain. Last left elbow injection 1/18/2024 for medial epicondylitis, prior to then was 12/5/2019,. Pain along the inner (medial) elbow, aggravated with activities, work. Treatment with ice, heat. Last injection worked until 2 weeks ago, then pain returned. He had to take off work the past week. He'd like repeat left elbow injection today.    We've followed in the past for both elbows, medial and lateral epicondylitis.     Last right elbow injection 3/1/2023 medial epicondyle.    Prior right elbow lateral epicondylitis treated with numerous injections, eventual surgery 5/2014.    He is a .      He reports having moderate-severe  pain/discomfort at the elbow. He denies numbness or tingling.   Pain severity: 9/10 with activities. Not as bad at rest.  Pain quality: aching,sharp  Frequency of symptoms: frequently  Associated symptoms: denies  Aggravated by: bending the elbow, washing his face, lifting  Relieved by: ice, rest.    Prior history of related problems: previous right elbow lateral and medial epicondylitis, left elbow lateral and medial epicondylitis.    Usual level of work activity: heavy  - .    Past medical history:  has a past medical history of Arthritis.   Patient Active Problem List   Diagnosis    Restless leg syndrome    Knee bursitis    Pure hypercholesterolemia    Major depression in partial remission (H24)    Pre-diabetes    Epicondylitis, lateral humeral    Immunization reaction    Chronic pain syndrome    Chronic right shoulder pain    Abdominal pain, left upper quadrant    Pain of toe of right foot    Acute midline low back pain without sciatica    Upper back  pain    Mild intermittent asthma, unspecified whether complicated    Incomplete tear of right rotator cuff    Impingement syndrome of shoulder, right    Degeneration of lumbar or lumbosacral intervertebral disc    Adjustment disorder with depressed mood    Mixed hyperlipidemia    Inguinal hernia    Kidney stone    HFrEF (heart failure with reduced ejection fraction) (H)       Past surgical history:  has a past surgical history that includes Combined Cystoscopy, Retrogrades, Ureteroscopy, Laser Holmium Lithotripsy Ureter(S), Insert Stent (Right, 10/15/2021) and Combined Cystoscopy, Retrogrades, Ureteroscopy, Laser Holmium Lithotripsy Ureter(S), Insert Stent (Right, 10/27/2021).     Medications:   Current Outpatient Medications:     ASPIRIN LOW DOSE 81 MG EC tablet, TAKE 1 TABLET(81 MG) BY MOUTH DAILY, Disp: 90 tablet, Rfl: 1    bisacodyl (DULCOLAX) 5 MG EC tablet, Two days prior to exam take two (2) tablets at 4pm. One day prior to exam take two (2) tablets at 4pm (Patient not taking: Reported on 5/8/2024), Disp: 4 tablet, Rfl: 0    carbidopa-levodopa (SINEMET)  MG tablet, TAKE 1 TABLET BY MOUTH AT BEDTIME, Disp: 90 tablet, Rfl: 0    carvedilol (COREG) 6.25 MG tablet, Take 1 tablet (6.25 mg) by mouth 2 times daily (with meals), Disp: 180 tablet, Rfl: 1    clotrimazole-betamethasone (LOTRISONE) 1-0.05 % external cream, Apply topically 2 times daily (Patient not taking: Reported on 5/8/2024), Disp: 45 g, Rfl: 1    cyclobenzaprine (FLEXERIL) 5 MG tablet, Take 1 tablet (5 mg) by mouth 3 times daily as needed for muscle spasms, Disp: 30 tablet, Rfl: 1    ENTRESTO 24-26 MG per tablet, Take 2 tablets by mouth 2 times daily, Disp: , Rfl:     ezetimibe (ZETIA) 10 MG tablet, Take 1 tablet (10 mg) by mouth daily, Disp: 90 tablet, Rfl: 0    finasteride (PROSCAR) 5 MG tablet, TAKE 1 TABLET BY MOUTH EVERY DAY, Disp: 90 tablet, Rfl: 2    FLUoxetine (PROZAC) 40 MG capsule, TAKE 2 CAPSULES(80 MG) BY MOUTH DAILY, Disp: 60  capsule, Rfl: 0    furosemide (LASIX) 20 MG tablet, TAKE 1 TABLET(20 MG) BY MOUTH EVERY MORNING, Disp: 90 tablet, Rfl: 0    gabapentin (NEURONTIN) 300 MG capsule, TAKE 2 CAPSULES BY MOUTH EVERY NIGHT AT BEDTIME, Disp: 180 capsule, Rfl: 0    HYDROcodone-acetaminophen (NORCO) 7.5-325 MG per tablet, Take 1 tablet by mouth every 4 hours as needed for pain maximum  4  tablet(s) per day, Disp: 30 tablet, Rfl: 0    ketoconazole (NIZORAL) 2 % external cream, Apply topically daily for 28 days, Disp: 60 g, Rfl: 0    Misc Natural Products (OSTEO BI-FLEX TRIPLE STRENGTH PO), Take 1 tablet by mouth daily (Patient not taking: Reported on 5/8/2024), Disp: , Rfl:     nitroGLYcerin (NITROSTAT) 0.4 MG sublingual tablet, Place 0.4 mg under the tongue (Patient not taking: Reported on 5/8/2024), Disp: , Rfl:     polyethylene glycol (GOLYTELY) 236 g suspension, Take as directed. Two days before your exam fill the first container with water. Cover and shake until mixed well. At 5:00pm drink one 8oz glass every 10-15 minutes until half (1/2) of the first container is empty. Store the remainder in the refrigerator. One day before your exam at 5:00pm drink the second half of the first container until it is gone. Before you go to bed mix the second container with water and put in refrigerator. Six hours before your check in time drink one 8oz glass every 10-15 minutes until half of container is empty. Discard the remainder of solution. (Patient not taking: Reported on 5/8/2024), Disp: 8000 mL, Rfl: 0    rosuvastatin (CRESTOR) 40 MG tablet, Take 40 mg by mouth daily, Disp: , Rfl:     tadalafil (CIALIS) 20 MG tablet, Take 1 tablet (20 mg) by mouth daily as needed, Disp: 10 tablet, Rfl: 3    tamsulosin (FLOMAX) 0.4 MG capsule, TAKE 1 CAPSULE(0.4 MG) BY MOUTH DAILY, Disp: 30 capsule, Rfl: 0    VENTOLIN  (90 Base) MCG/ACT inhaler, INHALE 2 PUFFS INTO THE LUNGS EVERY 6 HOURS AS NEEDED FOR SHORTNESS OF BREATH OR DIFFICULT BREATHING OR  "WHEEZING, Disp: 18 g, Rfl: 0    warfarin ANTICOAGULANT (COUMADIN) 5 MG tablet, Take 5 mg three days a week and 2.5 mg all other days, Disp: , Rfl:     Current Facility-Administered Medications:     BUPivacaine (MARCAINE) 0.25 % injection 1 mL, 1 mL, , , Jose G Blanchard MD, 1 mL at 01/18/24 1552    triamcinolone (KENALOG-40) injection 40 mg, 40 mg, , , Jose G Blanchard MD, 40 mg at 01/18/24 1552     Allergies:   No Known Allergies     Family History: family history includes Diabetes in his father; Heart Disease in his father.     Social History: .  reports that he has never smoked. He has never used smokeless tobacco. He reports that he does not drink alcohol and does not use drugs.    Review of Systems:     Denies numbness, tingling, parasthesias.   Denies headaches.   Denies fevers, chills, night sweats   Denies chest pain.   Denies shortness of breath.   Denies any skin problems, abrasions, rashes, irritation.      Physical Exam  GENERAL APPEARANCE: healthy, alert, no distress. Accompanied by his friend, Geraldo.  SKIN: no suspicious lesions or rashes  RESPIRATORY: No increased work of breathing.  NEURO: Normal strength and tone, mentation intact and speech normal  PSYCH:  mentation appears normal and affect normal. Not anxious.    Ht 1.594 m (5' 2.75\")   Wt 64 kg (141 lb)   BMI 25.18 kg/m         LEFT UPPER EXTREMITY:    Sensation intact to light touch in median, radial, ulnar and axillary nerve distributions  Palpable 2+ radial pulse, brisk capillary refill to all fingers, wwp  Intact epl fpl fdp edc wrist flexion/extension biceps triceps deltoid    ELBOW:  Skin intact. No open wounds. No skin maceration. Lateral based incision from previous surgery.  Inspection: no swelling, no ecchymosis, no olecranon bursa swelling, no distal bicep tendon defect  Tender: medial epicondyle and common flexor tendon  Non-tender: lateral epicondyle, common extensor tendon, extensor muscle of forearm, " "flexor muscle of forearm, supracondylar notch, olecranon bursa, distal bicep tendon and radial head/neck  Range of Motion: all normal  Strength: elbow strength full  Special tests: no pain with resisted wrist extension, no pain with resisted middle finger extension, medial elbow pain with resisted wrist flexion, supination and pronation   There is no gross deformity in the area.          X-rays: no new images today.      Assessment: 59 year old male , right -hand dominant, with chronic left medial elbow pain, medial epicondylitis.    Plan:   * discussed with patient history and clinical exam consistent with \"golfer's\" elbow, or medial epicondylitis, which is tendonitis at the medial or inner aspect of the elbow. This is where the flexor tendons originate for the wrist.    Treatment includes:  * rest  * activity modification - avoid aggravating activities and reduce strenuous activities for 6-8 weeks  * ice, ice massage  * Physical therapy, modalities as indicated including ultrasound, massage, iontophoresis  * NDAIDS regularly three times daily x2-3 weeks  * cortisone injection discussed, placed at point of maximal tenderness . Patient elects today.  * return to clinic as needed.  .    Hand / Upper Extremity Injection/Arthrocentesis: L elbow    Date/Time: 6/3/2024 3:11 PM    Performed by: Ammon Daniel PA  Authorized by: Jose G Blanchard MD    Indications:  Pain and tendon swelling  Needle Size:  25 G  Approach:  Lateral  Condition: epicondylitis, medial      Site:  L elbow  Medications:  40 mg triamcinolone 40 MG/ML; 1 mL BUPivacaine 0.25 %  Outcome:  Tolerated well, no immediate complications  Procedure discussed: discussed risks, benefits, and alternatives    Timeout: timeout called immediately prior to procedure    Prep: patient was prepped and draped in usual sterile fashion          Jose G Blanchard M.D., M.S.  Dept. of Orthopaedic Surgery  [facility]  "

## 2024-08-15 ENCOUNTER — OFFICE VISIT (OUTPATIENT)
Dept: URGENT CARE | Facility: URGENT CARE | Age: 59
End: 2024-08-15
Payer: COMMERCIAL

## 2024-08-15 VITALS
TEMPERATURE: 98.7 F | WEIGHT: 149.2 LBS | HEART RATE: 87 BPM | OXYGEN SATURATION: 96 % | RESPIRATION RATE: 12 BRPM | DIASTOLIC BLOOD PRESSURE: 86 MMHG | SYSTOLIC BLOOD PRESSURE: 129 MMHG | BODY MASS INDEX: 26.64 KG/M2

## 2024-08-15 DIAGNOSIS — M54.6 ACUTE LEFT-SIDED THORACIC BACK PAIN: Primary | ICD-10-CM

## 2024-08-15 PROCEDURE — 99213 OFFICE O/P EST LOW 20 MIN: CPT | Mod: 25 | Performed by: NURSE PRACTITIONER

## 2024-08-15 PROCEDURE — 96372 THER/PROPH/DIAG INJ SC/IM: CPT | Performed by: NURSE PRACTITIONER

## 2024-08-15 RX ORDER — CYCLOBENZAPRINE HCL 10 MG
10 TABLET ORAL 3 TIMES DAILY PRN
Qty: 21 TABLET | Refills: 0 | Status: SHIPPED | OUTPATIENT
Start: 2024-08-15 | End: 2024-08-22

## 2024-08-15 RX ORDER — KETOROLAC TROMETHAMINE 30 MG/ML
30 INJECTION, SOLUTION INTRAMUSCULAR; INTRAVENOUS ONCE
Status: COMPLETED | OUTPATIENT
Start: 2024-08-15 | End: 2024-08-15

## 2024-08-15 RX ADMIN — KETOROLAC TROMETHAMINE 30 MG: 30 INJECTION, SOLUTION INTRAMUSCULAR; INTRAVENOUS at 13:10

## 2024-08-15 ASSESSMENT — ENCOUNTER SYMPTOMS
NECK STIFFNESS: 0
BACK PAIN: 1
CHILLS: 0
FATIGUE: 0
NECK PAIN: 0
FEVER: 0

## 2024-08-15 NOTE — PATIENT INSTRUCTIONS
Use flexeril as needed. Recommend you follow up with your PCP and cardiology as there are a variety of medications that you should still be on.

## 2024-08-15 NOTE — NURSING NOTE
Clinic Administered Medication Documentation        Patient was given ketorolac. Prior to medication administration, verified patient's identity using patient s name and date of birth. Please see MAR and medication order for additional information. Patient instructed to remain in clinic for 15 minutes and report any adverse reaction to staff immediately.    Vial/Syringe: Single dose vial. Was entire vial of medication used? Yes    Stefanie Dixon, CMA

## 2024-08-15 NOTE — PROGRESS NOTES
"Assessment & Plan       ICD-10-CM    1. Acute left-sided thoracic back pain  M54.6 cyclobenzaprine (FLEXERIL) 10 MG tablet     diclofenac (VOLTAREN) 1 % topical gel     ketorolac (TORADOL) injection 30 mg           Patient instructions:  Use flexeril as needed. Recommend you follow up with your PCP and cardiology as there are a variety of medications that you should still be on.     Medical decision making:  Pt presents with left sided back pain after injuring it installing a closet. Does appear to have muscle spasm that was noted on exam. Will give patient short supply of flexeril. He is also requesting oxycodone which I stated would need to be by his primary or a pain clinic as he appears to have an old pain agreement that . Pt reports he's taken himself off all his normal medications \"because I'm fine\". Notified patient that it is important to be on his chronic medications for his future health. Offered him help with follow up as well as referral for our spine and pain specialists. Pt declined.     No follow-ups on file.    At the end of the encounter, I discussed results, diagnosis, medications. Discussed red flags for immediate return to clinic/ER, as well as indications for follow up if no improvement. Patient understood and agreed to plan. Patient was stable for discharge.    Meli Temple is a 59 year old male who presents to clinic today the following health issues:  Chief Complaint   Patient presents with    Back Pain     Injured back 1 week ago- has been seeing chiropractor      Pt presents with left mid back pain after installing a closet last week. Reports he has chronic back pain, but that is usually lower back pain. He states he's taken himself off all his chronic medications as he \"feels fine\". Requesting percocet and flexeril. Denies any numbness /tingling to fingers or toes, no loss of bowel or bladder control, no fevers.             Review of Systems   Constitutional:  Negative for " chills, fatigue and fever.   Musculoskeletal:  Positive for back pain. Negative for neck pain and neck stiffness.       Problem List:  2022-05: HFrEF (heart failure with reduced ejection fraction) (H)  2021-10: Kidney stone  2018-11: Mild intermittent asthma, unspecified whether complicated  2018-10: Acute pain of right knee  2018-10: Patellar tendon strain, right, subsequent encounter  2017-12: Acute pain of right shoulder  2017-12: S/P right rotator cuff repair  2017-11: Incomplete tear of right rotator cuff  2017-11: Impingement syndrome of shoulder, right  2017-05: Abdominal pain, left upper quadrant  2017-05: Pain of toe of right foot  2017-05: Acute midline low back pain without sciatica  2017-05: Upper back pain  2016-11: Chronic right shoulder pain  2016-01: Shoulder pain, right  2015-07: Chronic pain syndrome  2015-01: Immunization reaction  2014-09: Pain in joint, shoulder region  2014-06: Other postprocedural status  2014-02: Lateral epicondylitis  2013-11: Epicondylitis, lateral humeral  2013-11: Pain in joint, upper arm  2013-11: Major depression in partial remission (H24)  2013-11: Pre-diabetes  2013-11: Major depressive disorder, recurrent episode (H24)  2013-10: Lateral epicondylitis of right elbow  2013-05: Restless leg syndrome  2013-05: Knee bursitis  2013-05: Family history of diabetes mellitus  2013-05: Screen for colon cancer  2013-05: Pure hypercholesterolemia  2013-05: Screening PSA (prostate specific antigen)  2011-05: Mixed hyperlipidemia  2009-12: Adjustment disorder with depressed mood  2007-01: Inguinal hernia  2006-03: Degeneration of lumbar or lumbosacral intervertebral disc      Past Medical History:   Diagnosis Date    Arthritis        Social History     Tobacco Use    Smoking status: Never    Smokeless tobacco: Never   Substance Use Topics    Alcohol use: No           Objective    /86   Pulse 87   Temp 98.7  F (37.1  C) (Oral)   Resp 12   Wt 67.7 kg (149 lb 3.2 oz)   SpO2  96%   BMI 26.64 kg/m    Physical Exam  Constitutional:       Appearance: Normal appearance. He is not ill-appearing, toxic-appearing or diaphoretic.   Cardiovascular:      Rate and Rhythm: Normal rate.   Pulmonary:      Effort: Pulmonary effort is normal.   Musculoskeletal:      Cervical back: Normal.      Thoracic back: Spasms (in location on picture) and tenderness present. No swelling, edema, deformity or bony tenderness.      Lumbar back: Normal.        Back:    Neurological:      Mental Status: He is alert.              CARMEL RICHARD CNP

## 2024-08-26 ENCOUNTER — VIRTUAL VISIT (OUTPATIENT)
Dept: FAMILY MEDICINE | Facility: CLINIC | Age: 59
End: 2024-08-26
Payer: COMMERCIAL

## 2024-08-26 DIAGNOSIS — I10 BENIGN ESSENTIAL HYPERTENSION: ICD-10-CM

## 2024-08-26 DIAGNOSIS — M62.838 MUSCLE SPASM OF RIGHT SHOULDER: ICD-10-CM

## 2024-08-26 DIAGNOSIS — F32.4 MAJOR DEPRESSIVE DISORDER WITH SINGLE EPISODE, IN PARTIAL REMISSION (H): Primary | ICD-10-CM

## 2024-08-26 DIAGNOSIS — I25.10 CORONARY ARTERY DISEASE INVOLVING NATIVE CORONARY ARTERY OF NATIVE HEART WITHOUT ANGINA PECTORIS: ICD-10-CM

## 2024-08-26 DIAGNOSIS — G89.4 CHRONIC PAIN SYNDROME: ICD-10-CM

## 2024-08-26 DIAGNOSIS — M75.101 ROTATOR CUFF SYNDROME, RIGHT: ICD-10-CM

## 2024-08-26 PROCEDURE — 99441 PR PHYSICIAN TELEPHONE EVALUATION 5-10 MIN: CPT | Mod: 93 | Performed by: PHYSICIAN ASSISTANT

## 2024-08-26 RX ORDER — CYCLOBENZAPRINE HCL 5 MG
5 TABLET ORAL 3 TIMES DAILY PRN
Qty: 30 TABLET | Refills: 1 | Status: SHIPPED | OUTPATIENT
Start: 2024-08-26

## 2024-08-26 RX ORDER — BUPROPION HYDROCHLORIDE 150 MG/1
150 TABLET ORAL EVERY MORNING
Qty: 90 TABLET | Refills: 0 | Status: SHIPPED | OUTPATIENT
Start: 2024-08-26

## 2024-08-26 RX ORDER — FLUOXETINE 40 MG/1
80 CAPSULE ORAL DAILY
Qty: 180 CAPSULE | Refills: 1 | Status: SHIPPED | OUTPATIENT
Start: 2024-08-26

## 2024-08-26 RX ORDER — NITROGLYCERIN 0.4 MG/1
0.4 TABLET SUBLINGUAL EVERY 5 MIN PRN
Qty: 25 TABLET | Refills: 0 | Status: SHIPPED | OUTPATIENT
Start: 2024-08-26

## 2024-08-26 RX ORDER — HYDROCODONE BITARTRATE AND ACETAMINOPHEN 7.5; 325 MG/1; MG/1
1 TABLET ORAL EVERY 4 HOURS PRN
Qty: 30 TABLET | Refills: 0 | Status: SHIPPED | OUTPATIENT
Start: 2024-08-26

## 2024-08-26 ASSESSMENT — PATIENT HEALTH QUESTIONNAIRE - PHQ9
10. IF YOU CHECKED OFF ANY PROBLEMS, HOW DIFFICULT HAVE THESE PROBLEMS MADE IT FOR YOU TO DO YOUR WORK, TAKE CARE OF THINGS AT HOME, OR GET ALONG WITH OTHER PEOPLE: SOMEWHAT DIFFICULT
SUM OF ALL RESPONSES TO PHQ QUESTIONS 1-9: 7
SUM OF ALL RESPONSES TO PHQ QUESTIONS 1-9: 7

## 2024-08-26 ASSESSMENT — ASTHMA QUESTIONNAIRES
QUESTION_1 LAST FOUR WEEKS HOW MUCH OF THE TIME DID YOUR ASTHMA KEEP YOU FROM GETTING AS MUCH DONE AT WORK, SCHOOL OR AT HOME: NONE OF THE TIME
QUESTION_2 LAST FOUR WEEKS HOW OFTEN HAVE YOU HAD SHORTNESS OF BREATH: NOT AT ALL
QUESTION_5 LAST FOUR WEEKS HOW WOULD YOU RATE YOUR ASTHMA CONTROL: COMPLETELY CONTROLLED
ACT_TOTALSCORE: 25
QUESTION_4 LAST FOUR WEEKS HOW OFTEN HAVE YOU USED YOUR RESCUE INHALER OR NEBULIZER MEDICATION (SUCH AS ALBUTEROL): NOT AT ALL
ACT_TOTALSCORE: 25
QUESTION_3 LAST FOUR WEEKS HOW OFTEN DID YOUR ASTHMA SYMPTOMS (WHEEZING, COUGHING, SHORTNESS OF BREATH, CHEST TIGHTNESS OR PAIN) WAKE YOU UP AT NIGHT OR EARLIER THAN USUAL IN THE MORNING: NOT AT ALL

## 2024-08-26 NOTE — PROGRESS NOTES
Maverick is a 59 year old who is being evaluated via a billable telephone visit.    What phone number would you like to be contacted at?   How would you like to obtain your AVS? Esme  Originating Location (pt. Location): Home    Distant Location (provider location):  On-site      Subjective   Maverick is a 59 year old, presenting for the following health issues:  Recheck Medication        8/26/2024    12:04 PM   Additional Questions   Roomed by MaryK ay   Accompanied by none     History of Present Illness       Back Pain:  He presents for follow up of back pain. Patient's back pain is a recurring problem.  Location of back pain:  Other  Description of back pain: other  Back pain spreads: nowhere    Since patient first noticed back pain, pain is: gradually improving  Does back pain interfere with his job:  Yes       Reason for visit:  Recheck meds for back, elbow and shoulder pain    He eats 2-3 servings of fruits and vegetables daily.He consumes 3 sweetened beverage(s) daily.He exercises with enough effort to increase his heart rate 10 to 19 minutes per day.  He exercises with enough effort to increase his heart rate 7 days per week.   He is taking medications regularly.     Low back pain flared up lately. History of chronic low back pain and elbow pain.     Noting some depression . Some anhedonia. Not suicidal.     Htn well controlled. No chest pain/sob/palpitations/dizziness/ha's    Review of Systems  Constitutional, HEENT, cardiovascular, pulmonary, GI, , musculoskeletal, neuro, skin, endocrine and psych systems are negative, except as otherwise noted.      Objective           Vitals:  No vitals were obtained today due to virtual visit.    Physical Exam   General: Alert and no distress //Respiratory: No audible wheeze, cough, or shortness of breath // Psychiatric:  Appropriate affect, tone, and pace of words      Mavercik was seen today for recheck medication.    Diagnoses and all orders for this visit:    Major depressive  disorder with single episode, in partial remission (H24)  -     FLUoxetine (PROZAC) 40 MG capsule; Take 2 capsules (80 mg) by mouth daily.  -     buPROPion (WELLBUTRIN XL) 150 MG 24 hr tablet; Take 1 tablet (150 mg) by mouth every morning.    Chronic pain syndrome  -     HYDROcodone-acetaminophen (NORCO) 7.5-325 MG per tablet; Take 1 tablet by mouth every 4 hours as needed for pain. maximum  4  tablet(s) per day    Rotator cuff syndrome, right  -     HYDROcodone-acetaminophen (NORCO) 7.5-325 MG per tablet; Take 1 tablet by mouth every 4 hours as needed for pain. maximum  4  tablet(s) per day    Muscle spasm of right shoulder  -     cyclobenzaprine (FLEXERIL) 5 MG tablet; Take 1 tablet (5 mg) by mouth 3 times daily as needed for muscle spasms.    Benign essential hypertension    Coronary artery disease involving native coronary artery of native heart without angina pectoris  -     nitroGLYcerin (NITROSTAT) 0.4 MG sublingual tablet; Place 1 tablet (0.4 mg) under the tongue every 5 minutes as needed for chest pain.    Other orders  -     REVIEW OF HEALTH MAINTENANCE PROTOCOL ORDERS      Start bupropion.  work on lifestyle modification  Recheck in 4-6 wks.       Phone call duration: 10 minutes  Signed Electronically by: Juan Antonio Pak PA-C

## 2024-11-25 ENCOUNTER — OFFICE VISIT (OUTPATIENT)
Dept: ORTHOPEDICS | Facility: CLINIC | Age: 59
End: 2024-11-25
Payer: COMMERCIAL

## 2024-11-25 VITALS — WEIGHT: 149.5 LBS | RESPIRATION RATE: 16 BRPM | HEIGHT: 63 IN | BODY MASS INDEX: 26.49 KG/M2

## 2024-11-25 DIAGNOSIS — M77.02 MEDIAL EPICONDYLITIS, LEFT ELBOW: Primary | ICD-10-CM

## 2024-11-25 PROCEDURE — 20551 NJX 1 TENDON ORIGIN/INSJ: CPT | Mod: LT | Performed by: ORTHOPAEDIC SURGERY

## 2024-11-25 RX ORDER — TRIAMCINOLONE ACETONIDE 40 MG/ML
40 INJECTION, SUSPENSION INTRA-ARTICULAR; INTRAMUSCULAR
Status: COMPLETED | OUTPATIENT
Start: 2024-11-25 | End: 2024-11-25

## 2024-11-25 RX ORDER — BUPIVACAINE HYDROCHLORIDE 2.5 MG/ML
1 INJECTION, SOLUTION INFILTRATION; PERINEURAL
Status: COMPLETED | OUTPATIENT
Start: 2024-11-25 | End: 2024-11-25

## 2024-11-25 RX ADMIN — TRIAMCINOLONE ACETONIDE 40 MG: 40 INJECTION, SUSPENSION INTRA-ARTICULAR; INTRAMUSCULAR at 08:06

## 2024-11-25 RX ADMIN — BUPIVACAINE HYDROCHLORIDE 1 ML: 2.5 INJECTION, SOLUTION INFILTRATION; PERINEURAL at 08:06

## 2024-11-25 ASSESSMENT — PAIN SCALES - GENERAL: PAINLEVEL_OUTOF10: SEVERE PAIN (6)

## 2024-11-25 NOTE — LETTER
11/25/2024      Giovanni Cedeno  [address]  [place] [address]      Dear Colleague,    Thank you for referring your patient, Giovanni Cedeno, to the [facility] [place]. Please see a copy of my visit note below.    Chief Complaint:   Chief Complaint   Patient presents with     Left Elbow - Pain     Last medial injection: 6/3/24. Injection works good but the pain is back. Pain is medial. He would like another injection today.        HPI: Maverick Cedeno is a 59 year old male , right -hand dominant, who presents for followup evaluation and management of chronic left elbow pain. Last left elbow injection 6/3/2024 for medial epicondylitis, prior to then was 1/2024,. Pain along the inner (medial) elbow, aggravated with activities, work. Treatment with ice, heat.   He'd like repeat left elbow injection today.    We've followed in the past for both elbows, medial and lateral epicondylitis.     Last right elbow injection 3/1/2023 medial epicondyle.    Prior right elbow lateral epicondylitis treated with numerous injections, eventual surgery 5/2014.    He is a .      He reports having moderate-severe  pain/discomfort at the elbow. He denies numbness or tingling.   Pain severity: 6/10 with activities. Not as bad at rest.  Pain quality: aching,sharp  Frequency of symptoms: frequently  Associated symptoms: denies  Aggravated by: bending the elbow, washing his face, lifting  Relieved by: ice, rest.    Prior history of related problems: previous right elbow lateral and medial epicondylitis, left elbow lateral and medial epicondylitis.    Usual level of work activity: heavy  - .    Past medical history:  has a past medical history of Arthritis.   Patient Active Problem List   Diagnosis     Restless leg syndrome     Knee bursitis     Pure hypercholesterolemia     Major depression in partial remission (H)     Pre-diabetes     Epicondylitis, lateral  humeral     Immunization reaction     Chronic pain syndrome     Chronic right shoulder pain     Abdominal pain, left upper quadrant     Pain of toe of right foot     Acute midline low back pain without sciatica     Upper back pain     Mild intermittent asthma, unspecified whether complicated     Incomplete tear of right rotator cuff     Impingement syndrome of shoulder, right     Degeneration of lumbar or lumbosacral intervertebral disc     Adjustment disorder with depressed mood     Mixed hyperlipidemia     Inguinal hernia     Kidney stone     HFrEF (heart failure with reduced ejection fraction) (H)       Past surgical history:  has a past surgical history that includes Combined Cystoscopy, Retrogrades, Ureteroscopy, Laser Holmium Lithotripsy Ureter(S), Insert Stent (Right, 10/15/2021) and Combined Cystoscopy, Retrogrades, Ureteroscopy, Laser Holmium Lithotripsy Ureter(S), Insert Stent (Right, 10/27/2021).     Medications:   Current Outpatient Medications:      ASPIRIN LOW DOSE 81 MG EC tablet, TAKE 1 TABLET(81 MG) BY MOUTH DAILY, Disp: 90 tablet, Rfl: 1     buPROPion (WELLBUTRIN XL) 150 MG 24 hr tablet, Take 1 tablet (150 mg) by mouth every morning., Disp: 90 tablet, Rfl: 0     carbidopa-levodopa (SINEMET)  MG tablet, TAKE 1 TABLET BY MOUTH AT BEDTIME, Disp: 90 tablet, Rfl: 0     carvedilol (COREG) 6.25 MG tablet, Take 1 tablet (6.25 mg) by mouth 2 times daily (with meals), Disp: 180 tablet, Rfl: 1     clotrimazole-betamethasone (LOTRISONE) 1-0.05 % external cream, Apply topically 2 times daily, Disp: 45 g, Rfl: 1     cyclobenzaprine (FLEXERIL) 5 MG tablet, Take 1 tablet (5 mg) by mouth 3 times daily as needed for muscle spasms., Disp: 30 tablet, Rfl: 1     ENTRESTO 24-26 MG per tablet, Take 2 tablets by mouth 2 times daily, Disp: , Rfl:      ezetimibe (ZETIA) 10 MG tablet, Take 1 tablet (10 mg) by mouth daily, Disp: 90 tablet, Rfl: 0     finasteride (PROSCAR) 5 MG tablet, TAKE 1 TABLET BY MOUTH EVERY DAY,  Disp: 90 tablet, Rfl: 2     FLUoxetine (PROZAC) 40 MG capsule, Take 2 capsules (80 mg) by mouth daily., Disp: 180 capsule, Rfl: 1     gabapentin (NEURONTIN) 300 MG capsule, TAKE 2 CAPSULES BY MOUTH EVERY NIGHT AT BEDTIME, Disp: 180 capsule, Rfl: 0     HYDROcodone-acetaminophen (NORCO) 7.5-325 MG per tablet, Take 1 tablet by mouth every 4 hours as needed for pain. maximum  4  tablet(s) per day, Disp: 30 tablet, Rfl: 0     Misc Natural Products (OSTEO BI-FLEX TRIPLE STRENGTH PO), Take 1 tablet by mouth daily, Disp: , Rfl:      nitroGLYcerin (NITROSTAT) 0.4 MG sublingual tablet, Place 1 tablet (0.4 mg) under the tongue every 5 minutes as needed for chest pain. (Patient not taking: Reported on 11/25/2024), Disp: 25 tablet, Rfl: 0     polyethylene glycol (GOLYTELY) 236 g suspension, Take as directed. Two days before your exam fill the first container with water. Cover and shake until mixed well. At 5:00pm drink one 8oz glass every 10-15 minutes until half (1/2) of the first container is empty. Store the remainder in the refrigerator. One day before your exam at 5:00pm drink the second half of the first container until it is gone. Before you go to bed mix the second container with water and put in refrigerator. Six hours before your check in time drink one 8oz glass every 10-15 minutes until half of container is empty. Discard the remainder of solution. (Patient not taking: Reported on 11/25/2024), Disp: 8000 mL, Rfl: 0     rosuvastatin (CRESTOR) 40 MG tablet, Take 40 mg by mouth daily, Disp: , Rfl:      tadalafil (CIALIS) 20 MG tablet, Take 1 tablet (20 mg) by mouth daily as needed, Disp: 10 tablet, Rfl: 3     tamsulosin (FLOMAX) 0.4 MG capsule, TAKE 1 CAPSULE(0.4 MG) BY MOUTH DAILY, Disp: 30 capsule, Rfl: 0     VENTOLIN  (90 Base) MCG/ACT inhaler, INHALE 2 PUFFS INTO THE LUNGS EVERY 6 HOURS AS NEEDED FOR SHORTNESS OF BREATH OR DIFFICULT BREATHING OR WHEEZING, Disp: 18 g, Rfl: 0     warfarin ANTICOAGULANT  "(COUMADIN) 5 MG tablet, Take 5 mg three days a week and 2.5 mg all other days, Disp: , Rfl:      Allergies:   No Known Allergies     Family History: family history includes Diabetes in his father; Heart Disease in his father.     Social History: .  reports that he has never smoked. He has never used smokeless tobacco. He reports that he does not drink alcohol and does not use drugs.    Review of Systems:     Denies numbness, tingling, parasthesias.   Denies headaches.   Denies fevers, chills, night sweats   Denies chest pain.   Denies shortness of breath.   Denies any skin problems, abrasions, rashes, irritation.      Physical Exam  GENERAL APPEARANCE: healthy, alert, no distress. Accompanied by his friend, Geraldo.  SKIN: no suspicious lesions or rashes  RESPIRATORY: No increased work of breathing.  NEURO: Normal strength and tone, mentation intact and speech normal  PSYCH:  mentation appears normal and affect normal. Not anxious.    Ht 1.594 m (5' 2.75\")   Wt 67.8 kg (149 lb 8 oz)   BMI 26.69 kg/m         LEFT UPPER EXTREMITY:    Sensation intact to light touch in median, radial, ulnar and axillary nerve distributions  Palpable 2+ radial pulse, brisk capillary refill to all fingers, wwp  Intact epl fpl fdp edc wrist flexion/extension biceps triceps deltoid    ELBOW:  Skin intact. No open wounds. No skin maceration. Lateral based incision from previous surgery.  Inspection: no swelling, no ecchymosis, no olecranon bursa swelling, no distal bicep tendon defect  Tender: medial epicondyle and common flexor tendon  Non-tender: lateral epicondyle, common extensor tendon, extensor muscle of forearm, flexor muscle of forearm, supracondylar notch, olecranon bursa, distal bicep tendon and radial head/neck  Range of Motion: all normal  Strength: elbow strength full  Special tests: no pain with resisted wrist extension, no pain with resisted middle finger extension, medial elbow pain with resisted " "wrist flexion, supination and pronation   There is no gross deformity in the area.          X-rays: no new images today.      Assessment: 59 year old male , right -hand dominant, with chronic left medial elbow pain, medial epicondylitis.    Plan:   * discussed with patient history and clinical exam consistent with \"golfer's\" elbow, or medial epicondylitis, which is tendonitis at the medial or inner aspect of the elbow. This is where the flexor tendons originate for the wrist.    Treatment includes:  * rest  * activity modification - avoid aggravating activities and reduce strenuous activities for 6-8 weeks  * ice, ice massage  * Physical therapy, modalities as indicated including ultrasound, massage, iontophoresis  * NDAIDS regularly three times daily x2-3 weeks  * cortisone injection discussed, placed at point of maximal tenderness . Patient elects today.  * return to clinic as needed.  .    Hand / Upper Extremity Injection/Arthrocentesis: L elbow    Date/Time: 11/25/2024 8:06 AM    Performed by: Jose G Blanchard MD  Authorized by: Jose G Blanchard MD    Indications:  Pain  Needle Size:  25 G  Guidance: landmark    Approach:  Medial  Condition: epicondylitis, medial      Site:  L elbow  Medications:  40 mg triamcinolone 40 MG/ML; 1 mL BUPivacaine 0.25 %  Aspirate analysis: sent for lab analysis    Outcome:  Tolerated well, no immediate complications  Procedure discussed: discussed risks, benefits, and alternatives    Timeout: timeout called immediately prior to procedure    Prep: patient was prepped and draped in usual sterile fashion          Jose G Blanchard M.D., M.S.  Dept. of Orthopaedic Surgery  [facility]      Again, thank you for allowing me to participate in the care of your patient.        Sincerely,        Jose G Blanchard MD  "

## 2024-11-25 NOTE — PROGRESS NOTES
Chief Complaint:   Chief Complaint   Patient presents with    Left Elbow - Pain     Last medial injection: 6/3/24. Injection works good but the pain is back. Pain is medial. He would like another injection today.        HPI: Maverick Cedeno is a 59 year old male , right -hand dominant, who presents for followup evaluation and management of chronic left elbow pain. Last left elbow injection 6/3/2024 for medial epicondylitis, prior to then was 1/2024,. Pain along the inner (medial) elbow, aggravated with activities, work. Treatment with ice, heat.   He'd like repeat left elbow injection today.    We've followed in the past for both elbows, medial and lateral epicondylitis.     Last right elbow injection 3/1/2023 medial epicondyle.    Prior right elbow lateral epicondylitis treated with numerous injections, eventual surgery 5/2014.    He is a .      He reports having moderate-severe  pain/discomfort at the elbow. He denies numbness or tingling.   Pain severity: 6/10 with activities. Not as bad at rest.  Pain quality: aching,sharp  Frequency of symptoms: frequently  Associated symptoms: denies  Aggravated by: bending the elbow, washing his face, lifting  Relieved by: ice, rest.    Prior history of related problems: previous right elbow lateral and medial epicondylitis, left elbow lateral and medial epicondylitis.    Usual level of work activity: heavy  - .    Past medical history:  has a past medical history of Arthritis.   Patient Active Problem List   Diagnosis    Restless leg syndrome    Knee bursitis    Pure hypercholesterolemia    Major depression in partial remission (H)    Pre-diabetes    Epicondylitis, lateral humeral    Immunization reaction    Chronic pain syndrome    Chronic right shoulder pain    Abdominal pain, left upper quadrant    Pain of toe of right foot    Acute midline low back pain without sciatica    Upper back pain    Mild intermittent asthma, unspecified  whether complicated    Incomplete tear of right rotator cuff    Impingement syndrome of shoulder, right    Degeneration of lumbar or lumbosacral intervertebral disc    Adjustment disorder with depressed mood    Mixed hyperlipidemia    Inguinal hernia    Kidney stone    HFrEF (heart failure with reduced ejection fraction) (H)       Past surgical history:  has a past surgical history that includes Combined Cystoscopy, Retrogrades, Ureteroscopy, Laser Holmium Lithotripsy Ureter(S), Insert Stent (Right, 10/15/2021) and Combined Cystoscopy, Retrogrades, Ureteroscopy, Laser Holmium Lithotripsy Ureter(S), Insert Stent (Right, 10/27/2021).     Medications:   Current Outpatient Medications:     ASPIRIN LOW DOSE 81 MG EC tablet, TAKE 1 TABLET(81 MG) BY MOUTH DAILY, Disp: 90 tablet, Rfl: 1    buPROPion (WELLBUTRIN XL) 150 MG 24 hr tablet, Take 1 tablet (150 mg) by mouth every morning., Disp: 90 tablet, Rfl: 0    carbidopa-levodopa (SINEMET)  MG tablet, TAKE 1 TABLET BY MOUTH AT BEDTIME, Disp: 90 tablet, Rfl: 0    carvedilol (COREG) 6.25 MG tablet, Take 1 tablet (6.25 mg) by mouth 2 times daily (with meals), Disp: 180 tablet, Rfl: 1    clotrimazole-betamethasone (LOTRISONE) 1-0.05 % external cream, Apply topically 2 times daily, Disp: 45 g, Rfl: 1    cyclobenzaprine (FLEXERIL) 5 MG tablet, Take 1 tablet (5 mg) by mouth 3 times daily as needed for muscle spasms., Disp: 30 tablet, Rfl: 1    ENTRESTO 24-26 MG per tablet, Take 2 tablets by mouth 2 times daily, Disp: , Rfl:     ezetimibe (ZETIA) 10 MG tablet, Take 1 tablet (10 mg) by mouth daily, Disp: 90 tablet, Rfl: 0    finasteride (PROSCAR) 5 MG tablet, TAKE 1 TABLET BY MOUTH EVERY DAY, Disp: 90 tablet, Rfl: 2    FLUoxetine (PROZAC) 40 MG capsule, Take 2 capsules (80 mg) by mouth daily., Disp: 180 capsule, Rfl: 1    gabapentin (NEURONTIN) 300 MG capsule, TAKE 2 CAPSULES BY MOUTH EVERY NIGHT AT BEDTIME, Disp: 180 capsule, Rfl: 0    HYDROcodone-acetaminophen (NORCO) 7.5-325  MG per tablet, Take 1 tablet by mouth every 4 hours as needed for pain. maximum  4  tablet(s) per day, Disp: 30 tablet, Rfl: 0    Misc Natural Products (OSTEO BI-FLEX TRIPLE STRENGTH PO), Take 1 tablet by mouth daily, Disp: , Rfl:     nitroGLYcerin (NITROSTAT) 0.4 MG sublingual tablet, Place 1 tablet (0.4 mg) under the tongue every 5 minutes as needed for chest pain. (Patient not taking: Reported on 11/25/2024), Disp: 25 tablet, Rfl: 0    polyethylene glycol (GOLYTELY) 236 g suspension, Take as directed. Two days before your exam fill the first container with water. Cover and shake until mixed well. At 5:00pm drink one 8oz glass every 10-15 minutes until half (1/2) of the first container is empty. Store the remainder in the refrigerator. One day before your exam at 5:00pm drink the second half of the first container until it is gone. Before you go to bed mix the second container with water and put in refrigerator. Six hours before your check in time drink one 8oz glass every 10-15 minutes until half of container is empty. Discard the remainder of solution. (Patient not taking: Reported on 11/25/2024), Disp: 8000 mL, Rfl: 0    rosuvastatin (CRESTOR) 40 MG tablet, Take 40 mg by mouth daily, Disp: , Rfl:     tadalafil (CIALIS) 20 MG tablet, Take 1 tablet (20 mg) by mouth daily as needed, Disp: 10 tablet, Rfl: 3    tamsulosin (FLOMAX) 0.4 MG capsule, TAKE 1 CAPSULE(0.4 MG) BY MOUTH DAILY, Disp: 30 capsule, Rfl: 0    VENTOLIN  (90 Base) MCG/ACT inhaler, INHALE 2 PUFFS INTO THE LUNGS EVERY 6 HOURS AS NEEDED FOR SHORTNESS OF BREATH OR DIFFICULT BREATHING OR WHEEZING, Disp: 18 g, Rfl: 0    warfarin ANTICOAGULANT (COUMADIN) 5 MG tablet, Take 5 mg three days a week and 2.5 mg all other days, Disp: , Rfl:      Allergies:   No Known Allergies     Family History: family history includes Diabetes in his father; Heart Disease in his father.     Social History: .  reports that he has never smoked. He  "has never used smokeless tobacco. He reports that he does not drink alcohol and does not use drugs.    Review of Systems:     Denies numbness, tingling, parasthesias.   Denies headaches.   Denies fevers, chills, night sweats   Denies chest pain.   Denies shortness of breath.   Denies any skin problems, abrasions, rashes, irritation.      Physical Exam  GENERAL APPEARANCE: healthy, alert, no distress. Accompanied by his friend, Geraldo.  SKIN: no suspicious lesions or rashes  RESPIRATORY: No increased work of breathing.  NEURO: Normal strength and tone, mentation intact and speech normal  PSYCH:  mentation appears normal and affect normal. Not anxious.    Ht 1.594 m (5' 2.75\")   Wt 67.8 kg (149 lb 8 oz)   BMI 26.69 kg/m         LEFT UPPER EXTREMITY:    Sensation intact to light touch in median, radial, ulnar and axillary nerve distributions  Palpable 2+ radial pulse, brisk capillary refill to all fingers, wwp  Intact epl fpl fdp edc wrist flexion/extension biceps triceps deltoid    ELBOW:  Skin intact. No open wounds. No skin maceration. Lateral based incision from previous surgery.  Inspection: no swelling, no ecchymosis, no olecranon bursa swelling, no distal bicep tendon defect  Tender: medial epicondyle and common flexor tendon  Non-tender: lateral epicondyle, common extensor tendon, extensor muscle of forearm, flexor muscle of forearm, supracondylar notch, olecranon bursa, distal bicep tendon and radial head/neck  Range of Motion: all normal  Strength: elbow strength full  Special tests: no pain with resisted wrist extension, no pain with resisted middle finger extension, medial elbow pain with resisted wrist flexion, supination and pronation   There is no gross deformity in the area.          X-rays: no new images today.      Assessment: 59 year old male , right -hand dominant, with chronic left medial elbow pain, medial epicondylitis.    Plan:   * discussed with patient history and clinical exam consistent " "with \"golfer's\" elbow, or medial epicondylitis, which is tendonitis at the medial or inner aspect of the elbow. This is where the flexor tendons originate for the wrist.    Treatment includes:  * rest  * activity modification - avoid aggravating activities and reduce strenuous activities for 6-8 weeks  * ice, ice massage  * Physical therapy, modalities as indicated including ultrasound, massage, iontophoresis  * NDAIDS regularly three times daily x2-3 weeks  * cortisone injection discussed, placed at point of maximal tenderness . Patient elects today.  * return to clinic as needed.  .    Hand / Upper Extremity Injection/Arthrocentesis: L elbow    Date/Time: 11/25/2024 8:06 AM    Performed by: Jose G Blanchard MD  Authorized by: Jose G Blanchard MD    Indications:  Pain  Needle Size:  25 G  Guidance: landmark    Approach:  Medial  Condition: epicondylitis, medial      Site:  L elbow  Medications:  40 mg triamcinolone 40 MG/ML; 1 mL BUPivacaine 0.25 %  Aspirate analysis: sent for lab analysis    Outcome:  Tolerated well, no immediate complications  Procedure discussed: discussed risks, benefits, and alternatives    Timeout: timeout called immediately prior to procedure    Prep: patient was prepped and draped in usual sterile fashion          Jose G Blanchard M.D., M.S.  Dept. of Orthopaedic Surgery  [facility]  "

## 2024-12-23 ENCOUNTER — OFFICE VISIT (OUTPATIENT)
Dept: OPHTHALMOLOGY | Facility: CLINIC | Age: 59
End: 2024-12-23
Attending: OPHTHALMOLOGY
Payer: COMMERCIAL

## 2024-12-23 DIAGNOSIS — H53.2 DIPLOPIA: ICD-10-CM

## 2024-12-23 DIAGNOSIS — H50.012 MONOCULAR ESOTROPIA OF LEFT EYE: Primary | ICD-10-CM

## 2024-12-23 PROCEDURE — G0463 HOSPITAL OUTPT CLINIC VISIT: HCPCS | Performed by: OPHTHALMOLOGY

## 2024-12-23 PROCEDURE — 92004 COMPRE OPH EXAM NEW PT 1/>: CPT | Performed by: OPHTHALMOLOGY

## 2024-12-23 PROCEDURE — 92060 SENSORIMOTOR EXAMINATION: CPT | Performed by: OPHTHALMOLOGY

## 2024-12-23 PROCEDURE — 92015 DETERMINE REFRACTIVE STATE: CPT | Performed by: TECHNICIAN/TECHNOLOGIST

## 2024-12-23 ASSESSMENT — CONF VISUAL FIELD
OD_INFERIOR_NASAL_RESTRICTION: 0
OS_SUPERIOR_NASAL_RESTRICTION: 0
OS_NORMAL: 1
OS_INFERIOR_NASAL_RESTRICTION: 0
OD_SUPERIOR_NASAL_RESTRICTION: 0
OD_NORMAL: 1
OD_INFERIOR_TEMPORAL_RESTRICTION: 0
METHOD: TOYS
OS_INFERIOR_TEMPORAL_RESTRICTION: 0
OD_SUPERIOR_TEMPORAL_RESTRICTION: 0
OS_SUPERIOR_TEMPORAL_RESTRICTION: 0

## 2024-12-23 ASSESSMENT — REFRACTION_MANIFEST
OS_AXIS: 180
OS_ADD: +2.50
OS_CYLINDER: +1.00
OD_ADD: +2.50
OS_SPHERE: -0.25
OD_AXIS: 180
OD_CYLINDER: +0.50
OD_SPHERE: -0.25

## 2024-12-23 ASSESSMENT — REFRACTION
OS_SPHERE: -0.25
OS_AXIS: 180
OD_CYLINDER: +0.50
OS_CYLINDER: +1.00
OD_SPHERE: -0.25
OD_AXIS: 180

## 2024-12-23 ASSESSMENT — TONOMETRY
IOP_METHOD: SINGLE ICARE
OD_IOP_MMHG: 18
OS_IOP_MMHG: 17

## 2024-12-23 ASSESSMENT — CUP TO DISC RATIO
OS_RATIO: 0.25
OD_RATIO: 0.25

## 2024-12-23 ASSESSMENT — VISUAL ACUITY
OS_SC: 20/20
METHOD: SNELLEN - LINEAR
OS_SC+: -
OD_SC: 20/20

## 2024-12-23 ASSESSMENT — EXTERNAL EXAM - LEFT EYE: OS_EXAM: NORMAL

## 2024-12-23 ASSESSMENT — EXTERNAL EXAM - RIGHT EYE: OD_EXAM: NORMAL

## 2024-12-23 NOTE — LETTER
"12/23/2024       RE: Giovanni Cedeno  [address] [address], [place], [facility]  [place] [id number]     Dear Colleague,    Thank you for referring your patient, Giovanni Cedeno, to the [facility] at [facility]. Please see a copy of my visit note below.    Chief Complaint(s) and History of Present Illness(es)       Diplopia Evaluation    In both eyes.  Disease is new.  Duration of 4 months.  Occurring intermittently. Additional comments: Noticing diplopia at night while driving for the past 3-4 months, improves with squinting, no recent head trama or illness, no strab noticed, no h/o eye surgery, previously wore gls for distance/near, has felt gls were never \"right\" for the past few years, no h/o thyroid or diabetes               Comments    Inf pt  Mom h/o macular degeneration, grandmother h/o glaucoma      Previously seen by [facility] in [place]; seen for glasses. Did not feel that the glasses helped as much as needed.                Review of systems for the eyes was negative other than the pertinent positives and negatives noted in the HPI.    History is obtained from the patient.       Primary care: Juan Antonio Pak   Referring provider: Referred Self  [place] is home  Assessment & Plan   Giovanni Cedeno is a 59 year old male who presents with:     Monocular esotropia of left eye  Diplopia  Maverick presents with divergence insufficiency esotropia pattern with esophoria of 6pd at near and 12 at distance with some incommitance in side gazes. While he complains of diplopia, he has only monocular diplopia left eye today and does not provide a history of binocular diplopia. He does have binocular diplopia when overcorrected for his esotropia.  - Recommend glasses wear given resolution of monocular diplopia with manifest refraction today.   - Maverick requests close follow up to determine any need for treatment for binocular diplopia/strabismus and has in the " past not tolerated glasses well. I will be going on leave within 1 month and recommend follow up with my great adult strabismus colleagues for further management.   - With the lack of neurologic signs/symptoms and age >60 will hold off on any neuroimaging.   - Recommend trying at home closing each eye when having double vision and noting if closing the right, left or either eye resolves the double vision.       Return for 4-8 weeks with Dr. Bray or Dr. Ram for adult strabismus follow-up.    Patient Instructions   Trial of glasses. Please also check if covering the right eye also resolves the double when you have it or only relieved with covering the left eye.     Recommend follow up with Dr. Ram or Dr. Bray for double vision management.    Visit Diagnoses & Orders    ICD-10-CM    1. Monocular esotropia of left eye  H50.012 Sensorimotor      2. Diplopia  H53.2          Attending Physician Attestation:  Complete documentation of historical and exam elements from today's encounter can be found in the full encounter summary report (not reduplicated in this progress note).  I personally obtained the chief complaint(s) and history of present illness.  I confirmed and edited as necessary the review of systems, past medical/surgical history, family history, social history, and examination findings as documented by others; and I examined the patient myself.  I personally reviewed the relevant tests, images, and reports as documented above.  I formulated and edited as necessary the assessment and plan and discussed the findings and management plan with the patient and family. - Angela Bar MD              Again, thank you for allowing me to participate in the care of your patient.      Sincerely,    Angela Bar MD

## 2024-12-23 NOTE — PROGRESS NOTES
"Chief Complaint(s) and History of Present Illness(es)       Diplopia Evaluation    In both eyes.  Disease is new.  Duration of 4 months.  Occurring intermittently. Additional comments: Noticing diplopia at night while driving for the past 3-4 months, improves with squinting, no recent head trama or illness, no strab noticed, no h/o eye surgery, previously wore gls for distance/near, has felt gls were never \"right\" for the past few years, no h/o thyroid or diabetes               Comments    Inf pt  Mom h/o macular degeneration, grandmother h/o glaucoma      Previously seen by [facility] in [place]; seen for glasses. Did not feel that the glasses helped as much as needed.                Review of systems for the eyes was negative other than the pertinent positives and negatives noted in the HPI.    History is obtained from the patient.       Primary care: Juan Antonio Pak   Referring provider: Referred Self  [place] is home  Assessment & Plan   Giovanni Cedeno is a 59 year old male who presents with:     Monocular esotropia of left eye  Diplopia  Maverick presents with divergence insufficiency esotropia pattern with esophoria of 6pd at near and 12 at distance with some incommitance in side gazes. While he complains of diplopia, he has only monocular diplopia left eye today and does not provide a history of binocular diplopia. He does have binocular diplopia when overcorrected for his esotropia.  - Recommend glasses wear given resolution of monocular diplopia with manifest refraction today.   - Maverick requests close follow up to determine any need for treatment for binocular diplopia/strabismus and has in the past not tolerated glasses well. I will be going on leave within 1 month and recommend follow up with my great adult strabismus colleagues for further management.   - With the lack of neurologic signs/symptoms and age >60 will hold off on any neuroimaging.   - Recommend trying at home closing each eye when having " double vision and noting if closing the right, left or either eye resolves the double vision.       Return for 4-8 weeks with Dr. Bray or Dr. Ram for adult strabismus follow-up.    Patient Instructions   Trial of glasses. Please also check if covering the right eye also resolves the double when you have it or only relieved with covering the left eye.     Recommend follow up with Dr. Ram or Dr. Bray for double vision management.    Visit Diagnoses & Orders    ICD-10-CM    1. Monocular esotropia of left eye  H50.012 Sensorimotor      2. Diplopia  H53.2          Attending Physician Attestation:  Complete documentation of historical and exam elements from today's encounter can be found in the full encounter summary report (not reduplicated in this progress note).  I personally obtained the chief complaint(s) and history of present illness.  I confirmed and edited as necessary the review of systems, past medical/surgical history, family history, social history, and examination findings as documented by others; and I examined the patient myself.  I personally reviewed the relevant tests, images, and reports as documented above.  I formulated and edited as necessary the assessment and plan and discussed the findings and management plan with the patient and family. - Angela Bar MD

## 2024-12-23 NOTE — PATIENT INSTRUCTIONS
Trial of glasses. Please also check if covering the right eye also resolves the double when you have it or only relieved with covering the left eye.     Recommend follow up with Dr. Ram or Dr. Bray for double vision management.

## 2024-12-23 NOTE — NURSING NOTE
"Chief Complaint(s) and History of Present Illness(es)       Diplopia Evaluation              Laterality: both eyes    Onset: new    Duration: 4 months    Frequency: intermittently    Comments: Noticing diplopia at night while driving for the past 3-4 months, improves with squinting, no recent head trama or illness, no strab noticed, no h/o eye surgery, previously wore gls for distance/near, has felt gls were never \"right\" for the past few years, no h/o thyroid or diabetes                Comments    Inf pt  Mom h/o macular degeneration, grandmother h/o glaucoma    Previously seen by [facility] in [place]                    "

## (undated) DEVICE — SUCTION WATERBUG FLOOR PUDDLE VAC 9321

## (undated) DEVICE — SUCTION MANIFOLD NEPTUNE 2 SYS 4 PORT 0702-020-000

## (undated) DEVICE — LINEN TOWEL PACK X5 5464

## (undated) DEVICE — LINEN GOWN X4 5410

## (undated) DEVICE — SOL NACL 0.9% IRRIG 1000ML BOTTLE 2F7124

## (undated) DEVICE — BASKET NITINOL TIPLESS HALO  1.5FRX120CM 554120

## (undated) DEVICE — GUIDEWIRE SENSOR DUAL FLEX STR 0.035"X150CM M0066703080

## (undated) DEVICE — DRAPE C-ARM W/STRAPS 42X72" 07-CA104

## (undated) DEVICE — Device

## (undated) DEVICE — GOWN IMPERVIOUS SPECIALTY XLG/XLONG 32474

## (undated) DEVICE — CATH URETERAL OPEN END 5FRX70CM M0064002010

## (undated) DEVICE — PAD CHUX UNDERPAD 30X36" P3036C

## (undated) DEVICE — SPECIMEN CONTAINER 5OZ STERILE 2600SA

## (undated) DEVICE — TUBING SET THERMEDX UROLOGY SGL USE LL0006

## (undated) DEVICE — GLOVE PROTEXIS W/NEU-THERA 7.5  2D73TE75

## (undated) DEVICE — SOL NACL 0.9% IRRIG 3000ML BAG 2B7477

## (undated) DEVICE — KIT ENDO FIRST STEP DISINFECTANT 200ML W/POUCH EP-4

## (undated) DEVICE — CATH URETERAL DUAL LUMEN 10FRX54CM M0064051000

## (undated) DEVICE — ADAPTER SCOPE UROLOK II LF M0067301400

## (undated) DEVICE — RAD RX CONRAY 60% (50ML) CHARGE PER ML

## (undated) DEVICE — SOL WATER IRRIG 1000ML BOTTLE 2F7114

## (undated) DEVICE — PAD CHUX UNDERPAD 30X30"

## (undated) DEVICE — PACK CYSTO CUSTOM ASC

## (undated) DEVICE — SYR 30ML LL W/O NDL 302832

## (undated) RX ORDER — ACETAMINOPHEN 325 MG/1
TABLET ORAL
Status: DISPENSED
Start: 2021-10-27

## (undated) RX ORDER — FENTANYL CITRATE 50 UG/ML
INJECTION, SOLUTION INTRAMUSCULAR; INTRAVENOUS
Status: DISPENSED
Start: 2021-10-15

## (undated) RX ORDER — PROPOFOL 10 MG/ML
INJECTION, EMULSION INTRAVENOUS
Status: DISPENSED
Start: 2021-10-15

## (undated) RX ORDER — PROPOFOL 10 MG/ML
INJECTION, EMULSION INTRAVENOUS
Status: DISPENSED
Start: 2021-10-27

## (undated) RX ORDER — GABAPENTIN 300 MG/1
CAPSULE ORAL
Status: DISPENSED
Start: 2021-10-27

## (undated) RX ORDER — LIDOCAINE HYDROCHLORIDE 20 MG/ML
INJECTION, SOLUTION EPIDURAL; INFILTRATION; INTRACAUDAL; PERINEURAL
Status: DISPENSED
Start: 2021-10-27

## (undated) RX ORDER — DEXAMETHASONE SODIUM PHOSPHATE 4 MG/ML
INJECTION, SOLUTION INTRA-ARTICULAR; INTRALESIONAL; INTRAMUSCULAR; INTRAVENOUS; SOFT TISSUE
Status: DISPENSED
Start: 2021-10-15

## (undated) RX ORDER — FENTANYL CITRATE 50 UG/ML
INJECTION, SOLUTION INTRAMUSCULAR; INTRAVENOUS
Status: DISPENSED
Start: 2021-10-27

## (undated) RX ORDER — HYDROMORPHONE HCL IN WATER/PF 6 MG/30 ML
PATIENT CONTROLLED ANALGESIA SYRINGE INTRAVENOUS
Status: DISPENSED
Start: 2021-10-15

## (undated) RX ORDER — ONDANSETRON 2 MG/ML
INJECTION INTRAMUSCULAR; INTRAVENOUS
Status: DISPENSED
Start: 2021-10-15

## (undated) RX ORDER — ROCURONIUM BROMIDE 50 MG/5 ML
SYRINGE (ML) INTRAVENOUS
Status: DISPENSED
Start: 2021-10-15

## (undated) RX ORDER — HYDROMORPHONE HYDROCHLORIDE 1 MG/ML
INJECTION, SOLUTION INTRAMUSCULAR; INTRAVENOUS; SUBCUTANEOUS
Status: DISPENSED
Start: 2021-10-15

## (undated) RX ORDER — CEFAZOLIN SODIUM 2 G/100ML
INJECTION, SOLUTION INTRAVENOUS
Status: DISPENSED
Start: 2021-10-15

## (undated) RX ORDER — LIDOCAINE HYDROCHLORIDE 20 MG/ML
INJECTION, SOLUTION EPIDURAL; INFILTRATION; INTRACAUDAL; PERINEURAL
Status: DISPENSED
Start: 2021-10-15

## (undated) RX ORDER — ACETAMINOPHEN 325 MG/1
TABLET ORAL
Status: DISPENSED
Start: 2021-10-15

## (undated) RX ORDER — KETOROLAC TROMETHAMINE 30 MG/ML
INJECTION, SOLUTION INTRAMUSCULAR; INTRAVENOUS
Status: DISPENSED
Start: 2021-10-15

## (undated) RX ORDER — LIDOCAINE HYDROCHLORIDE 20 MG/ML
JELLY TOPICAL
Status: DISPENSED
Start: 2021-11-09

## (undated) RX ORDER — GLYCOPYRROLATE 0.2 MG/ML
INJECTION INTRAMUSCULAR; INTRAVENOUS
Status: DISPENSED
Start: 2021-10-27

## (undated) RX ORDER — CIPROFLOXACIN 500 MG/1
TABLET, FILM COATED ORAL
Status: DISPENSED
Start: 2021-11-09

## (undated) RX ORDER — OXYCODONE HYDROCHLORIDE 5 MG/1
TABLET ORAL
Status: DISPENSED
Start: 2021-10-27

## (undated) RX ORDER — FENTANYL CITRATE-0.9 % NACL/PF 10 MCG/ML
PLASTIC BAG, INJECTION (ML) INTRAVENOUS
Status: DISPENSED
Start: 2021-10-27

## (undated) RX ORDER — OXYCODONE HYDROCHLORIDE 5 MG/1
TABLET ORAL
Status: DISPENSED
Start: 2021-10-15

## (undated) RX ORDER — CEFAZOLIN SODIUM 1 G/3ML
INJECTION, POWDER, FOR SOLUTION INTRAMUSCULAR; INTRAVENOUS
Status: DISPENSED
Start: 2021-10-27